# Patient Record
Sex: MALE | Race: WHITE | NOT HISPANIC OR LATINO | Employment: OTHER
[De-identification: names, ages, dates, MRNs, and addresses within clinical notes are randomized per-mention and may not be internally consistent; named-entity substitution may affect disease eponyms.]

---

## 2017-02-17 ENCOUNTER — RECORDS - HEALTHEAST (OUTPATIENT)
Dept: ADMINISTRATIVE | Facility: OTHER | Age: 82
End: 2017-02-17

## 2017-03-10 ENCOUNTER — OFFICE VISIT - HEALTHEAST (OUTPATIENT)
Dept: INTERNAL MEDICINE | Facility: CLINIC | Age: 82
End: 2017-03-10

## 2017-03-10 DIAGNOSIS — Z00.00 HEALTHCARE MAINTENANCE: ICD-10-CM

## 2017-03-10 DIAGNOSIS — Z13.220 LIPID SCREENING: ICD-10-CM

## 2017-03-10 DIAGNOSIS — N20.0 NEPHROLITHIASIS: ICD-10-CM

## 2017-03-10 LAB
CHOLEST SERPL-MCNC: 217 MG/DL
FASTING STATUS PATIENT QL REPORTED: YES
HDLC SERPL-MCNC: 66 MG/DL
LDLC SERPL CALC-MCNC: 139 MG/DL
TRIGL SERPL-MCNC: 61 MG/DL

## 2017-03-10 ASSESSMENT — MIFFLIN-ST. JEOR: SCORE: 1329.85

## 2017-03-11 ENCOUNTER — COMMUNICATION - HEALTHEAST (OUTPATIENT)
Dept: INTERNAL MEDICINE | Facility: CLINIC | Age: 82
End: 2017-03-11

## 2017-04-19 ENCOUNTER — OFFICE VISIT - HEALTHEAST (OUTPATIENT)
Dept: INTERNAL MEDICINE | Facility: CLINIC | Age: 82
End: 2017-04-19

## 2017-04-19 DIAGNOSIS — M54.9 BACK PAIN: ICD-10-CM

## 2017-04-19 ASSESSMENT — MIFFLIN-ST. JEOR: SCORE: 1327.13

## 2017-05-01 ENCOUNTER — HOSPITAL ENCOUNTER (OUTPATIENT)
Dept: MRI IMAGING | Facility: HOSPITAL | Age: 82
Discharge: HOME | End: 2017-05-01
Attending: INTERNAL MEDICINE

## 2017-05-01 DIAGNOSIS — M54.9 BACK PAIN: ICD-10-CM

## 2017-05-02 ENCOUNTER — COMMUNICATION - HEALTHEAST (OUTPATIENT)
Dept: INTERNAL MEDICINE | Facility: CLINIC | Age: 82
End: 2017-05-02

## 2017-06-01 ENCOUNTER — OFFICE VISIT - HEALTHEAST (OUTPATIENT)
Dept: FAMILY MEDICINE | Facility: CLINIC | Age: 82
End: 2017-06-01

## 2017-06-01 ENCOUNTER — HOSPITAL ENCOUNTER (OUTPATIENT)
Dept: ULTRASOUND IMAGING | Facility: HOSPITAL | Age: 82
Discharge: HOME | End: 2017-06-01
Attending: FAMILY MEDICINE

## 2017-06-01 DIAGNOSIS — M79.89 LEFT LEG SWELLING: ICD-10-CM

## 2017-06-01 DIAGNOSIS — S80.812A LEG ABRASION, LEFT, INITIAL ENCOUNTER: ICD-10-CM

## 2017-06-05 ENCOUNTER — OFFICE VISIT - HEALTHEAST (OUTPATIENT)
Dept: FAMILY MEDICINE | Facility: CLINIC | Age: 82
End: 2017-06-05

## 2017-06-05 DIAGNOSIS — L03.116 LEFT LEG CELLULITIS: ICD-10-CM

## 2017-06-05 ASSESSMENT — MIFFLIN-ST. JEOR: SCORE: 1341.18

## 2017-06-08 ENCOUNTER — RECORDS - HEALTHEAST (OUTPATIENT)
Dept: GENERAL RADIOLOGY | Facility: CLINIC | Age: 82
End: 2017-06-08

## 2017-06-08 ENCOUNTER — OFFICE VISIT - HEALTHEAST (OUTPATIENT)
Dept: INTERNAL MEDICINE | Facility: CLINIC | Age: 82
End: 2017-06-08

## 2017-06-08 ENCOUNTER — COMMUNICATION - HEALTHEAST (OUTPATIENT)
Dept: INTERNAL MEDICINE | Facility: CLINIC | Age: 82
End: 2017-06-08

## 2017-06-08 DIAGNOSIS — S93.409A ANKLE SPRAIN: ICD-10-CM

## 2017-06-08 DIAGNOSIS — S81.802A WOUND OF LEFT LEG: ICD-10-CM

## 2017-06-08 DIAGNOSIS — S93.409A SPRAIN OF UNSPECIFIED LIGAMENT OF UNSPECIFIED ANKLE, INITIAL ENCOUNTER: ICD-10-CM

## 2017-06-08 ASSESSMENT — MIFFLIN-ST. JEOR: SCORE: 1333.47

## 2017-06-12 ENCOUNTER — OFFICE VISIT - HEALTHEAST (OUTPATIENT)
Dept: VASCULAR SURGERY | Facility: CLINIC | Age: 82
End: 2017-06-12

## 2017-06-12 DIAGNOSIS — L97.809: ICD-10-CM

## 2017-06-12 DIAGNOSIS — L97.922 LEG ULCER, LEFT, WITH FAT LAYER EXPOSED (H): ICD-10-CM

## 2017-06-12 ASSESSMENT — MIFFLIN-ST. JEOR: SCORE: 1332.11

## 2017-06-14 ENCOUNTER — COMMUNICATION - HEALTHEAST (OUTPATIENT)
Dept: VASCULAR SURGERY | Facility: CLINIC | Age: 82
End: 2017-06-14

## 2017-06-22 ENCOUNTER — OFFICE VISIT - HEALTHEAST (OUTPATIENT)
Dept: VASCULAR SURGERY | Facility: CLINIC | Age: 82
End: 2017-06-22

## 2017-06-22 DIAGNOSIS — L97.821: ICD-10-CM

## 2017-06-22 DIAGNOSIS — L97.921 LEG ULCER, LEFT, LIMITED TO BREAKDOWN OF SKIN (H): ICD-10-CM

## 2017-07-12 ENCOUNTER — COMMUNICATION - HEALTHEAST (OUTPATIENT)
Dept: INTERNAL MEDICINE | Facility: CLINIC | Age: 82
End: 2017-07-12

## 2017-07-27 ENCOUNTER — OFFICE VISIT - HEALTHEAST (OUTPATIENT)
Dept: VASCULAR SURGERY | Facility: CLINIC | Age: 82
End: 2017-07-27

## 2017-07-27 DIAGNOSIS — L97.921 LEG ULCER, LEFT, LIMITED TO BREAKDOWN OF SKIN (H): ICD-10-CM

## 2017-11-09 ENCOUNTER — RECORDS - HEALTHEAST (OUTPATIENT)
Dept: ADMINISTRATIVE | Facility: OTHER | Age: 82
End: 2017-11-09

## 2018-02-13 ENCOUNTER — RECORDS - HEALTHEAST (OUTPATIENT)
Dept: ADMINISTRATIVE | Facility: OTHER | Age: 83
End: 2018-02-13

## 2018-03-22 ENCOUNTER — OFFICE VISIT - HEALTHEAST (OUTPATIENT)
Dept: INTERNAL MEDICINE | Facility: CLINIC | Age: 83
End: 2018-03-22

## 2018-03-22 DIAGNOSIS — R42 VERTIGO: ICD-10-CM

## 2018-03-22 DIAGNOSIS — E78.5 HYPERLIPEMIA: ICD-10-CM

## 2018-03-22 LAB
ALBUMIN SERPL-MCNC: 3.5 G/DL (ref 3.5–5)
ALP SERPL-CCNC: 175 U/L (ref 45–120)
ALT SERPL W P-5'-P-CCNC: 27 U/L (ref 0–45)
ANION GAP SERPL CALCULATED.3IONS-SCNC: 7 MMOL/L (ref 5–18)
AST SERPL W P-5'-P-CCNC: 26 U/L (ref 0–40)
BILIRUB SERPL-MCNC: 0.6 MG/DL (ref 0–1)
BUN SERPL-MCNC: 23 MG/DL (ref 8–28)
CALCIUM SERPL-MCNC: 9.3 MG/DL (ref 8.5–10.5)
CHLORIDE BLD-SCNC: 107 MMOL/L (ref 98–107)
CHOLEST SERPL-MCNC: 185 MG/DL
CO2 SERPL-SCNC: 28 MMOL/L (ref 22–31)
CREAT SERPL-MCNC: 0.86 MG/DL (ref 0.7–1.3)
ERYTHROCYTE [DISTWIDTH] IN BLOOD BY AUTOMATED COUNT: 12.8 % (ref 11–14.5)
FASTING STATUS PATIENT QL REPORTED: YES
GFR SERPL CREATININE-BSD FRML MDRD: >60 ML/MIN/1.73M2
GLUCOSE BLD-MCNC: 87 MG/DL (ref 70–125)
HCT VFR BLD AUTO: 41.7 % (ref 40–54)
HDLC SERPL-MCNC: 64 MG/DL
HGB BLD-MCNC: 14.2 G/DL (ref 14–18)
LDLC SERPL CALC-MCNC: 108 MG/DL
MCH RBC QN AUTO: 31.4 PG (ref 27–34)
MCHC RBC AUTO-ENTMCNC: 34.1 G/DL (ref 32–36)
MCV RBC AUTO: 92 FL (ref 80–100)
PLATELET # BLD AUTO: 171 THOU/UL (ref 140–440)
PMV BLD AUTO: 7.4 FL (ref 7–10)
POTASSIUM BLD-SCNC: 4.9 MMOL/L (ref 3.5–5)
PROT SERPL-MCNC: 7.3 G/DL (ref 6–8)
RBC # BLD AUTO: 4.53 MILL/UL (ref 4.4–6.2)
SODIUM SERPL-SCNC: 142 MMOL/L (ref 136–145)
TRIGL SERPL-MCNC: 66 MG/DL
TSH SERPL DL<=0.005 MIU/L-ACNC: 0.12 UIU/ML (ref 0.3–5)
WBC: 2.7 THOU/UL (ref 4–11)

## 2018-03-22 ASSESSMENT — MIFFLIN-ST. JEOR: SCORE: 1323.49

## 2018-03-23 ENCOUNTER — COMMUNICATION - HEALTHEAST (OUTPATIENT)
Dept: INTERNAL MEDICINE | Facility: CLINIC | Age: 83
End: 2018-03-23

## 2018-03-27 ENCOUNTER — OFFICE VISIT - HEALTHEAST (OUTPATIENT)
Dept: OCCUPATIONAL THERAPY | Facility: REHABILITATION | Age: 83
End: 2018-03-27

## 2018-03-27 DIAGNOSIS — R11.0 NAUSEA: ICD-10-CM

## 2018-03-27 DIAGNOSIS — R42 DIZZINESS: ICD-10-CM

## 2018-03-27 DIAGNOSIS — R26.81 UNSTEADINESS ON FEET: ICD-10-CM

## 2018-03-28 ENCOUNTER — COMMUNICATION - HEALTHEAST (OUTPATIENT)
Dept: INTERNAL MEDICINE | Facility: CLINIC | Age: 83
End: 2018-03-28

## 2018-04-02 ENCOUNTER — RECORDS - HEALTHEAST (OUTPATIENT)
Dept: ADMINISTRATIVE | Facility: OTHER | Age: 83
End: 2018-04-02

## 2018-04-12 ENCOUNTER — OFFICE VISIT - HEALTHEAST (OUTPATIENT)
Dept: OCCUPATIONAL THERAPY | Facility: REHABILITATION | Age: 83
End: 2018-04-12

## 2018-04-12 DIAGNOSIS — R42 DIZZINESS: ICD-10-CM

## 2018-04-12 DIAGNOSIS — R11.0 NAUSEA: ICD-10-CM

## 2018-04-12 DIAGNOSIS — R26.81 UNSTEADINESS ON FEET: ICD-10-CM

## 2018-05-02 ENCOUNTER — OFFICE VISIT - HEALTHEAST (OUTPATIENT)
Dept: PHYSICAL THERAPY | Facility: REHABILITATION | Age: 83
End: 2018-05-02

## 2018-05-02 DIAGNOSIS — R26.81 UNSTEADINESS ON FEET: ICD-10-CM

## 2018-05-02 DIAGNOSIS — Z91.81 HISTORY OF FALL WITHIN PAST 90 DAYS: ICD-10-CM

## 2018-05-16 ENCOUNTER — OFFICE VISIT - HEALTHEAST (OUTPATIENT)
Dept: PHYSICAL THERAPY | Facility: REHABILITATION | Age: 83
End: 2018-05-16

## 2018-05-16 DIAGNOSIS — R26.81 UNSTEADINESS ON FEET: ICD-10-CM

## 2018-05-16 DIAGNOSIS — Z91.81 HISTORY OF FALL WITHIN PAST 90 DAYS: ICD-10-CM

## 2018-05-23 ENCOUNTER — OFFICE VISIT - HEALTHEAST (OUTPATIENT)
Dept: PHYSICAL THERAPY | Facility: REHABILITATION | Age: 83
End: 2018-05-23

## 2018-05-23 DIAGNOSIS — R26.81 UNSTEADINESS ON FEET: ICD-10-CM

## 2018-05-23 DIAGNOSIS — Z91.81 HISTORY OF FALL WITHIN PAST 90 DAYS: ICD-10-CM

## 2018-06-06 ENCOUNTER — OFFICE VISIT - HEALTHEAST (OUTPATIENT)
Dept: PHYSICAL THERAPY | Facility: REHABILITATION | Age: 83
End: 2018-06-06

## 2018-06-06 DIAGNOSIS — R26.81 UNSTEADINESS ON FEET: ICD-10-CM

## 2018-06-06 DIAGNOSIS — Z91.81 HISTORY OF FALL WITHIN PAST 90 DAYS: ICD-10-CM

## 2018-06-20 ENCOUNTER — OFFICE VISIT - HEALTHEAST (OUTPATIENT)
Dept: PHYSICAL THERAPY | Facility: REHABILITATION | Age: 83
End: 2018-06-20

## 2018-06-20 DIAGNOSIS — R26.81 UNSTEADINESS ON FEET: ICD-10-CM

## 2018-06-20 DIAGNOSIS — Z91.81 HISTORY OF FALL WITHIN PAST 90 DAYS: ICD-10-CM

## 2018-07-11 ENCOUNTER — OFFICE VISIT - HEALTHEAST (OUTPATIENT)
Dept: AUDIOLOGY | Facility: CLINIC | Age: 83
End: 2018-07-11

## 2018-07-11 ENCOUNTER — OFFICE VISIT - HEALTHEAST (OUTPATIENT)
Dept: PHYSICAL THERAPY | Facility: REHABILITATION | Age: 83
End: 2018-07-11

## 2018-07-11 DIAGNOSIS — H90.3 SENSORINEURAL HEARING LOSS, BILATERAL: ICD-10-CM

## 2018-07-11 DIAGNOSIS — R26.81 UNSTEADINESS ON FEET: ICD-10-CM

## 2018-07-11 DIAGNOSIS — H93.8X2 PLUGGED FEELING IN EAR, LEFT: ICD-10-CM

## 2018-07-11 DIAGNOSIS — Z91.81 HISTORY OF FALL WITHIN PAST 90 DAYS: ICD-10-CM

## 2018-07-12 ENCOUNTER — COMMUNICATION - HEALTHEAST (OUTPATIENT)
Dept: ADMINISTRATIVE | Facility: CLINIC | Age: 83
End: 2018-07-12

## 2018-07-12 ENCOUNTER — COMMUNICATION - HEALTHEAST (OUTPATIENT)
Dept: INTERNAL MEDICINE | Facility: CLINIC | Age: 83
End: 2018-07-12

## 2018-07-12 DIAGNOSIS — E03.9 HYPOTHYROID: ICD-10-CM

## 2018-08-01 ENCOUNTER — OFFICE VISIT - HEALTHEAST (OUTPATIENT)
Dept: PHYSICAL THERAPY | Facility: REHABILITATION | Age: 83
End: 2018-08-01

## 2018-08-01 DIAGNOSIS — R26.81 UNSTEADINESS ON FEET: ICD-10-CM

## 2018-08-01 DIAGNOSIS — Z91.81 HISTORY OF FALL WITHIN PAST 90 DAYS: ICD-10-CM

## 2018-08-08 ENCOUNTER — OFFICE VISIT - HEALTHEAST (OUTPATIENT)
Dept: INTERNAL MEDICINE | Facility: CLINIC | Age: 83
End: 2018-08-08

## 2018-08-08 DIAGNOSIS — R53.82 CHRONIC FATIGUE: ICD-10-CM

## 2018-08-08 DIAGNOSIS — H61.23 BILATERAL IMPACTED CERUMEN: ICD-10-CM

## 2018-08-08 DIAGNOSIS — E03.9 ACQUIRED HYPOTHYROIDISM: ICD-10-CM

## 2018-10-25 ENCOUNTER — OFFICE VISIT - HEALTHEAST (OUTPATIENT)
Dept: INTERNAL MEDICINE | Facility: CLINIC | Age: 83
End: 2018-10-25

## 2018-10-25 DIAGNOSIS — Z98.49: ICD-10-CM

## 2018-10-25 DIAGNOSIS — R53.83 FATIGUE: ICD-10-CM

## 2018-10-25 DIAGNOSIS — Z01.818 PRE-OPERATIVE EXAMINATION: ICD-10-CM

## 2018-10-25 DIAGNOSIS — D72.819 LEUKOPENIA: ICD-10-CM

## 2018-10-25 LAB
ANION GAP SERPL CALCULATED.3IONS-SCNC: 7 MMOL/L (ref 5–18)
ATRIAL RATE - MUSE: 54 BPM
BASOPHILS # BLD AUTO: 0 THOU/UL (ref 0–0.2)
BASOPHILS NFR BLD AUTO: 0 % (ref 0–2)
BUN SERPL-MCNC: 21 MG/DL (ref 8–28)
CALCIUM SERPL-MCNC: 9.4 MG/DL (ref 8.5–10.5)
CHLORIDE BLD-SCNC: 104 MMOL/L (ref 98–107)
CO2 SERPL-SCNC: 28 MMOL/L (ref 22–31)
CREAT SERPL-MCNC: 0.94 MG/DL (ref 0.7–1.3)
DIASTOLIC BLOOD PRESSURE - MUSE: NORMAL MMHG
EOSINOPHIL # BLD AUTO: 0 THOU/UL (ref 0–0.4)
EOSINOPHIL NFR BLD AUTO: 1 % (ref 0–6)
ERYTHROCYTE [DISTWIDTH] IN BLOOD BY AUTOMATED COUNT: 12.1 % (ref 11–14.5)
GFR SERPL CREATININE-BSD FRML MDRD: >60 ML/MIN/1.73M2
GLUCOSE BLD-MCNC: 84 MG/DL (ref 70–125)
HCT VFR BLD AUTO: 39.7 % (ref 40–54)
HGB BLD-MCNC: 13.4 G/DL (ref 14–18)
INTERPRETATION ECG - MUSE: NORMAL
LYMPHOCYTES # BLD AUTO: 0.7 THOU/UL (ref 0.8–4.4)
LYMPHOCYTES NFR BLD AUTO: 23 % (ref 20–40)
MCH RBC QN AUTO: 31.4 PG (ref 27–34)
MCHC RBC AUTO-ENTMCNC: 33.7 G/DL (ref 32–36)
MCV RBC AUTO: 93 FL (ref 80–100)
MONOCYTES # BLD AUTO: 0.4 THOU/UL (ref 0–0.9)
MONOCYTES NFR BLD AUTO: 13 % (ref 2–10)
NEUTROPHILS # BLD AUTO: 1.9 THOU/UL (ref 2–7.7)
NEUTROPHILS NFR BLD AUTO: 63 % (ref 50–70)
P AXIS - MUSE: 88 DEGREES
PLATELET # BLD AUTO: 166 THOU/UL (ref 140–440)
PMV BLD AUTO: 7.3 FL (ref 7–10)
POTASSIUM BLD-SCNC: 4.4 MMOL/L (ref 3.5–5)
PR INTERVAL - MUSE: 176 MS
QRS DURATION - MUSE: 90 MS
QT - MUSE: 434 MS
QTC - MUSE: 411 MS
R AXIS - MUSE: 12 DEGREES
RBC # BLD AUTO: 4.27 MILL/UL (ref 4.4–6.2)
SODIUM SERPL-SCNC: 139 MMOL/L (ref 136–145)
SYSTOLIC BLOOD PRESSURE - MUSE: NORMAL MMHG
T AXIS - MUSE: 63 DEGREES
VENTRICULAR RATE- MUSE: 54 BPM
WBC: 3.1 THOU/UL (ref 4–11)

## 2018-10-25 ASSESSMENT — MIFFLIN-ST. JEOR: SCORE: 1318.5

## 2018-10-26 ENCOUNTER — COMMUNICATION - HEALTHEAST (OUTPATIENT)
Dept: INTERNAL MEDICINE | Facility: CLINIC | Age: 83
End: 2018-10-26

## 2018-12-17 ENCOUNTER — RECORDS - HEALTHEAST (OUTPATIENT)
Dept: ADMINISTRATIVE | Facility: OTHER | Age: 83
End: 2018-12-17

## 2019-01-19 ENCOUNTER — COMMUNICATION - HEALTHEAST (OUTPATIENT)
Dept: INTERNAL MEDICINE | Facility: CLINIC | Age: 84
End: 2019-01-19

## 2019-01-19 DIAGNOSIS — E03.9 HYPOTHYROID: ICD-10-CM

## 2019-02-04 ENCOUNTER — COMMUNICATION - HEALTHEAST (OUTPATIENT)
Dept: INTERNAL MEDICINE | Facility: CLINIC | Age: 84
End: 2019-02-04

## 2019-02-07 ENCOUNTER — OFFICE VISIT - HEALTHEAST (OUTPATIENT)
Dept: INTERNAL MEDICINE | Facility: CLINIC | Age: 84
End: 2019-02-07

## 2019-02-07 DIAGNOSIS — E03.9 ACQUIRED HYPOTHYROIDISM: ICD-10-CM

## 2019-02-07 DIAGNOSIS — F34.1 DYSTHYMIA: ICD-10-CM

## 2019-02-07 DIAGNOSIS — R53.83 FATIGUE, UNSPECIFIED TYPE: ICD-10-CM

## 2019-02-15 ENCOUNTER — RECORDS - HEALTHEAST (OUTPATIENT)
Dept: ADMINISTRATIVE | Facility: OTHER | Age: 84
End: 2019-02-15

## 2019-03-26 ENCOUNTER — COMMUNICATION - HEALTHEAST (OUTPATIENT)
Dept: FAMILY MEDICINE | Facility: CLINIC | Age: 84
End: 2019-03-26

## 2019-03-26 ENCOUNTER — OFFICE VISIT - HEALTHEAST (OUTPATIENT)
Dept: FAMILY MEDICINE | Facility: CLINIC | Age: 84
End: 2019-03-26

## 2019-03-26 DIAGNOSIS — F34.1 DYSTHYMIA: ICD-10-CM

## 2019-03-26 DIAGNOSIS — Z00.00 ROUTINE GENERAL MEDICAL EXAMINATION AT A HEALTH CARE FACILITY: ICD-10-CM

## 2019-03-26 DIAGNOSIS — H57.89 EYE DRAINAGE: ICD-10-CM

## 2019-03-26 DIAGNOSIS — E03.9 ACQUIRED HYPOTHYROIDISM: ICD-10-CM

## 2019-03-26 DIAGNOSIS — Z80.42 FAMILY HISTORY OF MALIGNANT NEOPLASM OF PROSTATE: ICD-10-CM

## 2019-03-26 LAB — TSH SERPL DL<=0.005 MIU/L-ACNC: 0.14 UIU/ML (ref 0.3–5)

## 2019-03-26 ASSESSMENT — MIFFLIN-ST. JEOR: SCORE: 1265.32

## 2019-05-09 ENCOUNTER — AMBULATORY - HEALTHEAST (OUTPATIENT)
Dept: FAMILY MEDICINE | Facility: CLINIC | Age: 84
End: 2019-05-09

## 2019-05-09 ENCOUNTER — AMBULATORY - HEALTHEAST (OUTPATIENT)
Dept: LAB | Facility: CLINIC | Age: 84
End: 2019-05-09

## 2019-05-09 DIAGNOSIS — E03.9 ACQUIRED HYPOTHYROIDISM: ICD-10-CM

## 2019-05-09 LAB — TSH SERPL DL<=0.005 MIU/L-ACNC: 1.21 UIU/ML (ref 0.3–5)

## 2019-05-20 ENCOUNTER — OFFICE VISIT - HEALTHEAST (OUTPATIENT)
Dept: FAMILY MEDICINE | Facility: CLINIC | Age: 84
End: 2019-05-20

## 2019-05-20 DIAGNOSIS — L60.8 TOENAIL DEFORMITY: ICD-10-CM

## 2019-05-20 DIAGNOSIS — R26.89 BALANCE PROBLEMS: ICD-10-CM

## 2019-05-20 DIAGNOSIS — R26.81 GENERAL UNSTEADINESS: ICD-10-CM

## 2019-05-20 DIAGNOSIS — H61.91 LESION OF EXTERNAL EAR CANAL, RIGHT: ICD-10-CM

## 2019-05-20 DIAGNOSIS — H61.21 IMPACTED CERUMEN OF RIGHT EAR: ICD-10-CM

## 2019-05-20 DIAGNOSIS — E03.9 ACQUIRED HYPOTHYROIDISM: ICD-10-CM

## 2019-05-20 ASSESSMENT — MIFFLIN-ST. JEOR: SCORE: 1281.99

## 2019-05-23 ENCOUNTER — OFFICE VISIT - HEALTHEAST (OUTPATIENT)
Dept: PODIATRY | Facility: CLINIC | Age: 84
End: 2019-05-23

## 2019-05-23 DIAGNOSIS — L60.2 ONYCHAUXIS: ICD-10-CM

## 2019-05-23 DIAGNOSIS — B35.1 NAIL FUNGUS: ICD-10-CM

## 2019-05-23 ASSESSMENT — MIFFLIN-ST. JEOR: SCORE: 1281.08

## 2019-05-30 ENCOUNTER — OFFICE VISIT - HEALTHEAST (OUTPATIENT)
Dept: PHYSICAL THERAPY | Facility: REHABILITATION | Age: 84
End: 2019-05-30

## 2019-05-30 DIAGNOSIS — M62.81 GENERALIZED MUSCLE WEAKNESS: ICD-10-CM

## 2019-05-30 DIAGNOSIS — R26.81 UNSTEADINESS ON FEET: ICD-10-CM

## 2019-05-31 ENCOUNTER — OFFICE VISIT - HEALTHEAST (OUTPATIENT)
Dept: OTOLARYNGOLOGY | Facility: CLINIC | Age: 84
End: 2019-05-31

## 2019-05-31 DIAGNOSIS — H90.3 SENSORINEURAL HEARING LOSS (SNHL) OF BOTH EARS: ICD-10-CM

## 2019-05-31 DIAGNOSIS — H61.21 IMPACTED CERUMEN OF RIGHT EAR: ICD-10-CM

## 2019-05-31 DIAGNOSIS — H61.891 IRRITATION OF EXTERNAL EAR CANAL, RIGHT: ICD-10-CM

## 2019-06-21 ENCOUNTER — OFFICE VISIT - HEALTHEAST (OUTPATIENT)
Dept: PHYSICAL THERAPY | Facility: REHABILITATION | Age: 84
End: 2019-06-21

## 2019-06-21 DIAGNOSIS — Z91.81 HISTORY OF FALL WITHIN PAST 90 DAYS: ICD-10-CM

## 2019-06-21 DIAGNOSIS — R26.81 UNSTEADINESS ON FEET: ICD-10-CM

## 2019-06-21 DIAGNOSIS — M62.81 GENERALIZED MUSCLE WEAKNESS: ICD-10-CM

## 2019-06-27 ENCOUNTER — OFFICE VISIT - HEALTHEAST (OUTPATIENT)
Dept: PHYSICAL THERAPY | Facility: REHABILITATION | Age: 84
End: 2019-06-27

## 2019-06-27 DIAGNOSIS — R26.81 UNSTEADINESS ON FEET: ICD-10-CM

## 2019-06-27 DIAGNOSIS — Z91.81 HISTORY OF FALL WITHIN PAST 90 DAYS: ICD-10-CM

## 2019-06-27 DIAGNOSIS — M62.81 GENERALIZED MUSCLE WEAKNESS: ICD-10-CM

## 2019-07-11 ENCOUNTER — OFFICE VISIT - HEALTHEAST (OUTPATIENT)
Dept: PHYSICAL THERAPY | Facility: REHABILITATION | Age: 84
End: 2019-07-11

## 2019-07-11 DIAGNOSIS — M62.81 GENERALIZED MUSCLE WEAKNESS: ICD-10-CM

## 2019-07-11 DIAGNOSIS — R26.81 UNSTEADINESS ON FEET: ICD-10-CM

## 2019-07-11 DIAGNOSIS — Z91.81 HISTORY OF FALL WITHIN PAST 90 DAYS: ICD-10-CM

## 2019-07-25 ENCOUNTER — RECORDS - HEALTHEAST (OUTPATIENT)
Dept: ADMINISTRATIVE | Facility: OTHER | Age: 84
End: 2019-07-25

## 2019-08-09 ENCOUNTER — OFFICE VISIT - HEALTHEAST (OUTPATIENT)
Dept: OTOLARYNGOLOGY | Facility: CLINIC | Age: 84
End: 2019-08-09

## 2019-08-09 DIAGNOSIS — H61.891 IRRITATION OF EXTERNAL EAR CANAL, RIGHT: ICD-10-CM

## 2019-09-20 ENCOUNTER — OFFICE VISIT - HEALTHEAST (OUTPATIENT)
Dept: FAMILY MEDICINE | Facility: CLINIC | Age: 84
End: 2019-09-20

## 2019-09-20 DIAGNOSIS — F34.1 DYSTHYMIA: ICD-10-CM

## 2019-09-20 DIAGNOSIS — R26.89 BALANCE PROBLEMS: ICD-10-CM

## 2019-09-20 DIAGNOSIS — R53.83 FATIGUE, UNSPECIFIED TYPE: ICD-10-CM

## 2019-09-20 DIAGNOSIS — R26.81 GENERAL UNSTEADINESS: ICD-10-CM

## 2019-09-20 ASSESSMENT — ANXIETY QUESTIONNAIRES
IF YOU CHECKED OFF ANY PROBLEMS ON THIS QUESTIONNAIRE, HOW DIFFICULT HAVE THESE PROBLEMS MADE IT FOR YOU TO DO YOUR WORK, TAKE CARE OF THINGS AT HOME, OR GET ALONG WITH OTHER PEOPLE: NOT DIFFICULT AT ALL
1. FEELING NERVOUS, ANXIOUS, OR ON EDGE: NOT AT ALL
7. FEELING AFRAID AS IF SOMETHING AWFUL MIGHT HAPPEN: NOT AT ALL
3. WORRYING TOO MUCH ABOUT DIFFERENT THINGS: NOT AT ALL
6. BECOMING EASILY ANNOYED OR IRRITABLE: NOT AT ALL
2. NOT BEING ABLE TO STOP OR CONTROL WORRYING: NOT AT ALL
GAD7 TOTAL SCORE: 0
4. TROUBLE RELAXING: NOT AT ALL
5. BEING SO RESTLESS THAT IT IS HARD TO SIT STILL: NOT AT ALL

## 2019-09-20 ASSESSMENT — MIFFLIN-ST. JEOR: SCORE: 1280.63

## 2019-09-20 ASSESSMENT — PATIENT HEALTH QUESTIONNAIRE - PHQ9: SUM OF ALL RESPONSES TO PHQ QUESTIONS 1-9: 7

## 2019-10-21 ENCOUNTER — OFFICE VISIT - HEALTHEAST (OUTPATIENT)
Dept: FAMILY MEDICINE | Facility: CLINIC | Age: 84
End: 2019-10-21

## 2019-10-21 DIAGNOSIS — F34.1 DYSTHYMIA: ICD-10-CM

## 2019-10-21 DIAGNOSIS — R26.89 BALANCE PROBLEMS: ICD-10-CM

## 2019-10-21 DIAGNOSIS — R53.83 FATIGUE, UNSPECIFIED TYPE: ICD-10-CM

## 2019-10-21 DIAGNOSIS — E55.9 VITAMIN D DEFICIENCY, UNSPECIFIED: ICD-10-CM

## 2019-10-21 LAB
BASOPHILS # BLD AUTO: 0 THOU/UL (ref 0–0.2)
BASOPHILS NFR BLD AUTO: 0 % (ref 0–2)
EOSINOPHIL # BLD AUTO: 0 THOU/UL (ref 0–0.4)
EOSINOPHIL NFR BLD AUTO: 1 % (ref 0–6)
ERYTHROCYTE [DISTWIDTH] IN BLOOD BY AUTOMATED COUNT: 14.1 % (ref 11–14.5)
HCT VFR BLD AUTO: 40.5 % (ref 40–54)
HGB BLD-MCNC: 12.8 G/DL (ref 14–18)
LYMPHOCYTES # BLD AUTO: 0.5 THOU/UL (ref 0.8–4.4)
LYMPHOCYTES NFR BLD AUTO: 18 % (ref 20–40)
MCH RBC QN AUTO: 31.3 PG (ref 27–34)
MCHC RBC AUTO-ENTMCNC: 31.6 G/DL (ref 32–36)
MCV RBC AUTO: 99 FL (ref 80–100)
MONOCYTES # BLD AUTO: 0.3 THOU/UL (ref 0–0.9)
MONOCYTES NFR BLD AUTO: 10 % (ref 2–10)
NEUTROPHILS # BLD AUTO: 2 THOU/UL (ref 2–7.7)
NEUTROPHILS NFR BLD AUTO: 71 % (ref 50–70)
PLATELET # BLD AUTO: 171 THOU/UL (ref 140–440)
PMV BLD AUTO: 10.3 FL (ref 8.5–12.5)
RBC # BLD AUTO: 4.09 MILL/UL (ref 4.4–6.2)
WBC: 2.8 THOU/UL (ref 4–11)

## 2019-10-21 ASSESSMENT — ANXIETY QUESTIONNAIRES
5. BEING SO RESTLESS THAT IT IS HARD TO SIT STILL: NOT AT ALL
IF YOU CHECKED OFF ANY PROBLEMS ON THIS QUESTIONNAIRE, HOW DIFFICULT HAVE THESE PROBLEMS MADE IT FOR YOU TO DO YOUR WORK, TAKE CARE OF THINGS AT HOME, OR GET ALONG WITH OTHER PEOPLE: NOT DIFFICULT AT ALL
2. NOT BEING ABLE TO STOP OR CONTROL WORRYING: NOT AT ALL
7. FEELING AFRAID AS IF SOMETHING AWFUL MIGHT HAPPEN: NOT AT ALL
1. FEELING NERVOUS, ANXIOUS, OR ON EDGE: NOT AT ALL
GAD7 TOTAL SCORE: 0
3. WORRYING TOO MUCH ABOUT DIFFERENT THINGS: NOT AT ALL
6. BECOMING EASILY ANNOYED OR IRRITABLE: NOT AT ALL
4. TROUBLE RELAXING: NOT AT ALL

## 2019-10-21 ASSESSMENT — MIFFLIN-ST. JEOR: SCORE: 1298.77

## 2019-10-21 ASSESSMENT — PATIENT HEALTH QUESTIONNAIRE - PHQ9: SUM OF ALL RESPONSES TO PHQ QUESTIONS 1-9: 3

## 2019-10-22 ENCOUNTER — AMBULATORY - HEALTHEAST (OUTPATIENT)
Dept: FAMILY MEDICINE | Facility: CLINIC | Age: 84
End: 2019-10-22

## 2019-10-22 ENCOUNTER — COMMUNICATION - HEALTHEAST (OUTPATIENT)
Dept: FAMILY MEDICINE | Facility: CLINIC | Age: 84
End: 2019-10-22

## 2019-10-22 DIAGNOSIS — D70.9 NEUTROPENIA, UNSPECIFIED TYPE (H): ICD-10-CM

## 2019-10-22 DIAGNOSIS — D64.9 ANEMIA, UNSPECIFIED TYPE: ICD-10-CM

## 2019-10-22 LAB — 25(OH)D3 SERPL-MCNC: 40.3 NG/ML (ref 30–80)

## 2019-10-28 ENCOUNTER — COMMUNICATION - HEALTHEAST (OUTPATIENT)
Dept: ONCOLOGY | Facility: HOSPITAL | Age: 84
End: 2019-10-28

## 2019-10-31 ENCOUNTER — COMMUNICATION - HEALTHEAST (OUTPATIENT)
Dept: ONCOLOGY | Facility: HOSPITAL | Age: 84
End: 2019-10-31

## 2019-10-31 ENCOUNTER — COMMUNICATION - HEALTHEAST (OUTPATIENT)
Dept: ADMINISTRATIVE | Facility: HOSPITAL | Age: 84
End: 2019-10-31

## 2019-11-01 ENCOUNTER — COMMUNICATION - HEALTHEAST (OUTPATIENT)
Dept: ONCOLOGY | Facility: HOSPITAL | Age: 84
End: 2019-11-01

## 2019-11-08 ENCOUNTER — AMBULATORY - HEALTHEAST (OUTPATIENT)
Dept: INFUSION THERAPY | Facility: HOSPITAL | Age: 84
End: 2019-11-08

## 2019-11-08 ENCOUNTER — OFFICE VISIT - HEALTHEAST (OUTPATIENT)
Dept: ONCOLOGY | Facility: HOSPITAL | Age: 84
End: 2019-11-08

## 2019-11-08 DIAGNOSIS — D64.89 ANEMIA DUE TO OTHER CAUSE, NOT CLASSIFIED: ICD-10-CM

## 2019-11-08 DIAGNOSIS — R74.9 ABNORMAL SERUM ENZYME LEVEL, UNSPECIFIED: ICD-10-CM

## 2019-11-08 DIAGNOSIS — D64.9 ANEMIA, UNSPECIFIED TYPE: ICD-10-CM

## 2019-11-08 DIAGNOSIS — D72.810 LYMPHOCYTOPENIA: ICD-10-CM

## 2019-11-08 DIAGNOSIS — D64.9 NORMOCYTIC ANEMIA: ICD-10-CM

## 2019-11-08 LAB
ALBUMIN SERPL-MCNC: 3.6 G/DL (ref 3.5–5)
ALP SERPL-CCNC: 183 U/L (ref 45–120)
ALT SERPL W P-5'-P-CCNC: 23 U/L (ref 0–45)
ANION GAP SERPL CALCULATED.3IONS-SCNC: 5 MMOL/L (ref 5–18)
AST SERPL W P-5'-P-CCNC: 23 U/L (ref 0–40)
BASOPHILS # BLD AUTO: 0 THOU/UL (ref 0–0.2)
BASOPHILS NFR BLD AUTO: 0 % (ref 0–2)
BILIRUB SERPL-MCNC: 0.4 MG/DL (ref 0–1)
BUN SERPL-MCNC: 24 MG/DL (ref 8–28)
CALCIUM SERPL-MCNC: 9.6 MG/DL (ref 8.5–10.5)
CHLORIDE BLD-SCNC: 105 MMOL/L (ref 98–107)
CO2 SERPL-SCNC: 29 MMOL/L (ref 22–31)
CREAT SERPL-MCNC: 1.01 MG/DL (ref 0.7–1.3)
EOSINOPHIL # BLD AUTO: 0 THOU/UL (ref 0–0.4)
EOSINOPHIL NFR BLD AUTO: 1 % (ref 0–6)
ERYTHROCYTE [DISTWIDTH] IN BLOOD BY AUTOMATED COUNT: 14.3 % (ref 11–14.5)
FOLATE SERPL-MCNC: 19.4 NG/ML
GFR SERPL CREATININE-BSD FRML MDRD: >60 ML/MIN/1.73M2
GLUCOSE BLD-MCNC: 65 MG/DL (ref 70–125)
HCT VFR BLD AUTO: 40.4 % (ref 40–54)
HGB BLD-MCNC: 13.3 G/DL (ref 14–18)
LYMPHOCYTES # BLD AUTO: 0.7 THOU/UL (ref 0.8–4.4)
LYMPHOCYTES NFR BLD AUTO: 20 % (ref 20–40)
MCH RBC QN AUTO: 32.5 PG (ref 27–34)
MCHC RBC AUTO-ENTMCNC: 32.9 G/DL (ref 32–36)
MCV RBC AUTO: 99 FL (ref 80–100)
MONOCYTES # BLD AUTO: 0.5 THOU/UL (ref 0–0.9)
MONOCYTES NFR BLD AUTO: 15 % (ref 2–10)
NEUTROPHILS # BLD AUTO: 2.2 THOU/UL (ref 2–7.7)
NEUTROPHILS NFR BLD AUTO: 64 % (ref 50–70)
PLATELET # BLD AUTO: 177 THOU/UL (ref 140–440)
PMV BLD AUTO: 10 FL (ref 8.5–12.5)
POTASSIUM BLD-SCNC: 4.8 MMOL/L (ref 3.5–5)
PROT SERPL-MCNC: 7.7 G/DL (ref 6–8)
RBC # BLD AUTO: 4.09 MILL/UL (ref 4.4–6.2)
RETICS # AUTO: 0.04 MILL/UL (ref 0.01–0.11)
RETICS/RBC NFR AUTO: 1.07 % (ref 0.8–2.7)
SODIUM SERPL-SCNC: 139 MMOL/L (ref 136–145)
T4 FREE SERPL-MCNC: 1.1 NG/DL (ref 0.7–1.8)
TSH SERPL DL<=0.005 MIU/L-ACNC: 0.66 UIU/ML (ref 0.3–5)
VIT B12 SERPL-MCNC: 554 PG/ML (ref 213–816)
WBC: 3.4 THOU/UL (ref 4–11)

## 2019-11-08 ASSESSMENT — MIFFLIN-ST. JEOR: SCORE: 1305.12

## 2019-11-11 LAB — COPPER SERPL-MCNC: 119.2 UG/DL (ref 70–140)

## 2019-11-13 ENCOUNTER — COMMUNICATION - HEALTHEAST (OUTPATIENT)
Dept: ONCOLOGY | Facility: HOSPITAL | Age: 84
End: 2019-11-13

## 2019-11-25 ENCOUNTER — OFFICE VISIT - HEALTHEAST (OUTPATIENT)
Dept: FAMILY MEDICINE | Facility: CLINIC | Age: 84
End: 2019-11-25

## 2019-11-25 DIAGNOSIS — F41.9 ANXIETY: ICD-10-CM

## 2019-11-25 DIAGNOSIS — R45.86 MOOD CHANGE: ICD-10-CM

## 2019-11-25 DIAGNOSIS — F32.A DEPRESSION, UNSPECIFIED DEPRESSION TYPE: ICD-10-CM

## 2019-11-25 DIAGNOSIS — R26.89 BALANCE PROBLEMS: ICD-10-CM

## 2019-11-25 ASSESSMENT — ANXIETY QUESTIONNAIRES
5. BEING SO RESTLESS THAT IT IS HARD TO SIT STILL: NOT AT ALL
2. NOT BEING ABLE TO STOP OR CONTROL WORRYING: NEARLY EVERY DAY
4. TROUBLE RELAXING: NOT AT ALL
GAD7 TOTAL SCORE: 3
7. FEELING AFRAID AS IF SOMETHING AWFUL MIGHT HAPPEN: NOT AT ALL
3. WORRYING TOO MUCH ABOUT DIFFERENT THINGS: NOT AT ALL
1. FEELING NERVOUS, ANXIOUS, OR ON EDGE: NOT AT ALL
6. BECOMING EASILY ANNOYED OR IRRITABLE: NOT AT ALL

## 2019-11-25 ASSESSMENT — PATIENT HEALTH QUESTIONNAIRE - PHQ9: SUM OF ALL RESPONSES TO PHQ QUESTIONS 1-9: 18

## 2019-11-25 ASSESSMENT — MIFFLIN-ST. JEOR: SCORE: 1288.34

## 2019-12-11 ENCOUNTER — OFFICE VISIT - HEALTHEAST (OUTPATIENT)
Dept: BEHAVIORAL HEALTH | Facility: CLINIC | Age: 84
End: 2019-12-11

## 2019-12-11 DIAGNOSIS — F43.23 ADJUSTMENT DISORDER WITH MIXED ANXIETY AND DEPRESSED MOOD: ICD-10-CM

## 2019-12-11 ASSESSMENT — PATIENT HEALTH QUESTIONNAIRE - PHQ9: SUM OF ALL RESPONSES TO PHQ QUESTIONS 1-9: 10

## 2019-12-11 ASSESSMENT — ANXIETY QUESTIONNAIRES
6. BECOMING EASILY ANNOYED OR IRRITABLE: SEVERAL DAYS
4. TROUBLE RELAXING: NOT AT ALL
GAD7 TOTAL SCORE: 7
1. FEELING NERVOUS, ANXIOUS, OR ON EDGE: SEVERAL DAYS
5. BEING SO RESTLESS THAT IT IS HARD TO SIT STILL: NOT AT ALL
2. NOT BEING ABLE TO STOP OR CONTROL WORRYING: MORE THAN HALF THE DAYS
7. FEELING AFRAID AS IF SOMETHING AWFUL MIGHT HAPPEN: SEVERAL DAYS
IF YOU CHECKED OFF ANY PROBLEMS ON THIS QUESTIONNAIRE, HOW DIFFICULT HAVE THESE PROBLEMS MADE IT FOR YOU TO DO YOUR WORK, TAKE CARE OF THINGS AT HOME, OR GET ALONG WITH OTHER PEOPLE: NOT DIFFICULT AT ALL
3. WORRYING TOO MUCH ABOUT DIFFERENT THINGS: MORE THAN HALF THE DAYS

## 2019-12-17 ENCOUNTER — COMMUNICATION - HEALTHEAST (OUTPATIENT)
Dept: FAMILY MEDICINE | Facility: CLINIC | Age: 84
End: 2019-12-17

## 2019-12-23 ENCOUNTER — OFFICE VISIT - HEALTHEAST (OUTPATIENT)
Dept: FAMILY MEDICINE | Facility: CLINIC | Age: 84
End: 2019-12-23

## 2019-12-23 DIAGNOSIS — F41.9 ANXIETY: ICD-10-CM

## 2019-12-23 DIAGNOSIS — F32.A DEPRESSION, UNSPECIFIED DEPRESSION TYPE: ICD-10-CM

## 2019-12-23 ASSESSMENT — ANXIETY QUESTIONNAIRES
5. BEING SO RESTLESS THAT IT IS HARD TO SIT STILL: NOT AT ALL
3. WORRYING TOO MUCH ABOUT DIFFERENT THINGS: SEVERAL DAYS
1. FEELING NERVOUS, ANXIOUS, OR ON EDGE: MORE THAN HALF THE DAYS
4. TROUBLE RELAXING: NOT AT ALL
6. BECOMING EASILY ANNOYED OR IRRITABLE: MORE THAN HALF THE DAYS
7. FEELING AFRAID AS IF SOMETHING AWFUL MIGHT HAPPEN: NOT AT ALL
2. NOT BEING ABLE TO STOP OR CONTROL WORRYING: SEVERAL DAYS
GAD7 TOTAL SCORE: 6

## 2019-12-23 ASSESSMENT — PATIENT HEALTH QUESTIONNAIRE - PHQ9: SUM OF ALL RESPONSES TO PHQ QUESTIONS 1-9: 10

## 2019-12-23 ASSESSMENT — MIFFLIN-ST. JEOR: SCORE: 1294.69

## 2020-01-10 ENCOUNTER — RECORDS - HEALTHEAST (OUTPATIENT)
Dept: ADMINISTRATIVE | Facility: OTHER | Age: 85
End: 2020-01-10

## 2020-01-17 ENCOUNTER — RECORDS - HEALTHEAST (OUTPATIENT)
Dept: ADMINISTRATIVE | Facility: OTHER | Age: 85
End: 2020-01-17

## 2020-01-28 ENCOUNTER — OFFICE VISIT - HEALTHEAST (OUTPATIENT)
Dept: FAMILY MEDICINE | Facility: CLINIC | Age: 85
End: 2020-01-28

## 2020-01-28 DIAGNOSIS — F32.A DEPRESSION, UNSPECIFIED DEPRESSION TYPE: ICD-10-CM

## 2020-01-28 DIAGNOSIS — G20.A1 PARKINSON DISEASE (H): ICD-10-CM

## 2020-01-28 ASSESSMENT — MIFFLIN-ST. JEOR: SCORE: 1297.41

## 2020-01-29 ASSESSMENT — ANXIETY QUESTIONNAIRES
GAD7 TOTAL SCORE: 3
7. FEELING AFRAID AS IF SOMETHING AWFUL MIGHT HAPPEN: NOT AT ALL
2. NOT BEING ABLE TO STOP OR CONTROL WORRYING: SEVERAL DAYS
6. BECOMING EASILY ANNOYED OR IRRITABLE: NOT AT ALL
3. WORRYING TOO MUCH ABOUT DIFFERENT THINGS: SEVERAL DAYS
4. TROUBLE RELAXING: NOT AT ALL
1. FEELING NERVOUS, ANXIOUS, OR ON EDGE: SEVERAL DAYS
5. BEING SO RESTLESS THAT IT IS HARD TO SIT STILL: NOT AT ALL

## 2020-01-29 ASSESSMENT — PATIENT HEALTH QUESTIONNAIRE - PHQ9: SUM OF ALL RESPONSES TO PHQ QUESTIONS 1-9: 4

## 2020-02-28 ENCOUNTER — OFFICE VISIT - HEALTHEAST (OUTPATIENT)
Dept: FAMILY MEDICINE | Facility: CLINIC | Age: 85
End: 2020-02-28

## 2020-02-28 DIAGNOSIS — R00.1 SINUS BRADYCARDIA: ICD-10-CM

## 2020-02-28 DIAGNOSIS — F41.9 ANXIETY: ICD-10-CM

## 2020-02-28 DIAGNOSIS — F32.A DEPRESSION, UNSPECIFIED DEPRESSION TYPE: ICD-10-CM

## 2020-02-28 DIAGNOSIS — G20.A1 PARKINSON DISEASE (H): ICD-10-CM

## 2020-02-28 ASSESSMENT — ANXIETY QUESTIONNAIRES
4. TROUBLE RELAXING: NOT AT ALL
1. FEELING NERVOUS, ANXIOUS, OR ON EDGE: SEVERAL DAYS
GAD7 TOTAL SCORE: 3
3. WORRYING TOO MUCH ABOUT DIFFERENT THINGS: SEVERAL DAYS
7. FEELING AFRAID AS IF SOMETHING AWFUL MIGHT HAPPEN: NOT AT ALL
5. BEING SO RESTLESS THAT IT IS HARD TO SIT STILL: NOT AT ALL
2. NOT BEING ABLE TO STOP OR CONTROL WORRYING: SEVERAL DAYS
6. BECOMING EASILY ANNOYED OR IRRITABLE: NOT AT ALL

## 2020-02-28 ASSESSMENT — MIFFLIN-ST. JEOR: SCORE: 1274.73

## 2020-02-28 ASSESSMENT — PATIENT HEALTH QUESTIONNAIRE - PHQ9: SUM OF ALL RESPONSES TO PHQ QUESTIONS 1-9: 6

## 2020-03-18 ENCOUNTER — COMMUNICATION - HEALTHEAST (OUTPATIENT)
Dept: FAMILY MEDICINE | Facility: CLINIC | Age: 85
End: 2020-03-18

## 2020-04-22 ENCOUNTER — COMMUNICATION - HEALTHEAST (OUTPATIENT)
Dept: FAMILY MEDICINE | Facility: CLINIC | Age: 85
End: 2020-04-22

## 2020-04-22 DIAGNOSIS — E03.9 ACQUIRED HYPOTHYROIDISM: ICD-10-CM

## 2020-05-19 ENCOUNTER — COMMUNICATION - HEALTHEAST (OUTPATIENT)
Dept: FAMILY MEDICINE | Facility: CLINIC | Age: 85
End: 2020-05-19

## 2020-05-19 DIAGNOSIS — F32.A DEPRESSION, UNSPECIFIED DEPRESSION TYPE: ICD-10-CM

## 2020-07-13 PROBLEM — D72.819 LEUKOPENIA: Status: ACTIVE | Noted: 2018-10-26

## 2020-07-13 PROBLEM — Z80.42 FAMILY HISTORY OF MALIGNANT NEOPLASM OF PROSTATE: Status: ACTIVE | Noted: 2020-07-13

## 2020-07-13 PROBLEM — F34.1 DYSTHYMIA: Status: ACTIVE | Noted: 2019-02-08

## 2020-07-13 PROBLEM — R53.83 FATIGUE: Status: ACTIVE | Noted: 2018-10-26

## 2020-07-15 ENCOUNTER — AMBULATORY - HEALTHEAST (OUTPATIENT)
Dept: ADMINISTRATIVE | Facility: REHABILITATION | Age: 85
End: 2020-07-15

## 2020-07-15 ENCOUNTER — OFFICE VISIT (OUTPATIENT)
Dept: NEUROLOGY | Facility: CLINIC | Age: 85
End: 2020-07-15
Payer: COMMERCIAL

## 2020-07-15 ENCOUNTER — RECORDS - HEALTHEAST (OUTPATIENT)
Dept: ADMINISTRATIVE | Facility: OTHER | Age: 85
End: 2020-07-15

## 2020-07-15 VITALS — WEIGHT: 140 LBS | HEIGHT: 70 IN | BODY MASS INDEX: 20.04 KG/M2

## 2020-07-15 DIAGNOSIS — G20.A1 PARKINSON DISEASE (H): ICD-10-CM

## 2020-07-15 DIAGNOSIS — G20.A1 PARKINSON DISEASE (H): Primary | ICD-10-CM

## 2020-07-15 PROCEDURE — 99213 OFFICE O/P EST LOW 20 MIN: CPT | Mod: 95 | Performed by: PSYCHIATRY & NEUROLOGY

## 2020-07-15 RX ORDER — PSYLLIUM HUSK 0.4 G
2000 CAPSULE ORAL
COMMUNITY

## 2020-07-15 RX ORDER — MULTIPLE VITAMINS W/ MINERALS TAB 9MG-400MCG
1 TAB ORAL
COMMUNITY

## 2020-07-15 RX ORDER — LEVOTHYROXINE SODIUM 112 UG/1
TABLET ORAL
COMMUNITY
Start: 2020-04-23 | End: 2022-08-12

## 2020-07-15 RX ORDER — CARBIDOPA AND LEVODOPA 25; 100 MG/1; MG/1
2 TABLET ORAL 3 TIMES DAILY
COMMUNITY
Start: 2020-01-10 | End: 2020-10-07

## 2020-07-15 RX ORDER — LUTEIN 6 MG
1 TABLET ORAL
COMMUNITY
End: 2021-01-07

## 2020-07-15 ASSESSMENT — MIFFLIN-ST. JEOR: SCORE: 1296.29

## 2020-07-15 NOTE — NURSING NOTE
Chief Complaint   Patient presents with     Parkinsons Disease     3 month follow up- still having balance problems     Phone visit     [phone]     Magali Martinez CMA on 7/15/2020 at 10:01 AM

## 2020-07-15 NOTE — PATIENT INSTRUCTIONS
Patient Education     Parkinson Disease: Managing Day to Day    As Parkinson disease progresses, you may need to make some changes in your daily routine. For best results, plan activities for times you ll feel your best. Leave plenty of time to complete tasks. And take breaks when you need them. If more help is needed, your healthcare provider may refer you to an occupational therapist. This is a professional who can help you practice tasks of daily living.  Dressing  To make dressing easier:    Sit down to dress. This helps prevent falls.    Choose clothes that are easy to put on and take off. Elastic waistbands and clothes that close in the front are good choices.    Add paper clips to zipper pulls. This makes them easier to grasp.    Choose shoes that are easy to wear. Wear shoes with hook and loop straps. Women should not wear high heels.  Bathing and grooming  Loss of muscle control can make bathing and grooming a challenge. Try these tips:    Install safety items in the bathroom, such as grab bars, nonslip bathmats, and raised toilet seats.    Sit down to brush your teeth, shave, or dry your hair. This helps reduce the risk of falls.    Use liquid soap with a pump. Bar soap can be hard to hold.    Wear an absorbent robe to dry off if using towels is hard.  Eating and drinking  Try these tips at mealtime:    Choose foods that are easy to prepare and eat. Fresh fruits and vegetables make great snacks.    Try large-handled forks, spoons, and knives if it s hard to  utensils. Spill-proof cups can help with drinking.    Tell your healthcare provider if you have any problems swallowing.    Take small bites and small sips to prevent breathing food into your lungs.   Constipation  Constipation is a very common problem. The tips below can help:    Drink plenty of water.    Eat foods that are high in fiber, such as fruits, vegetables, and whole grains. A fiber supplement can also help.    Get regular  exercise.    Talk with your healthcare provider about laxatives or stool softeners.  Communicating  Over time, your voice may grow softer and less distinct. Your handwriting is also likely to become small and cramped. These tips can help you cope:    Breathe deeply before starting your sentences. Focus on speaking slowly and loudly. If needed, your healthcare provider may refer you to a speech therapist.    Add a voice amplifier to your phone. This helps you be heard.    Try typing instead of writing. If this is a problem, consider using voice-activated software for computers.    Use foam  on pens and pencils. These can make them easier to hold.  Sleeping  Many people with Parkinson have trouble sleeping. They may also move in their sleep and strike their partners. Tell your healthcare provider if you re having sleep problems or recurrent nightmares. Medicine can often help you sleep better. Check with your healthcare provider before using over-the-counter medicines to help you sleep.   Getting out of bed  You may feel very stiff and slow in the morning. It often helps to take medicines before you get out of bed. Ask your healthcare provider if you can use dissolvable pills or chew pills with water. This can help medicine work faster. Above all, remember to be patient and take your time.  Having sex  Having Parkinson doesn t mean you can t have a good sex life. Plan sex for the times of day when you ll feel your best. Talk with your partner about your feelings. Your healthcare provider can also find ways to help if you re having problems with sexual function.  If you have any of the following, call your healthcare provider:    Your symptoms suddenly get worse.    You have severe constipation.    You have trouble sleeping.    You have problems chewing or swallowing.    You often  freeze  (are unable to move your feet) or begin having falls.  Date Last Reviewed: 3/1/2018    1124-5106 [employer]  [address] [place] [address]. All rights reserved. This information is not intended as a substitute for professional medical care. Always follow your healthcare professional's instructions.

## 2020-07-15 NOTE — LETTER
7/15/2020         RE: Gomez Villasenor  [address] [facility] [address]        Dear Colleague,    Thank you for referring your patient, Gomez Villasenor, to the [facility] NEUROLOGY [place]. Please see a copy of my visit note below.    [facility] Neurology  [place]    Gomez Villasenor MRN# [id number]   Age: 89 year old YOB: 1928               Assessment and Plan:   Assessment:   Parkinson disease        Plan:   We will keep the Sinemet dose the same for now.  I would like to have him see physical therapy for balance and range of motion training specifically in regards to his Parkinson disease.  In the past he has seen Fransico at the [facility] physical therapy, and hopefully we can have him go back there again.  Delbert and his wife were both very happy with Fransico.    Regarding constipation, this can go along with Parkinson's in general.  I recommended any over-the-counter stool softener as needed.  If all goes well I will see him back in 3 months.               Chief Complaint/HPI:     I spoke to Delbert and his wife for a telephone visit today with their permission for follow-up.  He has a diagnosis of Parkinson's.  At our last visit I increased his Sinemet dose to 2 tablets 3 times daily.  He did notice some benefit with that in terms of ambulation but is still having some balance difficulty and has fallen a few times.  Otherwise he is doing okay.  He does okay with fine motor activities such as buttoning buttons and using utensils at the table.  He is noticing some depression and has been started on Zoloft at 50 mg daily.  He will be seeing his therapist in the next week or so.            Past Medical History:    has no past medical history on file.          Past Surgical History:    has no past surgical history on file.          Social History:     Social History     Tobacco Use     Smoking status: Never Smoker     Smokeless tobacco: Never Used   Substance Use  Topics     Alcohol use: Not Currently             Family History:     Family History   Problem Relation Age of Onset     Heart Disease Mother      Cancer Brother                 Allergies:   No Known Allergies          Medications:     Current Outpatient Medications:      carbidopa-levodopa (SINEMET)  MG tablet, Take 2 tablets by mouth 3 times daily, Disp: , Rfl:      cholecalciferol (VITAMIN D-1000 MAX ST) 25 MCG (1000 UT) TABS, Take 2,000 Units by mouth, Disp: , Rfl:      levothyroxine (SYNTHROID/LEVOTHROID) 112 MCG tablet, TAKE ONE TABLET BY MOUTH ONCE DAILY, Disp: , Rfl:      Multiple Vitamins-Minerals (PRESERVISION AREDS 2+MULTI VIT PO), , Disp: , Rfl:      multivitamin w/minerals (MULTI-VITAMIN) tablet, Take 1 tablet by mouth, Disp: , Rfl:      sertraline (ZOLOFT) 50 MG tablet, Take 75 mg by mouth daily TAKE 1 &1/2 TABLETS (75 MG) BY MOUTH ONCE DAILY, Disp: , Rfl:      Lutein 20 MG TABS, Take 1 tablet by mouth, Disp: , Rfl:      Omega-3 Fatty Acids (FISH OIL PO), Take 1 capsule by mouth, Disp: , Rfl:            Review of Systems:   No difficulty breathing or swallowing, no double vision            Physical Exam:   Awake and alert with no aphasia no dysarthria  Speech is clear and coherent  Further exam is not feasible over the phone today.       22 minutes were spent on the phone today.    Carroll Chaudhari MD         Again, thank you for allowing me to participate in the care of your patient.        Sincerely,        Carroll Chaudhari MD

## 2020-07-15 NOTE — PROGRESS NOTES
[facility] Neurology  [place]    Gomez Villasenor MRN# [id number]   Age: 89 year old YOB: 1928               Assessment and Plan:   Assessment:   Parkinson disease        Plan:   We will keep the Sinemet dose the same for now.  I would like to have him see physical therapy for balance and range of motion training specifically in regards to his Parkinson disease.  In the past he has seen Fransico at the [facility] physical therapy, and hopefully we can have him go back there again.  Delbert and his wife were both very happy with Fransico.    Regarding constipation, this can go along with Parkinson's in general.  I recommended any over-the-counter stool softener as needed.  If all goes well I will see him back in 3 months.               Chief Complaint/HPI:     I spoke to Delbert and his wife for a telephone visit today with their permission for follow-up.  He has a diagnosis of Parkinson's.  At our last visit I increased his Sinemet dose to 2 tablets 3 times daily.  He did notice some benefit with that in terms of ambulation but is still having some balance difficulty and has fallen a few times.  Otherwise he is doing okay.  He does okay with fine motor activities such as buttoning buttons and using utensils at the table.  He is noticing some depression and has been started on Zoloft at 50 mg daily.  He will be seeing his therapist in the next week or so.            Past Medical History:    has no past medical history on file.          Past Surgical History:    has no past surgical history on file.          Social History:     Social History     Tobacco Use     Smoking status: Never Smoker     Smokeless tobacco: Never Used   Substance Use Topics     Alcohol use: Not Currently             Family History:     Family History   Problem Relation Age of Onset     Heart Disease Mother      Cancer Brother                 Allergies:   No Known Allergies          Medications:     Current Outpatient  Medications:      carbidopa-levodopa (SINEMET)  MG tablet, Take 2 tablets by mouth 3 times daily, Disp: , Rfl:      cholecalciferol (VITAMIN D-1000 MAX ST) 25 MCG (1000 UT) TABS, Take 2,000 Units by mouth, Disp: , Rfl:      levothyroxine (SYNTHROID/LEVOTHROID) 112 MCG tablet, TAKE ONE TABLET BY MOUTH ONCE DAILY, Disp: , Rfl:      Multiple Vitamins-Minerals (PRESERVISION AREDS 2+MULTI VIT PO), , Disp: , Rfl:      multivitamin w/minerals (MULTI-VITAMIN) tablet, Take 1 tablet by mouth, Disp: , Rfl:      sertraline (ZOLOFT) 50 MG tablet, Take 75 mg by mouth daily TAKE 1 &1/2 TABLETS (75 MG) BY MOUTH ONCE DAILY, Disp: , Rfl:      Lutein 20 MG TABS, Take 1 tablet by mouth, Disp: , Rfl:      Omega-3 Fatty Acids (FISH OIL PO), Take 1 capsule by mouth, Disp: , Rfl:            Review of Systems:   No difficulty breathing or swallowing, no double vision            Physical Exam:   Awake and alert with no aphasia no dysarthria  Speech is clear and coherent  Further exam is not feasible over the phone today.       22 minutes were spent on the phone today.    Carroll Chaudhari MD

## 2020-07-18 ENCOUNTER — COMMUNICATION - HEALTHEAST (OUTPATIENT)
Dept: FAMILY MEDICINE | Facility: CLINIC | Age: 85
End: 2020-07-18

## 2020-07-18 DIAGNOSIS — E03.9 ACQUIRED HYPOTHYROIDISM: ICD-10-CM

## 2020-07-23 ENCOUNTER — COMMUNICATION - HEALTHEAST (OUTPATIENT)
Dept: ADMINISTRATIVE | Facility: HOSPITAL | Age: 85
End: 2020-07-23

## 2020-08-04 ENCOUNTER — OFFICE VISIT - HEALTHEAST (OUTPATIENT)
Dept: FAMILY MEDICINE | Facility: CLINIC | Age: 85
End: 2020-08-04

## 2020-08-04 DIAGNOSIS — H26.9 CATARACT OF LEFT EYE, UNSPECIFIED CATARACT TYPE: ICD-10-CM

## 2020-08-04 DIAGNOSIS — F34.1 DYSTHYMIA: ICD-10-CM

## 2020-08-04 DIAGNOSIS — G20.A1 PARKINSON DISEASE (H): ICD-10-CM

## 2020-08-04 DIAGNOSIS — E03.9 ACQUIRED HYPOTHYROIDISM: ICD-10-CM

## 2020-08-04 DIAGNOSIS — D64.9 ANEMIA, UNSPECIFIED TYPE: ICD-10-CM

## 2020-08-04 DIAGNOSIS — D72.810 LYMPHOCYTOPENIA: ICD-10-CM

## 2020-08-04 DIAGNOSIS — G62.9 NEUROPATHY: ICD-10-CM

## 2020-08-04 DIAGNOSIS — Z01.818 PREOPERATIVE EXAMINATION: ICD-10-CM

## 2020-08-04 LAB
BASOPHILS # BLD AUTO: 0 THOU/UL (ref 0–0.2)
BASOPHILS NFR BLD AUTO: 0 % (ref 0–2)
EOSINOPHIL # BLD AUTO: 0 THOU/UL (ref 0–0.4)
EOSINOPHIL NFR BLD AUTO: 1 % (ref 0–6)
ERYTHROCYTE [DISTWIDTH] IN BLOOD BY AUTOMATED COUNT: 14.6 % (ref 11–14.5)
HCT VFR BLD AUTO: 38.7 % (ref 40–54)
HGB BLD-MCNC: 12.6 G/DL (ref 14–18)
LYMPHOCYTES # BLD AUTO: 0.4 THOU/UL (ref 0.8–4.4)
LYMPHOCYTES NFR BLD AUTO: 12 % (ref 20–40)
MCH RBC QN AUTO: 31.8 PG (ref 27–34)
MCHC RBC AUTO-ENTMCNC: 32.6 G/DL (ref 32–36)
MCV RBC AUTO: 98 FL (ref 80–100)
MONOCYTES # BLD AUTO: 0.5 THOU/UL (ref 0–0.9)
MONOCYTES NFR BLD AUTO: 15 % (ref 2–10)
NEUTROPHILS # BLD AUTO: 2.5 THOU/UL (ref 2–7.7)
NEUTROPHILS NFR BLD AUTO: 72 % (ref 50–70)
PLATELET # BLD AUTO: 176 THOU/UL (ref 140–440)
PMV BLD AUTO: 9.6 FL (ref 8.5–12.5)
RBC # BLD AUTO: 3.96 MILL/UL (ref 4.4–6.2)
WBC: 3.5 THOU/UL (ref 4–11)

## 2020-08-04 ASSESSMENT — ANXIETY QUESTIONNAIRES
4. TROUBLE RELAXING: SEVERAL DAYS
5. BEING SO RESTLESS THAT IT IS HARD TO SIT STILL: NOT AT ALL
3. WORRYING TOO MUCH ABOUT DIFFERENT THINGS: SEVERAL DAYS
6. BECOMING EASILY ANNOYED OR IRRITABLE: NOT AT ALL
GAD7 TOTAL SCORE: 3
2. NOT BEING ABLE TO STOP OR CONTROL WORRYING: NOT AT ALL
1. FEELING NERVOUS, ANXIOUS, OR ON EDGE: SEVERAL DAYS
7. FEELING AFRAID AS IF SOMETHING AWFUL MIGHT HAPPEN: NOT AT ALL

## 2020-08-04 ASSESSMENT — PATIENT HEALTH QUESTIONNAIRE - PHQ9: SUM OF ALL RESPONSES TO PHQ QUESTIONS 1-9: 2

## 2020-08-04 ASSESSMENT — MIFFLIN-ST. JEOR: SCORE: 1269.29

## 2020-08-06 ENCOUNTER — OFFICE VISIT - HEALTHEAST (OUTPATIENT)
Dept: PHYSICAL THERAPY | Facility: REHABILITATION | Age: 85
End: 2020-08-06

## 2020-08-06 DIAGNOSIS — G20.A1 PARKINSON'S DISEASE (H): ICD-10-CM

## 2020-08-06 DIAGNOSIS — R26.81 GAIT INSTABILITY: ICD-10-CM

## 2020-08-06 DIAGNOSIS — R25.8 BRADYKINESIA: ICD-10-CM

## 2020-08-06 DIAGNOSIS — R29.3 POSTURAL INSTABILITY: ICD-10-CM

## 2020-08-10 ENCOUNTER — COMMUNICATION - HEALTHEAST (OUTPATIENT)
Dept: ONCOLOGY | Facility: HOSPITAL | Age: 85
End: 2020-08-10

## 2020-08-13 ENCOUNTER — OFFICE VISIT - HEALTHEAST (OUTPATIENT)
Dept: PHYSICAL THERAPY | Facility: REHABILITATION | Age: 85
End: 2020-08-13

## 2020-08-13 DIAGNOSIS — R26.81 UNSTEADINESS ON FEET: ICD-10-CM

## 2020-08-13 DIAGNOSIS — Z91.81 HISTORY OF FALL WITHIN PAST 90 DAYS: ICD-10-CM

## 2020-08-13 DIAGNOSIS — R29.3 POSTURAL INSTABILITY: ICD-10-CM

## 2020-08-13 DIAGNOSIS — R26.81 GAIT INSTABILITY: ICD-10-CM

## 2020-08-13 DIAGNOSIS — M62.81 GENERALIZED MUSCLE WEAKNESS: ICD-10-CM

## 2020-08-13 DIAGNOSIS — G20.A1 PARKINSON'S DISEASE (H): ICD-10-CM

## 2020-08-13 DIAGNOSIS — R25.8 BRADYKINESIA: ICD-10-CM

## 2020-08-21 ENCOUNTER — COMMUNICATION - HEALTHEAST (OUTPATIENT)
Dept: FAMILY MEDICINE | Facility: CLINIC | Age: 85
End: 2020-08-21

## 2020-08-21 DIAGNOSIS — F32.A DEPRESSION, UNSPECIFIED DEPRESSION TYPE: ICD-10-CM

## 2020-08-24 ENCOUNTER — OFFICE VISIT - HEALTHEAST (OUTPATIENT)
Dept: PHYSICAL THERAPY | Facility: REHABILITATION | Age: 85
End: 2020-08-24

## 2020-08-24 DIAGNOSIS — R29.3 POSTURAL INSTABILITY: ICD-10-CM

## 2020-08-24 DIAGNOSIS — R26.81 GAIT INSTABILITY: ICD-10-CM

## 2020-08-24 DIAGNOSIS — G20.A1 PARKINSON'S DISEASE (H): ICD-10-CM

## 2020-08-24 DIAGNOSIS — R25.8 BRADYKINESIA: ICD-10-CM

## 2020-08-24 DIAGNOSIS — Z91.81 HISTORY OF FALL WITHIN PAST 90 DAYS: ICD-10-CM

## 2020-08-24 DIAGNOSIS — M62.81 GENERALIZED MUSCLE WEAKNESS: ICD-10-CM

## 2020-08-24 DIAGNOSIS — R26.81 UNSTEADINESS ON FEET: ICD-10-CM

## 2020-08-27 ENCOUNTER — OFFICE VISIT - HEALTHEAST (OUTPATIENT)
Dept: PHYSICAL THERAPY | Facility: REHABILITATION | Age: 85
End: 2020-08-27

## 2020-08-27 DIAGNOSIS — R29.3 POSTURAL INSTABILITY: ICD-10-CM

## 2020-08-27 DIAGNOSIS — R26.81 GAIT INSTABILITY: ICD-10-CM

## 2020-08-27 DIAGNOSIS — R25.8 BRADYKINESIA: ICD-10-CM

## 2020-08-27 DIAGNOSIS — G20.A1 PARKINSON'S DISEASE (H): ICD-10-CM

## 2020-08-31 ENCOUNTER — OFFICE VISIT - HEALTHEAST (OUTPATIENT)
Dept: PHYSICAL THERAPY | Facility: REHABILITATION | Age: 85
End: 2020-08-31

## 2020-08-31 DIAGNOSIS — G20.A1 PARKINSON'S DISEASE (H): ICD-10-CM

## 2020-08-31 DIAGNOSIS — M62.81 GENERALIZED MUSCLE WEAKNESS: ICD-10-CM

## 2020-08-31 DIAGNOSIS — R26.81 UNSTEADINESS ON FEET: ICD-10-CM

## 2020-08-31 DIAGNOSIS — R25.8 BRADYKINESIA: ICD-10-CM

## 2020-08-31 DIAGNOSIS — R29.3 POSTURAL INSTABILITY: ICD-10-CM

## 2020-08-31 DIAGNOSIS — Z91.81 HISTORY OF FALL WITHIN PAST 90 DAYS: ICD-10-CM

## 2020-08-31 DIAGNOSIS — R26.81 GAIT INSTABILITY: ICD-10-CM

## 2020-09-04 ENCOUNTER — OFFICE VISIT - HEALTHEAST (OUTPATIENT)
Dept: PHYSICAL THERAPY | Facility: REHABILITATION | Age: 85
End: 2020-09-04

## 2020-09-04 DIAGNOSIS — R25.8 BRADYKINESIA: ICD-10-CM

## 2020-09-04 DIAGNOSIS — R29.3 POSTURAL INSTABILITY: ICD-10-CM

## 2020-09-04 DIAGNOSIS — R26.81 GAIT INSTABILITY: ICD-10-CM

## 2020-09-04 DIAGNOSIS — G20.A1 PARKINSON'S DISEASE (H): ICD-10-CM

## 2020-09-08 ENCOUNTER — OFFICE VISIT - HEALTHEAST (OUTPATIENT)
Dept: PHYSICAL THERAPY | Facility: REHABILITATION | Age: 85
End: 2020-09-08

## 2020-09-08 DIAGNOSIS — R29.3 POSTURAL INSTABILITY: ICD-10-CM

## 2020-09-08 DIAGNOSIS — G20.A1 PARKINSON'S DISEASE (H): ICD-10-CM

## 2020-09-08 DIAGNOSIS — R26.81 GAIT INSTABILITY: ICD-10-CM

## 2020-09-08 DIAGNOSIS — M62.81 GENERALIZED MUSCLE WEAKNESS: ICD-10-CM

## 2020-09-08 DIAGNOSIS — R25.8 BRADYKINESIA: ICD-10-CM

## 2020-09-08 DIAGNOSIS — R26.81 UNSTEADINESS ON FEET: ICD-10-CM

## 2020-09-11 ENCOUNTER — OFFICE VISIT - HEALTHEAST (OUTPATIENT)
Dept: PHYSICAL THERAPY | Facility: REHABILITATION | Age: 85
End: 2020-09-11

## 2020-09-11 DIAGNOSIS — R29.3 POSTURAL INSTABILITY: ICD-10-CM

## 2020-09-11 DIAGNOSIS — R25.8 BRADYKINESIA: ICD-10-CM

## 2020-09-11 DIAGNOSIS — R26.81 GAIT INSTABILITY: ICD-10-CM

## 2020-09-11 DIAGNOSIS — G20.A1 PARKINSON'S DISEASE (H): ICD-10-CM

## 2020-09-14 ENCOUNTER — OFFICE VISIT - HEALTHEAST (OUTPATIENT)
Dept: PHYSICAL THERAPY | Facility: REHABILITATION | Age: 85
End: 2020-09-14

## 2020-09-14 DIAGNOSIS — R29.3 POSTURAL INSTABILITY: ICD-10-CM

## 2020-09-14 DIAGNOSIS — R26.81 UNSTEADINESS ON FEET: ICD-10-CM

## 2020-09-14 DIAGNOSIS — R26.81 GAIT INSTABILITY: ICD-10-CM

## 2020-09-14 DIAGNOSIS — G20.A1 PARKINSON'S DISEASE (H): ICD-10-CM

## 2020-09-14 DIAGNOSIS — R25.8 BRADYKINESIA: ICD-10-CM

## 2020-09-14 DIAGNOSIS — M62.81 GENERALIZED MUSCLE WEAKNESS: ICD-10-CM

## 2020-09-17 ENCOUNTER — OFFICE VISIT - HEALTHEAST (OUTPATIENT)
Dept: PHYSICAL THERAPY | Facility: REHABILITATION | Age: 85
End: 2020-09-17

## 2020-09-17 DIAGNOSIS — R25.8 BRADYKINESIA: ICD-10-CM

## 2020-09-17 DIAGNOSIS — R29.3 POSTURAL INSTABILITY: ICD-10-CM

## 2020-09-17 DIAGNOSIS — R26.81 GAIT INSTABILITY: ICD-10-CM

## 2020-09-17 DIAGNOSIS — G20.A1 PARKINSON'S DISEASE (H): ICD-10-CM

## 2020-09-23 ENCOUNTER — OFFICE VISIT - HEALTHEAST (OUTPATIENT)
Dept: PHYSICAL THERAPY | Facility: REHABILITATION | Age: 85
End: 2020-09-23

## 2020-09-23 DIAGNOSIS — R26.81 GAIT INSTABILITY: ICD-10-CM

## 2020-09-23 DIAGNOSIS — R29.3 POSTURAL INSTABILITY: ICD-10-CM

## 2020-09-23 DIAGNOSIS — Z91.81 HISTORY OF FALL WITHIN PAST 90 DAYS: ICD-10-CM

## 2020-09-23 DIAGNOSIS — G20.A1 PARKINSON'S DISEASE (H): ICD-10-CM

## 2020-09-23 DIAGNOSIS — R26.81 UNSTEADINESS ON FEET: ICD-10-CM

## 2020-09-23 DIAGNOSIS — M62.81 GENERALIZED MUSCLE WEAKNESS: ICD-10-CM

## 2020-09-23 DIAGNOSIS — R25.8 BRADYKINESIA: ICD-10-CM

## 2020-10-05 ENCOUNTER — OFFICE VISIT - HEALTHEAST (OUTPATIENT)
Dept: PHYSICAL THERAPY | Facility: REHABILITATION | Age: 85
End: 2020-10-05

## 2020-10-05 DIAGNOSIS — R25.8 BRADYKINESIA: ICD-10-CM

## 2020-10-05 DIAGNOSIS — R26.81 UNSTEADINESS ON FEET: ICD-10-CM

## 2020-10-05 DIAGNOSIS — Z91.81 HISTORY OF FALL WITHIN PAST 90 DAYS: ICD-10-CM

## 2020-10-05 DIAGNOSIS — R26.81 GAIT INSTABILITY: ICD-10-CM

## 2020-10-05 DIAGNOSIS — R29.3 POSTURAL INSTABILITY: ICD-10-CM

## 2020-10-05 DIAGNOSIS — M62.81 GENERALIZED MUSCLE WEAKNESS: ICD-10-CM

## 2020-10-05 DIAGNOSIS — G20.A1 PARKINSON'S DISEASE (H): ICD-10-CM

## 2020-10-07 ENCOUNTER — VIRTUAL VISIT (OUTPATIENT)
Dept: NEUROLOGY | Facility: CLINIC | Age: 85
End: 2020-10-07
Payer: COMMERCIAL

## 2020-10-07 ENCOUNTER — RECORDS - HEALTHEAST (OUTPATIENT)
Dept: ADMINISTRATIVE | Facility: OTHER | Age: 85
End: 2020-10-07

## 2020-10-07 VITALS — HEIGHT: 68 IN | BODY MASS INDEX: 21.22 KG/M2 | WEIGHT: 140 LBS

## 2020-10-07 DIAGNOSIS — R53.83 FATIGUE, UNSPECIFIED TYPE: ICD-10-CM

## 2020-10-07 DIAGNOSIS — G20.A1 PARKINSON DISEASE (H): Primary | ICD-10-CM

## 2020-10-07 PROCEDURE — 99213 OFFICE O/P EST LOW 20 MIN: CPT | Mod: 95 | Performed by: PSYCHIATRY & NEUROLOGY

## 2020-10-07 RX ORDER — CARBIDOPA AND LEVODOPA 25; 100 MG/1; MG/1
3 TABLET ORAL 3 TIMES DAILY
Qty: 810 TABLET | Refills: 1 | Status: SHIPPED | OUTPATIENT
Start: 2020-10-07 | End: 2021-01-07

## 2020-10-07 ASSESSMENT — MIFFLIN-ST. JEOR: SCORE: 1259.54

## 2020-10-07 NOTE — PROGRESS NOTES
[facility] Neurology  [place]    Gomez Villasenor MRN# [id number]   Age: 89 year old YOB: 1928               Assessment and Plan:   Assessment:   Parkinson disease  fatigue        Plan:   We will increase the Sinemet now to 3 tablets 3 times a day.  He will continue with the home exercise program from the physical therapist.  We will meet again in 3 months.  Mrs. Klein asked about another physical therapy referral in about 6 months which sounds reasonable.  We will see how he is doing.    Regarding the fatigue, he may be tired from doing the exercises and may legitimately need a nap.  Affective issues might also contribute to daytime sleepiness.  He is not having difficulty sleeping at night so we do not need to make any major adjustments right now.  I have ordered some routine labs to look for metabolic issues.             Chief Complaint/HPI:     I spoke to Delbert and his wife for a telephone visit today with their permission for follow-up.  He has a diagnosis of Parkinson's.  At our last visit we continued the same dose of Sinemet, 2 tablets 3 times a day.  Since then he has been doing physical therapy through the [facility].  He has had good benefit from that.  He still uses a walking stick or walker when out of the house.  He feels his balance is still iffy.  He has not had recent falls.  I asked about fine motor activities such as buttoning buttons or using utensils, he says that he does not think of those things as a problem.  He has been taking naps more frequently these days.  His wife says that he will lie on the floor, go right to sleep for 10 or 15 minutes, and then be ready to go again.  He is not having any difficulty sleeping at night, and his wife concurs with that.  There are no symptoms of REM behavior disorder.            Past Medical History:    has a past medical history of Parkinson's disease (H).          Past Surgical History:    has no past surgical  history on file.          Social History:     Social History     Tobacco Use     Smoking status: Never Smoker     Smokeless tobacco: Never Used   Substance Use Topics     Alcohol use: Not Currently             Family History:     Family History   Problem Relation Age of Onset     Heart Disease Mother      Cancer Brother      No Known Problems Father                 Allergies:   No Known Allergies          Medications:     Current Outpatient Medications:      carbidopa-levodopa (SINEMET)  MG tablet, Take 3 tablets by mouth 3 times daily, Disp: 810 tablet, Rfl: 1     cholecalciferol (VITAMIN D-1000 MAX ST) 25 MCG (1000 UT) TABS, Take 2,000 Units by mouth, Disp: , Rfl:      levothyroxine (SYNTHROID/LEVOTHROID) 112 MCG tablet, TAKE ONE TABLET BY MOUTH ONCE DAILY, Disp: , Rfl:      Lutein 20 MG TABS, Take 1 tablet by mouth, Disp: , Rfl:      Multiple Vitamins-Minerals (PRESERVISION AREDS 2+MULTI VIT PO), , Disp: , Rfl:      multivitamin w/minerals (MULTI-VITAMIN) tablet, Take 1 tablet by mouth, Disp: , Rfl:      Omega-3 Fatty Acids (FISH OIL PO), Take 1 capsule by mouth, Disp: , Rfl:      sertraline (ZOLOFT) 50 MG tablet, Take 75 mg by mouth daily TAKE 1 &1/2 TABLETS (75 MG) BY MOUTH ONCE DAILY, Disp: , Rfl:            Review of Systems:   No difficulty breathing or swallowing, no double vision            Physical Exam:   Awake and alert with no aphasia no dysarthria  Speech is clear and coherent  Further exam is not feasible over the phone today.       16 minutes were spent on the phone today.    Carroll Chaudhari MD

## 2020-10-07 NOTE — LETTER
10/7/2020         RE: Gomez Villasenor  [address] [facility] [facility] [address]        Dear Colleague,    Thank you for referring your patient, Gomez Villasenor, to the [facility] NEUROLOGY CLINIC [place]. Please see a copy of my visit note below.    [facility] Neurology  [place]    Gomez Villasenor MRN# [id number]   Age: 89 year old YOB: 1928               Assessment and Plan:   Assessment:   Parkinson disease  fatigue        Plan:   We will increase the Sinemet now to 3 tablets 3 times a day.  He will continue with the home exercise program from the physical therapist.  We will meet again in 3 months.  Mrs. Klein asked about another physical therapy referral in about 6 months which sounds reasonable.  We will see how he is doing.    Regarding the fatigue, he may be tired from doing the exercises and may legitimately need a nap.  Affective issues might also contribute to daytime sleepiness.  He is not having difficulty sleeping at night so we do not need to make any major adjustments right now.  I have ordered some routine labs to look for metabolic issues.             Chief Complaint/HPI:     I spoke to Delbert and his wife for a telephone visit today with their permission for follow-up.  He has a diagnosis of Parkinson's.  At our last visit we continued the same dose of Sinemet, 2 tablets 3 times a day.  Since then he has been doing physical therapy through the [facility] system.  He has had good benefit from that.  He still uses a walking stick or walker when out of the house.  He feels his balance is still iffy.  He has not had recent falls.  I asked about fine motor activities such as buttoning buttons or using utensils, he says that he does not think of those things as a problem.  He has been taking naps more frequently these days.  His wife says that he will lie on the floor, go right to sleep for 10 or 15 minutes, and then be ready to go again.  He is not having  any difficulty sleeping at night, and his wife concurs with that.  There are no symptoms of REM behavior disorder.            Past Medical History:    has a past medical history of Parkinson's disease (H).          Past Surgical History:    has no past surgical history on file.          Social History:     Social History     Tobacco Use     Smoking status: Never Smoker     Smokeless tobacco: Never Used   Substance Use Topics     Alcohol use: Not Currently             Family History:     Family History   Problem Relation Age of Onset     Heart Disease Mother      Cancer Brother      No Known Problems Father                 Allergies:   No Known Allergies          Medications:     Current Outpatient Medications:      carbidopa-levodopa (SINEMET)  MG tablet, Take 3 tablets by mouth 3 times daily, Disp: 810 tablet, Rfl: 1     cholecalciferol (VITAMIN D-1000 MAX ST) 25 MCG (1000 UT) TABS, Take 2,000 Units by mouth, Disp: , Rfl:      levothyroxine (SYNTHROID/LEVOTHROID) 112 MCG tablet, TAKE ONE TABLET BY MOUTH ONCE DAILY, Disp: , Rfl:      Lutein 20 MG TABS, Take 1 tablet by mouth, Disp: , Rfl:      Multiple Vitamins-Minerals (PRESERVISION AREDS 2+MULTI VIT PO), , Disp: , Rfl:      multivitamin w/minerals (MULTI-VITAMIN) tablet, Take 1 tablet by mouth, Disp: , Rfl:      Omega-3 Fatty Acids (FISH OIL PO), Take 1 capsule by mouth, Disp: , Rfl:      sertraline (ZOLOFT) 50 MG tablet, Take 75 mg by mouth daily TAKE 1 &1/2 TABLETS (75 MG) BY MOUTH ONCE DAILY, Disp: , Rfl:            Review of Systems:   No difficulty breathing or swallowing, no double vision            Physical Exam:   Awake and alert with no aphasia no dysarthria  Speech is clear and coherent  Further exam is not feasible over the phone today.       16 minutes were spent on the phone today.    Carroll Chaudhari MD             Again, thank you for allowing me to participate in the care of your patient.        Sincerely,        Carroll Chaudhari MD

## 2020-10-07 NOTE — NURSING NOTE
Chief Complaint   Patient presents with     Follow Up     Parkinsons-states that his fatigue has increased since last visit and has found himself taking more naps during the day. Balance has stayed about the same-Therapy has been well and he has found that to be helpful      Telephone Visit Unable to do video [phone]  Alec Evans CMA on 10/7/2020 at 10:07 AM

## 2020-10-12 ENCOUNTER — OFFICE VISIT - HEALTHEAST (OUTPATIENT)
Dept: PHYSICAL THERAPY | Facility: REHABILITATION | Age: 85
End: 2020-10-12

## 2020-10-12 DIAGNOSIS — R29.3 POSTURAL INSTABILITY: ICD-10-CM

## 2020-10-12 DIAGNOSIS — G20.A1 PARKINSON'S DISEASE (H): ICD-10-CM

## 2020-10-12 DIAGNOSIS — R26.81 UNSTEADINESS ON FEET: ICD-10-CM

## 2020-10-12 DIAGNOSIS — M62.81 GENERALIZED MUSCLE WEAKNESS: ICD-10-CM

## 2020-10-12 DIAGNOSIS — R25.8 BRADYKINESIA: ICD-10-CM

## 2020-10-12 DIAGNOSIS — R26.81 GAIT INSTABILITY: ICD-10-CM

## 2020-10-12 DIAGNOSIS — Z91.81 HISTORY OF FALL WITHIN PAST 90 DAYS: ICD-10-CM

## 2020-10-19 ENCOUNTER — OFFICE VISIT - HEALTHEAST (OUTPATIENT)
Dept: PHYSICAL THERAPY | Facility: REHABILITATION | Age: 85
End: 2020-10-19

## 2020-10-19 ENCOUNTER — RECORDS - HEALTHEAST (OUTPATIENT)
Dept: LAB | Facility: HOSPITAL | Age: 85
End: 2020-10-19

## 2020-10-19 DIAGNOSIS — R26.81 UNSTEADINESS ON FEET: ICD-10-CM

## 2020-10-19 DIAGNOSIS — R25.8 BRADYKINESIA: ICD-10-CM

## 2020-10-19 DIAGNOSIS — Z91.81 HISTORY OF FALL WITHIN PAST 90 DAYS: ICD-10-CM

## 2020-10-19 DIAGNOSIS — G20.A1 PARKINSON'S DISEASE (H): ICD-10-CM

## 2020-10-19 DIAGNOSIS — R29.3 POSTURAL INSTABILITY: ICD-10-CM

## 2020-10-19 DIAGNOSIS — R26.81 GAIT INSTABILITY: ICD-10-CM

## 2020-10-19 DIAGNOSIS — M62.81 GENERALIZED MUSCLE WEAKNESS: ICD-10-CM

## 2020-10-19 LAB
ALBUMIN SERPL-MCNC: 3.7 G/DL (ref 3.5–5)
ALP SERPL-CCNC: 173 U/L (ref 45–120)
ALT SERPL W P-5'-P-CCNC: <9 U/L (ref 0–45)
ANION GAP SERPL CALCULATED.3IONS-SCNC: 7 MMOL/L (ref 5–18)
AST SERPL W P-5'-P-CCNC: 20 U/L (ref 0–40)
BILIRUB SERPL-MCNC: 0.4 MG/DL (ref 0–1)
BUN SERPL-MCNC: 22 MG/DL (ref 8–28)
CALCIUM SERPL-MCNC: 9.1 MG/DL (ref 8.5–10.5)
CHLORIDE BLD-SCNC: 105 MMOL/L (ref 98–107)
CO2 SERPL-SCNC: 29 MMOL/L (ref 22–31)
CREAT SERPL-MCNC: 1.01 MG/DL (ref 0.7–1.3)
GFR SERPL CREATININE-BSD FRML MDRD: >60 ML/MIN/1.73M2
GLUCOSE BLD-MCNC: 76 MG/DL (ref 70–125)
POTASSIUM BLD-SCNC: 4.2 MMOL/L (ref 3.5–5)
PROT SERPL-MCNC: 8 G/DL (ref 6–8)
SODIUM SERPL-SCNC: 141 MMOL/L (ref 136–145)
TSH SERPL DL<=0.005 MIU/L-ACNC: 0.81 UIU/ML (ref 0.3–5)
VIT B12 SERPL-MCNC: 492 PG/ML (ref 213–816)

## 2020-11-30 ENCOUNTER — OFFICE VISIT - HEALTHEAST (OUTPATIENT)
Dept: FAMILY MEDICINE | Facility: CLINIC | Age: 85
End: 2020-11-30

## 2020-11-30 DIAGNOSIS — G20.A1 PARKINSON DISEASE (H): ICD-10-CM

## 2020-11-30 DIAGNOSIS — E03.9 ACQUIRED HYPOTHYROIDISM: ICD-10-CM

## 2020-11-30 DIAGNOSIS — M54.50 ACUTE RIGHT-SIDED LOW BACK PAIN WITHOUT SCIATICA: ICD-10-CM

## 2020-11-30 DIAGNOSIS — F32.A DEPRESSION, UNSPECIFIED DEPRESSION TYPE: ICD-10-CM

## 2020-11-30 DIAGNOSIS — Z00.00 MEDICARE ANNUAL WELLNESS VISIT, SUBSEQUENT: ICD-10-CM

## 2020-11-30 DIAGNOSIS — Z13.1 SCREENING FOR DIABETES MELLITUS: ICD-10-CM

## 2020-11-30 LAB
FASTING STATUS PATIENT QL REPORTED: NO
GLUCOSE BLD-MCNC: 77 MG/DL (ref 74–125)

## 2020-11-30 ASSESSMENT — ANXIETY QUESTIONNAIRES
4. TROUBLE RELAXING: NOT AT ALL
5. BEING SO RESTLESS THAT IT IS HARD TO SIT STILL: NOT AT ALL
1. FEELING NERVOUS, ANXIOUS, OR ON EDGE: SEVERAL DAYS
6. BECOMING EASILY ANNOYED OR IRRITABLE: NOT AT ALL
IF YOU CHECKED OFF ANY PROBLEMS ON THIS QUESTIONNAIRE, HOW DIFFICULT HAVE THESE PROBLEMS MADE IT FOR YOU TO DO YOUR WORK, TAKE CARE OF THINGS AT HOME, OR GET ALONG WITH OTHER PEOPLE: SOMEWHAT DIFFICULT
GAD7 TOTAL SCORE: 3
2. NOT BEING ABLE TO STOP OR CONTROL WORRYING: SEVERAL DAYS
3. WORRYING TOO MUCH ABOUT DIFFERENT THINGS: SEVERAL DAYS
7. FEELING AFRAID AS IF SOMETHING AWFUL MIGHT HAPPEN: NOT AT ALL

## 2020-11-30 ASSESSMENT — MIFFLIN-ST. JEOR: SCORE: 1280.63

## 2020-11-30 ASSESSMENT — PATIENT HEALTH QUESTIONNAIRE - PHQ9: SUM OF ALL RESPONSES TO PHQ QUESTIONS 1-9: 11

## 2020-12-09 ENCOUNTER — OFFICE VISIT - HEALTHEAST (OUTPATIENT)
Dept: PHYSICAL THERAPY | Facility: REHABILITATION | Age: 85
End: 2020-12-09

## 2020-12-09 DIAGNOSIS — M25.69 BACK STIFFNESS: ICD-10-CM

## 2020-12-09 DIAGNOSIS — M62.81 GENERALIZED MUSCLE WEAKNESS: ICD-10-CM

## 2020-12-09 DIAGNOSIS — G20.A1 PARKINSON DISEASE (H): ICD-10-CM

## 2020-12-09 DIAGNOSIS — M54.50 ACUTE RIGHT-SIDED LOW BACK PAIN WITHOUT SCIATICA: ICD-10-CM

## 2020-12-16 ENCOUNTER — OFFICE VISIT - HEALTHEAST (OUTPATIENT)
Dept: PHYSICAL THERAPY | Facility: REHABILITATION | Age: 85
End: 2020-12-16

## 2020-12-16 DIAGNOSIS — M25.69 BACK STIFFNESS: ICD-10-CM

## 2020-12-16 DIAGNOSIS — M62.81 GENERALIZED MUSCLE WEAKNESS: ICD-10-CM

## 2020-12-16 DIAGNOSIS — G20.A1 PARKINSON DISEASE (H): ICD-10-CM

## 2020-12-16 DIAGNOSIS — M54.50 ACUTE RIGHT-SIDED LOW BACK PAIN WITHOUT SCIATICA: ICD-10-CM

## 2020-12-30 ENCOUNTER — OFFICE VISIT - HEALTHEAST (OUTPATIENT)
Dept: PHYSICAL THERAPY | Facility: REHABILITATION | Age: 85
End: 2020-12-30

## 2020-12-30 DIAGNOSIS — M62.81 GENERALIZED MUSCLE WEAKNESS: ICD-10-CM

## 2020-12-30 DIAGNOSIS — M54.50 ACUTE RIGHT-SIDED LOW BACK PAIN WITHOUT SCIATICA: ICD-10-CM

## 2020-12-30 DIAGNOSIS — M25.69 BACK STIFFNESS: ICD-10-CM

## 2020-12-30 DIAGNOSIS — G20.A1 PARKINSON DISEASE (H): ICD-10-CM

## 2020-12-30 DIAGNOSIS — F34.1 DYSTHYMIA: ICD-10-CM

## 2021-01-07 ENCOUNTER — VIRTUAL VISIT (OUTPATIENT)
Dept: NEUROLOGY | Facility: CLINIC | Age: 86
End: 2021-01-07
Payer: COMMERCIAL

## 2021-01-07 ENCOUNTER — RECORDS - HEALTHEAST (OUTPATIENT)
Dept: ADMINISTRATIVE | Facility: OTHER | Age: 86
End: 2021-01-07

## 2021-01-07 VITALS — BODY MASS INDEX: 19.33 KG/M2 | WEIGHT: 135 LBS | HEIGHT: 70 IN

## 2021-01-07 DIAGNOSIS — R53.83 FATIGUE, UNSPECIFIED TYPE: ICD-10-CM

## 2021-01-07 DIAGNOSIS — G20.A1 PARKINSON DISEASE (H): Primary | ICD-10-CM

## 2021-01-07 PROCEDURE — 99213 OFFICE O/P EST LOW 20 MIN: CPT | Mod: 95 | Performed by: PSYCHIATRY & NEUROLOGY

## 2021-01-07 RX ORDER — CARBIDOPA AND LEVODOPA 25; 100 MG/1; MG/1
3 TABLET ORAL 3 TIMES DAILY
Qty: 810 TABLET | Refills: 1 | Status: SHIPPED | OUTPATIENT
Start: 2021-01-07 | End: 2021-04-08

## 2021-01-07 ASSESSMENT — MIFFLIN-ST. JEOR: SCORE: 1268.61

## 2021-01-07 NOTE — PROGRESS NOTES
[facility] Neurology  [place]    Gomez Villasenor MRN# [id number]   Age: 89 year old YOB: 1928               Assessment and Plan:   Assessment:   Parkinson's  Fatigue        Plan:     We will continue the current dose of Sinemet.  I told him it is fine to take a nap if he wants, he is not having any difficulty sleeping at night.  I did talk to him about the rationale for using a cane or walker especially out of the house (I wonder if he has some neuropathy contributing to his balance difficulties).  We will meet again in 3 months, and discuss whether to refer him again for another round of the Parkinson's physical therapy.             Chief Complaint/HPI:     I spoke to Delbert and his wife for a telephone visit today with their permission.  At our last visit 3 months ago I increased the Sinemet to 3 tablets 3 times a day.  Delbert is not sure if that yielded significant benefit, it certainly did not hurt.  He still uses a cane or walker when out of the house.  TSH, B12 and CMP were all fine after last visit.  He does still notice some balance difficulties.  He does admit to some numbness in the toes which could represent some neuropathy.  He continues working with a  doing physical exercise, he also continues doing the home exercise program from the physical therapist.  Again, his wife mentions that he will frequently take naps during the day.  Interestingly, if they are busy having people over or doing things he finds that he does not need a nap and can keep going just fine.  He snores a little bit, his wife has not noticed any apneic spells.            Past Medical History:    has a past medical history of Parkinson's disease (H).          Past Surgical History:    has no past surgical history on file.          Social History:     Social History     Tobacco Use     Smoking status: Never Smoker     Smokeless tobacco: Never Used   Substance Use Topics     Alcohol use: Not  Currently             Family History:     Family History   Problem Relation Age of Onset     Heart Disease Mother      Cancer Brother      No Known Problems Father                 Allergies:   No Known Allergies          Medications:     Current Outpatient Medications:      carbidopa-levodopa (SINEMET)  MG tablet, Take 3 tablets by mouth 3 times daily, Disp: 810 tablet, Rfl: 1     cholecalciferol (VITAMIN D-1000 MAX ST) 25 MCG (1000 UT) TABS, Take 2,000 Units by mouth, Disp: , Rfl:      levothyroxine (SYNTHROID/LEVOTHROID) 112 MCG tablet, TAKE ONE TABLET BY MOUTH ONCE DAILY, Disp: , Rfl:      Multiple Vitamins-Minerals (PRESERVISION AREDS 2+MULTI VIT PO), , Disp: , Rfl:      multivitamin w/minerals (MULTI-VITAMIN) tablet, Take 1 tablet by mouth, Disp: , Rfl:      Omega-3 Fatty Acids (FISH OIL PO), Take 1 capsule by mouth, Disp: , Rfl:      sertraline (ZOLOFT) 50 MG tablet, Take 75 mg by mouth daily TAKE 1 &1/2 TABLETS (75 MG) BY MOUTH ONCE DAILY, Disp: , Rfl:                 Physical Exam:   Awake and alert with no aphasia no dysarthria  Speech is clear and coherent  Further exam is not feasible over the phone       15 minutes were spent on the phone today.      Carroll Chaudhari MD

## 2021-01-07 NOTE — LETTER
1/7/2021         RE: Gomez Villasenor  [address] [address]        Dear Colleague,    Thank you for referring your patient, Gomez Villsaenor, to the [facility] NEUROLOGY [facility] [place]. Please see a copy of my visit note below.    [facility] Neurology  [place]    Gomez Villasenor MRN# [id number]   Age: 89 year old YOB: 1928               Assessment and Plan:   Assessment:   Parkinson's  Fatigue        Plan:     We will continue the current dose of Sinemet.  I told him it is fine to take a nap if he wants, he is not having any difficulty sleeping at night.  I did talk to him about the rationale for using a cane or walker especially out of the house (I wonder if he has some neuropathy contributing to his balance difficulties).  We will meet again in 3 months, and discuss whether to refer him again for another round of the Parkinson's physical therapy.             Chief Complaint/HPI:     I spoke to Delbert and his wife for a telephone visit today with their permission.  At our last visit 3 months ago I increased the Sinemet to 3 tablets 3 times a day.  Delbert is not sure if that yielded significant benefit, it certainly did not hurt.  He still uses a cane or walker when out of the house.  TSH, B12 and CMP were all fine after last visit.  He does still notice some balance difficulties.  He does admit to some numbness in the toes which could represent some neuropathy.  He continues working with a  doing physical exercise, he also continues doing the home exercise program from the physical therapist.  Again, his wife mentions that he will frequently take naps during the day.  Interestingly, if they are busy having people over or doing things he finds that he does not need a nap and can keep going just fine.  He snores a little bit, his wife has not noticed any apneic spells.            Past Medical History:    has a past medical history of  Parkinson's disease (H).          Past Surgical History:    has no past surgical history on file.          Social History:     Social History     Tobacco Use     Smoking status: Never Smoker     Smokeless tobacco: Never Used   Substance Use Topics     Alcohol use: Not Currently             Family History:     Family History   Problem Relation Age of Onset     Heart Disease Mother      Cancer Brother      No Known Problems Father                 Allergies:   No Known Allergies          Medications:     Current Outpatient Medications:      carbidopa-levodopa (SINEMET)  MG tablet, Take 3 tablets by mouth 3 times daily, Disp: 810 tablet, Rfl: 1     cholecalciferol (VITAMIN D-1000 MAX ST) 25 MCG (1000 UT) TABS, Take 2,000 Units by mouth, Disp: , Rfl:      levothyroxine (SYNTHROID/LEVOTHROID) 112 MCG tablet, TAKE ONE TABLET BY MOUTH ONCE DAILY, Disp: , Rfl:      Multiple Vitamins-Minerals (PRESERVISION AREDS 2+MULTI VIT PO), , Disp: , Rfl:      multivitamin w/minerals (MULTI-VITAMIN) tablet, Take 1 tablet by mouth, Disp: , Rfl:      Omega-3 Fatty Acids (FISH OIL PO), Take 1 capsule by mouth, Disp: , Rfl:      sertraline (ZOLOFT) 50 MG tablet, Take 75 mg by mouth daily TAKE 1 &1/2 TABLETS (75 MG) BY MOUTH ONCE DAILY, Disp: , Rfl:                 Physical Exam:   Awake and alert with no aphasia no dysarthria  Speech is clear and coherent  Further exam is not feasible over the phone       15 minutes were spent on the phone today.      Carroll Chaudhari MD             Again, thank you for allowing me to participate in the care of your patient.        Sincerely,        Carroll Chaudhari MD

## 2021-01-07 NOTE — NURSING NOTE
Chief Complaint   Patient presents with     Follow Up     3 month follow up-states that his balance has worsened (denies any falls)      Phone visit [phone]  Alec Evans CMA on 1/7/2021 at 10:09 AM

## 2021-01-13 ENCOUNTER — OFFICE VISIT - HEALTHEAST (OUTPATIENT)
Dept: PHYSICAL THERAPY | Facility: REHABILITATION | Age: 86
End: 2021-01-13

## 2021-01-13 DIAGNOSIS — M25.69 BACK STIFFNESS: ICD-10-CM

## 2021-01-13 DIAGNOSIS — M54.50 ACUTE RIGHT-SIDED LOW BACK PAIN WITHOUT SCIATICA: ICD-10-CM

## 2021-01-13 DIAGNOSIS — M62.81 GENERALIZED MUSCLE WEAKNESS: ICD-10-CM

## 2021-01-18 ENCOUNTER — RECORDS - HEALTHEAST (OUTPATIENT)
Dept: ADMINISTRATIVE | Facility: OTHER | Age: 86
End: 2021-01-18

## 2021-01-21 ENCOUNTER — COMMUNICATION - HEALTHEAST (OUTPATIENT)
Dept: FAMILY MEDICINE | Facility: CLINIC | Age: 86
End: 2021-01-21

## 2021-04-08 ENCOUNTER — VIRTUAL VISIT (OUTPATIENT)
Dept: NEUROLOGY | Facility: CLINIC | Age: 86
End: 2021-04-08
Payer: COMMERCIAL

## 2021-04-08 ENCOUNTER — AMBULATORY - HEALTHEAST (OUTPATIENT)
Dept: ADMINISTRATIVE | Facility: REHABILITATION | Age: 86
End: 2021-04-08

## 2021-04-08 ENCOUNTER — RECORDS - HEALTHEAST (OUTPATIENT)
Dept: ADMINISTRATIVE | Facility: OTHER | Age: 86
End: 2021-04-08

## 2021-04-08 VITALS — WEIGHT: 135 LBS | BODY MASS INDEX: 19.33 KG/M2 | HEIGHT: 70 IN

## 2021-04-08 DIAGNOSIS — G20.A1 PARKINSON DISEASE (H): Primary | ICD-10-CM

## 2021-04-08 DIAGNOSIS — G20.A1 PARKINSON DISEASE (H): ICD-10-CM

## 2021-04-08 PROCEDURE — 99443 PR PHYSICIAN TELEPHONE EVALUATION 21-30 MIN: CPT | Mod: 95 | Performed by: PSYCHIATRY & NEUROLOGY

## 2021-04-08 RX ORDER — CARBIDOPA AND LEVODOPA 25; 100 MG/1; MG/1
3 TABLET ORAL 4 TIMES DAILY
Qty: 1050 TABLET | Refills: 1 | Status: SHIPPED | OUTPATIENT
Start: 2021-04-08 | End: 2021-07-14

## 2021-04-08 ASSESSMENT — MIFFLIN-ST. JEOR: SCORE: 1268.61

## 2021-04-08 NOTE — LETTER
"    4/8/2021         RE: Gomez Villasenor  [address] [address]        Dear Colleague,    Thank you for referring your patient, Gomez Villasenor, to the [facility] NEUROLOGY CL[facility] [place]. Please see a copy of my visit note below.    [facility] Neurology  [place]    Gomez Villasenor MRN# [id number]   Age: 89 year old YOB: 1928               Assessment and Plan:   Assessment:   Parkinson's, gradually progressing        Plan:   Orders Placed This Encounter   Procedures     PHYSICAL THERAPY REFERRAL     I increased his carbidopa levodopa prescription to 3 tablets 4 times a day.  He can experiment with timing in terms of taking the medication apart from meals if convenient.  He will try to discern if this makes a difference in terms of effectiveness.    We will have Delbert check in with the physical therapists for a refresher on Parkinson's related therapy.  I would like to see him (and his wife) here in the clinic in about 3 months for follow-up.             Chief Complaint/HPI:     I spoke to Delbert and his wife for a telephone visit today with their permission.  Delbert has Parkinson's, I last spoke with him in October 2020.  Since then, his walking is \"not getting any better.\"  He now uses a walker or a stick more often.  He has not had any recent falls.  He is noticing some wearing off of the medication.  Presently he takes carbidopa levodopa 3 tablets 3 times a day.  He takes these at 6 AM, noon and 6 PM.  He is still doing some home exercise, but it sounds like he focuses more on strengthening and range of motion.  He has been to physical therapy at [facility] in the past and had a good experience there.  No significant hallucinations, no cognitive issues.            Past Medical History:    has a past medical history of Parkinson's disease (H).          Past Surgical History:    has no past surgical history on file.          Social History:     Social " History     Tobacco Use     Smoking status: Never Smoker     Smokeless tobacco: Never Used   Substance Use Topics     Alcohol use: Not Currently             Family History:     Family History   Problem Relation Age of Onset     Heart Disease Mother      Cancer Brother      No Known Problems Father                 Allergies:   No Known Allergies          Medications:     Current Outpatient Medications:      carbidopa-levodopa (SINEMET)  MG tablet, Take 3 tablets by mouth 4 times daily, Disp: 1050 tablet, Rfl: 1     cholecalciferol (VITAMIN D-1000 MAX ST) 25 MCG (1000 UT) TABS, Take 2,000 Units by mouth, Disp: , Rfl:      levothyroxine (SYNTHROID/LEVOTHROID) 112 MCG tablet, TAKE ONE TABLET BY MOUTH ONCE DAILY, Disp: , Rfl:      Multiple Vitamins-Minerals (PRESERVISION AREDS 2+MULTI VIT PO), , Disp: , Rfl:      multivitamin w/minerals (MULTI-VITAMIN) tablet, Take 1 tablet by mouth, Disp: , Rfl:      sertraline (ZOLOFT) 50 MG tablet, Take 75 mg by mouth daily TAKE 1 &1/2 TABLETS (75 MG) BY MOUTH ONCE DAILY, Disp: , Rfl:      Omega-3 Fatty Acids (FISH OIL PO), Take 1 capsule by mouth, Disp: , Rfl:               Physical Exam:   Awake and alert with no aphasia no dysarthria  Speech is clear and coherent  Further exam is not feasible over the phone.       23 minutes were spent on the phone today.      Carroll Chaudhari MD             Again, thank you for allowing me to participate in the care of your patient.        Sincerely,        Carroll Chaudhari MD

## 2021-04-09 ENCOUNTER — COMMUNICATION - HEALTHEAST (OUTPATIENT)
Dept: FAMILY MEDICINE | Facility: CLINIC | Age: 86
End: 2021-04-09

## 2021-04-09 DIAGNOSIS — E03.9 ACQUIRED HYPOTHYROIDISM: ICD-10-CM

## 2021-04-09 NOTE — PROGRESS NOTES
"[facility] Neurology  [place]    Gomez Villasenor MRN# [id number]   Age: 89 year old YOB: 1928               Assessment and Plan:   Assessment:   Parkinson's, gradually progressing        Plan:   Orders Placed This Encounter   Procedures     PHYSICAL THERAPY REFERRAL     I increased his carbidopa levodopa prescription to 3 tablets 4 times a day.  He can experiment with timing in terms of taking the medication apart from meals if convenient.  He will try to discern if this makes a difference in terms of effectiveness.    We will have Delbert check in with the physical therapists for a refresher on Parkinson's related therapy.  I would like to see him (and his wife) here in the clinic in about 3 months for follow-up.             Chief Complaint/HPI:     I spoke to Delbert and his wife for a telephone visit today with their permission.  Delbert has Parkinson's, I last spoke with him in October 2020.  Since then, his walking is \"not getting any better.\"  He now uses a walker or a stick more often.  He has not had any recent falls.  He is noticing some wearing off of the medication.  Presently he takes carbidopa levodopa 3 tablets 3 times a day.  He takes these at 6 AM, noon and 6 PM.  He is still doing some home exercise, but it sounds like he focuses more on strengthening and range of motion.  He has been to physical therapy at [facility] in the past and had a good experience there.  No significant hallucinations, no cognitive issues.            Past Medical History:    has a past medical history of Parkinson's disease (H).          Past Surgical History:    has no past surgical history on file.          Social History:     Social History     Tobacco Use     Smoking status: Never Smoker     Smokeless tobacco: Never Used   Substance Use Topics     Alcohol use: Not Currently             Family History:     Family History   Problem Relation Age of Onset     Heart Disease Mother      Cancer Brother "      No Known Problems Father                 Allergies:   No Known Allergies          Medications:     Current Outpatient Medications:      carbidopa-levodopa (SINEMET)  MG tablet, Take 3 tablets by mouth 4 times daily, Disp: 1050 tablet, Rfl: 1     cholecalciferol (VITAMIN D-1000 MAX ST) 25 MCG (1000 UT) TABS, Take 2,000 Units by mouth, Disp: , Rfl:      levothyroxine (SYNTHROID/LEVOTHROID) 112 MCG tablet, TAKE ONE TABLET BY MOUTH ONCE DAILY, Disp: , Rfl:      Multiple Vitamins-Minerals (PRESERVISION AREDS 2+MULTI VIT PO), , Disp: , Rfl:      multivitamin w/minerals (MULTI-VITAMIN) tablet, Take 1 tablet by mouth, Disp: , Rfl:      sertraline (ZOLOFT) 50 MG tablet, Take 75 mg by mouth daily TAKE 1 &1/2 TABLETS (75 MG) BY MOUTH ONCE DAILY, Disp: , Rfl:      Omega-3 Fatty Acids (FISH OIL PO), Take 1 capsule by mouth, Disp: , Rfl:               Physical Exam:   Awake and alert with no aphasia no dysarthria  Speech is clear and coherent  Further exam is not feasible over the phone.       23 minutes were spent on the phone today.      Carroll Chaudhari MD

## 2021-04-27 ENCOUNTER — RECORDS - HEALTHEAST (OUTPATIENT)
Dept: ADMINISTRATIVE | Facility: OTHER | Age: 86
End: 2021-04-27

## 2021-04-27 ENCOUNTER — OFFICE VISIT - HEALTHEAST (OUTPATIENT)
Dept: PHYSICAL THERAPY | Facility: REHABILITATION | Age: 86
End: 2021-04-27

## 2021-04-27 DIAGNOSIS — M62.81 GENERALIZED MUSCLE WEAKNESS: ICD-10-CM

## 2021-04-27 DIAGNOSIS — R26.81 GAIT INSTABILITY: ICD-10-CM

## 2021-04-27 DIAGNOSIS — R25.8 BRADYKINESIA: ICD-10-CM

## 2021-04-27 DIAGNOSIS — R29.3 POSTURAL INSTABILITY: ICD-10-CM

## 2021-04-27 DIAGNOSIS — G20.A1 PARKINSON DISEASE (H): ICD-10-CM

## 2021-05-10 ENCOUNTER — OFFICE VISIT - HEALTHEAST (OUTPATIENT)
Dept: FAMILY MEDICINE | Facility: CLINIC | Age: 86
End: 2021-05-10

## 2021-05-10 DIAGNOSIS — G20.A1 PARKINSON DISEASE (H): ICD-10-CM

## 2021-05-10 DIAGNOSIS — R53.83 FATIGUE, UNSPECIFIED TYPE: ICD-10-CM

## 2021-05-10 DIAGNOSIS — R00.1 SINUS BRADYCARDIA: ICD-10-CM

## 2021-05-10 DIAGNOSIS — F32.0 MILD MAJOR DEPRESSION (H): ICD-10-CM

## 2021-05-10 LAB
ALBUMIN SERPL-MCNC: 3.4 G/DL (ref 3.5–5)
ALP SERPL-CCNC: 209 U/L (ref 45–120)
ALT SERPL W P-5'-P-CCNC: 15 U/L (ref 0–45)
ANION GAP SERPL CALCULATED.3IONS-SCNC: 9 MMOL/L (ref 5–18)
AST SERPL W P-5'-P-CCNC: 38 U/L (ref 0–40)
ATRIAL RATE - MUSE: 48 BPM
BASOPHILS # BLD AUTO: 0 THOU/UL (ref 0–0.2)
BASOPHILS NFR BLD AUTO: 0 % (ref 0–2)
BILIRUB SERPL-MCNC: 0.4 MG/DL (ref 0–1)
BUN SERPL-MCNC: 23 MG/DL (ref 8–28)
CALCIUM SERPL-MCNC: 8.9 MG/DL (ref 8.5–10.5)
CHLORIDE BLD-SCNC: 105 MMOL/L (ref 98–107)
CO2 SERPL-SCNC: 26 MMOL/L (ref 22–31)
CREAT SERPL-MCNC: 0.82 MG/DL (ref 0.7–1.3)
DIASTOLIC BLOOD PRESSURE - MUSE: NORMAL
EOSINOPHIL # BLD AUTO: 0 THOU/UL (ref 0–0.4)
EOSINOPHIL NFR BLD AUTO: 1 % (ref 0–6)
ERYTHROCYTE [DISTWIDTH] IN BLOOD BY AUTOMATED COUNT: 14.6 % (ref 11–14.5)
GFR SERPL CREATININE-BSD FRML MDRD: >60 ML/MIN/1.73M2
GLUCOSE BLD-MCNC: 81 MG/DL (ref 70–125)
HCT VFR BLD AUTO: 38.3 % (ref 40–54)
HGB BLD-MCNC: 12.3 G/DL (ref 14–18)
IMM GRANULOCYTES # BLD: 0 THOU/UL
IMM GRANULOCYTES NFR BLD: 0 %
INTERPRETATION ECG - MUSE: NORMAL
LYMPHOCYTES # BLD AUTO: 0.4 THOU/UL (ref 0.8–4.4)
LYMPHOCYTES NFR BLD AUTO: 13 % (ref 20–40)
MCH RBC QN AUTO: 32.1 PG (ref 27–34)
MCHC RBC AUTO-ENTMCNC: 32.1 G/DL (ref 32–36)
MCV RBC AUTO: 100 FL (ref 80–100)
MONOCYTES # BLD AUTO: 0.4 THOU/UL (ref 0–0.9)
MONOCYTES NFR BLD AUTO: 13 % (ref 2–10)
NEUTROPHILS # BLD AUTO: 2.3 THOU/UL (ref 2–7.7)
NEUTROPHILS NFR BLD AUTO: 73 % (ref 50–70)
P AXIS - MUSE: 89 DEGREES
PLATELET # BLD AUTO: 153 THOU/UL (ref 140–440)
PMV BLD AUTO: 9.6 FL (ref 7–10)
POTASSIUM BLD-SCNC: 4.6 MMOL/L (ref 3.5–5)
PR INTERVAL - MUSE: 200 MS
PROT SERPL-MCNC: 7.1 G/DL (ref 6–8)
QRS DURATION - MUSE: 92 MS
QT - MUSE: 466 MS
QTC - MUSE: 416 MS
R AXIS - MUSE: 31 DEGREES
RBC # BLD AUTO: 3.83 MILL/UL (ref 4.4–6.2)
SODIUM SERPL-SCNC: 140 MMOL/L (ref 136–145)
SYSTOLIC BLOOD PRESSURE - MUSE: NORMAL
T AXIS - MUSE: 68 DEGREES
TSH SERPL DL<=0.005 MIU/L-ACNC: 1.26 UIU/ML (ref 0.3–5)
VENTRICULAR RATE- MUSE: 48 BPM
WBC: 3.1 THOU/UL (ref 4–11)

## 2021-05-10 ASSESSMENT — PATIENT HEALTH QUESTIONNAIRE - PHQ9: SUM OF ALL RESPONSES TO PHQ QUESTIONS 1-9: 7

## 2021-05-10 ASSESSMENT — MIFFLIN-ST. JEOR: SCORE: 1239.35

## 2021-05-17 ENCOUNTER — OFFICE VISIT - HEALTHEAST (OUTPATIENT)
Dept: PHYSICAL THERAPY | Facility: REHABILITATION | Age: 86
End: 2021-05-17

## 2021-05-17 DIAGNOSIS — R26.81 GAIT INSTABILITY: ICD-10-CM

## 2021-05-17 DIAGNOSIS — R25.8 BRADYKINESIA: ICD-10-CM

## 2021-05-17 DIAGNOSIS — R29.3 POSTURAL INSTABILITY: ICD-10-CM

## 2021-05-17 DIAGNOSIS — G20.A1 PARKINSON DISEASE (H): ICD-10-CM

## 2021-05-17 DIAGNOSIS — M62.81 GENERALIZED MUSCLE WEAKNESS: ICD-10-CM

## 2021-05-21 ENCOUNTER — OFFICE VISIT - HEALTHEAST (OUTPATIENT)
Dept: CARDIOLOGY | Facility: CLINIC | Age: 86
End: 2021-05-21

## 2021-05-21 DIAGNOSIS — R53.83 FATIGUE: ICD-10-CM

## 2021-05-21 DIAGNOSIS — R42 DIZZINESS: ICD-10-CM

## 2021-05-21 DIAGNOSIS — G20.A1 PARKINSON DISEASE (H): ICD-10-CM

## 2021-05-21 DIAGNOSIS — R94.31 ABNORMAL ELECTROCARDIOGRAM: ICD-10-CM

## 2021-05-21 DIAGNOSIS — R00.1 SINUS BRADYCARDIA: ICD-10-CM

## 2021-05-21 ASSESSMENT — MIFFLIN-ST. JEOR: SCORE: 1235.72

## 2021-05-26 ENCOUNTER — OFFICE VISIT - HEALTHEAST (OUTPATIENT)
Dept: PHYSICAL THERAPY | Facility: REHABILITATION | Age: 86
End: 2021-05-26

## 2021-05-26 DIAGNOSIS — R26.81 GAIT INSTABILITY: ICD-10-CM

## 2021-05-26 DIAGNOSIS — M62.81 GENERALIZED MUSCLE WEAKNESS: ICD-10-CM

## 2021-05-26 DIAGNOSIS — R25.8 BRADYKINESIA: ICD-10-CM

## 2021-05-26 DIAGNOSIS — G20.A1 PARKINSON DISEASE (H): ICD-10-CM

## 2021-05-26 DIAGNOSIS — R29.3 POSTURAL INSTABILITY: ICD-10-CM

## 2021-05-26 ASSESSMENT — PATIENT HEALTH QUESTIONNAIRE - PHQ9
SUM OF ALL RESPONSES TO PHQ QUESTIONS 1-9: 2
SUM OF ALL RESPONSES TO PHQ QUESTIONS 1-9: 7
SUM OF ALL RESPONSES TO PHQ QUESTIONS 1-9: 3
SUM OF ALL RESPONSES TO PHQ QUESTIONS 1-9: 10
SUM OF ALL RESPONSES TO PHQ QUESTIONS 1-9: 10
SUM OF ALL RESPONSES TO PHQ QUESTIONS 1-9: 11
SUM OF ALL RESPONSES TO PHQ QUESTIONS 1-9: 18

## 2021-05-27 VITALS
HEIGHT: 69 IN | HEART RATE: 48 BPM | BODY MASS INDEX: 19.82 KG/M2 | SYSTOLIC BLOOD PRESSURE: 150 MMHG | DIASTOLIC BLOOD PRESSURE: 73 MMHG | WEIGHT: 133.8 LBS

## 2021-05-27 ASSESSMENT — PATIENT HEALTH QUESTIONNAIRE - PHQ9
SUM OF ALL RESPONSES TO PHQ QUESTIONS 1-9: 6
SUM OF ALL RESPONSES TO PHQ QUESTIONS 1-9: 4
SUM OF ALL RESPONSES TO PHQ QUESTIONS 1-9: 7

## 2021-05-27 NOTE — PROGRESS NOTES
"  Assessment and Plan:     1. Routine general medical examination at a health care facility    2. Acquired hypothyroidism  Will check TSH today, depending on results, will then refill levothyroxine  - Thyroid Stimulating Hormone (TSH)    3. Eye drainage  Mild, discussed with pt no sign of conjunctivitis or inflammation at this time.  If ongoing over the next 1-2 weeks, consider referral to ophthalmology.    4. Dysthymia  Has improved since starting venlafaxine.  Recommend he continue medication at this time.    5. Prostate Cancer  Is followed by urology, does have follow-up appointment months.    The patient's current medical problems were reviewed.    I have had an Advance Directives discussion with the patient.  The following health maintenance schedule was reviewed with the patient and provided in printed form in the after visit summary:   Health Maintenance   Topic Date Due     ZOSTER VACCINES (2 of 3) 01/27/2009     TD 18+ HE  11/10/2019     DEPRESSION FOLLOW UP  08/07/2019     FALL RISK ASSESSMENT  02/07/2020     ADVANCE DIRECTIVES DISCUSSED WITH PATIENT  03/22/2023     PNEUMOCOCCAL POLYSACCHARIDE VACCINE AGE 65 AND OVER  Completed     INFLUENZA VACCINE RULE BASED  Completed     PNEUMOCOCCAL CONJUGATE VACCINE FOR ADULTS (PCV13 OR PREVNAR)  Completed        Subjective:   Chief Complaint: Gomez EDU Villasenor \"Delbert\" is an 89 y.o. male, new to family medicine here for an Annual Wellness visit and to establish care.    HPI:  Additional concerns:     Patient has a history of hypothyroidism, has been taking levothyroxine for several years, most recent TSH checked approximately 1 year ago was slightly low.  He denies any symptoms of hypo-or hyperthyroidism.    When he last saw his prior PCP a few months ago, he did note some decreased energy and dysthymia that has been going on for about 1 year, was started on Effexor, currently taking 75 mg daily.  With medication patient has noted some increased energy and wife is " also seen more smiles from the patient.    Over the last 4 to 5 days patient has also noted some right eye drainage.  He denies any other cold symptoms.  He has tried using over-the-counter Systane eyedrops, which has helped.  He denies any photophobia, vision changes, eye pain, fevers, chills, sweats.    Review of Systems:  Please see above.  The rest of the review of systems are negative for all systems.    Patient Care Team:  Christiano Irvin MD as PCP - General  Neal Del Rio MD (Ophthalmology)  Christian Badillo MD (Dermatology)  Levon Cruz MD as Physician (Ophthalmology)  Stevie Braxton MD as Physician (Urology)  Hadley Chi MD as Physician (Urology)     Patient Active Problem List   Diagnosis     Prostate Cancer     Nephrolithiasis     Macular Degeneration     Hypothyroid     Leukopenia     Fatigue     Dysthymia     Past Medical History:   Diagnosis Date     Cancer (H)     prostate     Disease of thyroid gland     hypothyroid     Hearing loss      Kidney stones 3/17/2015    this is third episode of stones     Macular degeneration      Nephrolithiasis 2015    right sided/ureteroscopic removal     Prostate cancer (H)      Status post cystoscopy March 2015    with ureteroscopy and stone removal     Urinary incontinence       Past Surgical History:   Procedure Laterality Date     CYSTOURETHROSCOPY      Right stent exchange and stone extraction     EYE SURGERY Right     cataract removed     INSERT / REPLACE / REMOVE PROSTHESIS URETHRAL SPHINCTER N/A 9/21/2016    Procedure:  REPLACEMENT OF ARTIFICIAL URINARY SPHINCTER;  Surgeon: Stevie Braxton MD;  Location: [facility];  Service:      OTHER SURGICAL HISTORY      artificial urethral sphincter     AL CYSTOURETHROSCOPY      Description: Cystoscopy (Diagnostic);  Recorded: 11/07/2008;     AL REMV PROSTATE,RETROPUB,RADICAL      Description: Prostatectomy Retropubic Radical With Nerve Sparing;  Recorded: 11/10/2009;  Comments: 2001  artificial sphinter placed      Family History   Problem Relation Age of Onset     Heart disease Mother      No Medical Problems Father      Leukemia Brother      Cancer Brother       Social History     Socioeconomic History     Marital status:      Spouse name: Tanya     Number of children: 3     Years of education: Not on file     Highest education level: Not on file   Occupational History     Occupation: retired dentist   Social Needs     Financial resource strain: Not on file     Food insecurity:     Worry: Not on file     Inability: Not on file     Transportation needs:     Medical: Not on file     Non-medical: Not on file   Tobacco Use     Smoking status: Never Smoker     Smokeless tobacco: Never Used   Substance and Sexual Activity     Alcohol use: Yes     Alcohol/week: 0.6 oz     Types: 1 Cans of beer per week     Comment: one beer most days     Drug use: No     Sexual activity: Not on file   Lifestyle     Physical activity:     Days per week: Not on file     Minutes per session: Not on file     Stress: Not on file   Relationships     Social connections:     Talks on phone: Not on file     Gets together: Not on file     Attends Taoism service: Not on file     Active member of club or organization: Not on file     Attends meetings of clubs or organizations: Not on file     Relationship status: Not on file     Intimate partner violence:     Fear of current or ex partner: Not on file     Emotionally abused: Not on file     Physically abused: Not on file     Forced sexual activity: Not on file   Other Topics Concern     Not on file   Social History Narrative    . Tanya. 3 grown children and several grandkids. Retired dentist/  World traveller. Exercise enthusiast. Identical twin Brother Alonso Faust  age 83 CLL/Cancer/ICH... Non smoker, occ beer/       Current Outpatient Medications   Medication Sig Dispense Refill     cholecalciferol, vitamin D3, 1,000 unit tablet Take 2,000 Units by  "mouth daily.        GLUCOSAMINE/CHONDROITIN SULF A (GLUCOSAMINE-CHONDROITIN ORAL) Take 2 tablets by mouth daily.        levothyroxine (SYNTHROID, LEVOTHROID) 125 MCG tablet Take 1 tablet (125 mcg) by mouth once daily 90 tablet 3     lutein 20 mg Tab Take 1 tablet by mouth daily.       multivitamin therapeutic (THERAGRAN) tablet Take 1 tablet by mouth daily.       venlafaxine (EFFEXOR XR) 75 MG 24 hr capsule Take 1 capsule (75 mg total) by mouth daily. (take in AM) 90 capsule 3     vit A/vit C/vit E/zinc/copper (PRESERVISION AREDS ORAL) Take by mouth.       No current facility-administered medications for this visit.       Objective:   Vital Signs:   Visit Vitals  /90 (Patient Site: Left Arm, Patient Position: Sitting, Cuff Size: Adult Regular)   Pulse (!) 56   Temp 97.6  F (36.4  C) (Oral)   Resp 16   Ht 5' 8.25\" (1.734 m)   Wt 140 lb 6.4 oz (63.7 kg)   BMI 21.19 kg/m         VisionScreening:  No exam data present     PHYSICAL EXAM  General Appearance: Alert, cooperative, no distress, appears stated age  Head: Normocephalic, without obvious abnormality, atraumatic  Eyes: PERRL, conjunctiva/corneas clear, EOM's intact.  No drainage noted.  Ears: Normal TM's and external ear canals, both ears  Throat: Lips, mucosa, and tongue normal; teeth and gums normal  Neck: Supple, symmetrical, trachea midline, no adenopathy;  thyroid: not enlarged, symmetric, no tenderness/mass/nodules; no carotid bruit or JVD  Lungs: Clear to auscultation bilaterally, respirations unlabored  Heart: Regular rate and rhythm, S1 and S2 normal, no murmur.  Abdomen: Soft, non-tender, bowel sounds active all four quadrants,  no masses, no organomegaly  Musculoskeletal: Normal range of motion. No joint swelling or deformity.   Extremities: Extremities normal, atraumatic, no cyanosis or edema  Skin: Skin color, texture, turgor normal, no rashes or lesions  Lymph nodes: Cervical, supraclavicular, and axillary nodes normal  Neurologic: Alert.  " Normal speech.  No focal deficits.  Normal deep tendon reflexes.   Psychiatric: Normal mood and affect.  Does not appear anxious or depressed.  Normal speech pattern.  Maintains eye contact.  PHQ-9 = 0.  JOANIE-7 = 1.  See flow sheet for details.        Assessment Results 3/26/2019   Activities of Daily Living No help needed   Instrumental Activities of Daily Living No help needed   Get Up and Go Score -   Mini Cog Total Score 4   Some recent data might be hidden     A Mini-Cog score of 0-2 suggests the possibility of dementia, score of 3-5 suggests no dementia    Identified Health Risks:     The patient was provided with written information regarding signs of hearing loss.  He is at risk for falling and has been provided with information to reduce the risk of falling at home.  Patient's advanced directive was discussed and I am comfortable with the patient's wishes.

## 2021-05-27 NOTE — TELEPHONE ENCOUNTER
Please call pt and let him know TSH is still low, which means his levothyroxine dose is too high.  This may be causing some of his weight loss.  I've sent new lower dose of levothyroxine to his pharmacy.  Please also have him return to lab in ~6 weeks to recheck TSH level.

## 2021-05-28 ASSESSMENT — ANXIETY QUESTIONNAIRES
GAD7 TOTAL SCORE: 3
GAD7 TOTAL SCORE: 0
GAD7 TOTAL SCORE: 3
GAD7 TOTAL SCORE: 6
GAD7 TOTAL SCORE: 7
GAD7 TOTAL SCORE: 0

## 2021-05-29 ENCOUNTER — RECORDS - HEALTHEAST (OUTPATIENT)
Dept: ADMINISTRATIVE | Facility: CLINIC | Age: 86
End: 2021-05-29

## 2021-05-29 NOTE — PROGRESS NOTES
FOOT AND ANKLE SURGERY/PODIATRY CONSULT NOTE         ASSESSMENT:   Onychomycosis, onychauxis second toe right foot      TREATMENT:  We did the second toenail right foot        HPI: I was asked to see Gomez Villasenor today to evaluate and treat painful thick toenail second toe right foot.  The patient stated that this can be somewhat uncomfortable when wearing normal shoes.  He stated that the nail is also quite loose.  He denies any trauma to the foot.  He has not had any associated redness or swelling.  The pain is extremely mild.  He denies any other previous treatment.  He has not noticed any redness, swelling, drainage of bleeding surrounding the toenail.  The patient was seen in consultation at the request of Modesta Corrales MD for valuation treatment painful thick toenail second toe right foot.     Past Medical History:   Diagnosis Date     Cancer (H)     prostate     Disease of thyroid gland     hypothyroid     Hearing loss      Kidney stones 3/17/2015    this is third episode of stones     Macular degeneration      Nephrolithiasis 2015    right sided/ureteroscopic removal     Prostate cancer (H)      Status post cystoscopy March 2015    with ureteroscopy and stone removal     Urinary incontinence        Past Surgical History:   Procedure Laterality Date     CYSTOURETHROSCOPY      Right stent exchange and stone extraction     EYE SURGERY Right     cataract removed     INSERT / REPLACE / REMOVE PROSTHESIS URETHRAL SPHINCTER N/A 9/21/2016    Procedure:  REPLACEMENT OF ARTIFICIAL URINARY SPHINCTER;  Surgeon: Stevie Braxton MD;  Location: [facility];  Service:      OTHER SURGICAL HISTORY      artificial urethral sphincter     LA CYSTOURETHROSCOPY      Description: Cystoscopy (Diagnostic);  Recorded: 11/07/2008;     LA REMV PROSTATE,RETROPUB,RADICAL      Description: Prostatectomy Retropubic Radical With Nerve Sparing;  Recorded: 11/10/2009;  Comments: 2001 artificial sphinter placed       No Known  Allergies      Current Outpatient Medications:      cholecalciferol, vitamin D3, 1,000 unit tablet, Take 2,000 Units by mouth daily. , Disp: , Rfl:      GLUCOSAMINE/CHONDROITIN SULF A (GLUCOSAMINE-CHONDROITIN ORAL), Take 2 tablets by mouth daily. , Disp: , Rfl:      levothyroxine (SYNTHROID) 112 MCG tablet, Take 1 tablet (112 mcg total) by mouth daily., Disp: 90 tablet, Rfl: 3     lutein 20 mg Tab, Take 1 tablet by mouth daily., Disp: , Rfl:      multivitamin (ONE A DAY) per tablet, Take 1 tablet by mouth., Disp: , Rfl:      multivitamin therapeutic (THERAGRAN) tablet, Take 1 tablet by mouth daily., Disp: , Rfl:      venlafaxine (EFFEXOR XR) 75 MG 24 hr capsule, Take 1 capsule (75 mg total) by mouth daily. (take in AM), Disp: 90 capsule, Rfl: 3     vit A/vit C/vit E/zinc/copper (PRESERVISION AREDS ORAL), Take by mouth., Disp: , Rfl:      zolpidem (AMBIEN) 5 MG tablet, Take by mouth., Disp: , Rfl:     Family History   Problem Relation Age of Onset     Heart disease Mother      No Medical Problems Father      Leukemia Brother      Cancer Brother        Social History     Socioeconomic History     Marital status:      Spouse name: Tanya     Number of children: 3     Years of education: Not on file     Highest education level: Not on file   Occupational History     Occupation: retired dentist   Social Needs     Financial resource strain: Not on file     Food insecurity:     Worry: Not on file     Inability: Not on file     Transportation needs:     Medical: Not on file     Non-medical: Not on file   Tobacco Use     Smoking status: Never Smoker     Smokeless tobacco: Never Used   Substance and Sexual Activity     Alcohol use: Yes     Alcohol/week: 0.6 oz     Types: 1 Cans of beer per week     Comment: one beer most days     Drug use: No     Sexual activity: Not on file   Lifestyle     Physical activity:     Days per week: Not on file     Minutes per session: Not on file     Stress: Not on file   Relationships      Social connections:     Talks on phone: Not on file     Gets together: Not on file     Attends [employer] service: Not on file     Active member of club or organization: Not on file     Attends meetings of clubs or organizations: Not on file     Relationship status: Not on file     Intimate partner violence:     Fear of current or ex partner: Not on file     Emotionally abused: Not on file     Physically abused: Not on file     Forced sexual activity: Not on file   Other Topics Concern     Not on file   Social History Narrative    . Tanya. 3 grown children and several grandkids. Retired dentist/  World traveller. Exercise enthusiast. Identical twin Brother Alonso Faust  age 83 CLL/Cancer/ICH... Non smoker, occ beer/        Review of Systems - Patient denies fever, chills, rash, wound, stiffness, limping, numbness, weakness, heart burn, blood in stool, chest pain with activity, calf pain when walking, shortness of breath with activity, chronic cough, easy bleeding/bruising, swelling of ankles, excessive thirst, fatigue, depression, anxiety.  Patient admits to thick toenail second toe right foot.      OBJECTIVE:  Appearance: alert, well appearing, and in no distress.    Vitals:    19 1504   BP: 120/68   Pulse: (!) 52   SpO2: 97%       BMI= Body mass index is 21.43 kg/m .    General appearance: Patient is alert and fully cooperative with history & exam.  No sign of distress is noted during the visit.  Psychiatric: Affect is pleasant & appropriate.  Patient appears motivated to improve health.  Respiratory: Breathing is regular & unlabored while sitting.  HEENT: Hearing is intact to spoken word.  Speech is clear.  No gross evidence of visual impairment that would impact ambulation.    Vascular: Dorsalis pedis and posterior tibial pulses are palpable. There is no pedal hair growth bilaterally.  CFT < 3 sec from anterior tibial surface to distal digits bilaterally. There is no appreciable edema  noted.  Dermatologic: Nails are normal length this date.  The second toenail right foot is elongated and 2-3 times normal thickness.  Turgor and texture are within normal limits. No coloration or temperature changes. No primary or secondary lesions noted.  Neurologic: All epicritic and proprioceptive sensations are grossly intact bilaterally.  Musculoskeletal: All active and passive ankle, subtalar, midtarsal, and 1st MPJ range of motion are grossly intact without pain or crepitus, with the exception of none. Manual muscle strength is within normal limits bilaterally. All dorsiflexors, plantarflexors, invertors, evertors are intact bilaterally. Tenderness present to second toenail right foot on palpation.  No tenderness to second toe right foot with range of motion. Calf is soft/non-tender without warmth/induration    Imaging:     No results found.       TREATMENT:  Debrided second toenail right foot.  I recommended the patient return to the clinic as needed.    Ahmet Dougherty; SANDY  [facility] Foot & Ankle Surgery/Podiatry

## 2021-05-29 NOTE — PROGRESS NOTES
HPI: Gomez Villasenor presents to clinic today for cerumen removal at the request of Dr. Corrales. He was at a regular doctors visit and noted to have cerumen in his right ear canal.  Dr. Corrales also noted a small area of irritation in the right ear canal as well so wanted ENT to take a look.  He has known hearing loss for which he wears hearing aids that he got at [place].  Denies otalgia, otorrhea, vertigo, change in hearing from baseline or tinnitus. Denies other symptoms.    Past medical history, surgical history, family history, social history, medications, and allergies have been reviewed and are documented above.     Review of Systems: a 10-system review was performed. Symptoms are noted in the HPI and on a scanned document in the computer chart.    Physical Examination:   GEN: no acute distress, alert and oriented  EYES: extraocular movements intact, pupils equal and round. Sclera clear.   EARS: cerumen impaction on the right cleared under binocular microscopy using curette and suction.  Tiny area of redness in the superior posterior region of the right ear canal. The tympanic membranes are noted to be intact and clear with no signs of infections, effusions, perforations, or retractions.  PULM: breathing comfortably on room air with no stertor or stridor. Chest expansion symmetric.  HEART: no clubbing or cyanosis, no peripheral edema    MEDICAL DECISION-MAKING: Cerumen impaction on the right cleared under binocular microscopy without difficulty. Would like patient to follow-up in 6-8 weeks for recheck of his ears.

## 2021-05-29 NOTE — PROGRESS NOTES
[facility] Daily Progress     Patient Name: Gomez Villasenor  Date: 6/21/2019  Visit #: 2/8 through 8/28  PTA visit #:    PRECAUTIONS: none  Referral Diagnosis: General unsteadiness  Referring provider: Modesta Corrales MD  Visit Diagnosis:     ICD-10-CM    1. Unsteadiness on feet R26.81    2. Generalized muscle weakness M62.81    3. History of fall within past 90 days Z91.81          Assessment:     Although patient demonstrates relatively low risk for falls, he would benefit from skilled PT to address LE strength and proprioceptive deficits to reduce his risk of future falls.  HEP/POC compliance is  good .  Patient is appropriate to continue with skilled physical therapy intervention, as indicated by initial plan of care.    Goal Status:  Pt. will be independent with home exercise program in : Comment  Comment:: in 8 weeks    Pt will: demonstrate improved funtional mobility: TUG <= 10 sec for decreased risk of future falls in 8 weeks.  Pt will: demonstrate improved LE strength: APTA >= 12x for decreased risk of future falls in 8 weeks.  Pt will: demonstrate improved balance: FT/EC on foam x30 sec without LOB for decreased risk of future falls in 8 weeks.      Plan / Patient Education:     Continue current HEP (reviewing for proper performance).    Subjective:     Denies falls since last visit. Notes some difficulty with getting up off hands and knees after working in the garden.    Objective:     Demonstrated and patient performed proper technique and sequencing for floor<>stand transfers. Encouraged placement of sturdy outdoor chair nearby to help aid with transfer and decrease risk of falling while doing so.  Frequent cueing required for SLOW controlled mvmts; however, improved from previous visit.    Treatment Today     TREATMENT MINUTES COMMENTS   Evaluation     Self-care/ Home management     Manual therapy     Neuromuscular Re-education 29 Balance ex per flow sheet   Therapeutic Activity      Therapeutic Exercises     Gait training     Modality__________________                Total 29    Blank areas are intentional and mean the treatment did not include these items.       Roby Yepez  6/21/2019

## 2021-05-29 NOTE — PROGRESS NOTES
[facility] Certification Request    May 30, 2019      Patient: Gomez Villasenor  MR Number: [id number]  YOB: 1928  Date of Visit: 5/30/2019      Dear Dr. Modesta Corrales:    Thank you for this referral.   We are seeing Gomez Villasenor in Physical Therapy for decreased balance.    Medicare and/or Medicaid requires physician review and approval of the treatment plan. Please review the plan of care and verify that you agree with the therapy plan of care by co-signing this note.      Plan of Care  Authorization / Certification Start Date: 05/30/19  Authorization / Certification End Date: 08/28/19  Authorization / Certification Number of Visits: 8  Communication with: Referral Source  Patient Related Instruction: Nature of Condition;Treatment plan and rationale;Self Care instruction;Basis of treatment;Posture;Precautions;Next steps;Expected outcome  Times per Week: 1  Number of Weeks: 8  Number of Visits: 8  Therapeutic Exercise: Stretching;Strengthening  Neuromuscular Reeducation: balance/proprioception      Goals:  Pt. will be independent with home exercise program in : Comment  Comment:: in 8 weeks    Pt will: demonstrate improved funtional mobility: TUG <= 10 sec for decreased risk of future falls in 8 weeks.  Pt will: demonstrate improved LE strength: APTA >= 12x for decreased risk of future falls in 8 weeks.  Pt will: demonstrate improved balance: FT/EC on foam x30 sec without LOB for decreased risk of future falls in 8 weeks.        If you have any questions or concerns, please don't hesitate to call.    Sincerely,      Roby Yepez, PT        Physician recommendation:     ___ Follow therapist's recommendation        ___ Modify therapy      *Physician co-signature indicates they certify the need for these services furnished within this plan and while under their care.    [facility]   Initial Evaluation    Patient Name: Gomez Villasenor  Date of evaluation:  5/30/2019  PRECAUTIONS: none  Referral Diagnosis: General unsteadiness  Referring provider: Modesta Corrales MD  Visit Diagnosis:     ICD-10-CM    1. Unsteadiness on feet R26.81    2. Generalized muscle weakness M62.81        Assessment:      Pt. is appropriate for skilled PT intervention as outlined in the Plan of Care (POC).  Pt. is a good candidate for skilled PT services to improve pain levels and function.  Goals and POC established in collaboration with the patient.  Although patient demonstrates relatively low risk for falls, he would benefit from skilled PT to address LE strength and proprioceptive deficits to reduce his risk of future falls.    Goals:  Pt. will be independent with home exercise program in : Comment  Comment:: in 8 weeks    Pt will: demonstrate improved funtional mobility: TUG <= 10 sec for decreased risk of future falls in 8 weeks.  Pt will: demonstrate improved LE strength: APTA >= 12x for decreased risk of future falls in 8 weeks.  Pt will: demonstrate improved balance: FT/EC on foam x30 sec without LOB for decreased risk of future falls in 8 weeks.      Patient's expectations/goals are realistic.    Barriers to Learning or Achieving Goals:  Age.        Plan / Patient Instructions:        Plan of Care:   Authorization / Certification Start Date: 05/30/19  Authorization / Certification End Date: 08/28/19  Authorization / Certification Number of Visits: 8  Communication with: Referral Source  Patient Related Instruction: Nature of Condition;Treatment plan and rationale;Self Care instruction;Basis of treatment;Posture;Precautions;Next steps;Expected outcome  Times per Week: 1  Number of Weeks: 8  Number of Visits: 8  Therapeutic Exercise: Stretching;Strengthening  Neuromuscular Reeducation: balance/proprioception      Plan for next visit: Review HEP to ensure correct performance, adding in walking with head turns L/R as able.     Subjective:       History of Present Illness:    Gomez collier  "89 y.o. male who presents to therapy today with complaints of decreased balance.   Wife notes more shuffling, more LE weakness, more forward flexed posture/always looking down.  Does feel to be walking better at the start of the day, and tends to degrade as the day goes on.  Enjoys working in the yard. Goes to the gym 1-3 days/week. Notes to feel a bit more off balance the days after he goes to the gym.    Pt seeks PT to \"balance.\"     Objective:      Patient Outcome Measures :    Lower Extremity Functional Scale (_/80): 52     Scores range from 0-80, where a score of 80 represents maximum function. The minimal clinically important difference is a positive change of 9 points.    Examination  1. Unsteadiness on feet     2. Generalized muscle weakness       Involved side: Bilateral  Posture Observation:      General sitting posture is  fair.  General standing posture is fair.    LE sensation: Intact t/o bilat LE  LE strength: Grossly 4>4+/5 t/o bilat LE.    TU sec without AD.  APTA: 8x without UE A  4m walk test: 3.75 sec on average without AD    Balance Assessment:  Rhomberg - Eyes Open:  30 seconds  Rhomberg - Eyes Closed:  30 seconds  Rhomberg - Perturbed Surface with Eyes Open:  30 seconds  Rhomberg - Perturbed Surface with Eyes Closed:  5 seconds  Sharpened Rhomberg - Eyes Open:  R in front 30 seconds; L in front 25 seconds  Single Leg Stance:  3 seconds    MCGREGOR/56      Treatment Today     TREATMENT MINUTES COMMENTS   Evaluation 30 Balance   Self-care/ Home management     Manual therapy     Neuromuscular Re-education 38 -Discussed therapy diagnosis, prognosis, POC, relevant neuroanatomy and basis for tx.  -Reviewed and performed HEP, updating from previous POC.   Therapeutic Activity     Therapeutic Exercises     Gait training     Modality__________________                Total 68    Blank areas are intentional and mean the treatment did not include these items.            PT Evaluation Code: (Please " list factors)  Patient History/Comorbidities: age  Examination: balance  Clinical Presentation: Stable  Clinical Decision Making: Low    Patient History/  Comorbidities Examination  (body structures and functions, activity limitations, and/or participation restrictions) Clinical Presentation Clinical Decision Making (Complexity)   No documented Comorbidities or personal factors 1-2 Elements Stable and/or uncomplicated Low   1-2 documented comorbidities or personal factor 3 Elements Evolving clinical presentation with changing characteristics Moderate   3-4 documented comorbidities or personal factors 4 or more Unstable and unpredictable High           Roby Yepez, PT, DPT  5/30/2019  9:00 AM

## 2021-05-29 NOTE — PROGRESS NOTES
Assessment / Impression     1. General unsteadiness  Ambulatory referral to PT/OT    Ambulatory referral to Adult OT- Internal   2. Balance problems     3. Toenail deformity  Ambulatory referral to Podiatry   4. Impacted cerumen of right ear  Ambulatory referral to ENT   5. Lesion of external ear canal, right  Ambulatory referral to ENT   6. Acquired hypothyroidism  levothyroxine (SYNTHROID) 112 MCG tablet         Plan:     Unsteadiness and balance problems: This is been chronic, discussed with patient that given age this may continue over time.  Would recommend referral to PT and OT.    Toenail deformity: Appears to be possible fungal infection, and we discussed various options including topical versus oral antifungal, however given the toenail is loose, would recommend referral to podiatry to determine whether he should have complete toenail removal.    Impacted cerumen of right ear with lesion of ear canal: We are unable to fully remove all cerumen in canal today, therefore I was unable to fully visualize ear canal, though I could see a portion of a red lesion, possible granuloma?  on the right ear canal.  For this reason, I would recommend referral to ENT for full cerumen removal, as he has had difficulty hearing from his right ear, and this may also help visualize the fall ear canal to evaluate lesion.    Acquired hypothyroidism: Reviewed recent lab results, inform patient that recent TSH is now normal.  Would recommend he stay on his current dose of levothyroxine 112 mcg.  He may feel more tired given now he is at normal thyroid hormone levels rather than high thyroid hormone levels, though also discussed with patient that given his age it is common for people to sleep more than previously.    Follow-up in 6 to 12 months, sooner if needed.    I spent total time 40 minutes, counseling time 23 minutes reviewing plan and counseling patient, as well as discussion of findings as noted above.      Subjective:       HPI: Gomez Villasenor is a 89 y.o. male, here today with wife who presents for balance concerns.  This is not necessarily a new issue, as he had previously discussed this with his prior PCP last year, had participated in PT OT for a few months at that time until about August, 2018, which had been helpful.  However, over the last 3 to 6 months, he needs to be more focused on his walking in order to be balance correctly.  He will occasionally use a hiking pole to help keep balance.  He denies any headaches or ear pain, will have some occasional lightheadedness, though denies any shortness of breath or chest pain.    For the last several months to year, there have been some changes to his right second toenail.  His wife will not cut it due to some toenail changes.  It is slightly painful when it is pressed.    Patient has felt more tired with recent adjustments of his levothyroxine dose which had been lowered due to low TSH.  His TSH has now normalized.      Medical History:     Patient Active Problem List   Diagnosis     Prostate Cancer     Nephrolithiasis     Macular Degeneration     Hypothyroid     Leukopenia     Fatigue     Dysthymia       Past Medical History:   Diagnosis Date     Cancer (H)     prostate     Disease of thyroid gland     hypothyroid     Hearing loss      Kidney stones 3/17/2015    this is third episode of stones     Macular degeneration      Nephrolithiasis 2015    right sided/ureteroscopic removal     Prostate cancer (H)      Status post cystoscopy March 2015    with ureteroscopy and stone removal     Urinary incontinence        Past Surgical History:   Procedure Laterality Date     CYSTOURETHROSCOPY      Right stent exchange and stone extraction     EYE SURGERY Right     cataract removed     INSERT / REPLACE / REMOVE PROSTHESIS URETHRAL SPHINCTER N/A 9/21/2016    Procedure:  REPLACEMENT OF ARTIFICIAL URINARY SPHINCTER;  Surgeon: Stevie Braxton MD;  Location: [facility];   "Service:      OTHER SURGICAL HISTORY      artificial urethral sphincter     [place] CYSTOURETHROSCOPY      Description: Cystoscopy (Diagnostic);  Recorded: 2008;     [place] REMV PROSTATE,RETROPUB,RADICAL      Description: Prostatectomy Retropubic Radical With Nerve Sparing;  Recorded: 11/10/2009;  Comments:  artificial sphinter placed       Current Medications:     Current Outpatient Medications   Medication Sig     cholecalciferol, vitamin D3, 1,000 unit tablet Take 2,000 Units by mouth daily.      GLUCOSAMINE/CHONDROITIN SULF A (GLUCOSAMINE-CHONDROITIN ORAL) Take 2 tablets by mouth daily.      levothyroxine (SYNTHROID) 112 MCG tablet Take 1 tablet (112 mcg total) by mouth daily.     lutein 20 mg Tab Take 1 tablet by mouth daily.     multivitamin therapeutic (THERAGRAN) tablet Take 1 tablet by mouth daily.     venlafaxine (EFFEXOR XR) 75 MG 24 hr capsule Take 1 capsule (75 mg total) by mouth daily. (take in AM)     vit A/vit C/vit E/zinc/copper (PRESERVISION AREDS ORAL) Take by mouth.       Family History:     Family History   Problem Relation Age of Onset     Heart disease Mother      No Medical Problems Father      Leukemia Brother      Cancer Brother        Review of Systems  All other systems reviewed and are negative.         Social History:     Social History     Tobacco Use   Smoking Status Never Smoker   Smokeless Tobacco Never Used     Social History     Social History Narrative    . Tanya. 3 grown children and several grandkids. Retired dentist/  World traveller. Exercise enthusiast. Identical twin Brother Alonso Faust  age 83 CLL/Cancer/ICH... Non smoker, occ beer/          Objective:     /80 (Patient Site: Left Arm, Patient Position: Sitting, Cuff Size: Adult Regular)   Pulse 65   Temp 98  F (36.7  C) (Oral)   Resp 14   Ht 5' 8.5\" (1.74 m)   Wt 143 lb 3.2 oz (65 kg)   SpO2 99%   BMI 21.46 kg/m    Physical Examination: General appearance - alert, well appearing, and in no " distress  Eyes: pupils equal and reactive, extraocular eye movements intact  Ears: normal external ears, left clear canal,  Left TM appears normal, with no redness or bulging noted. Right canal initially with moderate amount of cerumen, and after my nurse tried to do ear lavage, only a portion of cerumen was removed.  I was not fully able to visualize the entire ear canal, though I could see and erythematous lesion along the lower right side of the canal.  From what I could visualize, right TM appears normal, with no redness or bulging noted.  Mouth: mucous membranes moist, pharynx normal without lesions  Neck: supple, no significant adenopathy or thyromegaly  Lungs: clear to auscultation, no wheezes, rales or rhonchi, symmetric air entry  Heart: normal rate, regular rhythm, normal S1, S2, no murmurs.  Abdomen: soft, nontender, nondistended, no masses or organomegaly  Neurological: alert, oriented, normal speech, no focal findings or movement disorder noted.  Normal rapid hand movements bilaterally.  Normal heel-to-shin test bilaterally.  Normal finger-to-nose test bilaterally.  Widened gait.  Normal romberg test.    Extremities: No edema, no clubbing or cyanosis.  There is thickened right 2nd digit toenail that is partially loose.    Psychiatric: Normal affect. Does not appear anxious or depressed.    No results found for this or any previous visit (from the past 168 hour(s)).      Modesta Corrales MD  5/20/2019  5:11 PM

## 2021-05-30 ENCOUNTER — RECORDS - HEALTHEAST (OUTPATIENT)
Dept: ADMINISTRATIVE | Facility: CLINIC | Age: 86
End: 2021-05-30

## 2021-05-30 VITALS — HEIGHT: 69 IN | WEIGHT: 151.4 LBS | BODY MASS INDEX: 22.42 KG/M2

## 2021-05-30 VITALS — BODY MASS INDEX: 22.51 KG/M2 | HEIGHT: 69 IN | WEIGHT: 152 LBS

## 2021-05-30 NOTE — PROGRESS NOTES
[facility] Daily Progress     Patient Name: Gomez Villasenor  Date: 2019  Visit #:  through   PTA visit #:    PRECAUTIONS: none  Referral Diagnosis: General unsteadiness  Referring provider: Modesta Corrales MD  Visit Diagnosis:     ICD-10-CM    1. Unsteadiness on feet R26.81    2. Generalized muscle weakness M62.81    3. History of fall within past 90 days Z91.81          Assessment:     With help from wife, patient independent and compliant with [facility]. At this time, he does test for relatively low risk for falls, although discussed with patient that often his falls occur with fatigue in demanding environments. Discussed importance of activity modification/pacing as needed to help prevent future falls. Otherwise, it is likely a plateau has been reached, and he is appropriate to continue with independent self-management.    Goal Status:  Pt. will be independent with home exercise program in : Comment  Comment:: in 8 weeks. MET  Pt will: demonstrate improved funtional mobility: TUG <= 10 sec for decreased risk of future falls in 8 weeks. PROGRESSING  Pt will: demonstrate improved LE strength: APTA >= 12x for decreased risk of future falls in 8 weeks. MET  Pt will: demonstrate improved balance: FT/EC on foam x30 sec without LOB for decreased risk of future falls in 8 weeks. NOT MET      Plan / Patient Education:     Discharge to [facility] at this time. Encouraged to follow-up with PCP regarding growing lack of energy over the past 6 months, despite staying active.    Subjective:     Did have one fall the other day when walking down the driving to get the mail. No injury, but did need some help from wife to get up.  Notes to be feeling more run-down/fatigued over the pat 6 months; encouraged to follow-up with PCP.    Objective:     TU.5 sec on average without AD.  APTA: 12x without UE A  Balance Assessment:  Rhomberg - Eyes Open:  30 seconds  Rhomberg - Eyes Closed:  30 seconds  Rhomberg -  Perturbed Surface with Eyes Open:  30 seconds  Rhomberg - Perturbed Surface with Eyes Closed:  3 seconds     MCGREGOR/56    LEFS: 40/80    Treatment Today     TREATMENT MINUTES COMMENTS   Evaluation     Self-care/ Home management 12 Discussed home modification/set-up for safety, fall prevention, fall recovery techniques   Manual therapy     Neuromuscular Re-education     Therapeutic Activity     Therapeutic Exercises     Gait training     Modality__________________     Physical Performance Test 29 Per above         Total 41    Blank areas are intentional and mean the treatment did not include these items.       Roby Yepez, PT, DPT  2019     [facility] Discharge Summary  Patient Name: Gomez Villasenor  Date: 2019  Referral Diagnosis: General unsteadiness  Referring provider: Modesta Corrales MD  Visit Diagnosis:   1. Unsteadiness on feet     2. Generalized muscle weakness     3. History of fall within past 90 days         Goals:  Pt. will be independent with home exercise program in : Comment  Comment:: in 8 weeks. MET  Pt will: demonstrate improved funtional mobility: TUG <= 10 sec for decreased risk of future falls in 8 weeks. PROGRESSING  Pt will: demonstrate improved LE strength: APTA >= 12x for decreased risk of future falls in 8 weeks. MET  Pt will: demonstrate improved balance: FT/EC on foam x30 sec without LOB for decreased risk of future falls in 8 weeks. NOT MET    Patient was seen for 4 visits from 19 to 19 with 0 missed appointments.  With help from wife, patient independent and compliant with HEP. At this time, he does test for relatively low risk for falls, although discussed with patient that often his falls occur with fatigue in demanding environments. Discussed importance of activity modification/pacing as needed to help prevent future falls. Otherwise, it is likely a plateau has been reached, and he is appropriate to continue with independent  self-management.    Therapy will be discontinued at this time.  The patient will need a new referral to resume.    Thank you for your referral.  Roby Yepez  7/11/2019  4:05 PM

## 2021-05-30 NOTE — PROGRESS NOTES
[facility] Daily Progress     Patient Name: Gomez Villasenor  Date: 6/27/2019  Visit #: 3/8 through 8/28  PTA visit #:    PRECAUTIONS: none  Referral Diagnosis: General unsteadiness  Referring provider: Modesta Corrales MD  Visit Diagnosis:     ICD-10-CM    1. Unsteadiness on feet R26.81    2. Generalized muscle weakness M62.81    3. History of fall within past 90 days Z91.81          Assessment:     Although patient demonstrates relatively low risk for falls, he would benefit from skilled PT to address LE strength and proprioceptive deficits to reduce his risk of future falls.  HEP/POC compliance is  good .  Patient is appropriate to continue with skilled physical therapy intervention, as indicated by initial plan of care.    Goal Status:  Pt. will be independent with home exercise program in : Comment  Comment:: in 8 weeks    Pt will: demonstrate improved funtional mobility: TUG <= 10 sec for decreased risk of future falls in 8 weeks.  Pt will: demonstrate improved LE strength: APTA >= 12x for decreased risk of future falls in 8 weeks.  Pt will: demonstrate improved balance: FT/EC on foam x30 sec without LOB for decreased risk of future falls in 8 weeks.      Plan / Patient Education:     Reassess goals next visit.    Subjective:     Denies falls since last visit. Tripped on the runner in the front [place], so they have removed this.  Noticing greater ease with getting off chairs.  Reports floor<>stand transfers in the garden getting a little easier.    Objective:     Reviewed HEP, with continued emphasis on slow, controlled movements and trying to perform LE strength/balance ex with as little help through UEs as needed, while still maintaining safety.    Treatment Today     TREATMENT MINUTES COMMENTS   Evaluation     Self-care/ Home management     Manual therapy     Neuromuscular Re-education 27 Balance ex per flow sheet   Therapeutic Activity     Therapeutic Exercises     Gait training      Modality__________________                Total 27    Blank areas are intentional and mean the treatment did not include these items.       Roby Yepez, PT, DPT  6/27/2019

## 2021-05-31 ENCOUNTER — RECORDS - HEALTHEAST (OUTPATIENT)
Dept: ADMINISTRATIVE | Facility: CLINIC | Age: 86
End: 2021-05-31

## 2021-05-31 VITALS — BODY MASS INDEX: 21.33 KG/M2 | WEIGHT: 149 LBS | HEIGHT: 70 IN

## 2021-05-31 VITALS — BODY MASS INDEX: 21.37 KG/M2 | HEIGHT: 70 IN | WEIGHT: 149.3 LBS

## 2021-05-31 VITALS — WEIGHT: 151 LBS | BODY MASS INDEX: 21.62 KG/M2 | HEIGHT: 70 IN

## 2021-05-31 VITALS — BODY MASS INDEX: 22.4 KG/M2 | WEIGHT: 151.7 LBS

## 2021-05-31 NOTE — PROGRESS NOTES
HPI: Gomez Villasenor presents to clinic today for recheck of his ears.  He was seen 5/31/19 for ear cleaning and noted to have area of redness in right EAC.  Denies otalgia, otorrhea, vertigo, change in hearing from baseline or tinnitus. Denies other symptoms.    Past medical history, surgical history, family history, social history, medications, and allergies have been reviewed and are documented above.     Review of Systems: a 10-system review was performed. Symptoms are noted in the HPI and on a scanned document in the computer chart.    Physical Examination:   GEN: no acute distress, alert and oriented  EYES: extraocular movements intact, pupils equal and round. Sclera clear.   EARS: Bilateral EACs normal without erythema, edema, or cerumen.  The tympanic membranes are noted to be intact and clear with no signs of infections, effusions, perforations, or retractions.  PULM: breathing comfortably on room air with no stertor or stridor. Chest expansion symmetric.  HEART: no clubbing or cyanosis, no peripheral edema    MEDICAL DECISION-MAKING: Patient is a 89 y/o male with normal ear canals.  Reassured patient that ear exam was normal today.  He may RTC prn.

## 2021-06-01 VITALS — HEIGHT: 70 IN | BODY MASS INDEX: 21.06 KG/M2 | WEIGHT: 147.1 LBS

## 2021-06-01 VITALS — BODY MASS INDEX: 20.76 KG/M2 | WEIGHT: 144.7 LBS

## 2021-06-01 NOTE — PROGRESS NOTES
Assessment / Impression     1. Dysthymia     2. Fatigue, unspecified type     3. Balance problems     4. General unsteadiness           Plan:     I spent a significant portion of the time counseling and talking to patient and his wife regarding various options, including switching the timing of his Effexor.  However, given he feels that the Effexor may have caused some of his side effects including tremor of his lips and worsening his balance issues, and he does not know if it has helped his mood that much, I am okay with patient discontinuing Effexor for 1 month and we have close follow-up with  him at that time.  We did also briefly discussed physical therapy again, the patient would like to see how he does without medication.  He will continue his exercises at home.  Discussed expected amount of energy given patient's age as well.    I spent total time 25 minutes, counseling time 17 minutes reviewing options and plan with patient and wife.      Return in about 1 month (around 10/20/2019) for Follow Up.    Subjective:      HPI: Gomez Villasenor is a 89 y.o. male, here with his wife, who presents for ongoing concerns regarding his mood and balance.  I did personally review patient's chart, of note, his prior PCP had placed patient on Effexor due to history of dysthymia.  Patient notes that he has had some in balance, though felt like it got worse around the time he started the Effexor.  He has also noted occasional tremor of his lips since starting the Effexor.  When he saw me at his last visit he had talked about his balance concerns, we did give patient a referral to PT, and he is unsure whether it was helpful, though he notes that symptoms did not get worse.  He is also wondering if it is somewhat normal that he is taking a nap daily.  Otherwise he does not feel tired or short of breath during other moments of the day.    He and his wife are hoping to move to senior housing soon, is a currently still live in  their home, though yard maintenance has been hard now that patient and his wife are older.      Medical History:     Patient Active Problem List   Diagnosis     Prostate Cancer     Nephrolithiasis     Macular Degeneration     Hypothyroid     Leukopenia     Fatigue     Dysthymia       Past Medical History:   Diagnosis Date     Cancer (H)     prostate     Disease of thyroid gland     hypothyroid     Hearing loss      Kidney stones 3/17/2015    this is third episode of stones     Macular degeneration      Nephrolithiasis 2015    right sided/ureteroscopic removal     Prostate cancer (H)      Status post cystoscopy March 2015    with ureteroscopy and stone removal     Urinary incontinence        Past Surgical History:   Procedure Laterality Date     CYSTOURETHROSCOPY      Right stent exchange and stone extraction     EYE SURGERY Right     cataract removed     INSERT / REPLACE / REMOVE PROSTHESIS URETHRAL SPHINCTER N/A 9/21/2016    Procedure:  REPLACEMENT OF ARTIFICIAL URINARY SPHINCTER;  Surgeon: Stevie Braxton MD;  Location: [facility];  Service:      OTHER SURGICAL HISTORY      artificial urethral sphincter     [place] CYSTOURETHROSCOPY      Description: Cystoscopy (Diagnostic);  Recorded: 11/07/2008;     [place] REMV PROSTATE,RETROPUB,RADICAL      Description: Prostatectomy Retropubic Radical With Nerve Sparing;  Recorded: 11/10/2009;  Comments: 2001 artificial sphinter placed       Current Medications:     Current Outpatient Medications   Medication Sig     cholecalciferol, vitamin D3, 1,000 unit tablet Take 2,000 Units by mouth daily.      GLUCOSAMINE/CHONDROITIN SULF A (GLUCOSAMINE-CHONDROITIN ORAL) Take 2 tablets by mouth daily.      levothyroxine (SYNTHROID) 112 MCG tablet Take 1 tablet (112 mcg total) by mouth daily.     multivitamin (ONE A DAY) per tablet Take 1 tablet by mouth.     multivitamin therapeutic (THERAGRAN) tablet Take 1 tablet by mouth daily.     vit A/vit C/vit E/zinc/copper (PRESERVISION  "AREDS ORAL) Take by mouth.     lutein 20 mg Tab Take 1 tablet by mouth daily.       Family History:     Family History   Problem Relation Age of Onset     Heart disease Mother      No Medical Problems Father      Leukemia Brother      Cancer Brother        Review of Systems  All other systems reviewed and are negative.         Social History:     Social History     Tobacco Use   Smoking Status Never Smoker   Smokeless Tobacco Never Used     Social History     Social History Narrative    . Tanya. 3 grown children and several grandkids. Retired dentist/  World traveller. Exercise enthusiast. Identical twin Brother Alonso Faust  age 83 CLL/Cancer/ICH... Non smoker, occ beer/          Objective:     /76 (Patient Site: Right Arm, Patient Position: Sitting, Cuff Size: Adult Regular)   Pulse 64   Temp 97.3  F (36.3  C) (Oral)   Resp 22   Ht 5' 8.5\" (1.74 m)   Wt 142 lb 14.4 oz (64.8 kg)   BMI 21.41 kg/m    Physical Examination: General appearance - alert, well appearing, and in no distress  Eyes: pupils equal and reactive, extraocular eye movements intact  Mouth: mucous membranes moist, pharynx normal without lesions  Neck: supple, no significant adenopathy or thyromegaly  Lungs: clear to auscultation, no wheezes, rales or rhonchi, symmetric air entry  Heart: normal rate, regular rhythm, normal S1, S2, no murmurs.  Abdomen: soft, nontender, nondistended, no masses or organomegaly  Neurological: alert, oriented, normal speech, no focal findings or movement disorder noted.    Extremities: No edema, no clubbing or cyanosis  Psychiatric: Normal affect. Does not appear anxious or depressed.  Normal speech pattern.  Maintains eye contact.  PHQ-9 = 7.  JOANIE-7 = 0.  See flow sheet for details.    No results found for this or any previous visit (from the past 168 hour(s)).      Modesta Corrales MD  2019  5:08 PM        "

## 2021-06-02 ENCOUNTER — OFFICE VISIT - HEALTHEAST (OUTPATIENT)
Dept: PHYSICAL THERAPY | Facility: REHABILITATION | Age: 86
End: 2021-06-02

## 2021-06-02 VITALS — WEIGHT: 142.1 LBS | BODY MASS INDEX: 20.39 KG/M2

## 2021-06-02 VITALS — BODY MASS INDEX: 21.28 KG/M2 | HEIGHT: 68 IN | WEIGHT: 140.4 LBS

## 2021-06-02 VITALS — BODY MASS INDEX: 20.9 KG/M2 | HEIGHT: 70 IN | WEIGHT: 146 LBS

## 2021-06-02 DIAGNOSIS — M62.81 GENERALIZED MUSCLE WEAKNESS: ICD-10-CM

## 2021-06-02 DIAGNOSIS — R26.81 GAIT INSTABILITY: ICD-10-CM

## 2021-06-02 DIAGNOSIS — G20.A1 PARKINSON DISEASE (H): ICD-10-CM

## 2021-06-02 DIAGNOSIS — R29.3 POSTURAL INSTABILITY: ICD-10-CM

## 2021-06-02 DIAGNOSIS — R25.8 BRADYKINESIA: ICD-10-CM

## 2021-06-02 NOTE — PROGRESS NOTES
Assessment / Impression     1. Dysthymia     2. Fatigue, unspecified type  HM1(CBC and Differential)    Vitamin D, Total (25-Hydroxy)    HM1 (CBC with Diff)   3. Vitamin D deficiency, unspecified   Vitamin D, Total (25-Hydroxy)   4. Balance problems           Plan:     From a mood standpoint, patient actually feels quite well despite stopping Effexor.  We will not restart any new medications.  He does note some fatigue, and we discussed general expectations given patient's age, however have not recently checked CBC her vitamin D level so we will recheck these levels.  He did have recent TSH checked which was in normal range.  Regarding his general history of balance problems, recommend he continue his exercises that he has learned from PT, can use a walker as needed.    Return in about 4 months (around 2/21/2020) for Follow Up.    Subjective:      HPI: Gomez Villasenor is a 89 y.o. male, here today with wife, who presents for general follow-up.  Please see my previous notes for more details.  At last visit, we decided to stop Effexor to see if this would improve some of his symptoms.  He actually has noted improvement of his mood since stopping his Effexor, wanting to get out of bed in the morning, and wife is also noted that patient is smiling more.  From a balance perspective, though he has not noted much change, though he is using a walker which seems to help overall.  He did find that the exercise he was doing with physical therapy has been helpful, so he needs to commit to doing them more regularly.  He still will note tiredness and fatigue, though denies any shortness of breath or difficulty breathing.  No suicidal or homicidal ideation.      Medical History:     Patient Active Problem List   Diagnosis     Prostate Cancer     Nephrolithiasis     Macular Degeneration     Hypothyroid     Leukopenia     Fatigue     Dysthymia       Past Medical History:   Diagnosis Date     Cancer (H)     prostate     Disease  of thyroid gland     hypothyroid     Hearing loss      Kidney stones 3/17/2015    this is third episode of stones     Macular degeneration      Nephrolithiasis 2015    right sided/ureteroscopic removal     Prostate cancer (H)      Status post cystoscopy March 2015    with ureteroscopy and stone removal     Urinary incontinence        Past Surgical History:   Procedure Laterality Date     CYSTOURETHROSCOPY      Right stent exchange and stone extraction     EYE SURGERY Right     cataract removed     INSERT / REPLACE / REMOVE PROSTHESIS URETHRAL SPHINCTER N/A 9/21/2016    Procedure:  REPLACEMENT OF ARTIFICIAL URINARY SPHINCTER;  Surgeon: Stevie Braxton MD;  Location: [facility];  Service:      OTHER SURGICAL HISTORY      artificial urethral sphincter     MN CYSTOURETHROSCOPY      Description: Cystoscopy (Diagnostic);  Recorded: 11/07/2008;     MN REMV PROSTATE,RETROPUB,RADICAL      Description: Prostatectomy Retropubic Radical With Nerve Sparing;  Recorded: 11/10/2009;  Comments: 2001 artificial sphinter placed       Current Medications:     Current Outpatient Medications   Medication Sig     cholecalciferol, vitamin D3, 1,000 unit tablet Take 2,000 Units by mouth daily.      GLUCOSAMINE/CHONDROITIN SULF A (GLUCOSAMINE-CHONDROITIN ORAL) Take 2 tablets by mouth daily.      levothyroxine (SYNTHROID) 112 MCG tablet Take 1 tablet (112 mcg total) by mouth daily.     multivitamin (ONE A DAY) per tablet Take 1 tablet by mouth.     vit A/vit C/vit E/zinc/copper (PRESERVISION AREDS ORAL) Take by mouth.     lutein 20 mg Tab Take 1 tablet by mouth daily.     multivitamin therapeutic (THERAGRAN) tablet Take 1 tablet by mouth daily.       Family History:     Family History   Problem Relation Age of Onset     Heart disease Mother      No Medical Problems Father      Leukemia Brother      Cancer Brother        Review of Systems  All other systems reviewed and are negative.         Social History:     Social History  "    Tobacco Use   Smoking Status Never Smoker   Smokeless Tobacco Never Used     Social History     Patient does not qualify to have social determinant information on file (likely too young).   Social History Narrative    . Tanya. 3 grown children and several grandkids. Retired dentist/  World traveller. Exercise enthusiast. Identical twin Brother Alonso Faust  age 83 CLL/Cancer/ICH... Non smoker, occ beer/          Objective:     /78 (Patient Site: Right Arm, Patient Position: Sitting, Cuff Size: Adult Regular)   Pulse 60   Temp 97.4  F (36.3  C) (Oral)   Resp 16   Ht 5' 8.5\" (1.74 m)   Wt 146 lb 14.4 oz (66.6 kg)   BMI 22.01 kg/m    Physical Examination: General appearance - alert, well appearing, and in no distress  Eyes: pupils equal and reactive, extraocular eye movements intact  Mouth: mucous membranes moist, pharynx normal without lesions  Neck: supple, no significant adenopathy or thyromegaly  Lungs: clear to auscultation, no wheezes, rales or rhonchi, symmetric air entry  Heart: normal rate, regular rhythm, normal S1, S2, no murmurs.  Abdomen: soft, nontender, nondistended, no masses or organomegaly  Neurological: alert, oriented, normal speech, no focal findings or movement disorder noted.    Extremities: No edema, no clubbing or cyanosis  Psychiatric: Normal affect. Does not appear anxious or depressed.  Normal speech pattern.  Maintains eye contact.  PHQ-9=3.  JOANIE-7=0.  See flow sheet for details.    No results found for this or any previous visit (from the past 168 hour(s)).      Modesta Corrales MD  10/21/2019  5:32 PM        "

## 2021-06-02 NOTE — TELEPHONE ENCOUNTER
Pt's wife called in today asking if they could take the earlier appointment that was offered to them yesterday when they called.  Writer checked the schedule and found the appointment on Friday, November 8th, 2019 at 11:00 Am with Dr. Gutierrez, 10:45 AM Nurse at [facility].  Appointment was rescheduled to this date and time. New letter and information will be sent out via [employer].     In Basket message received for [facility], referring physician is Modesta Corrales. Call placed to [facility] to set up appointment. Same scheduled for Thursday, 11/14/2019 at 1:00 PM with . With a nurse apt at 12:45 PM. Envelope sent to [facility] with informational letter, MD card, [facility] intake form, medication and allergy list, and appointment letter. Work up for Neutropenia/Anemia has been done at [facility].  Medical records will collect any outside records.      I explained that Delbert would becoming to a cancer center and that the doctors are both cancer and blood doctors. Explained the appointment process of arriving early and bringing Health History and medication allergy list along to appointment.     Given [facility] Number to call if  Delbert has further questions or concerns. [phone].

## 2021-06-03 VITALS
BODY MASS INDEX: 21.76 KG/M2 | WEIGHT: 146.9 LBS | HEART RATE: 60 BPM | TEMPERATURE: 97.4 F | HEIGHT: 69 IN | SYSTOLIC BLOOD PRESSURE: 138 MMHG | DIASTOLIC BLOOD PRESSURE: 78 MMHG | RESPIRATION RATE: 16 BRPM

## 2021-06-03 VITALS
RESPIRATION RATE: 22 BRPM | HEART RATE: 64 BPM | SYSTOLIC BLOOD PRESSURE: 130 MMHG | TEMPERATURE: 97.3 F | DIASTOLIC BLOOD PRESSURE: 76 MMHG | WEIGHT: 142.9 LBS | HEIGHT: 69 IN | BODY MASS INDEX: 21.17 KG/M2

## 2021-06-03 VITALS
BODY MASS INDEX: 21.97 KG/M2 | SYSTOLIC BLOOD PRESSURE: 173 MMHG | OXYGEN SATURATION: 95 % | HEIGHT: 69 IN | HEART RATE: 53 BPM | DIASTOLIC BLOOD PRESSURE: 83 MMHG | WEIGHT: 148.3 LBS

## 2021-06-03 VITALS — BODY MASS INDEX: 21.21 KG/M2 | HEIGHT: 69 IN | WEIGHT: 143.2 LBS

## 2021-06-03 VITALS — BODY MASS INDEX: 21.18 KG/M2 | HEIGHT: 69 IN | WEIGHT: 143 LBS

## 2021-06-03 NOTE — PROGRESS NOTES
Assessment / Impression     1. Balance problems  Ambulatory referral to Neurology   2. Depression, unspecified depression type  Ambulatory referral to Neurology    Ambulatory referral to Psychiatry    Ambulatory referral to Psychology   3. Anxiety  Ambulatory referral to Psychiatry    Ambulatory referral to Psychology   4. Mood change  Ambulatory referral to Neurology    Ambulatory referral to Psychiatry    Ambulatory referral to Psychology         Plan:     In regard to patient and wife's concern regarding possible diagnosis of Parkinson's disease, I discussed with him that I did not note any resting tremor or cogwheel rigidity today, though he may have some bradykinesia.  They were hoping to have referral to neurology for further evaluation and perhaps some clarity in regards to some of his symptoms, therefore this was ordered today.  I do think that there is an component of unaddressed depression and possible anxiety as well, and this was discussed with patient.  However, given possible neurological diagnoses, will wait for that evaluation before starting medication.  Given his age and that he did not note any significant improvement when he was previously on Effexor, we discussed referral to psychiatry.  I do also recommend referral to psychology, particularly in regard to patient's anxiety, and patient understood.  We will have her specialty  assist patient in scheduling appointments, and he may follow-up with me once he has seen other providers, sooner if needed.    Return in about 3 months (around 2/25/2020) for Follow Up.    Subjective:      HPI: Gomez SORENSEN Berniebetty is a 89 y.o. male, here today with wife who presents for general follow-up of ongoing mood and general energy level.  Patient now tells me that he did not answer his PHQ-9 truthfully over the last few months, and he still has moments of feeling down and hopeless.  However, when he was placed on Effexor by his prior PCP earlier this year  he did not note much improvement of his mood, though he does feel that mood has may be worsened since coming off of medication.  He continues to note balance concerns in general and unsteadiness.  Patient and his wife are wondering if patient may have parkinsonian, because of patient's multiple symptoms including anxiety, stooped posture and instability.  He has not noted any significant tremors, aside from occasional tremor of his mouth.  He denies any significant cognitive decline, though still feels fatigued and tired many days, though wondering if this may simply be his new baseline. No suicidal or homicidal ideation.        Medical History:     Patient Active Problem List   Diagnosis     Prostate Cancer     Nephrolithiasis     Macular Degeneration     Hypothyroid     Leukopenia     Fatigue     Dysthymia       Past Medical History:   Diagnosis Date     Cancer (H)     prostate     Disease of thyroid gland     hypothyroid     Hearing loss      Kidney stones 3/17/2015    this is third episode of stones     Macular degeneration      Nephrolithiasis 2015    right sided/ureteroscopic removal     Prostate cancer (H)      Status post cystoscopy March 2015    with ureteroscopy and stone removal     Urinary incontinence        Past Surgical History:   Procedure Laterality Date     CYSTOURETHROSCOPY      Right stent exchange and stone extraction     EYE SURGERY Right     cataract removed     INSERT / REPLACE / REMOVE PROSTHESIS URETHRAL SPHINCTER N/A 9/21/2016    Procedure:  REPLACEMENT OF ARTIFICIAL URINARY SPHINCTER;  Surgeon: Stevie Braxton MD;  Location: [facility];  Service:      OTHER SURGICAL HISTORY      artificial urethral sphincter     IN CYSTOURETHROSCOPY      Description: Cystoscopy (Diagnostic);  Recorded: 11/07/2008;     IN REMV PROSTATE,RETROPUB,RADICAL      Description: Prostatectomy Retropubic Radical With Nerve Sparing;  Recorded: 11/10/2009;  Comments: 2001 artificial sphinter placed  "      Current Medications:     Current Outpatient Medications   Medication Sig     cholecalciferol, vitamin D3, 1,000 unit tablet Take 2,000 Units by mouth daily.      GLUCOSAMINE/CHONDROITIN SULF A (GLUCOSAMINE-CHONDROITIN ORAL) Take 2 tablets by mouth daily.      levothyroxine (SYNTHROID) 112 MCG tablet Take 1 tablet (112 mcg total) by mouth daily.     lutein 20 mg Tab Take 1 tablet by mouth daily.     multivitamin (ONE A DAY) per tablet Take 1 tablet by mouth.     vit A/vit C/vit E/zinc/copper (PRESERVISION AREDS ORAL) Take by mouth.       Family History:     Family History   Problem Relation Age of Onset     Heart disease Mother      No Medical Problems Father      Leukemia Brother      Cancer Brother        Review of Systems  All other systems reviewed and are negative.         Social History:     Social History     Tobacco Use   Smoking Status Never Smoker   Smokeless Tobacco Never Used     Social History     Social History Narrative    . Tanya. 3 grown children and several grandkids. Retired dentist/  World traveller. Exercise enthusiast. Identical twin Brother Alonso Faust  age 83 CLL/Cancer/ICH... Non smoker, occ beer/          Objective:     /80 (Patient Site: Left Arm, Patient Position: Sitting, Cuff Size: Adult Regular)   Pulse 60   Resp 18   Ht 5' 8.5\" (1.74 m)   Wt 144 lb 9.6 oz (65.6 kg)   BMI 21.67 kg/m    Physical Examination: General appearance - alert, well appearing, and in no distress  Eyes: pupils equal and reactive, extraocular eye movements intact  Mouth: mucous membranes moist, pharynx normal without lesions  Neck: supple, no significant adenopathy or thyromegaly  Lungs: clear to auscultation, no wheezes, rales or rhonchi, symmetric air entry  Heart: normal rate, regular rhythm, normal S1, S2, no murmurs.  Abdomen: soft, nontender, nondistended, no masses or organomegaly  Neurological: alert, oriented, normal speech, no focal findings or movement disorder noted.  "   Musculoskeletal: No resting or active tremor noted.  No cogwheel rigidity of upper extremities noted.   gait is slower than normal, though no shuffling appreciated.  Extremities: No edema, no clubbing or cyanosis  Psychiatric: Normal affect. Does not appear anxious or depressed.  Normal speech pattern.  Maintains eye contact.  Well-groomed.  PHQ-9 = 18.  JOANIE-7 = 3.  See flow sheet for details.    No results found for this or any previous visit (from the past 168 hour(s)).      Modesta Corrales MD  11/25/2019  4:35 PM

## 2021-06-03 NOTE — TELEPHONE ENCOUNTER
I called and informed patient through voicemail that labs were normal and to follow up in 6 months per Dr. Gutierrez. If worsening cytopenias to follow up with primary.

## 2021-06-03 NOTE — CONSULTS
[facility] Hematology and Oncology Consult Note    Patient: Gomez Villasenor  MRN: [id number]  Date of Service: 11/08/2019      Reason for Visit:    1.  Normocytic anemia  2.  Lymphocytopenia    Assessment/Plan:    1.  Cytopenias: Reviewed his labs going back many years.  He said slight decrease in his white count and hemoglobin.  Today there is still only mildly decreased.  Platelet count is normal.  No neutropenia.  Generally asymptomatic other than decreased stamina which she attributes to depression.  No B symptoms.  His exam is normal.  At this point we will check some laboratory studies to evaluate the cytopenias.  If these are normal then I would favor observation with blood count checks every 6 months.  I do not think he needs a bone marrow biopsy at this time.  He may have a very indolent myelodysplasia with his counts but not be well enough to initiate treatment at this point.  Plan was explained to the patient and his significant other.  Questions were answered.  We will call him when all of his lab tests are back and make a final plan at that time.    ECOG Performance   ECOG Performance Status: 2    Distress Assessment  Distress Assessment Score: 2    Problem List:    1. Lymphocytopenia  Vitamin B12    Folate, Serum    Copper, Serum    Reticulocytes    HM1(CBC and Differential)    Comprehensive Metabolic Panel    TSH    T4, Free   2. Abnormal serum enzyme level, unspecified   Copper, Serum   3. Anemia due to other cause, not classified   TSH   4. Anemia, unspecified type   T4, Free     Staging History:    Cancer Staging  No matching staging information was found for the patient.    History:    Delbert is a 89-year-old gentleman who is referred for cytopenias.  Overall he is been feeling okay.  He does have some depression which she believes is causing some decreased stamina and fatigue.  Overall he is generally healthy though.  Reports no new symptoms.  Denies fevers, chills, night sweats.  No nausea,  vomiting, or abdominal pain.  No recent unintentional weight loss.    Past History:    Past Medical History:   Diagnosis Date     Cancer (H)     prostate     Disease of thyroid gland     hypothyroid     Hearing loss      Kidney stones 3/17/2015    this is third episode of stones     Macular degeneration      Nephrolithiasis 2015    right sided/ureteroscopic removal     Prostate cancer (H)      Status post cystoscopy March 2015    with ureteroscopy and stone removal     Urinary incontinence     Family History   Problem Relation Age of Onset     Heart disease Mother      No Medical Problems Father      Leukemia Brother      Cancer Brother       [unfilled] Social History     Socioeconomic History     Marital status:      Spouse name: Tanya     Number of children: 3     Years of education: Not on file     Highest education level: Not on file   Occupational History     Occupation: retired dentist   Social Needs     Financial resource strain: Not on file     Food insecurity:     Worry: Not on file     Inability: Not on file     Transportation needs:     Medical: Not on file     Non-medical: Not on file   Tobacco Use     Smoking status: Never Smoker     Smokeless tobacco: Never Used   Substance and Sexual Activity     Alcohol use: Yes     Alcohol/week: 1.0 standard drinks     Types: 1 Cans of beer per week     Comment: one beer most days     Drug use: No     Sexual activity: Never   Lifestyle     Physical activity:     Days per week: Not on file     Minutes per session: Not on file     Stress: Not on file   Relationships     Social connections:     Talks on phone: Not on file     Gets together: Not on file     Attends Rastafarian service: Not on file     Active member of club or organization: Not on file     Attends meetings of clubs or organizations: Not on file     Relationship status: Not on file     Intimate partner violence:     Fear of current or ex partner: Not on file     Emotionally abused: Not on file      Physically abused: Not on file     Forced sexual activity: Not on file   Other Topics Concern     Not on file   Social History Narrative    . Tanya. 3 grown children and several grandkids. Retired dentist/  World traveller. Exercise enthusiast. Identical twin Brother Alonso Faust  age 83 CLL/Cancer/ICH... Non smoker, occ beer/         Allergies:    No Known Allergies    Review of Systems:    General  General (WDL): Exceptions to WDL  Fatigue: Yes - Chronic (Greater than 3 months)  Fever: None  Generalized Muscle Weakness: Yes - Recent (Less than 3 months)  Weight Loss: Yes - Recent (Greater than 3 months)  ENT  ENT (WDL): Exceptions to WDL  Vertigo (Dizziness): None  Changes in vision: Yes - Chronic (Greater than 3 months)  Glasses or Contacts: Yes - Chronic (Greater than 3 months)  Hearing loss: Yes - Chronic (Greater than 3 months)  Hearing Aids: Yes - Chronic (Greater than 3 months)  Tinnitus: None  Pain/Pressure in ears: None  Sinus Congestion/Drainage: None  Hoarseness: None  Sore Throat: None  Dental Problems: None  Dentures: None  Respiratory  Respiratory (WDL): All respiratory elements are within defined limits  Cardiovascular  Cardiovascular (WDL): All cardiovascular elements are within defined limits  Endocrine  Endocrine (WDL): All endocrine elements are within defined limits  Gastrointestinal  Gastrointestinal (WDL): All gastrointestinal elements are within defined limits  Musculoskeletal  Musculoskeletal (WDL): Exceptions to WDL  Range of Motion Limitation: None  Joint pain: None  Back Pain: None  Activity Assistance: None  Difficulty to lie flat for more than 30 minutes: None  Pain interfering with walking: None  Muscle pain or stiffness: None  Recent fall: Yes - Chronic (Greater than 3 months)  Assistive device: Yes - Chronic (Greater than 3 months)  Neurological  Neurological (WDL): Exceptions to WDL  History of LOC?: None  Headaches: None  Difficulty walking: Yes - Chronic (Greater  "than 3 months)  Difficulty with speech: None  Difficulty with memory: None  Vertigo (Dizziness): None  Dominant Hand: Right  Seizures: None  Difficulty with Balance: Yes - Chronic (Greater than 3 months)  Numbness and/or tingling: None  Psychological/Emotional  Psychological/Emotional (WDL): Exceptions to WDL  Depression: Yes - Chronic (Greater than 3 months)  Insomnia: None  Panic attacks: None  Anxiety: None  Daytime sleepiness: Yes - Chronic (Greater than 3 months)  Hallucinations: None  Psychosocial needs or not coping well: None  Hematological/Lymphatic  Hematological/Lymphatic (WDL): All hematological/lymphatic elements are within defined limits  Dermatological  Dermatologic (WDL): All dermatological elements are within defined limits  Genitourinary/Reproductive  Genitourinary/Reproductive (WDL): Exceptions to WDL  Urinary Frequency: Yes - Chronic (Greater than 3 months)  Urinary Incontinence: Yes - Chronic (Greater than 3 months)  Painful urination: None  Urination more than 2 times a night: Yes - Chronic (Greater than 3 months)  Urinary Urgency: None  Difficulty Initiating Urine Stream: None  Blood in urine: None  Sensation of incomplete emptying of bladder: Yes - Chronic (Greater than 3 months)  Sexual concerns: None  Reproductive (Females only)     Pain  Currently in Pain: No/denies    Physical Exam:    Recent Vitals 11/8/2019   Height 5' 8.5\"   Weight 148 lbs 5 oz   BSA (m2) 1.8 m2   /83   Pulse 53   Temp -   Temp src 1   SpO2 95   Some recent data might be hidden     General: patient appears stated age of 89 y.o.. Nontoxic and in no distress.   HEENT: Head: atraumatic, normocephalic. Sclerae anicteric.  Chest:  Normal respiratory effort  Cardiac:  No edema.   Abdomen: abdomen is non-distended  Extremities: normal tone and muscle bulk.  Skin: no lesions or rash. Warm and dry.   CNS: alert and oriented. Grossly non-focal.   Psychiatric: normal mood and affect.     Lab Results:    Recent Results (from " the past 168 hour(s))   Reticulocytes   Result Value Ref Range    Retic Absolute Count 0.044 0.010 - 0.110 mill/uL    Retic Ct Pct 1.07 0.8 - 2.7 %   HM1 (CBC with Diff)   Result Value Ref Range    WBC 3.4 (L) 4.0 - 11.0 thou/uL    RBC 4.09 (L) 4.40 - 6.20 mill/uL    Hemoglobin 13.3 (L) 14.0 - 18.0 g/dL    Hematocrit 40.4 40.0 - 54.0 %    MCV 99 80 - 100 fL    MCH 32.5 27.0 - 34.0 pg    MCHC 32.9 32.0 - 36.0 g/dL    RDW 14.3 11.0 - 14.5 %    Platelets 177 140 - 440 thou/uL    MPV 10.0 8.5 - 12.5 fL    Neutrophils % 64 50 - 70 %    Lymphocytes % 20 20 - 40 %    Monocytes % 15 (H) 2 - 10 %    Eosinophils % 1 0 - 6 %    Basophils % 0 0 - 2 %    Neutrophils Absolute 2.2 2.0 - 7.7 thou/uL    Lymphocytes Absolute 0.7 (L) 0.8 - 4.4 thou/uL    Monocytes Absolute 0.5 0.0 - 0.9 thou/uL    Eosinophils Absolute 0.0 0.0 - 0.4 thou/uL    Basophils Absolute 0.0 0.0 - 0.2 thou/uL     Imaging Results:    No results found.      Signed by: Amrit Gutierrez MD

## 2021-06-04 VITALS
WEIGHT: 141.6 LBS | SYSTOLIC BLOOD PRESSURE: 126 MMHG | RESPIRATION RATE: 18 BRPM | DIASTOLIC BLOOD PRESSURE: 76 MMHG | HEIGHT: 69 IN | BODY MASS INDEX: 20.97 KG/M2 | HEART RATE: 52 BPM

## 2021-06-04 VITALS
HEART RATE: 54 BPM | DIASTOLIC BLOOD PRESSURE: 68 MMHG | RESPIRATION RATE: 16 BRPM | WEIGHT: 146.6 LBS | BODY MASS INDEX: 21.71 KG/M2 | HEIGHT: 69 IN | SYSTOLIC BLOOD PRESSURE: 122 MMHG

## 2021-06-04 VITALS
TEMPERATURE: 97.6 F | RESPIRATION RATE: 20 BRPM | WEIGHT: 140.4 LBS | HEIGHT: 69 IN | SYSTOLIC BLOOD PRESSURE: 122 MMHG | BODY MASS INDEX: 20.79 KG/M2 | HEART RATE: 57 BPM | OXYGEN SATURATION: 98 % | DIASTOLIC BLOOD PRESSURE: 78 MMHG

## 2021-06-04 VITALS
HEIGHT: 69 IN | SYSTOLIC BLOOD PRESSURE: 108 MMHG | HEART RATE: 60 BPM | WEIGHT: 144.6 LBS | DIASTOLIC BLOOD PRESSURE: 80 MMHG | BODY MASS INDEX: 21.42 KG/M2 | RESPIRATION RATE: 18 BRPM

## 2021-06-04 VITALS
BODY MASS INDEX: 21.62 KG/M2 | WEIGHT: 146 LBS | HEART RATE: 82 BPM | RESPIRATION RATE: 18 BRPM | HEIGHT: 69 IN | DIASTOLIC BLOOD PRESSURE: 72 MMHG | SYSTOLIC BLOOD PRESSURE: 140 MMHG

## 2021-06-04 NOTE — TELEPHONE ENCOUNTER
Looks like the only SSRI pt has ever tried (that we have record of) is effexor and that was 02/2019 from Dr Irvin. 37.5 and 75mg doses    See note below

## 2021-06-04 NOTE — PROGRESS NOTES
Assessment / Impression     1. Depression, unspecified depression type  sertraline (ZOLOFT) 25 MG tablet   2. Anxiety  sertraline (ZOLOFT) 25 MG tablet         Plan:     Discussed with patient various treatment options, including medications as well as therapy.  Patient would like to start medication, and he is unsure if he'll continue therapy at this point.  We will begin sertraline 12.5 mg daily for 1 week, then increase to 25mg daily..  Discussed possible side effects of medication such as GI side effects, decreased libido, possible weight gain.   If patient notes any severe side effects such as severe agitation or suicidal ideation, he may call the clinic or 911 immediately for further instructions. Discussed with patient that it may take 4-6 weeks for full effect.  We may also need to increase dose.  Patient to make follow-up appointment with me in 4-6 weeks.    I spent total time 25 minutes, counseling time 17 minutes reviewing treatment options and plan with patient and wife.      Return in about 1 month (around 1/23/2020) for Med Check.    Subjective:      HPI: Gomez Villasenor is a 89 y.o. male, here today with wife who presents for requesting starting medication for depression and anxiety.  He had recently seen a therapist, and though I briefly reviewed notes, they are in transit to being scanned to his chart, so I do not currently have therapist notes.  At his visit with this therapist, they had talked about restarting SSRI.  Patient is here today because he would like to restart medication.  He had previously been on Effexor starting around February of this year, which was started by his previous PCP, though he did not feel medication did much to change his mood, so medication was stopped a few months ago.  He continues to feel decreased energy and overall interest in doing things.  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  In the end, he decided to go and was glad that he went, and he knows that the daughter of the  was appreciative of his visit.  Patient and his wife state there have been similar circumstances where patient becomes anxious about going out to do things or to see people.  This did not change when he was on Effexor.  Denies any suicidal or homicidal ideation.      Medical History:     Patient Active Problem List   Diagnosis     Prostate Cancer     Nephrolithiasis     Macular Degeneration     Hypothyroid     Leukopenia     Fatigue     Dysthymia       Past Medical History:   Diagnosis Date     Cancer (H)     prostate     Disease of thyroid gland     hypothyroid     Hearing loss      Kidney stones 3/17/2015    this is third episode of stones     Macular degeneration      Nephrolithiasis     right sided/ureteroscopic removal     Prostate cancer (H)      Status post cystoscopy 2015    with ureteroscopy and stone removal     Urinary incontinence        Past Surgical History:   Procedure Laterality Date     CYSTOURETHROSCOPY      Right stent exchange and stone extraction     EYE SURGERY Right     cataract removed     INSERT / REPLACE / REMOVE PROSTHESIS URETHRAL SPHINCTER N/A 2016    Procedure:  REPLACEMENT OF ARTIFICIAL URINARY SPHINCTER;  Surgeon: Stevie Braxton MD;  Location: [facility];  Service:      OTHER SURGICAL HISTORY      artificial urethral sphincter     MD CYSTOURETHROSCOPY      Description: Cystoscopy (Diagnostic);  Recorded: 2008;     MD REMV PROSTATE,RETROPUB,RADICAL      Description: Prostatectomy Retropubic Radical With Nerve Sparing;  Recorded: 11/10/2009;  Comments:  artificial sphinter placed       Current Medications:     Current Outpatient Medications   Medication Sig      "cholecalciferol, vitamin D3, 1,000 unit tablet Take 2,000 Units by mouth daily.      GLUCOSAMINE/CHONDROITIN SULF A (GLUCOSAMINE-CHONDROITIN ORAL) Take 2 tablets by mouth daily.      levothyroxine (SYNTHROID) 112 MCG tablet Take 1 tablet (112 mcg total) by mouth daily.     multivitamin (ONE A DAY) per tablet Take 1 tablet by mouth.     vit A/vit C/vit E/zinc/copper (PRESERVISION AREDS ORAL) Take by mouth.     lutein 20 mg Tab Take 1 tablet by mouth daily.     sertraline (ZOLOFT) 25 MG tablet Take 1/2 tab daily for 1 week, then 1 tab daily.       Family History:     Family History   Problem Relation Age of Onset     Heart disease Mother      No Medical Problems Father      Leukemia Brother      Cancer Brother        Review of Systems  All other systems reviewed and are negative.         Social History:     Social History     Tobacco Use   Smoking Status Never Smoker   Smokeless Tobacco Never Used     Social History     Social History Narrative    . Tanya. 3 grown children and several grandkids. Retired dentist/  World traveller. Exercise enthusiast. Identical twin Brother Alonso Faust  age 83 CLL/Cancer/ICH... Non smoker, occ beer/          Objective:     /72 (Patient Site: Right Arm, Patient Position: Sitting, Cuff Size: Adult Regular)   Pulse 82   Resp 18   Ht 5' 8.5\" (1.74 m)   Wt 146 lb (66.2 kg)   BMI 21.88 kg/m    Physical Examination: General appearance - alert, well appearing, and in no distress  Eyes: extraocular eye movements intact  Neurological: alert, oriented, normal speech, no focal findings or movement disorder noted.    Extremities: No edema, no clubbing or cyanosis  Psychiatric: Normal affect. Does not appear anxious or depressed.  Normal speech pattern.  Maintains eye contact.  PHQ-9 = 10.  JOANIE-7 = 8.  See flow sheet for details.  Well groomed.    No results found for this or any previous visit (from the past 168 hour(s)).      Modesta Corrales MD  2019  2:36 " PM

## 2021-06-04 NOTE — TELEPHONE ENCOUNTER
Medication Request    Medication name: Anti depressant    Pharmacy Name and Location:   [id number] - [place] [id number] [address]    Reason for request:  Psych evaul 12/11/19  Requesting a antidepressant, would like something other then the Effexor found not to be effected.    When did you use medication last?:  03/26/19    Patient offered appointment:    pending PCP request, due to recent assessment with psych.    Okay to leave a detailed message: yes    Please send Rx for antidepressant or advise spouse with a plan of care.

## 2021-06-05 VITALS
RESPIRATION RATE: 20 BRPM | HEART RATE: 56 BPM | DIASTOLIC BLOOD PRESSURE: 76 MMHG | TEMPERATURE: 96.7 F | SYSTOLIC BLOOD PRESSURE: 126 MMHG | BODY MASS INDEX: 21.17 KG/M2 | HEIGHT: 69 IN | WEIGHT: 142.9 LBS

## 2021-06-05 NOTE — PROGRESS NOTES
"Assessment / Impression     1. Depression, unspecified depression type  sertraline (ZOLOFT) 50 MG tablet   2. Parkinson disease (H)           Plan:     His mood is improved, though I do think there is still some room for improvement.  For this reason, would recommend we increase his sertraline to 50 mg daily.  He will continue treatment of his Parkinson's with neurology.  HARVEY completed so that I can review those records.  Follow-up in 1 month for medication check.  Patient understands, agrees with plan.    Return in about 1 month (around 2/28/2020) for Follow Up.    Subjective:      HPI: Gomez Villasenor is a 89 y.o. male, here today with wife \"Maday\" who presents for medication follow-up.  Please see my previous notes for more details.  At that visit, patient had noted depressed mood, and so we started sertraline, and now he is taking 25 mg daily.  Since he last saw me he [facility], though I am unable to see the clinic note.  Patient states that neurology thinks patient may have Parkinson's disease, therefore he was started on Sinemet on January 10 and he has overall felt better mood wise, though unsure whether this is due to the sertraline or Sinemet.  He does feel that his movement such as walking has also improved.  From a mood standpoint though, he does note that when he first wakes up in the morning he does not feel as well, still having moments of feeling down in the dumps, though as the day goes on he seems to do better.  He has been taking his sertraline around dinnertime.  No adverse effects of medication.      Medical History:     Patient Active Problem List   Diagnosis     Prostate Cancer     Nephrolithiasis     Macular Degeneration     Hypothyroid     Leukopenia     Fatigue     Dysthymia     Parkinson disease (H)       Past Medical History:   Diagnosis Date     Cancer (H)     prostate     Disease of thyroid gland     hypothyroid     Hearing loss      Kidney stones " 3/17/2015    this is third episode of stones     Macular degeneration      Nephrolithiasis 2015    right sided/ureteroscopic removal     Prostate cancer (H)      Status post cystoscopy March 2015    with ureteroscopy and stone removal     Urinary incontinence        Past Surgical History:   Procedure Laterality Date     CYSTOURETHROSCOPY      Right stent exchange and stone extraction     EYE SURGERY Right     cataract removed     INSERT / REPLACE / REMOVE PROSTHESIS URETHRAL SPHINCTER N/A 9/21/2016    Procedure:  REPLACEMENT OF ARTIFICIAL URINARY SPHINCTER;  Surgeon: Stevie Braxton MD;  Location: [facility];  Service:      OTHER SURGICAL HISTORY      artificial urethral sphincter     OK CYSTOURETHROSCOPY      Description: Cystoscopy (Diagnostic);  Recorded: 11/07/2008;     OK REMV PROSTATE,RETROPUB,RADICAL      Description: Prostatectomy Retropubic Radical With Nerve Sparing;  Recorded: 11/10/2009;  Comments: 2001 artificial sphinter placed       Current Medications:     Current Outpatient Medications   Medication Sig     carbidopa-levodopa (SINEMET)  mg per tablet Take 1 tablet by mouth 3 (three) times a day.     cholecalciferol, vitamin D3, 1,000 unit tablet Take 2,000 Units by mouth daily.      GLUCOSAMINE/CHONDROITIN SULF A (GLUCOSAMINE-CHONDROITIN ORAL) Take 2 tablets by mouth daily.      levothyroxine (SYNTHROID) 112 MCG tablet Take 1 tablet (112 mcg total) by mouth daily.     multivitamin (ONE A DAY) per tablet Take 1 tablet by mouth.     vit A/vit C/vit E/zinc/copper (PRESERVISION AREDS ORAL) Take by mouth.     lutein 20 mg Tab Take 1 tablet by mouth daily.     sertraline (ZOLOFT) 50 MG tablet Take 1 tablet (50 mg total) by mouth daily.       Family History:     Family History   Problem Relation Age of Onset     Heart disease Mother      No Medical Problems Father      Leukemia Brother      Cancer Brother        Review of Systems  All other systems reviewed and are negative.  "        Social History:     Social History     Tobacco Use   Smoking Status Never Smoker   Smokeless Tobacco Never Used     Social History     Social History Narrative    . Tanya. 3 grown children and several grandkids. Retired dentist/  World traveller. Exercise enthusiast. Identical twin Brother Alonso Faust  age 83 CLL/Cancer/ICH... Non smoker, occ beer/          Objective:     /68 (Patient Site: Left Arm, Patient Position: Sitting, Cuff Size: Adult Regular)   Pulse (!) 54   Resp 16   Ht 5' 8.5\" (1.74 m)   Wt 146 lb 9.6 oz (66.5 kg)   BMI 21.97 kg/m    Physical Examination: General appearance - alert, well appearing, and in no distress  Eyes: extraocular eye movements intact  Mouth: mucous membranes moist, pharynx normal without lesions  Lungs: clear to auscultation, no wheezes, rales or rhonchi, symmetric air entry  Heart: normal rate, regular rhythm, normal S1, S2, no murmurs.  Abdomen: soft, nontender, nondistended, no masses or organomegaly  Extremities: No edema, no clubbing or cyanosis  Psychiatric: Normal affect. Does not appear anxious or depressed.  Normal speech pattern.  Maintains eye contact.  PHQ-9 = 4.  JOANIE to 7 = 3.  See flow sheet for details.  No active suicidal or homicidal ideation.    No results found for this or any previous visit (from the past 168 hour(s)).      Modesta Corrales MD  2020  5:37 PM        "

## 2021-06-06 NOTE — PROGRESS NOTES
"Assessment / Impression     1. Anxiety     2. Depression, unspecified depression type  sertraline (ZOLOFT) 50 MG tablet   3. Parkinson disease (H)     4. Sinus bradycardia           Plan:     Discussed with patient that we could try to further increase his sertraline to 75 mg daily given he continues to have gradual mood improvement.  Patient is in agreement to plan.  Again, discussed that this may take up to 1 month to note full effects.  He will follow-up in 1 month at that time.  He will continue management of Sinemet with neurology.  Of note, patient is bradycardic today, though in review of his chart, he tends to have a heart rate that runs around 60, though has previously has documented heart rate in 50s.  He is asymptomatic, no lightheadedness.    Return in about 1 month (around 3/28/2020) for Med Check.    Subjective:      HPI: Gomez Villasenor is a 89 y.o. male who presents for medication follow-up of sertraline.  Please see my previous notes for more details.  Briefly, patient has had some dysthymia or depression for a few years.  He was previously on Effexor, though did not feel that medication was helpful so we had stopped medication, however they did feel that his mood got worse.  We did recently start sertraline, at last visit had increased sertraline to 50 mg daily.  Since that visit, wife has noted some mild improvement such as \"more smiles\".  However, patient still notes not much interest in doing things that he used to enjoy.  He also notes anxiety surrounding possibly moving out of their home which they have lived in for several years soon.  Denies any active suicidal or homicidal ideation.  He continues to take Sinemet for Parkinson's, which is managed by neurology.  Denies any adverse effects of medication.      Medical History:     Patient Active Problem List   Diagnosis     Prostate Cancer     Nephrolithiasis     Macular Degeneration     Hypothyroid     Leukopenia     Fatigue     Dysthymia "     Parkinson disease (H)       Past Medical History:   Diagnosis Date     Cancer (H)     prostate     Disease of thyroid gland     hypothyroid     Hearing loss      Kidney stones 3/17/2015    this is third episode of stones     Macular degeneration      Nephrolithiasis 2015    right sided/ureteroscopic removal     Prostate cancer (H)      Status post cystoscopy March 2015    with ureteroscopy and stone removal     Urinary incontinence        Past Surgical History:   Procedure Laterality Date     CYSTOURETHROSCOPY      Right stent exchange and stone extraction     EYE SURGERY Right     cataract removed     INSERT / REPLACE / REMOVE PROSTHESIS URETHRAL SPHINCTER N/A 9/21/2016    Procedure:  REPLACEMENT OF ARTIFICIAL URINARY SPHINCTER;  Surgeon: Stevie Braxton MD;  Location: [facility];  Service:      OTHER SURGICAL HISTORY      artificial urethral sphincter     ME CYSTOURETHROSCOPY      Description: Cystoscopy (Diagnostic);  Recorded: 11/07/2008;     ME REMV PROSTATE,RETROPUB,RADICAL      Description: Prostatectomy Retropubic Radical With Nerve Sparing;  Recorded: 11/10/2009;  Comments: 2001 artificial sphinter placed       Current Medications:     Current Outpatient Medications   Medication Sig     carbidopa-levodopa (SINEMET)  mg per tablet Take 1 tablet by mouth 3 (three) times a day.     cholecalciferol, vitamin D3, 1,000 unit tablet Take 2,000 Units by mouth daily.      GLUCOSAMINE/CHONDROITIN SULF A (GLUCOSAMINE-CHONDROITIN ORAL) Take 2 tablets by mouth daily.      levothyroxine (SYNTHROID) 112 MCG tablet Take 1 tablet (112 mcg total) by mouth daily.     lutein 20 mg Tab Take 1 tablet by mouth daily.     multivitamin (ONE A DAY) per tablet Take 1 tablet by mouth.     sertraline (ZOLOFT) 50 MG tablet Take 1.5 tablets (75 mg total) by mouth daily.     vit A/vit C/vit E/zinc/copper (PRESERVISION AREDS ORAL) Take by mouth.       Family History:     Family History   Problem Relation Age of Onset  "    Heart disease Mother      No Medical Problems Father      Leukemia Brother      Cancer Brother        Review of Systems  All other systems reviewed and are negative.         Social History:     Social History     Tobacco Use   Smoking Status Never Smoker   Smokeless Tobacco Never Used     Social History     Social History Narrative    . Tanya. 3 grown children and several grandkids. Retired dentist/  World traveller. Exercise enthusiast. Identical twin Brother Alonso Faust  age 83 CLL/Cancer/ICH... Non smoker, occ beer/          Objective:     /76 (Patient Site: Left Arm, Patient Position: Sitting, Cuff Size: Adult Regular)   Pulse (!) 52   Resp 18   Ht 5' 8.5\" (1.74 m)   Wt 141 lb 9.6 oz (64.2 kg)   BMI 21.22 kg/m    Physical Examination: General appearance - alert, well appearing, and in no distress  Eyes: extraocular eye movements intact  Mouth: mucous membranes moist, pharynx normal without lesions  Neck: supple, no significant adenopathy or thyromegaly  Lungs: clear to auscultation, no wheezes, rales or rhonchi, symmetric air entry  Heart: normal rate, regular rhythm, normal S1, S2, no murmurs.  Neurological: alert, oriented, normal speech  Extremities: No edema, no clubbing or cyanosis  Psychiatric: Normal affect. Does not appear anxious or depressed.  Normal speech pattern.  Maintains eye contact.  PHQ-9 = 6.  JOANIE to 7 = 3.  See flow sheet for details.    No results found for this or any previous visit (from the past 168 hour(s)).      Modesta Corrales MD  2020  4:38 PM        "

## 2021-06-06 NOTE — TELEPHONE ENCOUNTER
Called and spoke with wife Tanya (who is on the consent to communicate and always comes in with the patient)    Due to COVID19 patient is electing to cancel the appointment scheduled for next week.  He is improving on the new dose of medication and will call and/or send a [employer] message as needed. Will call pharmacy for refills as needed.    Hermelinda Parra, CMA

## 2021-06-07 NOTE — TELEPHONE ENCOUNTER
Refill Approved    Rx renewed per Medication Renewal Policy. Medication was last renewed on 11.2019.    Tika Deras, [facility] Triage/Med Refill 4/23/2020     Requested Prescriptions   Pending Prescriptions Disp Refills     levothyroxine (SYNTHROID, LEVOTHROID) 112 MCG tablet [Pharmacy Med Name: Levothyroxine Sodium Oral Tablet 112 MCG] 90 tablet 0     Sig: TAKE ONE TABLET BY MOUTH ONCE DAILY       Thyroid Hormones Protocol Passed - 4/22/2020  7:22 AM        Passed - Provider visit in past 12 months or next 3 months     Last office visit with prescriber/PCP: 2/28/2020 Modesta Corrales MD OR same dept: 2/28/2020 Modesta Corrales MD OR same specialty: 2/28/2020 Modesta Corrales MD  Last physical: 3/26/2019 Last MTM visit: Visit date not found   Next visit within 3 mo: Visit date not found  Next physical within 3 mo: Visit date not found  Prescriber OR PCP: Modesta Corrales MD  Last diagnosis associated with med order: 1. Acquired hypothyroidism  - levothyroxine (SYNTHROID, LEVOTHROID) 112 MCG tablet [Pharmacy Med Name: Levothyroxine Sodium Oral Tablet 112 MCG]; TAKE ONE TABLET BY MOUTH ONCE DAILY   Dispense: 90 tablet; Refill: 0    If protocol passes may refill for 12 months if within 3 months of last provider visit (or a total of 15 months).             Passed - TSH on file in past 12 months for patient age 12 & older     TSH   Date Value Ref Range Status   11/08/2019 0.66 0.30 - 5.00 uIU/mL Final

## 2021-06-09 ENCOUNTER — OFFICE VISIT - HEALTHEAST (OUTPATIENT)
Dept: PHYSICAL THERAPY | Facility: REHABILITATION | Age: 86
End: 2021-06-09

## 2021-06-09 DIAGNOSIS — G20.A1 PARKINSON DISEASE (H): ICD-10-CM

## 2021-06-09 DIAGNOSIS — R25.8 BRADYKINESIA: ICD-10-CM

## 2021-06-09 DIAGNOSIS — M62.81 GENERALIZED MUSCLE WEAKNESS: ICD-10-CM

## 2021-06-09 DIAGNOSIS — R26.81 GAIT INSTABILITY: ICD-10-CM

## 2021-06-09 DIAGNOSIS — R29.3 POSTURAL INSTABILITY: ICD-10-CM

## 2021-06-09 NOTE — PROGRESS NOTES
Assessment and Plan:   NO MAJOR dx or problems/ Wellness visit    1. Healthcare maintenance  No new dx; RTC one yr or prn  2. Nephrolithiasis    - Comprehensive Metabolic Panel  - HM2(CBC w/o Differential)  - Thyroid Stimulating Hormone (TSH)  - Urinalysis-UC if Indicated    3. Lipid screening    - Lipid Cascade      The patient's current medical problems were reviewed.    I have had an Advance Directives discussion with the patient.  The following health maintenance schedule was reviewed with the patient and provided in printed form in the after visit summary:   Health Maintenance   Topic Date Due     FALL RISK ASSESSMENT  03/10/2018     TD 18+ HE  11/10/2019     ADVANCE DIRECTIVES DISCUSSED WITH PATIENT  03/10/2022     PNEUMOCOCCAL POLYSACCHARIDE VACCINE AGE 65 AND OVER  Completed     INFLUENZA VACCINE RULE BASED  Completed     PNEUMOCOCCAL CONJUGATE VACCINE FOR ADULTS (PCV13 OR PREVNAR)  Completed     ZOSTER VACCINE  Completed        Subjective:   Chief Complaint: Gomez Villasenor is an 88 y.o. male here for an Annual Wellness visit.   HPI:  Here for annual exam. Doing very well. Some fatigue and lack of energy and lack of enthusiasm at age 88 but now significant depressive issues    Review of Systems:      Please see above.  The rest of the review of systems are negative for all systems.    Patient Care Team:  Chritsiano Irvin MD as PCP - General  Neal Del Rio MD (Ophthalmology)  Christian Badillo MD (Dermatology)  Levon Cruz MD as Physician (Ophthalmology)  Stevie Braxton MD as Physician (Urology)  Hadley Chi MD as Physician (Urology)     Patient Active Problem List   Diagnosis     Prostate Cancer     Nephrolithiasis     Macular Degeneration     Vertigo     Ureteral stone     Hypothyroid     Past Medical History:   Diagnosis Date     Cancer     prostate     Disease of thyroid gland     hypothyroid     Hearing loss      Kidney stones 3/17/2015    this is third episode of stones      Macular degeneration      Nephrolithiasis 2015    right sided/ureteroscopic removal     Prostate cancer      Status post cystoscopy 2015    with ureteroscopy and stone removal     Urinary incontinence       Past Surgical History:   Procedure Laterality Date     CYSTOURETHROSCOPY      Right stent exchange and stone extraction     EYE SURGERY Right     cataract removed     INSERT / REPLACE / REMOVE PROSTHESIS URETHRAL SPHINCTER N/A 2016    Procedure:  REPLACEMENT OF ARTIFICIAL URINARY SPHINCTER;  Surgeon: Stevie Braxton MD;  Location: [facility];  Service:      OTHER SURGICAL HISTORY      artificial urethral sphincter     NH CYSTOURETHROSCOPY      Description: Cystoscopy (Diagnostic);  Recorded: 2008;     NH REMV PROSTATE,RETROPUB,RADICAL      Description: Prostatectomy Retropubic Radical With Nerve Sparing;  Recorded: 11/10/2009;  Comments:  artificial sphinter placed      Family History   Problem Relation Age of Onset     Heart disease Mother      No Medical Problems Father      Leukemia Brother      Cancer Brother       Social History     Social History     Marital status:      Spouse name: Tanya     Number of children: 3     Years of education: N/A     Occupational History     retired dentist      Social History Main Topics     Smoking status: Never Smoker     Smokeless tobacco: Never Used     Alcohol use 0.6 oz/week     1 Cans of beer per week      Comment: one beer most days     Drug use: No     Sexual activity: Not on file     Other Topics Concern     Not on file     Social History Narrative    . Tanya. 3 grown children and several grandkids. Retired dentist/  World traveller. Exercise enthusiast. Identical twin Brother Alonso Faust  age 83 CLL/Cancer/ICH... Non smoker, occ beer/       Current Outpatient Prescriptions   Medication Sig Dispense Refill     ANTIOX #8/OM3/DHA/EPA/LUT/ZEAX (PRESERVISION AREDS 2, OMEGA-3, ORAL) Take 2 tablets by mouth daily.   "      cholecalciferol, vitamin D3, 1,000 unit tablet Take 2,000 Units by mouth daily.        GLUCOSAMINE/CHONDROITIN SULF A (GLUCOSAMINE-CHONDROITIN ORAL) Take 2 tablets by mouth daily.        levothyroxine (SYNTHROID, LEVOTHROID) 125 MCG tablet Take 125 mcg by mouth Daily at 6:00 am.       lutein 20 mg Tab Take 1 tablet by mouth daily.       multivitamin therapeutic (THERAGRAN) tablet Take 1 tablet by mouth daily.       omega-3 fatty acids-vitamin E (FISH OIL) 1,000 mg cap Take 1 capsule by mouth daily.       No current facility-administered medications for this visit.       Objective:   Vital Signs:   Visit Vitals     /88 (Patient Site: Left Arm, Patient Position: Sitting, Cuff Size: Adult Regular); 152/86 left      Pulse 60     Ht 5' 9\" (1.753 m)     Wt 152 lb (68.9 kg)     BMI 22.45 kg/m2        VisionScreening:  No exam data present     PHYSICAL EXAM  Extraordinarily healthy appearing at age 88. Chronic age related skin changes. HEENT negative. Lungs clear cor reg abd- benign. Ext neg remarkable good muscle tone. Neuro negative    Assessment Results 3/10/2017   Activities of Daily Living No help needed   Instrumental Activities of Daily Living No help needed   Get Up and Go Score Less than 12 seconds   Mini Cog Total Score 5     A Mini-Cog score of 0-2 suggests the possibility of dementia, score of 3-5 suggests no dementia    Identified Health Risks:     The patient was provided with written information regarding signs of hearing loss.  Patient's advanced directive was discussed and I am comfortable with the patient's wishes.    Recent Results (from the past 240 hour(s))   HM2(CBC w/o Differential)   Result Value Ref Range    WBC 3.2 (L) 4.0 - 11.0 thou/uL    RBC 4.52 4.40 - 6.20 mill/uL    Hemoglobin 14.3 14.0 - 18.0 g/dL    Hematocrit 42.4 40.0 - 54.0 %    MCV 94 80 - 100 fL    MCH 31.6 27.0 - 34.0 pg    MCHC 33.6 32.0 - 36.0 g/dL    RDW 12.6 11.0 - 14.5 %    Platelets 200 140 - 440 thou/uL    MPV 7.2 " 7.0 - 10.0 fL   Urinalysis-UC if Indicated   Result Value Ref Range    Color, UA Yellow Colorless, Yellow, Straw, Light Yellow    Clarity, UA Clear Clear    Glucose, UA Negative Negative    Bilirubin, UA Negative Negative    Ketones, UA Negative Negative    Specific Gravity, UA 1.015 1.005 - 1.030    Blood, UA Negative Negative    pH, UA 7.0 5.0 - 8.0    Protein, UA Negative Negative mg/dL    Urobilinogen, UA 0.2 E.U./dL 0.2 E.U./dL, 1.0 E.U./dL    Nitrite, UA Negative Negative    Leukocytes, UA Negative Negative

## 2021-06-09 NOTE — TELEPHONE ENCOUNTER
Refill Approved    Rx renewed per Medication Renewal Policy. Medication was last renewed on 04/23/2020.  Last office visit was on 02/28/2020 with PCP.    Dominique Cruz, [facility] Triage/Med Refill 7/18/2020     Requested Prescriptions   Pending Prescriptions Disp Refills     levothyroxine (SYNTHROID, LEVOTHROID) 112 MCG tablet [Pharmacy Med Name: Levothyroxine Sodium Oral Tablet 112 MCG] 90 tablet 0     Sig: TAKE ONE TABLET BY MOUTH ONCE DAILY       Thyroid Hormones Protocol Passed - 7/18/2020  7:03 AM        Passed - Provider visit in past 12 months or next 3 months     Last office visit with prescriber/PCP: 2/28/2020 Modesta Corrales MD OR same dept: 2/28/2020 Modesta Corrales MD OR same specialty: 2/28/2020 Modesta Corrales MD  Last physical: 3/26/2019 Last MTM visit: Visit date not found   Next visit within 3 mo: Visit date not found  Next physical within 3 mo: Visit date not found  Prescriber OR PCP: Modetsa Corrales MD  Last diagnosis associated with med order: 1. Acquired hypothyroidism  - levothyroxine (SYNTHROID, LEVOTHROID) 112 MCG tablet [Pharmacy Med Name: Levothyroxine Sodium Oral Tablet 112 MCG]; TAKE ONE TABLET BY MOUTH ONCE DAILY   Dispense: 90 tablet; Refill: 0    If protocol passes may refill for 12 months if within 3 months of last provider visit (or a total of 15 months).             Passed - TSH on file in past 12 months for patient age 12 & older     TSH   Date Value Ref Range Status   11/08/2019 0.66 0.30 - 5.00 uIU/mL Final

## 2021-06-10 NOTE — PROGRESS NOTES
Preoperative Exam  -  Scheduled Procedure: left cataract removal  Surgery Date:  08/18/20  Surgery Location: [facility]  [phone]  Surgeon:  Dr. Del Rio    Assessment/Plan:     1. Preoperative examination  2. Cataract of left eye, unspecified cataract type      3. Parkinson disease (H)  Followed by neurology, continue carbidopa-levodopa.    4. Acquired hypothyroidism  Most recent TSH checked in November, 2019 and normal.  Continue current dose of levothyroxine.    5. Dysthymia  Mood adequately managed with sertraline 50 mg daily.    6. Anemia, unspecified type  7. Lymphocytopenia  - HM1(CBC and Differential)  - HM1 (CBC with Diff)  Followed by hematology, labs were stable when checked in November, 2019.  Rechecked CBC today, which has been relatively stable.  Encouraged patient to follow-up with hematology as planned.    8. Neuropathy:  Intermittent and peripheral.  Had recent normal labs including folate and vitamin B12.  Not extremely bothersome at this time, and patient not wanting to take additional medications.  Will continue to monitor.    Surgical Procedure Risk: Low (reported cardiac risk generally < 1%)  Have you had prior anesthesia?: Yes  Have you or any family members had a previous anesthesia reaction:  No  Do you or any family members have a history of a clotting or bleeding disorder?: No  Cardiac Risk Assessment: no increased risk for major cardiac complications    APPROVAL GIVEN to proceed with proposed procedure, without further diagnostic evaluation      Functional Status: Independent  Patient plans to recover at home with family.     Subjective:      Gomez Villasenor is a 89 y.o. male, here today with wife, who presents for a preoperative consultation.  Has had worsening vision in left eye, planning to have left cataract removal.      Was seen by hematology regarding normocytic anemia and lymphocytopenia in November, 2019.  They were to follow-up again in approximately 6 months though  have not done so.  He denies any new fatigue, fevers, chills, sweats.    Has had intermittent tingling/pain of toes.  Barely noticeable today.      All other systems reviewed and are negative, other than those listed in the HPI.    Pertinent History  Do you have difficulty breathing or chest pain after walking up a flight of stairs: No  History of obstructive sleep apnea: No  Steroid use in the last 6 months: No  Frequent Aspirin/NSAID use: No  Prior Blood Transfusion: No  Prior Blood Transfusion Reaction: No  If for some reason prior to, during or after the procedure, if it is medically indicated, would you be willing to have a blood transfusion?:  There is no transfusion refusal.    Current Outpatient Medications   Medication Sig Dispense Refill     carbidopa-levodopa (SINEMET)  mg per tablet Take 2 tablets by mouth 3 (three) times a day.       cholecalciferol, vitamin D3, 1,000 unit tablet Take 2,000 Units by mouth daily.        GLUCOSAMINE/CHONDROITIN SULF A (GLUCOSAMINE-CHONDROITIN ORAL) Take 2 tablets by mouth daily.        levothyroxine (SYNTHROID, LEVOTHROID) 112 MCG tablet TAKE ONE TABLET BY MOUTH ONCE DAILY  90 tablet 2     lutein 20 mg Tab Take 1 tablet by mouth daily.       multivitamin (ONE A DAY) per tablet Take 1 tablet by mouth.       sertraline (ZOLOFT) 50 MG tablet TAKE 1 &1/2 TABLETS (75 MG) BY MOUTH ONCE DAILY 135 tablet 2     vit A/vit C/vit E/zinc/copper (PRESERVISION AREDS ORAL) Take by mouth.       No current facility-administered medications for this visit.         No Known Allergies    Patient Active Problem List   Diagnosis     Prostate Cancer     Nephrolithiasis     Macular Degeneration     Hypothyroid     Leukopenia     Fatigue     Dysthymia     Parkinson disease (H)       Past Medical History:   Diagnosis Date     Cancer (H)     prostate     Disease of thyroid gland     hypothyroid     Hearing loss      Kidney stones 3/17/2015    this is third episode of stones     Macular  degeneration      Nephrolithiasis 2015    right sided/ureteroscopic removal     Prostate cancer (H)      Status post cystoscopy March 2015    with ureteroscopy and stone removal     Urinary incontinence        Past Surgical History:   Procedure Laterality Date     CYSTOURETHROSCOPY      Right stent exchange and stone extraction     EYE SURGERY Right     cataract removed     INSERT / REPLACE / REMOVE PROSTHESIS URETHRAL SPHINCTER N/A 9/21/2016    Procedure:  REPLACEMENT OF ARTIFICIAL URINARY SPHINCTER;  Surgeon: Stevie Braxton MD;  Location: [facility];  Service:      OTHER SURGICAL HISTORY      artificial urethral sphincter     NC CYSTOURETHROSCOPY      Description: Cystoscopy (Diagnostic);  Recorded: 11/07/2008;     NC REMV PROSTATE,RETROPUB,RADICAL      Description: Prostatectomy Retropubic Radical With Nerve Sparing;  Recorded: 11/10/2009;  Comments: 2001 artificial sphinter placed       Social History     Socioeconomic History     Marital status:      Spouse name: Tanya     Number of children: 3     Years of education: Not on file     Highest education level: Not on file   Occupational History     Occupation: retired dentist   Social Needs     Financial resource strain: Not on file     Food insecurity     Worry: Not on file     Inability: Not on file     Transportation needs     Medical: Not on file     Non-medical: Not on file   Tobacco Use     Smoking status: Never Smoker     Smokeless tobacco: Never Used   Substance and Sexual Activity     Alcohol use: Yes     Alcohol/week: 1.0 standard drinks     Types: 1 Cans of beer per week     Comment: one beer most days     Drug use: No     Sexual activity: Never   Lifestyle     Physical activity     Days per week: Not on file     Minutes per session: Not on file     Stress: Not on file   Relationships     Social connections     Talks on phone: Not on file     Gets together: Not on file     Attends [employer] service: Not on file     Active member  "of club or organization: Not on file     Attends meetings of clubs or organizations: Not on file     Relationship status: Not on file     Intimate partner violence     Fear of current or ex partner: Not on file     Emotionally abused: Not on file     Physically abused: Not on file     Forced sexual activity: Not on file   Other Topics Concern     Not on file   Social History Narrative    . Tanya. 3 grown children and several grandkids. Retired dentist/  World traveller. Exercise enthusiast. Identical twin Brother Alonso Faust  age 83 CLL/Cancer/ICH... Non smoker, occ beer/        Patient Care Team:  Modesta Corrales MD as PCP - General (Family Medicine)  Neal Del Rio MD (Ophthalmology)  Christian Badillo MD (Dermatology)  Levon Cruz MD as Physician (Ophthalmology)  Stevie Braxton MD as Physician (Urology)  Hadley Chi MD as Physician (Urology)  Modesta Corrales MD as Assigned PCP          Objective:     Vitals:    20 0918   BP: 122/78   Pulse: (!) 57   Resp: 20   Temp: 97.6  F (36.4  C)   TempSrc: Tympanic   SpO2: 98%   Weight: 140 lb 6.4 oz (63.7 kg)   Height: 5' 8.5\" (1.74 m)         Physical Exam:  General Appearance: Alert, cooperative, no distress, appears stated age  Head: Normocephalic, without obvious abnormality, atraumatic  Eyes: PERRL, conjunctiva/corneas clear, EOM's intact  Ears: Normal TM's and external ear canals, both ears  Throat: Lips, mucosa, and tongue normal; teeth and gums normal  Neck: Supple, symmetrical, trachea midline, no adenopathy;  thyroid: not enlarged, symmetric, no tenderness/mass/nodules; no carotid bruit or JVD  Lungs: Clear to auscultation bilaterally, respirations unlabored  Heart: Regular rate and rhythm, S1 and S2 normal, no murmur.  Abdomen: Soft, non-tender, bowel sounds active all four quadrants,  no masses, no organomegaly  Musculoskeletal: Normal range of motion. No joint swelling or deformity.   Extremities: Extremities normal, " atraumatic, no cyanosis or edema  Skin: Skin color, texture, turgor normal, no rashes or lesions  Lymph nodes: Cervical, supraclavicular, and axillary nodes normal  Neurologic: Alert.  Normal speech.  No focal deficits.  Normal deep tendon reflexes.   Psychiatric: Normal mood and affect.  Does not appear anxious or depressed.        Patient Instructions   No aspirin or NSAIDs within 7 days of surgery.        Labs:  Recent Results (from the past 24 hour(s))   HM1 (CBC with Diff)    Collection Time: 08/04/20  9:56 AM   Result Value Ref Range    WBC 3.5 (L) 4.0 - 11.0 thou/uL    RBC 3.96 (L) 4.40 - 6.20 mill/uL    Hemoglobin 12.6 (L) 14.0 - 18.0 g/dL    Hematocrit 38.7 (L) 40.0 - 54.0 %    MCV 98 80 - 100 fL    MCH 31.8 27.0 - 34.0 pg    MCHC 32.6 32.0 - 36.0 g/dL    RDW 14.6 (H) 11.0 - 14.5 %    Platelets 176 140 - 440 thou/uL    MPV 9.6 8.5 - 12.5 fL    Neutrophils % 72 (H) 50 - 70 %    Lymphocytes % 12 (L) 20 - 40 %    Monocytes % 15 (H) 2 - 10 %    Eosinophils % 1 0 - 6 %    Basophils % 0 0 - 2 %    Neutrophils Absolute 2.5 2.0 - 7.7 thou/uL    Lymphocytes Absolute 0.4 (L) 0.8 - 4.4 thou/uL    Monocytes Absolute 0.5 0.0 - 0.9 thou/uL    Eosinophils Absolute 0.0 0.0 - 0.4 thou/uL    Basophils Absolute 0.0 0.0 - 0.2 thou/uL       Immunization History   Administered Date(s) Administered     DT (pediatric) 07/31/2000     Hep A, historic 03/01/1996, 12/09/1996, 08/09/2007     IG-IM 12/27/1990     IPV 08/23/1985, 12/03/1991     Influenza C5y2-46, 01/08/2010     Influenza high dose,seasonal,PF, 65+ yrs 09/23/2015, 10/17/2017, 10/09/2018, 10/01/2019     Influenza, inj, historic,unspecified 10/08/2007, 11/07/2008, 09/30/2009, 10/01/2010, 10/04/2011, 10/24/2012, 09/11/2014     Influenza, seasonal,quad inj 6-35 mos 09/20/2013     Influenza,seasonal, Inj IIV3 11/15/2005, 12/05/2006     Lyme Disease 04/15/1999, 05/13/1999, 04/26/2000     Meningococcal MPSV4, SQ 03/11/1991, 02/06/1996     Pneumo Conj 13-V (2010&after) 02/13/2015      Pneumo Polysac 23-V 01/01/1994, 08/09/2007     Rabies, IM Human Diploid 02/24/1992, 03/03/1992, 03/25/1992     Td, Adult, Absorbed 08/23/1985, 12/21/1990, 02/06/1996, 12/31/2000, 09/10/2007     Td,adult,historic,unspecified 11/10/2009     Typhoid, Inj, Inactive 09/06/1985, 12/04/1990, 02/06/1996, 09/10/2007     Varicella 05/04/2019     Yellow Fever 08/23/1985, 12/09/1996, 12/02/2008     ZOSTER, LIVE 12/02/2008     ZOSTER, RECOMBINANT, IM 05/04/2019, 07/11/2019           Electronically signed by Modesta Corrales MD 08/04/20 9:21 AM

## 2021-06-10 NOTE — PROGRESS NOTES
[facility] Rehabilitation Daily Progress     Patient Name: Gomez Villasenor  Date: 8/13/2020  Visit #: 2  PTA visit #:    Referral Diagnosis:    Referring provider: Carroll Chaudhari MD  Visit Diagnosis:     ICD-10-CM    1. Bradykinesia  R25.8    2. Postural instability  R29.3    3. Gait instability  R26.81    4. Parkinson's disease (H)  G20    5. Unsteadiness on feet  R26.81    6. Generalized muscle weakness  M62.81    7. History of fall within past 90 days  Z91.81        Initial Eval Assessment: Gomez Villasenor is a 89 y.o. male who presents to therapy today with chief complaints of feeling unbalanced and falling. Onset date of sx was 3-4 years ago.  Pt reported h/o therapy for balance two times over the last 2 years with some improvements. However, this winter he met with a neurologist and was diagnosed with Parkinson's Disease. He has been started on medication and it has been increased since. He has had several falls the most recent was last night. Patient presents with an increase in falls risk based on TUG, TUG cognitive, mini BESTest score and gait speed. Patient will benefit from skilled therapy to increase strengthe, improved balance and decrease risk for falls.      Precautions / Restrictions : falls risk indicated      Assessment:   Patient presents to follow up appointment for parkinson's disease. Treatment was initiated today with large amplitude movements, mobility and balance exercise. Patient tolerated well and is motivated to participate in therapy.  HEP/POC compliance is  good .  Patient is benefitting from skilled physical therapy and is making steady progress toward functional goals.  Patient is appropriate to continue with skilled physical therapy intervention, as indicated by initial plan of care.    Goal Status:  No data recorded  Pt will: Pateint will demonstrate improved balance with decrease in TUG time to less than 12 seconds ad TUG cognitive less than 15 seconds in 8  weeks  Pt will: demonstrated increase in functional strength and endurance with increase in 2 minute walk test to >125 meters and a RPE less than/equal to 3/10 in 8 weeks  Pt will: increase Mini BESTest score to > 18/28 in order to decrease his risk for falls in 8 weeks  Pt will: increase comfortable gait speed to >1.0 m/sec for improved saefty with ambulation and demonstrate improved gait mechanics and pattern in 8 weeks      Plan / Patient Education:     Continue with initial plan of care.  Progress with home program as tolerated.  Plan for next visit: begin PD specific exercises, large amplitude, high effort exercises for improved balance and strength.   Subjective:     Pain Rating: no pain today. He fell about 2 weeks ago and hit back on left side which was sore for a while but it is now better.     Patient reports nothing new since seen. No falls since he was last here.     Patient exercises with a  once per week, walking about a mile at a time. Walking stick to get around out of the house nothing in the house.     Objective:     Therapeutic exercise performed:    Seated sustained exercise:  Large amplitude Floor to ceiling (reaching down out up and back)   Comments: instructed patient on performance of exercise, incluced finger flicks with 10 second hold. Cuing for big movements throughout.    Sets/reps: 1x10    Effort: 8-9 right hand more difficulty doing full motion and finger flicks throughout  Large amplitude Side to side (reaching out to the side and twisting across body   Comments: instructed patient on performance of exercise, incluced finger flicks with 10 second hold. Cuing for big movements throughout.      Sets/reps: 1x8 on each side, non alternating   Effort 10 right hand more difficulty doing full motion and finger flicks throughout    Other:     Sit to stand with large arm movements out and powerful motion, and controlled lowering 2x10 repetitions    Neuromuscular re-education  performed:    Multi-directional repetitive movements:     Step and Reach  Chairs nearby: one rail  UE support: one hand for forward and backwards no hand for sideways    2A Forward step and reach:    Comments: instructed patient on performance of exercise. Use of yard stick to step over. Cuing for big movements throughout.      Sets/reps: 2x10 each side, with one UE support   Effort 9/10  2B Side step and reach   Comments: instructed patient on performance of exercise. Use of yard stick to step over. Cuing for big movements throughout.    Sets/reps: 2x10 each side    Effort 9/10  2C Backwards step and reach   Comments: instructed patient on performance of exercise. Use of yard stick to step over. Cuing for big movements throughout.    Sets/reps: 2x10 each side    Effort 9/10    Rock and Reach (for next time)   Chairs nearby:   UE support:     2D Left LE forward/backward   Comments:    Sets/reps:    Effort   2D Right LE forward/backward   Comments:    Sets/reps:    Effort   2E Side to side    Comments:    Sets/reps:    Effort       Other:      Therapeutic activity performed:       BIG walking (not today)    Other:     Nu Step WL5 for 8 minutes, patient tolerated well    HEP Below  Exercise #1: out, down, up and back seated with finger flicks 2x10  Comment #1: sit to stand with big arms x10  Exercise #2: large stepping forward and back with arms out x10        Treatment Today     TREATMENT MINUTES COMMENTS   Evaluation     Self-care/ Home management     Manual therapy     Neuromuscular Re-education 24 See above   Therapeutic Activity     Therapeutic Exercises 31 See above   Gait training     Modality__________________                Total 55    Blank areas are intentional and mean the treatment did not include these items.       Wilma Galvez, PT, DPT  8/13/2020

## 2021-06-10 NOTE — PROGRESS NOTES
"Gomez Burrellvicky  9/22/1928      Assessment and Plan:  1. Back pain- remote hx of Prostate Ca; check MRI may need spine referral or phys Rx depending on results      Chief Complaint: back     Visit diagnoses:    1. Back pain  MR Lumbar Spine Without Contrast       Meds:  Current Outpatient Prescriptions   Medication Sig Dispense Refill     ANTIOX #8/OM3/DHA/EPA/LUT/ZEAX (PRESERVISION AREDS 2, OMEGA-3, ORAL) Take 2 tablets by mouth daily.        cholecalciferol, vitamin D3, 1,000 unit tablet Take 2,000 Units by mouth daily.        GLUCOSAMINE/CHONDROITIN SULF A (GLUCOSAMINE-CHONDROITIN ORAL) Take 2 tablets by mouth daily.        levothyroxine (SYNTHROID, LEVOTHROID) 125 MCG tablet Take 125 mcg by mouth Daily at 6:00 am.       lutein 20 mg Tab Take 1 tablet by mouth daily.       multivitamin therapeutic (THERAGRAN) tablet Take 1 tablet by mouth daily.       omega-3 fatty acids-vitamin E (FISH OIL) 1,000 mg cap Take 1 capsule by mouth daily.       No current facility-administered medications for this visit.        No Known Allergies    ROS: complete review of symptoms otherwise negative except as noted below    S:  New onset of back pain for patient with which is unusual for him mostly in the mid spine area remote history of prostate cancer noted no radiation to the buttock is able to ambulate without difficulty he's never really had back pain and actually has been very active and physically fit working with a        O:   Vitals:    04/19/17 0923   BP: 138/88   Patient Site: Left Arm   Patient Position: Sitting   Cuff Size: Adult Regular   Pulse: 60   Weight: 151 lb 6.4 oz (68.7 kg)   Height: 5' 9\" (1.753 m)       Physical Exam:  General-  VS- see above  HEENT- neg   Neck- no adenopathy/thyromegaly/bruits  Chest- clear   Cor- reg no murmurs/gallops/ectopics  Abdomen- soft non tender, no masses; no organomegaly  Extremities: no edema, good distal pulses  Neuro- Cr. NN-  intact, alert,   Musculoskeletal  some " paralumbar spasm and tenderness on the right side no CVA tenderness some tenderness over the L3 vertebrae of questionable significance is able to ambulate without difficulty        Christiano Irvin MD

## 2021-06-10 NOTE — TELEPHONE ENCOUNTER
FYI - Status Update  Who is Calling: Spouse  Update: Patient only has two days worth of medication.  Please refill today and add appropriate amount of refills.  Okay to leave a detailed message?:  No return call needed

## 2021-06-10 NOTE — PROGRESS NOTES
[facility] Rehabilitation Daily Progress     Patient Name: Gomez Villasenor  Date: 8/24/2020  Visit #: 3  PTA visit #:    Referral Diagnosis:    Referring provider: Carroll Chaudhari MD  Visit Diagnosis:     ICD-10-CM    1. Bradykinesia  R25.8    2. Postural instability  R29.3    3. Gait instability  R26.81    4. Parkinson's disease (H)  G20    5. Unsteadiness on feet  R26.81    6. Generalized muscle weakness  M62.81    7. History of fall within past 90 days  Z91.81        Initial Eval Assessment: Gomez Villasenor is a 89 y.o. male who presents to therapy today with chief complaints of feeling unbalanced and falling. Onset date of sx was 3-4 years ago.  Pt reported h/o therapy for balance two times over the last 2 years with some improvements. However, this winter he met with a neurologist and was diagnosed with Parkinson's Disease. He has been started on medication and it has been increased since. He has had several falls the most recent was last night. Patient presents with an increase in falls risk based on TUG, TUG cognitive, mini BESTest score and gait speed. Patient will benefit from skilled therapy to increase strengthe, improved balance and decrease risk for falls.      Precautions / Restrictions : falls risk indicated      Assessment:   Patient presents to follow up appointment for parkinson's disease. Treatment was initiated today with large amplitude movements, mobility and balance exercise. Patient tolerated well and is motivated to participate in therapy. Good participation and effort throughout.  HEP/POC compliance is  good .  Patient is benefitting from skilled physical therapy and is making steady progress toward functional goals.  Patient is appropriate to continue with skilled physical therapy intervention, as indicated by initial plan of care.    Goal Status:  No data recorded  Pt will: Pateint will demonstrate improved balance with decrease in TUG time to less than 12 seconds ad  "TUG cognitive less than 15 seconds in 8 weeks  Pt will: demonstrated increase in functional strength and endurance with increase in 2 minute walk test to >125 meters and a RPE less than/equal to 3/10 in 8 weeks  Pt will: increase Mini BESTest score to > 18/28 in order to decrease his risk for falls in 8 weeks  Pt will: increase comfortable gait speed to >1.0 m/sec for improved saefty with ambulation and demonstrate improved gait mechanics and pattern in 8 weeks      Plan / Patient Education:     Continue with initial plan of care.  Progress with home program as tolerated.  Plan for next visit: begin PD specific exercises, large amplitude, high effort exercises for improved balance and strength.   Subjective:     Pain Rating: no pain today. He fell about 2 weeks ago and hit back on left side which was sore for a while but it is now better.     Patient reports that he is a little foggy this morning. He has been doing exercises. They are going pretty well, thinks he is improving. No falls since here.   Patient reports nothing new since seen. No falls since he was last here.     Patient exercises with a  once per week, walking about a mile at a time. Walking stick to get around out of the house nothing in the house.     Objective:     Therapeutic exercise performed:    Seated sustained exercise:  Large amplitude Floor to ceiling (reaching down out up and back)   Comments: instructed patient on performance of exercise, incluced finger flicks with 10 second hold. Cuing for big movements throughout.    Sets/reps: 2x8    Effort: 8-9 right hand more difficulty doing full motion and finger flicks throughout (no fatigue to note)  Large amplitude Side to side (reaching out to the side and twisting across body   Comments: instructed patient on performance of exercise, incluced finger flicks with 10 second hold. Cuing for big movements throughout.      Sets/reps: 1x8 on each side, non alternating   Effort 8-9 \"pretty good, " "felt good\" right hand more difficulty doing full motion and finger flicks throughout    Other:     Sit to stand with large arm movements out and powerful motion, and controlled lowering 2x10 repetitions    Neuromuscular re-education performed:    Multi-directional repetitive movements:     Step and Reach  Chairs nearby: in // bars  UE support: one hand for forward and backwards no hand for sideways    2A Forward step and reach:    Comments: instructed patient on performance of exercise. Use of walking stick to step over. Cuing for big movements throughout.    Sets/reps: 2x10 each side no UE support at this time in // bars, intermittently needing for balance   Effort 8/10  2B Side step and reach   Comments: instructed patient on performance of exercise. Use of walking stick to perform. Cuing for big movements throughout.    Sets/reps: 2x10 each side    Effort 6-7/10, not sure why he did not do more. Just foggy and having to think of to many things  2C Backwards step and reach   Comments: instructed patient on performance of exercise. Use of yard stick to step over. Cuing for big movements throughout.    Sets/reps: 2x10 each side , first repetition without holding on, second with holding on one arm was able to perform to a full effort when holding on with one arm.    Effort 9/10    Rock and Reach (for next time)   Chairs nearby:   UE support:     2D Left LE forward/backward   Comments:    Sets/reps:    Effort   2D Right LE forward/backward   Comments:    Sets/reps:    Effort   2E Side to side    Comments:    Sets/reps:    Effort       Other:      Therapeutic activity performed:       BIG walking: cuing for heel strike and big arm swing throughout 300 feet x3-5 repetitions    Other:     Nu Step WL5 for 8 minutes, patient tolerated well    HEP Below  Exercise #1: out, down, up and back seated with finger flicks 2x10  Comment #1: sit to stand with big arms x10  Exercise #2: large stepping forward and back with arms out " x10  Comment #2: Rotation large amplitude in sitting x10        Treatment Today     TREATMENT MINUTES COMMENTS   Evaluation     Self-care/ Home management     Manual therapy     Neuromuscular Re-education 31 See above   Therapeutic Activity     Therapeutic Exercises 24 See above   Gait training     Modality__________________                Total 55    Blank areas are intentional and mean the treatment did not include these items.       Wilma Galvez, PT, DPT  8/24/2020

## 2021-06-10 NOTE — TELEPHONE ENCOUNTER
Refill Approved    Rx renewed per Medication Renewal Policy. Medication was last renewed on 5/21/20.    Kary Cervantes, [facility] Triage/Med Refill 8/21/2020     Requested Prescriptions   Pending Prescriptions Disp Refills     sertraline (ZOLOFT) 50 MG tablet 135 tablet 2       [facility] Refill Protocol  Passed - 8/21/2020  9:05 AM        Passed - PCP or prescribing provider visit in last year     Last office visit with prescriber/PCP: 2/28/2020 Modesta Corrales MD OR same dept: 2/28/2020 Modesta Corrales MD OR same specialty: 2/28/2020 Modesta Corrales MD  Last physical: 8/4/2020 Last MTM visit: Visit date not found   Next visit within 3 mo: Visit date not found  Next physical within 3 mo: Visit date not found  Prescriber OR PCP: Modesta Corrales MD  Last diagnosis associated with med order: 1. Depression, unspecified depression type  - sertraline (ZOLOFT) 50 MG tablet  Dispense: 135 tablet; Refill: 2    If protocol passes may refill for 12 months if within 3 months of last provider visit (or a total of 15 months).

## 2021-06-10 NOTE — PROGRESS NOTES
[facility] Rehabilitation Daily Progress     Patient Name: Gomez Villasenor  Date: 8/27/2020  Visit #: 3  PTA visit #:    Referral Diagnosis:    Referring provider: Carroll Chaudhari MD  Visit Diagnosis:     ICD-10-CM    1. Postural instability  R29.3    2. Bradykinesia  R25.8    3. Gait instability  R26.81    4. Parkinson's disease (H)  G20        Initial Eval Assessment: Gomez Villasenor is a 89 y.o. male who presents to therapy today with chief complaints of feeling unbalanced and falling. Onset date of sx was 3-4 years ago.  Pt reported h/o therapy for balance two times over the last 2 years with some improvements. However, this winter he met with a neurologist and was diagnosed with Parkinson's Disease. He has been started on medication and it has been increased since. He has had several falls the most recent was last night. Patient presents with an increase in falls risk based on TUG, TUG cognitive, mini BESTest score and gait speed. Patient will benefit from skilled therapy to increase strengthe, improved balance and decrease risk for falls.      Precautions / Restrictions : falls risk indicated      Assessment:   Patient presents to follow up appointment for parkinson's disease. Treatment was initiated today with large amplitude movements, mobility and balance exercise. Patient tolerated well and is motivated to participate in therapy. Good participation and effort throughout.  HEP/POC compliance is  good .  Patient is benefitting from skilled physical therapy and is making steady progress toward functional goals.  Patient is appropriate to continue with skilled physical therapy intervention, as indicated by initial plan of care.    Goal Status:  No data recorded  Pt will: Pateint will demonstrate improved balance with decrease in TUG time to less than 12 seconds ad TUG cognitive less than 15 seconds in 8 weeks  Pt will: demonstrated increase in functional strength and endurance with increase  "in 2 minute walk test to >125 meters and a RPE less than/equal to 3/10 in 8 weeks  Pt will: increase Mini BESTest score to > 18/28 in order to decrease his risk for falls in 8 weeks  Pt will: increase comfortable gait speed to >1.0 m/sec for improved saefty with ambulation and demonstrate improved gait mechanics and pattern in 8 weeks      Plan / Patient Education:     Continue with initial plan of care.  Progress with home program as tolerated.  Plan for next visit: continue PD specific exercises, large amplitude, high effort exercises for improved balance and strength.   Subjective:     Pain Rating: no pain today.  No falls, trying to work the exercises daily. Wife has been helping with this at home.   Has been going to the  one time per week.     Objective:     Therapeutic exercise performed:    Seated sustained exercise:  Large amplitude Floor to ceiling (reaching down out up and back)   Comments: instructed patient on performance of exercise, incluced finger flicks with 10 second hold. Cuing for big movements throughout.    Sets/reps: 1x8    Effort: 9-10   Large amplitude Side to side (reaching out to the side and twisting across body   Comments: instructed patient on performance of exercise, incluced finger flicks with 10 second hold. Cuing for big movements throughout, shaping for each of the first 2-3 reps each direction     Sets/reps: 1x 12  alternating   Effort 8-9 \"pretty good, felt good\" right hand more difficulty doing full motion and finger flicks throughout    Other:     NuStep WL 5 x 8 min average spm 71     Sit to stand with large arm movements out and powerful motion, and controlled lowering 2x10 repetitions, added yellow foam under his feet on second set     Neuromuscular re-education performed:    Multi-directional repetitive movements:     Step and Reach  Chairs nearby: in // bars  UE support: no hands for support consistently     2A Forward step and reach:    Comments: instructed patient " on performance of exercise. Use of walking stick to step over. Cuing for big movements throughout. Encouraged quality of movement over speed of movement    Sets/reps: 2x10 each side no UE support at this time in // bars, intermittently needing for balance   Effort 8/10  2B Side step and reach   Comments: instructed patient on performance of exercise. Use of walking stick to perform. Cuing for big movements throughout. Cues needed throughout for head turn and big steps, decreased stability with faitgue, using bar behing himself to catch balance frequently on second set to the right    Sets/reps: 2x10 each side    Effort 8/10,   2C Backwards step and reach   Comments: instructed patient on performance of exercise. . Cuing for big movements throughout. Guiding arm swing, and cues for weight shift and toe ups   Sets/reps: 2x10 each side ,  Holding on with one arm throughout for increase quality of movement    Effort 9/10    Rock and Reach (for next time)   Chairs nearby:   UE support:     2D LE forward/backward   Comments: initiated today, stated with heel toe rocking, then added arm swing unilaterally with holding on to // bar with opposite side, cues to continued heel toe rocking needed    Sets/reps: 2 x 10 reps each side    Effort  Not rated   2E Side to side    Comments:    Sets/reps:    Effort       Other:      Therapeutic activity performed:       BIG walking: cuing for heel strike and big arm swing throughout 300 feet x3-5 repetitions    Other:     HEP Below  Exercise #1: out, down, up and back seated with finger flicks 2x10  Comment #1: sit to stand with big arms x10  Exercise #2: large stepping forward and back with arms out x10  Comment #2: Rotation large amplitude in sitting x10        Treatment Today     TREATMENT MINUTES COMMENTS   Evaluation     Self-care/ Home management     Manual therapy     Neuromuscular Re-education 30 See above   Therapeutic Activity     Therapeutic Exercises 23 See above   Gait  training     Modality__________________                Total 53     Blank areas are intentional and mean the treatment did not include these items.       Wendy aWlsh, PT, DPT, CLT  8/27/2020

## 2021-06-11 NOTE — PROGRESS NOTES
[facility] Rehabilitation Daily Progress     Patient Name: Gomez Villasenor  Date: 8/31/2020  Visit #: 4  PTA visit #:    Referral Diagnosis:    Referring provider: Carroll Chaudhari MD  Visit Diagnosis:     ICD-10-CM    1. Postural instability  R29.3    2. Bradykinesia  R25.8    3. Gait instability  R26.81    4. Parkinson's disease (H)  G20    5. Unsteadiness on feet  R26.81    6. Generalized muscle weakness  M62.81    7. History of fall within past 90 days  Z91.81        Initial Eval Assessment: Gomez Villasenor is a 89 y.o. male who presents to therapy today with chief complaints of feeling unbalanced and falling. Onset date of sx was 3-4 years ago.  Pt reported h/o therapy for balance two times over the last 2 years with some improvements. However, this winter he met with a neurologist and was diagnosed with Parkinson's Disease. He has been started on medication and it has been increased since. He has had several falls the most recent was last night. Patient presents with an increase in falls risk based on TUG, TUG cognitive, mini BESTest score and gait speed. Patient will benefit from skilled therapy to increase strengthe, improved balance and decrease risk for falls.      Precautions / Restrictions : falls risk indicated      Assessment:   Patient presents to follow up appointment for parkinson's disease. Treatment was initiated today with large amplitude movements, mobility and balance exercise. Patient tolerated well and is motivated to participate in therapy. Good participation and effort throughout. Due to patient request, worked on some exercises to help with picking up items from floor on this date.  HEP/POC compliance is  good .  Patient is benefitting from skilled physical therapy and is making steady progress toward functional goals.  Patient is appropriate to continue with skilled physical therapy intervention, as indicated by initial plan of care.    Goal Status:  No data recorded  Pt  "will: Pateint will demonstrate improved balance with decrease in TUG time to less than 12 seconds ad TUG cognitive less than 15 seconds in 8 weeks  Pt will: demonstrated increase in functional strength and endurance with increase in 2 minute walk test to >125 meters and a RPE less than/equal to 3/10 in 8 weeks  Pt will: increase Mini BESTest score to > 18/28 in order to decrease his risk for falls in 8 weeks  Pt will: increase comfortable gait speed to >1.0 m/sec for improved saefty with ambulation and demonstrate improved gait mechanics and pattern in 8 weeks      Plan / Patient Education:     Continue with initial plan of care.  Progress with home program as tolerated.  Plan for next visit: give stepping to side next time, continue PD specific exercises, large amplitude, high effort exercises for improved balance and strength.   Subjective:     Pain Rating: no pain today.    Patient reports he has been doing exercises at least once a day. No falls.. Wife has been helping with this at home with exercise.   Has been going to the  one time per week. Tuesday's.     At home it all resolves around balance, things hard to do.     Objective:     Therapeutic exercise performed:    Seated sustained exercise:  Large amplitude Floor to ceiling (reaching down out up and back)   Comments: instructed patient on performance of exercise, incluced finger flicks with 10 second hold. Cuing for big movements throughout.    Sets/reps: 1x9    Effort: 9/10  Large amplitude Side to side (reaching out to the side and twisting across body   Comments: instructed patient on performance of exercise, incluced finger flicks with 10 second hold. Cuing for big movements throughout, shaping for each of the first 2-3 reps each direction     Sets/reps: 1x 12  alternating   Effort 9 \"pretty good, felt good\" right hand more difficulty doing full motion and finger flicks throughout    Other:     NuStep WL 6 x 8 min average spm 71     Sit to stand " with large arm movements out and powerful motion, and controlled lowering 2x10 repetitions, yellow foam under his feet both sets.    Neuromuscular re-education performed:    Multi-directional repetitive movements:     Step and Reach  Chairs nearby: in // bars  UE support: no hands for support consistently     2A Forward step and reach:    Comments: instructed patient on performance of exercise. Use of walking stick to step over. Cuing for big movements throughout. Encouraged quality of movement over speed of movement    Sets/reps: 2x10 each side no UE support at this time in // bars, intermittently needing for balance   Effort 8/10 when having to not use bars, when using bars 9/10  2B Side step and reach   Comments: instructed patient on performance of exercise. Use of walking stick to perform. Cuing for big movements throughout. Cues needed throughout for head turn and big steps, decreased stability with faitgue, using bar behing himself to catch balance frequently on second set to the right    Sets/reps: 2x10 each side    Effort 8/10 first repetition then 9/10  2C Backwards step and reach   Comments: instructed patient on performance of exercise. . Cuing for big movements throughout. Guiding arm swing, and cues for weight shift and toe ups.  (at times catching foot on foam)   Sets/reps: 2x10 each side ,  Holding on with one arm throughout for increase quality of movement    Effort 9/10    Rock and Reach (for next time)   Chairs nearby:   UE support:     2D LE forward/backward   Comments: stated with heel toe rocking, then added arm swing unilaterally with holding on to // bar with opposite side, cues to continued heel toe rocking needed, attempted without holding on increased difficulty   Sets/reps: 2 x 10 reps each side    Effort  Not rated   2E Side to side    Comments:    Sets/reps:    Effort       Other:    Standing on foam rotation grabbing scarves on ground and throwing across body x12 both  sides.    Therapeutic activity performed:       BIG walking: cuing for heel strike and big arm swing throughout 300 feet x3-5 repetitions    Other:     HEP   Exercise #1: out, down, up and back seated with finger flicks 2x10  Comment #1: sit to stand with big arms x10  Exercise #2: large stepping forward and back with arms out x10  Comment #2: Rotation large amplitude in sitting x10        Treatment Today     TREATMENT MINUTES COMMENTS   Evaluation     Self-care/ Home management     Manual therapy     Neuromuscular Re-education 30 See above   Therapeutic Activity     Therapeutic Exercises 25 See above   Gait training     Modality__________________                Total 53     Blank areas are intentional and mean the treatment did not include these items.       Wilma Galvez, PT, DPT  8/31/2020

## 2021-06-11 NOTE — PROGRESS NOTES
[facility] Rehabilitation Daily Progress     Patient Name: Gomez Villasenor  Date: 9/11/2020  Visit #: 8  PTA visit #:    Referral Diagnosis:    Referring provider: Carroll Chaudhari MD  Visit Diagnosis:     ICD-10-CM    1. Postural instability  R29.3    2. Bradykinesia  R25.8    3. Gait instability  R26.81    4. Parkinson's disease (H)  G20        Initial Eval Assessment: Gomez Villasenor is a 89 y.o. male who presents to therapy today with chief complaints of feeling unbalanced and falling. Onset date of sx was 3-4 years ago.  Pt reported h/o therapy for balance two times over the last 2 years with some improvements. However, this winter he met with a neurologist and was diagnosed with Parkinson's Disease. He has been started on medication and it has been increased since. He has had several falls the most recent was last night. Patient presents with an increase in falls risk based on TUG, TUG cognitive, mini BESTest score and gait speed. Patient will benefit from skilled therapy to increase strengthe, improved balance and decrease risk for falls.      Precautions / Restrictions : falls risk indicated      Assessment:   Patient presents to follow up appointment for parkinson's disease. Treatment was continued today with large amplitude movements, mobility and balance exercise. Patient tolerated well and is motivated to participate in therapy. Good participation and effort throughout. Worked with [employer] walking sticks today, patient had improved gait and tolerance to ambulation with this.  HEP/POC compliance is  good .  Patient is benefitting from skilled physical therapy and is making steady progress toward functional goals.  Patient is appropriate to continue with skilled physical therapy intervention, as indicated by initial plan of care.    Goal Status:  No data recorded  Pt will: Pateint will demonstrate improved balance with decrease in TUG time to less than 12 seconds ad TUG cognitive less than 15  seconds in 8 weeks  Pt will: demonstrated increase in functional strength and endurance with increase in 2 minute walk test to >125 meters and a RPE less than/equal to 3/10 in 8 weeks  Pt will: increase Mini BESTest score to > 18/28 in order to decrease his risk for falls in 8 weeks  Pt will: increase comfortable gait speed to >1.0 m/sec for improved saefty with ambulation and demonstrate improved gait mechanics and pattern in 8 weeks      Plan / Patient Education:     Continue with initial plan of care.  Progress with home program as tolerated.  Plan for next visit: give rocking exercises next time, contniue practicing with walking sticks. continue PD specific exercises, large amplitude, high effort exercises for improved balance and strength.   Subjective:     Pain Rating: no pain today.    Doing ok, no falls.     Objective:       Balance Testing: Last session   Functional test Score / AD if used 2020 Fall risk cutoff score Norms Observations   30 second sit<>stand 11     14 <8 high fall risk  9-12 mod risk 10     Timed up and go (TUG)  (seconds) Trial 1: 13.78  Trial 2: 11.59  Trial 3: 12.88  Av.75 sec   Trial 1: 12.76  Trial 2:  12.01  Trial 3: 11. 81  Av.2 >13 sec       Cognitive TUG (seconds) 18.35 sec   Task: Count down from 100 by 3's  - 100- 81 with 3-4 mistakes            15.56 seconds   100-91 (1 mistake) >15 sec 9-10 seconds     Comfortable gait speed 10M    6.54 seconds 11 steps   0.92 m/sec   <1.0 m/s 1.21 m/sec for 85-89 years old      Fast gait speed 10M    5.54 seconds 9 steps  1.08 m/sec      1.65 m/sec for 85-89 years old      4-square step test    Trial 1: 20.53 steps pn wood dowel on backwards step and nearly fell, mod+ assist from therapist   Trial 2: 13.85 sec did not strike any wood dowels     >15 sec       2 minute walk test    114.2 meters     118meters (with no assistive device, was focusing on arm swingbil)   144.1 meters for 80-85 years old men      6 minute walk test                                                        Therapeutic exercise performed:    Seated sustained exercise:  Large amplitude Floor to ceiling (reaching down out up and back)   Comments: instructed patient on performance of exercise, incluced finger flicks with 10 second hold. Cuing for big movements throughout and rotation of the shoulders on the last 2 reps    Sets/reps: 1x5    Effort: 10/10  Large amplitude Side to side (reaching out to the side and twisting across body   Comments: instructed patient on performance of exercise, incluced finger flicks with 10 second hold. Cuing for big movements throughout, shaping for each of the first 2-3 reps each direction     Sets/reps: 1x8 repetitions bilaterally alternating.   Effort 10/10    Other:     NuStep WL 6 x 8 min average spm 71     Sit to stand with large arm movements out and powerful motion, and controlled lowering 1 x15 repetitions, yellow foam under his feet both sets. Cues for weight shifting forward and slow return to sit as needed throughout second set       Neuromuscular re-education performed:    Multi-directional repetitive movements:     Step and Reach  Chairs nearby: in // bars  UE support: no hands for support consistently     2A Forward step and reach:    Comments: instructed patient on performance of exercise. Use of 1/2 foam to step over. Cuing for big movements throughout. Encouraged quality of movement over speed of movement cues for B arms but patient was more off balance and stopped using the arms to focus on the legs    Sets/reps: 1x20 alternating sides, intermittent use of UE support at thi the bar near by    Effort  9/10  2B Side step and reach   Comments: instructed patient on performance of exercise. Use of walking stick to perform. Cuing for big movements throughout. Cues needed throughout for head turn and big steps,cues for R arm on first 2-3 reps, decreased stability with faitgue, and struck the foam roll with L foot consistently     Sets/reps: 1 x15  Each side    Effort  10/10  2C Backwards step and reach   Comments: instructed patient on performance of exercise. . Cuing for big movements throughout. Guiding arm swing, and cues for weight shift and toe ups. Changed to holding on with one arm on rail, unilateral arm swing for increase in quality of movement and increase in amplitude    Sets/reps: 2x10 each side ,     Effort 9/10    Rock and Reach    Chairs nearby:   UE support:     2D LE forward/backward   Comments: stated with heel toe rocking, then added arm swing unilaterally with holding on to // bar with opposite side, cues to continued heel toe rocking needed, attempted without holding on increased difficulty   Sets/reps: 2 x 10 reps each side    Effort  Not rated   2E Side to side    Comments:    Sets/reps:    Effort       Other:      Therapeutic activity performed:       BIG walking: cuing for heel strike and big arm swing throughout 300 feet x3-5 repetitions, using B walking sticks today. Improvement to note  Other:     HEP   Exercise #1: out, down, up and back seated with finger flicks 2x10  Comment #1: sit to stand with big arms x10  Exercise #2: large stepping forward and back with arms out x10  Comment #2: Rotation large amplitude in sitting x10  Exercise #3: large stepping to the side arms out x10 magaly  Comment #3: large stepping back with arm swing bow x10 magaly        Treatment Today     TREATMENT MINUTES COMMENTS   Evaluation     Self-care/ Home management     Manual therapy     Neuromuscular Re-education 40 See above   Therapeutic Activity     Therapeutic Exercises 18 See above   Gait training     Modality__________________                Total 58    Blank areas are intentional and mean the treatment did not include these items.       Wendy Walsh, PT, DPT  9/11/2020

## 2021-06-11 NOTE — PROGRESS NOTES
"Gomez SORENSEN Ostergren  9/22/1928      Assessment and Plan:  1. Fall 5/27; left linear shin laceration. Looks clean ~ 7 cm right over tibia dependent rubor and edema- no pus or active infection  Wound will take time to heal and will ask wound clinic to evaluate  2. Lat malleolus tenderness- xray ok /blanca likely. Walking boot.       Chief Complaint: fall; leg wound and pain    Visit diagnoses:    1. Ankle sprain  XR Ankle Left 3 or More VWS    CANCELED: XR Ankle Left 3 or More VWS Portable   2. Wound of left leg  Ambulatory referral to Wound Clinic       Meds:  Current Outpatient Prescriptions   Medication Sig Dispense Refill     ANTIOX #8/OM3/DHA/EPA/LUT/ZEAX (PRESERVISION AREDS 2, OMEGA-3, ORAL) Take 2 tablets by mouth daily.        cephalexin (KEFLEX) 500 MG capsule Take 1 capsule (500 mg total) by mouth 3 (three) times a day for 7 days. 21 capsule 0     cholecalciferol, vitamin D3, 1,000 unit tablet Take 2,000 Units by mouth daily.        GLUCOSAMINE/CHONDROITIN SULF A (GLUCOSAMINE-CHONDROITIN ORAL) Take 2 tablets by mouth daily.        levothyroxine (SYNTHROID, LEVOTHROID) 125 MCG tablet Take 125 mcg by mouth Daily at 6:00 am.       lutein 20 mg Tab Take 1 tablet by mouth daily.       multivitamin therapeutic (THERAGRAN) tablet Take 1 tablet by mouth daily.       omega-3 fatty acids-vitamin E (FISH OIL) 1,000 mg cap Take 1 capsule by mouth daily.       No current facility-administered medications for this visit.        No Known Allergies    ROS: complete review of symptoms otherwise negative except as noted below    S:  [place]ell [place] weekend [place]. Arshad wound. Seen UC x2. On Keflex. Hard to bear weight on left ankle foot        O:   Vitals:    06/08/17 0914   BP: 140/76   Patient Site: Left Arm   Patient Position: Sitting   Cuff Size: Adult Regular   Pulse: 69   SpO2: 94%   Weight: 149 lb 4.8 oz (67.7 kg)   Height: 5' 10\" (1.778 m)       Physical Exam:  General-  VS- see above    Musculoskeletal- " impressive but clean vertical wound over shin ~7 cm; no pus. Some granulation tissue/ distal edema rubor tender lat malleolus. Grimace with weight bear          Christiano Irvin MD      XR ANKLE LEFT 3 OR MORE VWS  6/8/2017 9:38 AM     INDICATION: Left ankle sprain.  COMPARISON: None.     FINDINGS: Lower calf subcutaneous stranding which could represent edema, cellulitis, or contusion. No fracture, dislocation, or ankle joint effusion seen.     This report was electronically interpreted by: Dr. Wilman Verma MD ON 06/08/2017 at 10:05

## 2021-06-11 NOTE — PROGRESS NOTES
[facility] Rehabilitation Daily Progress     Patient Name: Gomez Villasenor  Date: 9/14/2020  Visit #: 7  PTA visit #:    Referral Diagnosis:    Referring provider: Carroll Chaudhari MD  Visit Diagnosis:     ICD-10-CM    1. Postural instability  R29.3    2. Bradykinesia  R25.8    3. Gait instability  R26.81    4. Parkinson's disease (H)  G20    5. Unsteadiness on feet  R26.81    6. Generalized muscle weakness  M62.81      Initial Eval Assessment: Gomez Villasenor is a 89 y.o. male who presents to therapy today with chief complaints of feeling unbalanced and falling. Onset date of sx was 3-4 years ago.  Pt reported h/o therapy for balance two times over the last 2 years with some improvements. However, this winter he met with a neurologist and was diagnosed with Parkinson's Disease. He has been started on medication and it has been increased since. He has had several falls the most recent was last night. Patient presents with an increase in falls risk based on TUG, TUG cognitive, mini BESTest score and gait speed. Patient will benefit from skilled therapy to increase strengthe, improved balance and decrease risk for falls.      Precautions / Restrictions : falls risk indicated      Assessment:   Patient presents to follow up appointment for parkinson's disease. Treatment was continued today with large amplitude movements, mobility and balance exercise. Patient tolerated well and is motivated to participate in therapy. Good participation and effort throughout, increased difficulty with balance today, unsure of why. Patient had increased difficulty with remembering cuing for exercises today, likely due to no compliance to HEP over the weekend. Worked with [employer] walking sticks today, patient had improved gait and tolerance to ambulation with this.  HEP/POC compliance is  good .  Patient is benefitting from skilled physical therapy and is making steady progress toward functional goals.  Patient is  appropriate to continue with skilled physical therapy intervention, as indicated by initial plan of care.    Goal Status:  No data recorded  Pt will: Pateint will demonstrate improved balance with decrease in TUG time to less than 12 seconds ad TUG cognitive less than 15 seconds in 8 weeks  Pt will: demonstrated increase in functional strength and endurance with increase in 2 minute walk test to >125 meters and a RPE less than/equal to 3/10 in 8 weeks  Pt will: increase Mini BESTest score to > 18/28 in order to decrease his risk for falls in 8 weeks  Pt will: increase comfortable gait speed to >1.0 m/sec for improved saefty with ambulation and demonstrate improved gait mechanics and pattern in 8 weeks      Plan / Patient Education:     Continue with initial plan of care.  Progress with home program as tolerated.     Plan for next visit: give rocking exercises next time, make sure he scheduled what he should for more appointments, contniue practicing with walking sticks. continue PD specific exercises, large amplitude, high effort exercises for improved balance and strength.   Subjective:     Pain Rating: no pain today.    Patient has been good, nothing new. Exercises are going pretty well. Since last time did not have time to get exercises in but was able to walk.  No falls.     Objective:     Therapeutic exercise performed:    Seated sustained exercise:  Large amplitude Floor to ceiling (reaching down out up and back)   Comments: instructed patient on performance of exercise, incluced finger flicks with 10 second hold. Cuing for big movements throughout and rotation of the shoulders. Patient needing reminders today on performance   Sets/reps: 1x10    Effort: 10/10  Large amplitude Side to side (reaching out to the side and twisting across body   Comments: instructed patient on performance of exercise, incluced finger flicks with 10 second hold. Cuing for big movements throughout, shaping for each of the first 2-3  reps each direction  (cuing for hip extension and following hand with eyes throughout.)      Sets/reps: 1x8 repetitions bilaterally non alternating.   Effort 10/10    Other:   NuStep WL 6 x 10 min average spm 71       Sit to stand with large arm movements out and powerful motion, and controlled lowering 2x10 repetitions, yellow foam under his feet both sets. Cues for weight shifting forward and slow return to sit as needed throughout second set       Neuromuscular re-education performed:    Multi-directional repetitive movements:     Step and Reach  Chairs nearby: in // bars  UE support: no hands for support consistently     2A Forward step and reach:    Comments: instructed patient on performance of exercise. Use of 1/2 foam to step over. Cuing for big movements throughout. Encouraged quality of movement over speed of movement cues for B arms occasionally mostly right arm needed cues for movement   Sets/reps: 2x10 repetitions magaly, first set one at a time, second set alternating intermittent use of UE support at thi the bar near by. A little more wobbly today   Effort  9/10 (balance hinders)   2B Side step and reach   Comments: instructed patient on performance of exercise. Cuing for big movements throughout. Cues needed throughout for head turn and big steps on first set, decreased stability with faitgue, and struck the foam roll with L foot consistently with fatigue on second set    Sets/reps: 1x10 first rep without UE support (increased difficulty and decreased form) second set with holding on to walking stick in one hand, both sides   Effort  10/10  2C Backwards step and reach   Comments: instructed patient on performance of exercise. . Cuing for big movements throughout. Guiding arm swing, and cues for weight shift and toe ups.  (at times catching foot on foam)   Sets/reps: 2x10 each side ,  First set with no UE support (difficult for patient needing intermittent UE on bar for catching balance), second set used  UE support.    Effort 9/10    Rock and Reach    Chairs nearby:   UE support:     2D LE forward/backward   Comments: stated with heel toe rocking, then added arm swing unilaterally with holding on to // bar with opposite side, cues to continued heel toe rocking needed, attempted without holding on increased difficulty   Sets/reps: 2 x 10 reps each side    Effort  Not rated   2E Side to side    Comments:    Sets/reps:    Effort       Other:      Therapeutic activity performed:     BIG walking: cuing for heel strike and big arm swing throughout 300 feet x3-5 repetitions, using B walking sticks today. Improvement to note  Other:     HEP   Exercise #1: out, down, up and back seated with finger flicks 2x10  Comment #1: sit to stand with big arms x10  Exercise #2: large stepping forward and back with arms out x10  Comment #2: Rotation large amplitude in sitting x10  Exercise #3: large stepping to the side arms out x10 magaly  Comment #3: large stepping back with arm swing bow x10 magaly    Treatment Today     TREATMENT MINUTES COMMENTS   Evaluation     Self-care/ Home management     Manual therapy     Neuromuscular Re-education 30 See above   Therapeutic Activity     Therapeutic Exercises 26 See above   Gait training     Modality__________________                Total 56    Blank areas are intentional and mean the treatment did not include these items.       Wilma Galvez, PT, DPT  9/14/2020

## 2021-06-11 NOTE — PROGRESS NOTES
Subjective:      Patient ID: Gomez Villasenor is a 88 y.o. male.    Chief Complaint:    HPI  Patient comes in for evaluation of injury to his left leg that occurred 6 days ago.  He is up at his cabin and slipped on a concrete step and fell forward on his left knee.  He suffered abrasions and has been dressing these at nighttime and keep them open during the day.  He has not had any fevers or chills.  He denies any cough or shortness of breath.  He has noticed that his left foot and ankle is swollen for the past 3 days.    Past Medical History:   Diagnosis Date     Cancer     prostate     Disease of thyroid gland     hypothyroid     Hearing loss      Kidney stones 3/17/2015    this is third episode of stones     Macular degeneration      Nephrolithiasis 2015    right sided/ureteroscopic removal     Prostate cancer      Status post cystoscopy March 2015    with ureteroscopy and stone removal     Urinary incontinence        Past Surgical History:   Procedure Laterality Date     CYSTOURETHROSCOPY      Right stent exchange and stone extraction     EYE SURGERY Right     cataract removed     INSERT / REPLACE / REMOVE PROSTHESIS URETHRAL SPHINCTER N/A 9/21/2016    Procedure:  REPLACEMENT OF ARTIFICIAL URINARY SPHINCTER;  Surgeon: Stevie Braxton MD;  Location: [facility];  Service:      OTHER SURGICAL HISTORY      artificial urethral sphincter     CA CYSTOURETHROSCOPY      Description: Cystoscopy (Diagnostic);  Recorded: 11/07/2008;     CA REMV PROSTATE,RETROPUB,RADICAL      Description: Prostatectomy Retropubic Radical With Nerve Sparing;  Recorded: 11/10/2009;  Comments: 2001 artificial sphinter placed       Family History   Problem Relation Age of Onset     Heart disease Mother      No Medical Problems Father      Leukemia Brother      Cancer Brother        Social History   Substance Use Topics     Smoking status: Never Smoker     Smokeless tobacco: Never Used     Alcohol use 0.6 oz/week     1 Cans of  beer per week      Comment: one beer most days       Review of Systems    Objective:     /82  Pulse (!) 56  Temp 97.5  F (36.4  C) (Oral)   Resp 16  Wt 151 lb 11.2 oz (68.8 kg)  SpO2 97%  BMI 22.4 kg/m2    Physical Exam   Constitutional: No distress.   He is able to stand up easily and walking does not cause much difficulty.   HENT:   Head: Atraumatic.   Cardiovascular: Normal rate.    Pulmonary/Chest: Effort normal. No respiratory distress.   Musculoskeletal:   Left lower extremity: Knee has 2 lacerations that appear to be healing well.  The anterior shin has about a 15 cm deep abrasion to the entire layers of the skin with some small amount of serous fluid weeping from one site.  This appears to be healing well and no signs of infection.  Ankle and foot has 2+ pitting edema and some ecchymosis noted that is likely dependent from his previous injury.  Pitting edema up the lower leg to the knee where it is 1+.  He has a strong dorsal pedal pulse.  Skin of the foot is warm dry.  Left knee: Mild tenderness  With palpation just over patella.  No crepitus noted.  No pain with lateral or medial torquing of the knee.  Anterior drawer sign is negative.  He is able to bear weight on the knee without much difficulty.    Right lower extremity: No signs of swelling seen.     Skin: Skin is warm and dry.     US of the left leg:     Us Venous Leg Left    Result Date: 6/1/2017  US VENOUS LEG LEFT 6/1/2017 1:50 PM INDICATION: Left leg swelling, pain. TECHNIQUE: Routine exam without and with compression, augmentation, and duplex utilizing 2D gray-scale imaging, Doppler interrogation with color-flow and spectral waveform analysis. COMPARISON: None. FINDINGS: The common femoral, femoral, popliteal, and segmentally visualized calf veins were evaluated. The opposite CFV was also included in the evaluation. Left leg veins are negative for deep venous thrombosis. No popliteal cysts.     CONCLUSION: 1.  Left leg veins are  negative for DVT.          Assessment:     Procedures    The primary encounter diagnosis was Leg abrasion, left, initial encounter. A diagnosis of Left leg swelling was also pertinent to this visit.    Abrasion to the left lower extremity including the knee and anterior tibia.  No signs of infection noted.  He had swelling and ecchymosis that I think was due to dependent edema from the injury.  I did order an ultrasound which confirmed that there was no DVT.    Plan:     I discussed keeping the area covered for the next couple weeks and change his dressing daily.  I showed him how to dress the wound by applying a small amount of antibiotic ointment to the area and then covering with a nonstick Telfa dressing and taping the edges to the skin.      Patient was informed that if he begins noticing increased redness, discharge or pain, then he needs to be reevaluated for possible infection.

## 2021-06-11 NOTE — PROGRESS NOTES
[facility] Rehabilitation Daily Progress     Patient Name: Gomez Villasenor  Date: 9/23/2020  Visit #: 9  PTA visit #:    Referral Diagnosis:    Referring provider: Carroll Chaudhari MD  Visit Diagnosis:     ICD-10-CM    1. Postural instability  R29.3    2. Bradykinesia  R25.8    3. Gait instability  R26.81    4. Parkinson's disease (H)  G20    5. Unsteadiness on feet  R26.81    6. Generalized muscle weakness  M62.81    7. History of fall within past 90 days  Z91.81      Initial Eval Assessment: Gomez Villasenor is a 89 y.o. male who presents to therapy today with chief complaints of feeling unbalanced and falling. Onset date of sx was 3-4 years ago.  Pt reported h/o therapy for balance two times over the last 2 years with some improvements. However, this winter he met with a neurologist and was diagnosed with Parkinson's Disease. He has been started on medication and it has been increased since. He has had several falls the most recent was last night. Patient presents with an increase in falls risk based on TUG, TUG cognitive, mini BESTest score and gait speed. Patient will benefit from skilled therapy to increase strengthe, improved balance and decrease risk for falls.      Precautions / Restrictions : falls risk indicated      Assessment:   Patient presents to follow up appointment for parkinson's disease. Treatment was continued today with large amplitude movements, mobility and balance exercise. Patient tolerated well and is motivated to participate in therapy. Good participation and effort throughout, improved balance with more focus today. Impacted by focus cognitive difficulty increases unsteadiness. Patient will now be seen one time a week with more focus on practice of HEP independently at home. Worked with magaly walking sticks today, patient had improved gait and tolerance to ambulation with this.    HEP/POC compliance is  good .  Patient is benefitting from skilled physical therapy and is  making steady progress toward functional goals.  Patient is appropriate to continue with skilled physical therapy intervention, as indicated by initial plan of care.    Goal Status:  No data recorded  Pt will: Pateint will demonstrate improved balance with decrease in TUG time to less than 12 seconds ad TUG cognitive less than 15 seconds in 8 weeks  Pt will: demonstrated increase in functional strength and endurance with increase in 2 minute walk test to >125 meters and a RPE less than/equal to 3/10 in 8 weeks  Pt will: increase Mini BESTest score to > 18/28 in order to decrease his risk for falls in 8 weeks  Pt will: increase comfortable gait speed to >1.0 m/sec for improved saefty with ambulation and demonstrate improved gait mechanics and pattern in 8 weeks      Plan / Patient Education:     Continue with initial plan of care.  Progress with home program as tolerated.       Plan for next visit: give rocking exercises next time, work on 1/4 and 1/2 turns contniue practicing with walking sticks. continue PD specific exercises, large amplitude, high effort exercises for improved balance and strength.   Subjective:     Pain Rating: no pain today.    Nothing new to report doing good.     Exercises are doing better, doing them more faithfully. Walking everyday but not quiet as far, has not been going as well the last week. (may be because he is walking with walking sticks instead of walker)    About a week ago he fell when trying to walk to a chair in his shop and feet stopped but the upper body went forward and he hit his head on the wall.         Objective:     Therapeutic exercise performed:    Seated sustained exercise:  Large amplitude Floor to ceiling (reaching down out up and back)   Comments: instructed patient on performance of exercise, incluced finger flicks with 10 second hold. Cuing for big movements throughout and rotation of the shoulders. Patient needing reminders today on performance   Sets/reps:  1x10    Effort: 10/10  Large amplitude Side to side (reaching out to the side and twisting across body   Comments: instructed patient on performance of exercise, incluced finger flicks with 10 second hold. Cuing for big movements throughout, shaping for each of the first 2-3 reps each direction  (cuing for hip extension and following hand with eyes throughout.)      Sets/reps: 1x12 repetitions alternating   Effort 10/10    Other:   NuStep WL 6 x 10 min average spm 72       Sit to stand with large arm movements out and powerful motion, and controlled lowering 2x10 repetitions, airex blue foam under his feet both sets. Cues for weight shifting forward and slow return to sit as needed throughout second set .       Neuromuscular re-education performed:    Multi-directional repetitive movements:     Step and Reach  Chairs nearby: in // bars  UE support: no hands for support consistently     2A Forward step and reach:    Comments: instructed patient on performance of exercise. Use of 1/2 foam to step over. Cuing for big movements throughout. Encouraged quality of movement over speed of movement cues for B arms occasionally mostly right arm needed cues for movement   Sets/reps: 2x10 repetitions each side. 1 x 20 reps alternating steps. A little more wobbly with fatigue come the end.   Effort  10/10   2B Side step and reach   Comments: instructed patient on performance of exercise. Cuing for big movements throughout. Cues needed throughout for head turn and big steps on first set, decreased stability with faitgue, and struck the foam roll with L foot consistently with fatigue on second set    Sets/reps: 1x10 first rep without UE support (increased difficulty and decreased form) second set with holding on to walking stick in one hand, both sides   Effort  10/10  2C Backwards step and reach   Comments: instructed patient on performance of exercise. . Cuing for big movements throughout. Decided to use one arm on bars for both  sets for increase in effort and quality of movement, will continue to try and advance away from this in clinic at future appointment   Sets/reps: 1x10 first rep without UE support. Improved performance with concentration. Increased difficulty when attempted to do every other more challenging to remember what to do which makes him unsteady   Effort 9/10    Rock and Reach    Chairs nearby:   UE support:     2D LE forward/backward   Comments: stated with heel toe rocking, then added arm swing unilaterally with holding on to // bar with opposite side, cues to continued heel toe rocking needed, attempted without holding on increased difficulty   Sets/reps: 2 x 10 reps each side    Effort  Not rated   2E Side to side    Comments:    Sets/reps:    Effort       Other:      Therapeutic activity performed:     Walking between chairs with big steps to do 1/2 turn and sit x 10 reps     BIG walking: cuing for heel strike and big arm swing throughout 300 feet x3-5 repetitions, using B walking sticks today. Improvement to note  Other:     HEP   Exercise #1: out, down, up and back seated with finger flicks 2x10  Comment #1: sit to stand with big arms x10  Exercise #2: large stepping forward and back with arms out x10  Comment #2: Rotation large amplitude in sitting x10  Exercise #3: large stepping to the side arms out x10 magaly  Comment #3: large stepping back with arm swing bow x10 magaly    Treatment Today     TREATMENT MINUTES COMMENTS   Evaluation     Self-care/ Home management     Manual therapy     Neuromuscular Re-education 33 See above   Therapeutic Activity     Therapeutic Exercises 23 See above   Gait training     Modality__________________                Total 56    Blank areas are intentional and mean the treatment did not include these items.       Wilma Galvez, PT, DPT  9/23/2020

## 2021-06-11 NOTE — PROGRESS NOTES
[facility] Rehabilitation Daily Progress     Patient Name: Gomez Villasenor  Date: 9/4/2020  Visit #: 6  PTA visit #:    Referral Diagnosis:    Referring provider: Carroll Chaudhari MD  Visit Diagnosis:     ICD-10-CM    1. Postural instability  R29.3    2. Bradykinesia  R25.8    3. Gait instability  R26.81    4. Parkinson's disease (H)  G20        Initial Eval Assessment: Gomez Villasenor is a 89 y.o. male who presents to therapy today with chief complaints of feeling unbalanced and falling. Onset date of sx was 3-4 years ago.  Pt reported h/o therapy for balance two times over the last 2 years with some improvements. However, this winter he met with a neurologist and was diagnosed with Parkinson's Disease. He has been started on medication and it has been increased since. He has had several falls the most recent was last night. Patient presents with an increase in falls risk based on TUG, TUG cognitive, mini BESTest score and gait speed. Patient will benefit from skilled therapy to increase strengthe, improved balance and decrease risk for falls.      Precautions / Restrictions : falls risk indicated      Assessment:   Patient presents to follow up appointment for parkinson's disease. Treatment was initiated today with large amplitude movements, mobility and balance exercise. Patient tolerated well and is motivated to participate in therapy. Good participation and effort throughout. Due to patient request, worked on some exercises to help with picking up items from floor on this date.  HEP/POC compliance is  good .  Patient is benefitting from skilled physical therapy and is making steady progress toward functional goals.  Patient is appropriate to continue with skilled physical therapy intervention, as indicated by initial plan of care.    Goal Status:  No data recorded  Pt will: Pateint will demonstrate improved balance with decrease in TUG time to less than 12 seconds ad TUG cognitive less than 15  seconds in 8 weeks  Pt will: demonstrated increase in functional strength and endurance with increase in 2 minute walk test to >125 meters and a RPE less than/equal to 3/10 in 8 weeks  Pt will: increase Mini BESTest score to > 18/28 in order to decrease his risk for falls in 8 weeks  Pt will: increase comfortable gait speed to >1.0 m/sec for improved saefty with ambulation and demonstrate improved gait mechanics and pattern in 8 weeks      Plan / Patient Education:     Continue with initial plan of care.  Progress with home program as tolerated.  Plan for next visit: give stepping to side next time, continue PD specific exercises, large amplitude, high effort exercises for improved balance and strength.   Subjective:     Pain Rating: no pain today.  No falls, has been doing the exercises 1 time per day.   Has been going to the  one time per week.   Feels like the balance stuff comes and goes.     Objective:     Therapeutic exercise performed:    Seated sustained exercise:  Large amplitude Floor to ceiling (reaching down out up and back)   Comments: instructed patient on performance of exercise, incluced finger flicks with 10 second hold. Cuing for big movements throughout and rotation of the shoulders on the last 2 reps    Sets/reps: 1x9    Effort: 8/10  Large amplitude Side to side (reaching out to the side and twisting across body   Comments: instructed patient on performance of exercise, incluced finger flicks with 10 second hold. Cuing for big movements throughout, shaping for each of the first 2-3 reps each direction     Sets/reps: 1x 12  alternating   Effort 9     Other:     NuStep WL 6 x 8 min average spm 71     Sit to stand with large arm movements out and powerful motion, and controlled lowering 2x10 repetitions, yellow foam under his feet both sets. Cues for weight shifting forward and slow return to sit as needed throughout second set       Neuromuscular re-education performed:    Multi-directional  repetitive movements:     Step and Reach  Chairs nearby: in // bars  UE support: no hands for support consistently     2A Forward step and reach:    Comments: instructed patient on performance of exercise. Use of 1/2 foam to step over. Cuing for big movements throughout. Encouraged quality of movement over speed of movement cues for B arms occasionally mostly right arm needed cues for movement   Sets/reps: 2x20 alternating sides, intermittent use of UE support at thi the bar near by    Effort  9/10  2B Side step and reach   Comments: instructed patient on performance of exercise. Use of walking stick to perform. Cuing for big movements throughout. Cues needed throughout for head turn and big steps on first set,cues for R arm on first set, decreased stability with faitgue, and struck the foam roll with L foot consistently with fatigue on second set   Sets/reps: 2x20  Alternating sides    Effort  9/10  2C Backwards step and reach   Comments: instructed patient on performance of exercise. . Cuing for big movements throughout. Guiding arm swing, and cues for weight shift and toe ups.  (at times catching foot on foam)   Sets/reps: 2x10 each side ,     Effort 9/10    Rock and Reach    Chairs nearby:   UE support:     2D LE forward/backward   Comments: stated with heel toe rocking, then added arm swing unilaterally with holding on to // bar with opposite side, cues to continued heel toe rocking needed, attempted without holding on increased difficulty   Sets/reps: 2 x 10 reps each side    Effort  Not rated   2E Side to side    Comments:    Sets/reps:    Effort       Other:    Standing on foam rotation grabbing scarves on ground and throwing across body x12 both sides.    Therapeutic activity performed:       BIG walking: cuing for heel strike and big arm swing throughout 300 feet x3-5 repetitions, using B walking sticks and scarves for cues for arm swing     Other:     HEP   Exercise #1: out, down, up and back seated with  finger flicks 2x10  Comment #1: sit to stand with big arms x10  Exercise #2: large stepping forward and back with arms out x10  Comment #2: Rotation large amplitude in sitting x10        Treatment Today     TREATMENT MINUTES COMMENTS   Evaluation     Self-care/ Home management     Manual therapy     Neuromuscular Re-education 30 See above   Therapeutic Activity     Therapeutic Exercises 23 See above   Gait training     Modality__________________                Total 53    Blank areas are intentional and mean the treatment did not include these items.       Wendy Walsh, PT, DPT  9/4/2020

## 2021-06-11 NOTE — PROGRESS NOTES
[facility] Rehabilitation Daily Progress     Patient Name: Gomez Villasenor  Date: 9/8/2020  Visit #: 6  PTA visit #:    Referral Diagnosis:    Referring provider: Carroll Chaudhari MD  Visit Diagnosis:     ICD-10-CM    1. Postural instability  R29.3    2. Bradykinesia  R25.8    3. Gait instability  R26.81    4. Parkinson's disease (H)  G20    5. Unsteadiness on feet  R26.81    6. Generalized muscle weakness  M62.81        Initial Eval Assessment: Gomez Villasenor is a 89 y.o. male who presents to therapy today with chief complaints of feeling unbalanced and falling. Onset date of sx was 3-4 years ago.  Pt reported h/o therapy for balance two times over the last 2 years with some improvements. However, this winter he met with a neurologist and was diagnosed with Parkinson's Disease. He has been started on medication and it has been increased since. He has had several falls the most recent was last night. Patient presents with an increase in falls risk based on TUG, TUG cognitive, mini BESTest score and gait speed. Patient will benefit from skilled therapy to increase strengthe, improved balance and decrease risk for falls.      Precautions / Restrictions : falls risk indicated      Assessment:   Patient presents to follow up appointment for parkinson's disease. Treatment was continued today with large amplitude movements, mobility and balance exercise. Patient tolerated well and is motivated to participate in therapy. Good participation and effort throughout. Worked with magaly walking sticks today, patient had improved gait and tolerance to ambulation with this.  HEP/POC compliance is  good .  Patient is benefitting from skilled physical therapy and is making steady progress toward functional goals.  Patient is appropriate to continue with skilled physical therapy intervention, as indicated by initial plan of care.    Goal Status:  No data recorded  Pt will: Pateint will demonstrate improved balance  with decrease in TUG time to less than 12 seconds ad TUG cognitive less than 15 seconds in 8 weeks  Pt will: demonstrated increase in functional strength and endurance with increase in 2 minute walk test to >125 meters and a RPE less than/equal to 3/10 in 8 weeks  Pt will: increase Mini BESTest score to > 18/28 in order to decrease his risk for falls in 8 weeks  Pt will: increase comfortable gait speed to >1.0 m/sec for improved saefty with ambulation and demonstrate improved gait mechanics and pattern in 8 weeks      Plan / Patient Education:     Continue with initial plan of care.  Progress with home program as tolerated.  Plan for next visit: give rocking exercises next time, make sure he scheduled what he should for more appointments, contniue practicing with walking sticks. continue PD specific exercises, large amplitude, high effort exercises for improved balance and strength.   Subjective:     Pain Rating: no pain today.    Patient has been doing good. Exercises are going okay, 1 time a day. No falls      Objective:       Balance Testing:  Functional test Score / AD if used 2020 Fall risk cutoff score Norms Observations   30 second sit<>stand 11     14 <8 high fall risk  9-12 mod risk 10     Timed up and go (TUG)  (seconds) Trial 1: 13.78  Trial 2: 11.59  Trial 3: 12.88  Av.75 sec   Trial 1: 12.76  Trial 2:  12.01  Trial 3: 11. 81 >13 sec       Cognitive TUG (seconds) 18.35 sec   Task: Count down from 100 by 3's  - 100- 81 with 3-4 mistakes            15.56 seconds   100-91 (1 mistake) >15 sec 9-10 seconds     Comfortable gait speed 10M    6.54 seconds 11 steps   0.92 m/sec   <1.0 m/s 1.21 m/sec for 85-89 years old      Fast gait speed 10M    5.54 seconds 9 steps  1.08 m/sec      1.65 m/sec for 85-89 years old      4-square step test    Trial 1: 20.53 steps pn wood dowel on backwards step and nearly fell, mod+ assist from therapist   Trial 2: 13.85 sec did not strike any wood dowels     >15 sec        2 minute walk test    114.2 meters     118meters (with no assistive device, was focusing on arm swingbil)   144.1 meters for 80-85 years old men      6 minute walk test                                                       Therapeutic exercise performed:    Seated sustained exercise:  Large amplitude Floor to ceiling (reaching down out up and back)   Comments: instructed patient on performance of exercise, incluced finger flicks with 10 second hold. Cuing for big movements throughout and rotation of the shoulders on the last 2 reps    Sets/reps: 1x10    Effort: 10/10  Large amplitude Side to side (reaching out to the side and twisting across body   Comments: instructed patient on performance of exercise, incluced finger flicks with 10 second hold. Cuing for big movements throughout, shaping for each of the first 2-3 reps each direction     Sets/reps: 1x8 repetitions bilaterally non alternating.   Effort 10/10    Other:     NuStep WL 6 x 10 min average spm 71     Sit to stand with large arm movements out and powerful motion, and controlled lowering 2x10 repetitions, yellow foam under his feet both sets. Cues for weight shifting forward and slow return to sit as needed throughout second set       Neuromuscular re-education performed:    Multi-directional repetitive movements:     Step and Reach  Chairs nearby: in // bars  UE support: no hands for support consistently     2A Forward step and reach:    Comments: instructed patient on performance of exercise. Use of 1/2 foam to step over. Cuing for big movements throughout. Encouraged quality of movement over speed of movement cues for B arms occasionally mostly right arm needed cues for movement   Sets/reps: 1x20 alternating sides, intermittent use of UE support at thi the bar near by    Effort  9/10  2B Side step and reach   Comments: instructed patient on performance of exercise. Use of walking stick to perform. Cuing for big movements throughout. Cues needed  throughout for head turn and big steps on first set,cues for R arm on first set, decreased stability with faitgue, and struck the foam roll with L foot consistently with fatigue on second set   Sets/reps: 1x10 with holding on to walking stick in one hand, both sides   Effort  10/10  2C Backwards step and reach   Comments: instructed patient on performance of exercise. . Cuing for big movements throughout. Guiding arm swing, and cues for weight shift and toe ups.  (at times catching foot on foam)   Sets/reps: 2x10 each side ,     Effort 9/10    Rock and Reach    Chairs nearby:   UE support:     2D LE forward/backward   Comments: stated with heel toe rocking, then added arm swing unilaterally with holding on to // bar with opposite side, cues to continued heel toe rocking needed, attempted without holding on increased difficulty   Sets/reps: 2 x 10 reps each side    Effort  Not rated   2E Side to side    Comments:    Sets/reps:    Effort       Other:      Therapeutic activity performed:       BIG walking: cuing for heel strike and big arm swing throughout 300 feet x3-5 repetitions, using B walking sticks today. Improvement to note  Other:     HEP   Exercise #1: out, down, up and back seated with finger flicks 2x10  Comment #1: sit to stand with big arms x10  Exercise #2: large stepping forward and back with arms out x10  Comment #2: Rotation large amplitude in sitting x10  Exercise #3: large stepping to the side arms out x10 magaly  Comment #3: large stepping back with arm swing bow x10 magaly        Treatment Today     TREATMENT MINUTES COMMENTS   Evaluation     Self-care/ Home management     Manual therapy     Neuromuscular Re-education 30 See above   Therapeutic Activity     Therapeutic Exercises 26 See above   Gait training     Modality__________________                Total 56    Blank areas are intentional and mean the treatment did not include these items.       Wilma Galvez, PT, DPT  9/8/2020

## 2021-06-11 NOTE — PROGRESS NOTES
Date of Service:6/22/2017    Chief Complaint:   Chief Complaint   Patient presents with     Wound Check       History:    Gomez presents to clinic for reevaluation of his left leg ulcer.  He is not having any pain in the ulcer area.  He is applying PolyMem on his ulcers every few days.  He continues to wear the Tubigrip for compression.    Current Outpatient Prescriptions   Medication Sig Dispense Refill     cholecalciferol, vitamin D3, 1,000 unit tablet Take 2,000 Units by mouth daily.        GLUCOSAMINE/CHONDROITIN SULF A (GLUCOSAMINE-CHONDROITIN ORAL) Take 2 tablets by mouth daily.        levothyroxine (SYNTHROID, LEVOTHROID) 125 MCG tablet Take 125 mcg by mouth Daily at 6:00 am.       lutein 20 mg Tab Take 1 tablet by mouth daily.       multivitamin therapeutic (THERAGRAN) tablet Take 1 tablet by mouth daily.       omega-3 fatty acids-vitamin E (FISH OIL) 1,000 mg cap Take 1 capsule by mouth daily.       Current Facility-Administered Medications   Medication Dose Route Frequency Provider Last Rate Last Dose     lidocaine 2 % jelly (XYLOCAINE)   Topical PRN Susan Petit CNP   1 application at 06/12/17 1030     lidocaine 2 % jelly (XYLOCAINE)   Topical PRN Susan Petit CNP           No Known Allergies    Physical Exam:    Vitals:    06/22/17 0912   BP: 146/90   Pulse: 60   Resp: 16   Temp: 97.6  F (36.4  C)    There is no height or weight on file to calculate BMI.    General:  88 y.o. male in no apparent distress.    Psychiatric:  Alert and oriented x 3.  Cooperative.   Integumentary:  Skin is uniformly warm, dry and pink.  Lower extremity edema: none  Ulcerations:  There is no florencio wound erythema, induration, maceration, denudement, fluctuance or warmth surrounding the ulcer(s).      Wound 06/12/17 left shin inferior (Active)   Pre Size Length 2 6/22/2017  9:00 AM   Pre Size Width 0.2 6/22/2017  9:00 AM   Pre Total Sq cm 0.4 6/22/2017  9:00 AM   Product Used Bacitracin 6/12/2017 10:00 AM        Wound 06/12/17 left shin superior (Active)   Pre Size Length 1.8 6/22/2017  9:00 AM   Pre Size Width 0.2 6/22/2017  9:00 AM   Pre Total Sq cm 0.36 6/22/2017  9:00 AM   Post Size Length 1.5 6/22/2017  9:00 AM   Post Size Width 0.3 6/22/2017  9:00 AM   Product Used Bacitracin 6/12/2017 10:00 AM       Wound 06/12/17 left knee (Active)   Pre Size Length 0.4 6/22/2017  9:00 AM   Pre Size Width 0.3 6/22/2017  9:00 AM   Pre Total Sq cm 0.12 6/22/2017  9:00 AM   Description scab 6/12/2017 10:00 AM       Incision 03/18/15 Perineum (Active)       Incision 09/21/16 Perineum (Active)       Assessment:  1. Leg ulcer, left, limited to breakdown of skin     2. Ulcer of knee, left, limited to breakdown of skin         A new wound was identified today: no.    Plan:  1.   Debridement of the left leg ulcer was recommended.  After consent was obtained and topical 2% Xylocaine was applied under clean conditions, and using a #15 blade,the devitalized dermal tissue was debrided for a total square centimeters of 0.48. Following debridement, there was a decrease in the nonviable tissue. The patient tolerated the procedure without any difficulty.     2.    Left leg ulcer, improving.     3.    Knee ulcer, resolved    4.   Treatment:   Will continue with the PolyMem, which can be changed 1-2x/week.  He will continue with wearing Tubigrip.    5.   Patient will follow up with me in two weeks  for reevaluation.      Susan Petit, APRN, CNP,  On license of [facility]  [phone]

## 2021-06-11 NOTE — PROGRESS NOTES
[facility] Daily Progress     Patient Name: Gomez Villasenor  Date: 9/17/2020  Visit #: 8  PTA visit #:    Referral Diagnosis:    Referring provider: Carroll Chaudhari MD  Visit Diagnosis:     ICD-10-CM    1. Postural instability  R29.3    2. Bradykinesia  R25.8    3. Gait instability  R26.81    4. Parkinson's disease (H)  G20      Initial Eval Assessment: Gomez Villasenor is a 89 y.o. male who presents to therapy today with chief complaints of feeling unbalanced and falling. Onset date of sx was 3-4 years ago.  Pt reported h/o therapy for balance two times over the last 2 years with some improvements. However, this winter he met with a neurologist and was diagnosed with Parkinson's Disease. He has been started on medication and it has been increased since. He has had several falls the most recent was last night. Patient presents with an increase in falls risk based on TUG, TUG cognitive, mini BESTest score and gait speed. Patient will benefit from skilled therapy to increase strengthe, improved balance and decrease risk for falls.      Precautions / Restrictions : falls risk indicated      Assessment:   Patient presents to follow up appointment for parkinson's disease. Treatment was continued today with large amplitude movements, mobility and balance exercise. Patient tolerated well and is motivated to participate in therapy. Good participation and effort throughout, increased difficulty with balance today, unsure of why. Patient had increased difficulty with remembering cuing for exercises today, likely due to no compliance to HEP over the weekend. Worked with magaly walking sticks today, patient had improved gait and tolerance to ambulation with this.  HEP/POC compliance is  good .  Patient is benefitting from skilled physical therapy and is making steady progress toward functional goals.  Patient is appropriate to continue with skilled physical therapy intervention, as indicated by  initial plan of care.    Goal Status:  No data recorded  Pt will: Pateint will demonstrate improved balance with decrease in TUG time to less than 12 seconds ad TUG cognitive less than 15 seconds in 8 weeks  Pt will: demonstrated increase in functional strength and endurance with increase in 2 minute walk test to >125 meters and a RPE less than/equal to 3/10 in 8 weeks  Pt will: increase Mini BESTest score to > 18/28 in order to decrease his risk for falls in 8 weeks  Pt will: increase comfortable gait speed to >1.0 m/sec for improved saefty with ambulation and demonstrate improved gait mechanics and pattern in 8 weeks      Plan / Patient Education:     Continue with initial plan of care.  Progress with home program as tolerated.     Plan for next visit: give rocking exercises next time, work on 1/4 and 1/2 turns contniue practicing with walking sticks. continue PD specific exercises, large amplitude, high effort exercises for improved balance and strength.   Subjective:     Pain Rating: no pain today.    Patient has been good, nothing new. Exercises are going pretty well. Exercises went well yesterday but walking was not good yesterday, dragging his feet. 2 days ago he fell when trying to walk to a chair in his shop and feet stopped but the upper body went forward and he hit his head on the wall. (has a bandaid in place on head today)     Objective:     Therapeutic exercise performed:    Seated sustained exercise:  Large amplitude Floor to ceiling (reaching down out up and back)   Comments: instructed patient on performance of exercise, incluced finger flicks with 10 second hold. Cuing for big movements throughout and rotation of the shoulders. Patient needing reminders today on performance   Sets/reps: 1x10    Effort: 10/10  Large amplitude Side to side (reaching out to the side and twisting across body   Comments: instructed patient on performance of exercise, incluced finger flicks with 10 second hold. Cuing for  big movements throughout, shaping for each of the first 2-3 reps each direction  (cuing for hip extension and following hand with eyes throughout.)      Sets/reps: 1x8 repetitions bilaterally non alternating.   Effort 10/10    Other:   NuStep WL 6 x 10 min average spm 72       Sit to stand with large arm movements out and powerful motion, and controlled lowering 2x10 repetitions, airex blue foam under his feet both sets. Cues for weight shifting forward and slow return to sit as needed throughout second set       Neuromuscular re-education performed:    Multi-directional repetitive movements:     Step and Reach  Chairs nearby: in // bars  UE support: no hands for support consistently     2A Forward step and reach:    Comments: instructed patient on performance of exercise. Use of 1/2 foam to step over. Cuing for big movements throughout. Encouraged quality of movement over speed of movement cues for B arms occasionally mostly right arm needed cues for movement   Sets/reps: 2x10 repetitions each side. 1 x 20 reps alternating steps, first 2 sets one at a time, third set alternating intermittent use of UE support at thi the bar near by. A little more wobbly with fatigue   Effort  9/10   2B Side step and reach   Comments: instructed patient on performance of exercise. Cuing for big movements throughout. Cues needed throughout for head turn and big steps on first set, decreased stability with faitgue, and struck the foam roll with L foot consistently with fatigue on second set    Sets/reps: 1x10 first rep without UE support (increased difficulty and decreased form) second set with holding on to walking stick in one hand, both sides   Effort  10/10  2C Backwards step and reach   Comments: instructed patient on performance of exercise. . Cuing for big movements throughout. Decided to use one arm on bars for both sets for increase in effort and quality of movement, will continue to try and advance away from this in clinic  at future appointment    Effort 9/10    Rock and Reach    Chairs nearby:   UE support:     2D LE forward/backward   Comments: stated with heel toe rocking, then added arm swing unilaterally with holding on to // bar with opposite side, cues to continued heel toe rocking needed, attempted without holding on increased difficulty   Sets/reps: 2 x 10 reps each side    Effort  Not rated   2E Side to side    Comments:    Sets/reps:    Effort       Other:      Therapeutic activity performed:     Walking between chairs with big steps to do 1/2 turn and sit x 10 reps     BIG walking: cuing for heel strike and big arm swing throughout 300 feet x3-5 repetitions, using B walking sticks today. Improvement to note  Other:     HEP   Exercise #1: out, down, up and back seated with finger flicks 2x10  Comment #1: sit to stand with big arms x10  Exercise #2: large stepping forward and back with arms out x10  Comment #2: Rotation large amplitude in sitting x10  Exercise #3: large stepping to the side arms out x10 magaly  Comment #3: large stepping back with arm swing bow x10 magaly    Treatment Today     TREATMENT MINUTES COMMENTS   Evaluation     Self-care/ Home management     Manual therapy     Neuromuscular Re-education 35 See above   Therapeutic Activity     Therapeutic Exercises 23 See above   Gait training     Modality__________________                Total 58    Blank areas are intentional and mean the treatment did not include these items.       Wendy Walsh, PT, DPT  9/17/2020

## 2021-06-11 NOTE — PROGRESS NOTES
"Assessment:     1. Left leg cellulitis  cephalexin (KEFLEX) 500 MG capsule     Given increased pain, slight increase in redness and ongoing pitting edema. Will tx with cephalexin. DVT neg 5 days ago and I do not feel it is necessary to repeat today,      Plan:   Left leg infection  Cephalexin prescribed. Recommend probiotics such as acidophillis and bifidus to replace the normal bacteria that lives in the colon and gets depleted by antibiotics. You can buy it as an over the counter supplement (Culturelle, Florajen) or eat yogurt with live or active cultures.  Continue to change dressings and monitor for redness, swelling, drainage or warmth.   Keep the leg elevated when you are sitting  Return to clinic if symptoms are not improving as expected or if worsening in any way.           Subjective:       Gomez Villasenor is a 88 y.o. male who presents for evaluation of left lower leg abrasion. He had fallen and hit his leg on a cement step about 10 days ago with a long abrasion. He was seen 5 days ago in clinic and taught dressing changes and had a neg US for DVT. Since then he feels the wound is looking slightly better but he started to have increased pain in it during the night and wife thought it looked slightly more red yesterday. No change in drainage. He has no pain currently. He has not taken anything for pain. Has been keeping his leg elevated occasionally. Denies fever, chills, decreased appetite, chest pain, SOB, dyspnea.     Review of Systems  Pertinent items are noted in HPI.      Objective:      /90  Pulse 60  Temp 97.9  F (36.6  C) (Oral)   Ht 5' 10\" (1.778 m)  Wt 151 lb (68.5 kg)  SpO2 97%  BMI 21.67 kg/m2  General appearance: alert, appears stated age and cooperative  Skin: left lower leg has a 15 cm shallow abrasion down the front of his tibia about 0.5cm across. There is scant amt of serous drainage. mild erythema extending out about 1 cm laterally but 2.5 and vaguely beyond on medial " aspect. There is still pitting edema from foot to knee, moderate - severe in foot/ankle and lessening up the leg. pitting +1.   Neurologic: Grossly normal  Musc: TTP of the lower leg, no calf pain. Full ROM of knee and ankle.

## 2021-06-11 NOTE — PROGRESS NOTES
[facility] Consult Note    Date of Service:  6/19/2017    Requesting Provider: Christiano Irvin MD      History of Present Illness:     Gomez Villasenor presents to clinic his wife regarding his lower leg ulcer.   Gomez fell a couple of weeks ago on a couple of cement steps.  He did not sustain any injuries from the fall except a large scrape on his left shin and left knee.  He will finish his antibiotic, keflex, today.  He is having minimal pain in his ulcer and minimal drainage.  He is applying bacitracin to it daily and covering it with a non stick pad.  His swelling has decreased, but continues to persist.  He is not applying any compression on his leg, but does elevate it when he is sitting. His primary gave him a walking boot last week, but he does not wear it because it hurts his ankle and shin.    Review of Symptoms:    Constitutional:  Denies fever, chills or night sweats    Integumentary:   See above. Skin is uniformly warm, dry and pink.    Psych:  No depression/anxiety      ENTM:  fair  sight.  Hard of Hearing: is not significant     GI:  Nutritional intake:  Appetite is good.  Taking Nutritional supplements: No  Weight:  no change .    Bowels:regular     :  denies difficulty with urination.     MSK:   Patient is ambulatory; does not use an assistive device.       Neurological: No new weakness, numbness, or tingling    Cardiac:  Denies new chest pain or tightness.    Respiratory:  Denies any unusual SOB    Endocrine:  negative diabetes.       Past Medical History:    Past Medical History:   Diagnosis Date     Cancer     prostate     Disease of thyroid gland     hypothyroid     Hearing loss      Kidney stones 3/17/2015    this is third episode of stones     Macular degeneration      Nephrolithiasis 2015    right sided/ureteroscopic removal     Prostate cancer      Status post cystoscopy March 2015    with ureteroscopy and stone removal     Urinary incontinence         Surgical  History:   Past Surgical History:   Procedure Laterality Date     CYSTOURETHROSCOPY      Right stent exchange and stone extraction     EYE SURGERY Right     cataract removed     INSERT / REPLACE / REMOVE PROSTHESIS URETHRAL SPHINCTER N/A 9/21/2016    Procedure:  REPLACEMENT OF ARTIFICIAL URINARY SPHINCTER;  Surgeon: Stevie Braxton MD;  Location: [facility];  Service:      OTHER SURGICAL HISTORY      artificial urethral sphincter     AR CYSTOURETHROSCOPY      Description: Cystoscopy (Diagnostic);  Recorded: 11/07/2008;     AR REMV PROSTATE,RETROPUB,RADICAL      Description: Prostatectomy Retropubic Radical With Nerve Sparing;  Recorded: 11/10/2009;  Comments: 2001 artificial sphinter placed       Allergies: No Known Allergies       Medications:    Current Outpatient Prescriptions:      ANTIOX #8/OM3/DHA/EPA/LUT/ZEAX (PRESERVISION AREDS 2, OMEGA-3, ORAL), Take 2 tablets by mouth daily. , Disp: , Rfl:      cholecalciferol, vitamin D3, 1,000 unit tablet, Take 2,000 Units by mouth daily. , Disp: , Rfl:      GLUCOSAMINE/CHONDROITIN SULF A (GLUCOSAMINE-CHONDROITIN ORAL), Take 2 tablets by mouth daily. , Disp: , Rfl:      levothyroxine (SYNTHROID, LEVOTHROID) 125 MCG tablet, Take 125 mcg by mouth Daily at 6:00 am., Disp: , Rfl:      lutein 20 mg Tab, Take 1 tablet by mouth daily., Disp: , Rfl:      multivitamin therapeutic (THERAGRAN) tablet, Take 1 tablet by mouth daily., Disp: , Rfl:      omega-3 fatty acids-vitamin E (FISH OIL) 1,000 mg cap, Take 1 capsule by mouth daily., Disp: , Rfl:     Current Facility-Administered Medications:      lidocaine 2 % jelly (XYLOCAINE), , Topical, PRN, Susan Petit CNP, 1 application at 06/12/17 1030     lidocaine 2 % jelly (XYLOCAINE), , Topical, PRN, Susan Petit CNP    Family history:   Family History   Problem Relation Age of Onset     Heart disease Mother      No Medical Problems Father      Leukemia Brother      Cancer Brother          Social History:  "  Social History     Social History     Marital status:      Spouse name: Tanya     Number of children: 3     Years of education: N/A     Occupational History     retired dentist      Social History Main Topics     Smoking status: Never Smoker     Smokeless tobacco: Never Used     Alcohol use 0.6 oz/week     1 Cans of beer per week      Comment: one beer most days     Drug use: No     Sexual activity: Not on file     Other Topics Concern     Not on file     Social History Narrative    . Tanya. 3 grown children and several grandkids. Retired dentist/  World traveller. Exercise enthusiast. Identical twin Brother Alonso Faust  age 83 CLL/Cancer/ICH... Non smoker, occ beer/         Physical Exam    General: This is a 88 y.o. male who appears their reported age, not in acute distress    Constitution:  Vital Signs: /90 (Patient Site: Left Arm, Patient Position: Sitting, Cuff Size: Adult Regular)  Pulse (!) 56  Temp 97.4  F (36.3  C) (Oral)   Resp 16  Ht 5' 10\" (1.778 m)  Wt 149 lb (67.6 kg) Comment: per patient report  BMI 21.38 kg/m2 Body mass index is 21.38 kg/(m^2).    Psychiatric: Alert and oriented X 3, in no apparent distress. Calm and cooperative throughout exam    Head/Neck:  Normocephalic, atraumatic    Cardiovascular:  Rate and Rhythm is regular    Respiratory:  Lungs are clear to auscultation in all fields bilaterally.     Abdomen: Normal bowel sounds. Soft, symmetric, no guarding or rigidity, non tender with palpation.  No organomegaly or masses palpated.     Integumentary/Hair/Nails:  Turgor and texture are diminished.    Groin Lymphatics: No inguinal lymphadenopathy.    Neurological:  Grossly intact.     Vascular:    Arterial:    Femoral:  strong and equal bilaterally.     bilateral dorsalis pedis and posterior tibial pulses,  are absent. Using a handheld doppler, the bilateral pulse is strong and unrestrictive and biphasic in nature    Hair growth on feet is absent " bilaterally     Venous:  There is not venous distention on theBilateral legs.  There are No physical signs of venous insufficiency    There is pitting edema noted in left leg and ankle.     Circumferential volume measures:    Vasc Edema 6/12/2017   Right just above MTP 20.4   Right Ankle 18   Right Widest Calf 29.8   Right Thigh Up 10cm 37.0   Left - just above MTP 22.5   Left Ankle 21.1   Left Widest Calf 29.5   Left Thigh Up 10cm 37.8       Ulceration(s)/Wound(s): Periwound skin is without denudement, erythema, maceration, warmth, induration and or fluctuance      Wound 06/12/17 left shin inferior (Active)   Pre Size Length 7 6/12/2017 10:00 AM   Pre Size Width 0.7 6/12/2017 10:00 AM   Pre Total Sq cm 4.9 6/12/2017 10:00 AM   Product Used Bacitracin 6/12/2017 10:00 AM       Wound 06/12/17 left shin superior (Active)   Pre Size Length 1.5 6/12/2017 10:00 AM   Pre Size Width 0.3 6/12/2017 10:00 AM   Pre Total Sq cm 0.45 6/12/2017 10:00 AM   Product Used Bacitracin 6/12/2017 10:00 AM       Wound 06/12/17 left knee (Active)   Pre Size Length 1.9 6/12/2017 10:00 AM   Pre Size Width 1.7 6/12/2017 10:00 AM   Pre Total Sq cm 3.23 6/12/2017 10:00 AM   Description scab 6/12/2017 10:00 AM       Incision 03/18/15 Perineum (Active)       Incision 09/21/16 Perineum (Active)       Laboratory studies:     Lab Results   Component Value Date    WBC 3.2 (L) 03/10/2017    HGB 14.3 03/10/2017    HCT 42.4 03/10/2017    MCV 94 03/10/2017     03/10/2017     Lab Results   Component Value Date    CREATININE 0.89 03/10/2017     Lab Results   Component Value Date    BUN 21 03/10/2017     Lab Results   Component Value Date    CRP 0.1 11/09/2015     Lab Results   Component Value Date    SEDRATE 34 (H) 11/09/2015     Lab Results   Component Value Date    ALBUMIN 3.5 03/10/2017     No results found for: HGBA1C    Lab Results   Component Value Date    TSH 0.42 03/10/2017         No results found for: BNP  Results for orders placed or  performed in visit on 09/07/16   Basic Metabolic Panel   Result Value Ref Range    Sodium 142 136 - 145 mmol/L    Potassium 4.6 3.5 - 5.1 mmol/L    Chloride 104 98 - 107 mmol/L    CO2 32 21 - 32 mmol/L    Anion Gap, Calculation 6 5 - 18 mmol/L    Glucose 67 (L) 74 - 125 mg/dL    Calcium 9.1 8.5 - 10.1 mg/dL    BUN 27 8 - 28 mg/dL    Creatinine 1.08 0.70 - 1.30 mg/dL    GFR MDRD Af Amer >60 >60 mL/min/1.73m2    GFR MDRD Non Af Amer >60 >60 mL/min/1.73m2      Results for orders placed or performed in visit on 03/10/17   Comprehensive Metabolic Panel   Result Value Ref Range    Sodium 141 136 - 145 mmol/L    Potassium 4.2 3.5 - 5.0 mmol/L    Chloride 106 98 - 107 mmol/L    CO2 29 22 - 31 mmol/L    Anion Gap, Calculation 6 5 - 18 mmol/L    Glucose 81 70 - 125 mg/dL    BUN 21 8 - 28 mg/dL    Creatinine 0.89 0.70 - 1.30 mg/dL    GFR MDRD Af Amer >60 >60 mL/min/1.73m2    GFR MDRD Non Af Amer >60 >60 mL/min/1.73m2    Bilirubin, Total 0.6 0.0 - 1.0 mg/dL    Calcium 9.2 8.5 - 10.5 mg/dL    Protein, Total 7.3 6.0 - 8.0 g/dL    Albumin 3.5 3.5 - 5.0 g/dL    Alkaline Phosphatase 166 (H) 45 - 120 U/L    AST 23 0 - 40 U/L    ALT 22 0 - 45 U/L     Vitamin D, Total (25-Hydroxy)   Date Value Ref Range Status   02/04/2010 44.0 30.0 - 80.0 ng/mL Final     Comment:                    Deficiency              <10.0 ng/mL               Insufficiency       10.0-29.9 ng/mL               Sufficiency         30.0-80.0 ng/mL               Toxicity (possible)    >100.0 ng/mL      Vitamin D, Total (25-Hydroxy)   Date Value Ref Range Status   02/04/2010 44.0 30.0 - 80.0 ng/mL Final     Comment:                    Deficiency              <10.0 ng/mL               Insufficiency       10.0-29.9 ng/mL               Sufficiency         30.0-80.0 ng/mL               Toxicity (possible)    >100.0 ng/mL        Assessment:  1. Leg ulcer, left, with fat layer exposed     2. Ulcer of knee         Assessment/Plan:  1.  Excisional Debridement of the left leg  ulcer was recommended and after consent was obtained and topical 2% Xylocaine was applied, under clean conditions, using a #15 scalpel, the devitalized subcutaneous  tissue in the ulcer base and at the ulcer margins were sharply excised for a total sq. cm of 8.58    Following excision, there was minimal bleeding tissue, which was easily controlled and a decrease in the nonviable tissue.  The patient tolerated the procedure without difficulty.      2. Edema. Secondary to trauma    3. Nutrition: Encouraged high protein foods and/or nutritional supplements    4.  Left leg and knee ulcer, no signs of infection    5. Treatment:  Will start PolyMem on his ulcers and start Tubigrip for compression to reduce his edema.  He can change it 2-3x/week.    6. Patient to return to clinic in one week(s) for re-evaluation. They were instructed to call the clinic sooner with any further questions or concerns. Answered all questions.      Susan Petit, APRN, CNP, [facility]  [phone]

## 2021-06-12 NOTE — PROGRESS NOTES
[facility] Rehabilitation Daily Progress and Discharge    Patient Name: Gomez Villasenor  Date: 10/19/2020  Visit #: 12  PTA visit #:    Referral Diagnosis:    Referring provider: Modesta Corrales MD  Visit Diagnosis:     ICD-10-CM    1. Postural instability  R29.3    2. Bradykinesia  R25.8    3. Gait instability  R26.81    4. Parkinson's disease (H)  G20    5. Generalized muscle weakness  M62.81    6. Unsteadiness on feet  R26.81    7. History of fall within past 90 days  Z91.81      Initial Eval Assessment: Gomez Villasenor is a 89 y.o. male who presents to therapy today with chief complaints of feeling unbalanced and falling. Onset date of sx was 3-4 years ago.  Pt reported h/o therapy for balance two times over the last 2 years with some improvements. However, this winter he met with a neurologist and was diagnosed with Parkinson's Disease. He has been started on medication and it has been increased since. He has had several falls the most recent was last night. Patient presents with an increase in falls risk based on TUG, TUG cognitive, mini BESTest score and gait speed. Patient will benefit from skilled therapy to increase strengthe, improved balance and decrease risk for falls.   Precautions / Restrictions : falls risk indicated      Assessment:   Patient presents to follow up appointment for parkinson's disease. Assessed objectives and goals on this date. Patient met almost all goals, and showed improvement in all physical performance testing. Appropriate to discharge and continue independent management with exercise.       Goal Status:  No data recorded  Pt will: Pateint will demonstrate improved balance with decrease in TUG time to less than 12 seconds ad TUG cognitive less than 15 seconds in 8 weeks  Pt will: demonstrated increase in functional strength and endurance with increase in 2 minute walk test to >125 meters and a RPE less than/equal to 3/10 in 8 weeks  Pt will: increase Mini BESTest  score to > 18/28 in order to decrease his risk for falls in 8 weeks  Pt will: increase comfortable gait speed to >1.0 m/sec for improved saefty with ambulation and demonstrate improved gait mechanics and pattern in 8 weeks      Plan / Patient Education:     Discharge to independent management  Make another check in [facility] in 6 months  Subjective:     Pain Rating: no pain today.    Patient reports he is doing pretty well. Seem to be a little low on energy recently, past couple months or so.  Patient reports that there is nothing new, did a couple of walks this weekend. Was able to walk a mile with the walking sticks which is great.     Exercises are doing better, doing them more faithfully. Daily.     No falls since last seen.     Objective:     Balance Testing:   Functional test Score / AD if used 2020 Fall risk cutoff score Norms Score 10/19/2020     30 second sit<>stand 11     14 <8 high fall risk  9-12 mod risk 10  14   Timed up and go (TUG)  (seconds) Trial 1: 13.78  Trial 2: 11.59  Trial 3: 12.88  Av.75 sec   Trial 1: 12.76  Trial 2:  12.01  Trial 3: 11. 81  Av.2 >13 sec      Trial 1: 11.16  Trial 2:  11.12  Trial 3: 11.14  Av.14   Cognitive TUG (seconds) 18.35 sec   Task: Count down from 100 by 3's  - 100- 81 with 3-4 mistakes            15.56 seconds   100-91 (1 mistake) >15 sec 9-10 seconds  14.93   Comfortable gait speed 10M    6.54 seconds 11 steps   0.92 m/sec   <1.0 m/s 1.21 m/sec for 85-89 years old   5.98 seconds   Fast gait speed 10M    5.54 seconds 9 steps  1.08 m/sec      1.65 m/sec for 85-89 years old   4.80 seconds   4-square step test    Trial 1: 20.53 steps pn wood dowel on backwards step and nearly fell, mod+ assist from therapist   Trial 2: 13.85 sec did not strike any wood dowels     >15 sec    16.4  13.5     Avg 15   2 minute walk test    114.2 meters     118meters (with no assistive device, was focusing on arm swingbil)   144.1 meters for 80-85 years old men   126 meters       6 minute walk test                                                     MiniBest <23/28= falls risk  Balance Assessment: Mini-BEST 14/28 (<23/28)  -- 19/28  1) Sit <> Stand: (2) Normal    2) Rise to Toes: (1) able to rise higher with hand assist    3) Stand on One Leg: (0) severe L: 1-2 seconds  R: 2-3 seconds    4) Compensatory Stepping Correction-Forward: (1) Moderate - multiple little steps to correct    5) Compensatory Stepping Correction-Backward: (0) 4+ steps  6) Compensatory Stepping Correction-Lateral: (1) Severe L: (1) 3 step,  R: (1) 4+ steps, max assist to correct balance    7) Eyes Open, Firm Surface: (2) Normal    8) Eyes Closed, Foam Surface: (2) Normal, some unsteadiness  9) Incline, Eyes Closed: (2) Normal  10) Change in Gait Speed: (2) Normal    11) Walk with Head Turns-Horizontal: (1) Moderate - slows speed and mild deviation for path   12) Walk with Pivot Turns: (1) Moderate - slow speed and 4+ steps to turn   13) Step Over Obstacles: (2) Moderate - slow gait and steps cautiously    14) TUG with Dual Tasks: (1) Moderate  TUG:  See above   Cognitive TUG:  See above        Therapeutic exercise performed: (HEP not in session)    Seated sustained exercise:  Large amplitude Floor to ceiling (reaching down out up and back)   Sets/reps: 1x8    Effort: 10/10  Large amplitude Side to side (reaching out to the side and twisting across body   Sets/reps: 1x12 repetitions alternating   Effort 10/10    Other: DONE In Session TOdAy  NuStep WL 6 x 10 min average spm 72     Sit to stand with large arm movements out and powerful motion, and controlled lowering 2x10 repetitions, blue foam Rampart under his feet both sets. Cues for weight shifting forward and slow return to sit as needed throughout second set .       Neuromuscular re-education performed: HEP not in session today    Multi-directional repetitive movements:     Step and Reach  Chairs nearby: in // bars  UE support: no hands for support consistently      2A Forward step and reach:    Sets/reps: 2x10 repetitions each side. 1 x 20 reps alternating steps. A little more wobbly with fatigue come the end.   Effort  10/10   2B Side step and reach   Sets/reps: 1x10 first rep without UE support (increased difficulty and decreased form) second set with holding on to walking stick in one hand, both sides   Effort  10/10  2C Backwards step and reach  Sets/reps: 1x10 first rep without UE support. Improved performance with concentration. Increased difficulty when attempted to do every other more challenging to remember what to do which makes him unsteady   Effort 9/10    Rock and Reach    Chairs nearby:   UE support:     2D LE forward/backward   Comments   Sets/reps: 2 x 10 reps each side    Effort  Not rated   2E Side to side    Comments:    Sets/reps:    Effort       Other:     HEP   Exercise #1: out, down, up and back seated with finger flicks 2x10  Comment #1: sit to stand with big arms x10  Exercise #2: large stepping forward and back with arms out x10  Comment #2: Rotation large amplitude in sitting x10  Exercise #3: large stepping to the side arms out x10 magaly  Comment #3: large stepping back with arm swing bow x10 magaly  Exercise #4: swinging in staggered stance x30 sec bilaterally      Treatment Today     TREATMENT MINUTES COMMENTS   Evaluation     Self-care/ Home management     Manual therapy     Neuromuscular Re-education     Therapeutic Activity     Therapeutic Exercises 15 See above   Gait training     Modality__________________     Physical Performance Test 40 See above         Total 55    Blank areas are intentional and mean the treatment did not include these items.       Wilma Galvez, PT, DPT  10/19/2020

## 2021-06-12 NOTE — PROGRESS NOTES
Date of Service:7/27/2017    Chief Complaint:   Chief Complaint   Patient presents with     Wound Check       History:    Gomez presents to clinic for reevaluation of his left leg ulcer.  He is not having any pain in the ulcer area.  He is applying PolyMem on his ulcers every few days.  He continues to wear the Tubigrip for compression. He is offering no complaints.    Current Outpatient Prescriptions   Medication Sig Dispense Refill     cholecalciferol, vitamin D3, 1,000 unit tablet Take 2,000 Units by mouth daily.        GLUCOSAMINE/CHONDROITIN SULF A (GLUCOSAMINE-CHONDROITIN ORAL) Take 2 tablets by mouth daily.        levothyroxine (SYNTHROID, LEVOTHROID) 125 MCG tablet TAKE 1 TABLET (125 MCG) BY MOUTH ONCE DAILY 90 tablet 3     lutein 20 mg Tab Take 1 tablet by mouth daily.       multivitamin therapeutic (THERAGRAN) tablet Take 1 tablet by mouth daily.       omega-3 fatty acids-vitamin E (FISH OIL) 1,000 mg cap Take 1 capsule by mouth daily.       Current Facility-Administered Medications   Medication Dose Route Frequency Provider Last Rate Last Dose     lidocaine 2 % jelly (XYLOCAINE)   Topical PRN Susan Petit, CNP   1 application at 06/12/17 1030     lidocaine 2 % jelly (XYLOCAINE)   Topical PRN Susan Petit, CNP   1 application at 07/27/17 0925       No Known Allergies    Physical Exam:    Vitals:    07/27/17 0900   BP: 138/74   Pulse: 64   Resp: 16   Temp: 98.4  F (36.9  C)    There is no height or weight on file to calculate BMI.    General:  88 y.o. male in no apparent distress.    Psychiatric:  Alert and oriented x 3.  Cooperative.   Integumentary:  Skin is uniformly warm, dry and pink.  Lower extremity edema: none  Ulcerations:  There is no florencio wound erythema, induration, maceration, denudement, fluctuance or warmth surrounding the ulcer(s).      See wound flow sheet for wound measurements.    Assessment:  1. Leg ulcer, left, limited to breakdown of skin         A new wound was  identified today: no.    Plan:  1.   Debridement of the left leg ulcer was recommended.  After consent was obtained and topical 2% Xylocaine was applied under clean conditions, and using a #15 blade,the devitalized dermal tissue was debrided for a total square centimeters of 0.2. Following debridement, there was a decrease in the nonviable tissue. The patient tolerated the procedure without any difficulty.     2.    Left leg ulcer, resolved    3.    Knee ulcer, resolved    4.   Treatment:  For the next two weeks, while the newly re-epithelized tissue remodels and becomes stronger, the area will be covered with band aide.       5.   Patient will follow up with me as needed.    Susan Petit, APRN, CNP,  [facility] [place]  [phone]

## 2021-06-12 NOTE — PROGRESS NOTES
[facility] Rehabilitation Daily Progress     Patient Name: Gomez Villasenor  Date: 10/12/2020  Visit #: 11  PTA visit #:    Referral Diagnosis:    Referring provider: Carroll Chaudhari MD  Visit Diagnosis:     ICD-10-CM    1. Postural instability  R29.3    2. Bradykinesia  R25.8    3. Gait instability  R26.81    4. Parkinson's disease (H)  G20    5. Generalized muscle weakness  M62.81    6. Unsteadiness on feet  R26.81    7. History of fall within past 90 days  Z91.81      Initial Eval Assessment: Gomez Villasenor is a 89 y.o. male who presents to therapy today with chief complaints of feeling unbalanced and falling. Onset date of sx was 3-4 years ago.  Pt reported h/o therapy for balance two times over the last 2 years with some improvements. However, this winter he met with a neurologist and was diagnosed with Parkinson's Disease. He has been started on medication and it has been increased since. He has had several falls the most recent was last night. Patient presents with an increase in falls risk based on TUG, TUG cognitive, mini BESTest score and gait speed. Patient will benefit from skilled therapy to increase strengthe, improved balance and decrease risk for falls.   Precautions / Restrictions : falls risk indicated      Assessment:   Patient presents to follow up appointment for parkinson's disease. Treatment was continued today with large amplitude movements, mobility and balance exercise. Focused on patient being independent with exercise today, needing less cuing. Patient tolerated well and is motivated to participate in therapy. Good participation and effort throughout. Time spent today on education for activity pacing and fatigue with Parkinson's. Will do testing next session, and ensure understanding for independent management.    HEP/POC compliance is  good .  Patient is benefitting from skilled physical therapy and is making steady progress toward functional goals.  Patient is  appropriate to continue with skilled physical therapy intervention, as indicated by initial plan of care.    Goal Status:  No data recorded  Pt will: Pateint will demonstrate improved balance with decrease in TUG time to less than 12 seconds ad TUG cognitive less than 15 seconds in 8 weeks  Pt will: demonstrated increase in functional strength and endurance with increase in 2 minute walk test to >125 meters and a RPE less than/equal to 3/10 in 8 weeks  Pt will: increase Mini BESTest score to > 18/28 in order to decrease his risk for falls in 8 weeks  Pt will: increase comfortable gait speed to >1.0 m/sec for improved saefty with ambulation and demonstrate improved gait mechanics and pattern in 8 weeks      Plan / Patient Education:     Continue with initial plan of care.  Progress with home program as tolerated.     Plan for next visit: testing next session, wrap things up,  work on 1/4 and 1/2 turns contniue practicing with walking sticks. continue PD specific exercises, large amplitude, high effort exercises for improved balance and strength.   Subjective:     Pain Rating: no pain today.    Patient reports that there is nothing new, did a couple of walks this weekend. Was able to walk a mile with the walking sticks which is great.     Exercises are doing better, doing them more faithfully. Daily.     No falls since last seen.     Objective:     Therapeutic exercise performed:    Seated sustained exercise:  Large amplitude Floor to ceiling (reaching down out up and back)   Comments: patient lead exercise on this date, good performance, incluced finger flicks with 10 second hold. Cuing for big movements throughout and rotation of the shoulders.   Sets/reps: 1x8    Effort: 10/10  Large amplitude Side to side (reaching out to the side and twisting across body   Comments: instructed patient on performance of exercise, incluced finger flicks with 10 second hold. Cuing for big movements throughout, improvement in  performance.   Sets/reps: 1x12 repetitions alternating   Effort 10/10    Other:   NuStep WL 6 x 10 min average spm 72     Sit to stand with large arm movements out and powerful motion, and controlled lowering 2x10 repetitions, blue foam Jena under his feet both sets. Cues for weight shifting forward and slow return to sit as needed throughout second set .       Neuromuscular re-education performed:    Multi-directional repetitive movements:     Step and Reach  Chairs nearby: in // bars  UE support: no hands for support consistently     2A Forward step and reach:    Comments: patient able to perform LE without cuing needing cuing for arms throughout. Use of 1/2 foam to step over. Cuing for big movements throughout. Encouraged quality of movement over speed of movement cues for B arms occasionally mostly right arm needed cues for movement   Sets/reps: 2x10 repetitions each side. 1 x 20 reps alternating steps. A little more wobbly with fatigue come the end.   Effort  10/10   2B Side step and reach   Comments: Patient able to remember performance of exercise with cuing for use of arms. Cuing for big movements throughout. Cues needed throughout for head turn and big steps on first set, decreased stability with faitgue, and struck the foam roll with L foot consistently with fatigue on second set    Sets/reps: 1x10 first rep without UE support (increased difficulty and decreased form) second set with holding on to walking stick in one hand, both sides   Effort  10/10  2C Backwards step and reach   Comments: needing reminders for UE use, able to remember stepping with LEs. Cuing for big movements throughout. Decided to use one arm on bars for both sets for increase in effort and quality of movement, will continue to try and advance away from this in clinic at future appointment   Sets/reps: 1x10 first rep without UE support. Improved performance with concentration. Increased difficulty when attempted to do every other  more challenging to remember what to do which makes him unsteady   Effort 9/10    Rock and Reach    Chairs nearby:   UE support:     2D LE forward/backward   Comments: stated with heel toe rocking, then added arm swing unilaterally with holding on to // bar with opposite side, cues to continued heel toe rocking needed, attempted without holding on increased difficulty   Sets/reps: 2 x 10 reps each side    Effort  Not rated   2E Side to side    Comments:    Sets/reps:    Effort       Other:    Therapeutic activity performed:     Walking between chairs with big steps to do 1/2 turn and sit plus three sit to stands in between x 6 reps    (walking 10 feet, 3 sts, turn and sit)    BIG walking: cuing for heel strike and big arm swing throughout 300 feet x3-5 repetitions, using B walking sticks today. Improvement to note  Other:     HEP   Exercise #1: out, down, up and back seated with finger flicks 2x10  Comment #1: sit to stand with big arms x10  Exercise #2: large stepping forward and back with arms out x10  Comment #2: Rotation large amplitude in sitting x10  Exercise #3: large stepping to the side arms out x10 magaly  Comment #3: large stepping back with arm swing bow x10 magaly      Treatment Today     TREATMENT MINUTES COMMENTS   Evaluation     Self-care/ Home management     Manual therapy     Neuromuscular Re-education 30 See above   Therapeutic Activity     Therapeutic Exercises 25 See above   Gait training     Modality__________________                Total 55    Blank areas are intentional and mean the treatment did not include these items.       Wilma Galvez, PT, DPT  10/12/2020

## 2021-06-12 NOTE — PROGRESS NOTES
[facility] Rehabilitation Daily Progress     Patient Name: Gomez Villasenor  Date: 10/5/2020  Visit #: 10  PTA visit #:    Referral Diagnosis:    Referring provider: Carroll Chaudhari MD  Visit Diagnosis:     ICD-10-CM    1. Postural instability  R29.3    2. Bradykinesia  R25.8    3. Gait instability  R26.81    4. Parkinson's disease (H)  G20    5. Unsteadiness on feet  R26.81    6. Generalized muscle weakness  M62.81    7. History of fall within past 90 days  Z91.81      Initial Eval Assessment: Gomez Villasenor is a 89 y.o. male who presents to therapy today with chief complaints of feeling unbalanced and falling. Onset date of sx was 3-4 years ago.  Pt reported h/o therapy for balance two times over the last 2 years with some improvements. However, this winter he met with a neurologist and was diagnosed with Parkinson's Disease. He has been started on medication and it has been increased since. He has had several falls the most recent was last night. Patient presents with an increase in falls risk based on TUG, TUG cognitive, mini BESTest score and gait speed. Patient will benefit from skilled therapy to increase strengthe, improved balance and decrease risk for falls.      Precautions / Restrictions : falls risk indicated      Assessment:   Patient presents to follow up appointment for parkinson's disease. Treatment was continued today with large amplitude movements, mobility and balance exercise. Patient tolerated well and is motivated to participate in therapy. Good participation and effort throughout. Time spent today on education for activity pacing and fatigue with Parkinson's. Impacted by focus cognitive difficulty increases unsteadiness. Patient will now be seen one time a week with more focus on practice of HEP independently at home. Worked with magaly walking sticks today, patient had improved gait and tolerance to ambulation with this.    HEP/POC compliance is  good .  Patient is  benefitting from skilled physical therapy and is making steady progress toward functional goals.  Patient is appropriate to continue with skilled physical therapy intervention, as indicated by initial plan of care.    Goal Status:  No data recorded  Pt will: Pateint will demonstrate improved balance with decrease in TUG time to less than 12 seconds ad TUG cognitive less than 15 seconds in 8 weeks  Pt will: demonstrated increase in functional strength and endurance with increase in 2 minute walk test to >125 meters and a RPE less than/equal to 3/10 in 8 weeks  Pt will: increase Mini BESTest score to > 18/28 in order to decrease his risk for falls in 8 weeks  Pt will: increase comfortable gait speed to >1.0 m/sec for improved saefty with ambulation and demonstrate improved gait mechanics and pattern in 8 weeks      Plan / Patient Education:     Continue with initial plan of care.  Progress with home program as tolerated.       Plan for next visit: give rocking exercises next time, work on 1/4 and 1/2 turns contniue practicing with walking sticks. continue PD specific exercises, large amplitude, high effort exercises for improved balance and strength.   Subjective:     Pain Rating: no pain today.    Patient reports that he was able to walk about a mile with walking sticks, has not been able to do this for a while. Can do this about every other day, gets more tired and does less the next day.    Exercises are doing better, doing them more faithfully. Daily.     No falls since last soon.         Objective:     Therapeutic exercise performed:    Seated sustained exercise:  Large amplitude Floor to ceiling (reaching down out up and back)   Comments: instructed patient on performance of exercise, incluced finger flicks with 10 second hold. Cuing for big movements throughout and rotation of the shoulders. Patient needing reminders today on performance   Sets/reps: 1x8    Effort: 10/10  Large amplitude Side to side (reaching  out to the side and twisting across body   Comments: instructed patient on performance of exercise, incluced finger flicks with 10 second hold. Cuing for big movements throughout, improvement in performance.   Sets/reps: 1x12 repetitions alternating   Effort 10/10    Other:   NuStep WL 6 x 10 min average spm 72       Sit to stand with large arm movements out and powerful motion, and controlled lowering 2x10 repetitions, blue foam [place] under his feet both sets. Cues for weight shifting forward and slow return to sit as needed throughout second set .       Neuromuscular re-education performed:    Multi-directional repetitive movements:     Step and Reach  Chairs nearby: in // bars  UE support: no hands for support consistently     2A Forward step and reach:    Comments: instructed patient on performance of exercise. Use of 1/2 foam to step over. Cuing for big movements throughout. Encouraged quality of movement over speed of movement cues for B arms occasionally mostly right arm needed cues for movement   Sets/reps: 2x10 repetitions each side. 1 x 20 reps alternating steps. A little more wobbly with fatigue come the end.   Effort  10/10   2B Side step and reach   Comments: instructed patient on performance of exercise. Cuing for big movements throughout. Cues needed throughout for head turn and big steps on first set, decreased stability with faitgue, and struck the foam roll with L foot consistently with fatigue on second set    Sets/reps: 1x10 first rep without UE support (increased difficulty and decreased form) second set with holding on to walking stick in one hand, both sides   Effort  10/10  2C Backwards step and reach   Comments: instructed patient on performance of exercise. . Cuing for big movements throughout. Decided to use one arm on bars for both sets for increase in effort and quality of movement, will continue to try and advance away from this in clinic at future appointment   Sets/reps: 1x10 first  rep without UE support. Improved performance with concentration. Increased difficulty when attempted to do every other more challenging to remember what to do which makes him unsteady   Effort 9/10    Rock and Reach    Chairs nearby:   UE support:     2D LE forward/backward   Comments: stated with heel toe rocking, then added arm swing unilaterally with holding on to // bar with opposite side, cues to continued heel toe rocking needed, attempted without holding on increased difficulty   Sets/reps: 2 x 10 reps each side    Effort  Not rated   2E Side to side    Comments:    Sets/reps:    Effort       Other:    Therapeutic activity performed:     Walking between chairs with big steps to do 1/2 turn and sit x 10 reps     BIG walking: cuing for heel strike and big arm swing throughout 300 feet x3-5 repetitions, using B walking sticks today. Improvement to note  Other:     HEP   Exercise #1: out, down, up and back seated with finger flicks 2x10  Comment #1: sit to stand with big arms x10  Exercise #2: large stepping forward and back with arms out x10  Comment #2: Rotation large amplitude in sitting x10  Exercise #3: large stepping to the side arms out x10 magaly  Comment #3: large stepping back with arm swing bow x10 magaly      Treatment Today     TREATMENT MINUTES COMMENTS   Evaluation     Self-care/ Home management     Manual therapy     Neuromuscular Re-education 31 See above   Therapeutic Activity     Therapeutic Exercises 25 See above   Gait training     Modality__________________                Total 56    Blank areas are intentional and mean the treatment did not include these items.       Wilma Galvez, PT, DPT  10/5/2020

## 2021-06-13 NOTE — PROGRESS NOTES
[facility] Rehabilitation Daily Progress Note    Patient Name: Gomez Villasenor  Date: 2020  Visit #: 2  PTA visit #:  NA  Referral Diagnosis: Low back pain  Referring provider: Modesta Corrales MD  Visit Diagnosis:     ICD-10-CM    1. Acute right-sided low back pain without sciatica  M54.5    2. Back stiffness  M25.69    3. Generalized muscle weakness  M62.81      Assessment from Initial Evaluation:  Gomez Villasenor is a 89 y.o. male who presents to therapy today with chief complaints of acute right sided low back pain. Onset date of sx was 10/2020 after a fall on the L side.  Pt reported h/o prostate cancer, hypothyroid, leukopenia, dysthymia, Parkinson Disease.  Pain symptoms are sharp at time, but is typically dull and achy.  Functional impairments include getting up from a chair and initially walking, walking for long periods, and standing for long periods.  Pt demo's signs and sx consistent with likely facet arthropathy as a result from his fall.     Precautions / Restrictions : falls risk d/t PD      Assessment:     HEP/POC compliance is  good .  The patient presents to PT for his first follow up visit.  His pain is improving and he has a good understanding of his HEP.  He is appropriate to follow up in 2 weeks with skilled PT services.    Goal Status:  Pt. will be independent with home exercise program in : 6 weeks    Pt will: be able to get up from a chair after sitting for long periods without pain in the lower back; in 8 weeks  Pt will: be able to walk 1/4-1/2 mile without pain in the lower back; in 8 weeks  Pt will: be able to stand for 20 minutes for housework without pain in the lower back; in 8 weeks    Plan / Patient Education:     Continue with initial plan of care.  Progress with home program as tolerated.  Continue with S/L mobilizations if helpful.  Core strengthening.       Subjective:     Pain Ratin  The patient reports that he feels his back when he first gets out of  bed in the morning.  He feels the pain improves after getting up and walking.  He was sore after last session and needed to take a Tylenol.  He continues to have symptoms into the R hip.    Objective:     Mild pain with R lumbar side bending.    Exercise #1: KTC stretch  Comment #1: 30 seconds bilaterally; also double KTC x 30 seconds  Exercise #2: LTR  Comment #2: x 3 bilaterally  Exercise #3: PPT  Comment #3: x 10  Exercise #4: Ab march  Comment #4: x 15; cues to keep pelvis level    Appt time: 9:45AM - 10:17AM    Treatment Today     TREATMENT MINUTES COMMENTS   Evaluation     Self-care/ Home management     Manual therapy 15 -L S/L R lumbar rotation mobilizations grades I-II   Neuromuscular Re-education     Therapeutic Activity     Therapeutic Exercises 17 -Subjective measures taken  -See flow sheet; added abdominal march to HEP   Gait training     Modality__________________                Total 32    Blank areas are intentional and mean the treatment did not include these items.       Christine Austin, PT, DPT  12/16/2020

## 2021-06-13 NOTE — PROGRESS NOTES
Assessment and Plan:     Patient has been advised of split billing requirements and indicates understanding: Yes  1. Medicare annual wellness visit, subsequent      2. Acute right-sided low back pain without sciatica  Recommend x-ray, which was completed today.  I do suspect this is more likely related to musculoskeletal origin.  Would recommend physical therapy.  - Ambulatory referral to PT/OT  - XR Pelvis W 2 Vw Hip Right; Future  - XR Pelvis W 2 Vw Hip Right    3. Parkinson disease (H)  Followed by neurology.  This is likely also playing a role in his overall balance and mood.    4. Acquired hypothyroidism  Recent TSH checked was normal.  Continue current dose of levothyroxine.    5. Depression, unspecified depression type  Currently on sertraline 75 mg daily.  I would recommend that he see his therapist more frequently, such as monthly rather than every 3 months.    6. Screening for diabetes mellitus  Fasting today, will check labs.  - Glucose     The patient's current medical problems were reviewed.    I have had an Advance Directives discussion with the patient.  The following health maintenance schedule was reviewed with the patient and provided in printed form in the after visit summary:   Health Maintenance   Topic Date Due     DEPRESSION ACTION PLAN  [birth year]28     TD 18+ HE  11/10/2019     MEDICARE ANNUAL WELLNESS VISIT  03/26/2020     FALL RISK ASSESSMENT  11/30/2021     ADVANCE CARE PLANNING  03/26/2024     Pneumococcal Vaccine: 65+ Years  Completed     INFLUENZA VACCINE RULE BASED  Completed     ZOSTER VACCINES  Completed     Pneumococcal Vaccine: Pediatrics (0 to 5 Years) and At-Risk Patients (6 to 64 Years)  Aged Out     HEPATITIS B VACCINES  Aged Out        Subjective:   Chief Complaint: Gomez Villasenor is an 89 y.o. male here with wife for an Annual Wellness visit.     HPI:  Additional concerns:    Has noted some dizziness in the morning, and also notes ongoing balance concerns, though this  is likely related to his Parkinson's.  He had been seeing physical therapy to help with Parkinson's, though has completed the sessions.    Has had soreness of right low back.  Started less than 1 month ago.  He did fall while going to the bathroom a month ago, though landed on the other side.  Did have a bruise.      He also continues to note feeling tired, and from a mood perspective has ongoing feeling of being down in the dumps.  He has passive thoughts of death, though denies any active suicidal homicidal ideation.  Contracts for safety.    Review of Systems:    Please see above.  The rest of the review of systems are negative for all systems.    Patient Care Team:  Modesta Corrales MD as PCP - General (Family Medicine)  Neal Del Rio MD (Ophthalmology)  Christian Badillo MD (Dermatology)  Levon Cruz MD as Physician (Ophthalmology)  Stevie Braxton MD as Physician (Urology)  Hadley Chi MD as Physician (Urology)  Modesta Corrales MD as Assigned PCP  Vasyl Leung PA-C as Assigned Surgical Provider  Ahmet Dougherty DPM as Assigned Musculoskeletal Provider  Amrit Gutierrez MD as Assigned Cancer Care Provider     Patient Active Problem List   Diagnosis     Prostate Cancer     Nephrolithiasis     Macular Degeneration     Hypothyroid     Leukopenia     Fatigue     Dysthymia     Parkinson disease (H)     Past Medical History:   Diagnosis Date     Cancer (H)     prostate     Disease of thyroid gland     hypothyroid     Hearing loss      Kidney stones 3/17/2015    this is third episode of stones     Macular degeneration      Nephrolithiasis 2015    right sided/ureteroscopic removal     Prostate cancer (H)      Status post cystoscopy March 2015    with ureteroscopy and stone removal     Urinary incontinence       Past Surgical History:   Procedure Laterality Date     CYSTOURETHROSCOPY      Right stent exchange and stone extraction     EYE SURGERY Right     cataract removed     INSERT / REPLACE  / REMOVE PROSTHESIS URETHRAL SPHINCTER N/A 9/21/2016    Procedure:  REPLACEMENT OF ARTIFICIAL URINARY SPHINCTER;  Surgeon: Stevie Braxton MD;  Location: [facility];  Service:      OTHER SURGICAL HISTORY      artificial urethral sphincter     CO CYSTOURETHROSCOPY      Description: Cystoscopy (Diagnostic);  Recorded: 11/07/2008;     CO REMV PROSTATE,RETROPUB,RADICAL      Description: Prostatectomy Retropubic Radical With Nerve Sparing;  Recorded: 11/10/2009;  Comments: 2001 artificial sphinter placed      Family History   Problem Relation Age of Onset     Heart disease Mother      No Medical Problems Father      Leukemia Brother      Cancer Brother       Social History     Socioeconomic History     Marital status:      Spouse name: Tanya     Number of children: 3     Years of education: Not on file     Highest education level: Not on file   Occupational History     Occupation: retired dentist   Social Needs     Financial resource strain: Not on file     Food insecurity     Worry: Not on file     Inability: Not on file     Transportation needs     Medical: Not on file     Non-medical: Not on file   Tobacco Use     Smoking status: Never Smoker     Smokeless tobacco: Never Used   Substance and Sexual Activity     Alcohol use: Yes     Alcohol/week: 1.0 standard drinks     Types: 1 Cans of beer per week     Comment: one beer most days     Drug use: No     Sexual activity: Never   Lifestyle     Physical activity     Days per week: Not on file     Minutes per session: Not on file     Stress: Not on file   Relationships     Social connections     Talks on phone: Not on file     Gets together: Not on file     Attends Restoration service: Not on file     Active member of club or organization: Not on file     Attends meetings of clubs or organizations: Not on file     Relationship status: Not on file     Intimate partner violence     Fear of current or ex partner: Not on file     Emotionally abused: Not on  "file     Physically abused: Not on file     Forced sexual activity: Not on file   Other Topics Concern     Not on file   Social History Narrative    . Tanya. 3 grown children and several grandkids. Retired dentist/  World traveller. Exercise enthusiast. Identical twin Brother Alonso Faust  age 83 CLL/Cancer/ICH... Non smoker, occ beer/       Current Outpatient Medications   Medication Sig Dispense Refill     carbidopa-levodopa (SINEMET)  mg per tablet Take 2 tablets by mouth 3 (three) times a day.       cholecalciferol, vitamin D3, 1,000 unit tablet Take 2,000 Units by mouth daily.        GLUCOSAMINE/CHONDROITIN SULF A (GLUCOSAMINE-CHONDROITIN ORAL) Take 2 tablets by mouth daily.        levothyroxine (SYNTHROID, LEVOTHROID) 112 MCG tablet TAKE ONE TABLET BY MOUTH ONCE DAILY  90 tablet 2     lutein 20 mg Tab Take 1 tablet by mouth daily.       multivitamin (ONE A DAY) per tablet Take 1 tablet by mouth.       sertraline (ZOLOFT) 50 MG tablet TAKE 1 &1/2 TABLETS (75 MG) BY MOUTH ONCE DAILY 135 tablet 3     vit A/vit C/vit E/zinc/copper (PRESERVISION AREDS ORAL) Take by mouth.       No current facility-administered medications for this visit.       Objective:   Vital Signs:   Visit Vitals  /76 (Patient Site: Left Arm, Patient Position: Sitting, Cuff Size: Adult Regular)   Pulse (!) 56   Temp 96.7  F (35.9  C) (Tympanic)   Resp 20   Ht 5' 8.5\" (1.74 m)   Wt 142 lb 14.4 oz (64.8 kg)   BMI 21.41 kg/m           VisionScreening:  No exam data present     PHYSICAL EXAM  General Appearance: Alert, cooperative, no distress, appears stated age  Head: Normocephalic, without obvious abnormality, atraumatic  Eyes: PERRL, conjunctiva/corneas clear, EOM's intact  Ears: Normal TM's and external ear canals, both ears  Nose: Nares normal, septum midline,mucosa normal, no drainage  Throat: Lips, mucosa, and tongue normal; teeth and gums normal  Neck: Supple, symmetrical, trachea midline, no adenopathy;  " thyroid: not enlarged, symmetric, no tenderness/mass/nodules; no carotid bruit or JVD  Lungs: Clear to auscultation bilaterally, respirations unlabored  Heart: Regular rate and rhythm, S1 and S2 normal, no murmur.  Abdomen: Soft, non-tender, bowel sounds active all four quadrants,  no masses, no organomegaly  Genitourinary: Penis normal. No hernias palpated  Musculoskeletal: Normal range of motion. No joint swelling or deformity.  No tenderness to palpation along vertebrae.  No tenderness palpation of lumbar paraspinous muscles bilaterally.    Extremities: Extremities normal, atraumatic, no cyanosis or edema  Skin: Skin color, texture, turgor normal, no rashes or lesions  Lymph nodes: Cervical, supraclavicular, and axillary nodes normal  Neurologic: Alert.  Normal speech.  No focal deficits.  Normal deep tendon reflexes.   Psychiatric: Flat affect.  Does not appear anxious or depressed.  See flow sheet for PHQ-9 and JOANIE-7.  Denies active suicidal or homicidal ideation.  Contracts for safety.        Assessment Results 11/30/2020   Activities of Daily Living No help needed   Instrumental Activities of Daily Living No help needed   Mini Cog Total Score 5   Some recent data might be hidden     A Mini-Cog score of 0-2 suggests the possibility of dementia, score of 3-5 suggests no dementia    Identified Health Risks:     The patient was provided with written information regarding signs of hearing loss.  Information on urinary incontinence and treatment options given to patient.  The patient s PHQ-9 score is consistent with moderate depression.  He was provided with information regarding depression and was advised to schedule a follow up appointment in 8-12 weeks to further address this issue.  He is at risk for falling and has been provided with information to reduce the risk of falling at home.  Patient's advanced directive was discussed and I am comfortable with the patient's wishes.

## 2021-06-14 NOTE — PROGRESS NOTES
[facility] Rehabilitation Daily Progress Note    Patient Name: Gomez Villasenor  Date: 2020  Visit #: 3  PTA visit #:  NA  Referral Diagnosis: Low back pain  Referring provider: Modesta Corrales MD  Visit Diagnosis:     ICD-10-CM    1. Acute right-sided low back pain without sciatica  M54.5    2. Back stiffness  M25.69    3. Generalized muscle weakness  M62.81      Assessment from Initial Evaluation:  Gomez Villasenor is a 89 y.o. male who presents to therapy today with chief complaints of acute right sided low back pain. Onset date of sx was 10/2020 after a fall on the L side.  Pt reported h/o prostate cancer, hypothyroid, leukopenia, dysthymia, Parkinson Disease.  Pain symptoms are sharp at time, but is typically dull and achy.  Functional impairments include getting up from a chair and initially walking, walking for long periods, and standing for long periods.  Pt demo's signs and sx consistent with likely facet arthropathy as a result from his fall.     Precautions / Restrictions : falls risk d/t PD    Assessment:     HEP/POC compliance is  good .  The patient presents to PT for his second follow up visit.  He reports a 75% improvement since beginning PT.  He is progressing well and is appropriate to follow up in 2 weeks for possible DC if doing well.    Goal Status:  Pt. will be independent with home exercise program in : 6 weeks    Pt will: be able to get up from a chair after sitting for long periods without pain in the lower back; in 8 weeks  Pt will: be able to walk 1/4-1/2 mile without pain in the lower back; in 8 weeks  Pt will: be able to stand for 20 minutes for housework without pain in the lower back; in 8 weeks    Plan / Patient Education:     Continue with initial plan of care.  Progress with home program as tolerated.  Continue with S/L mobilizations if helpful.  Core strengthening.   Consider discharge if doing well.    Subjective:     Pain Ratin  The patient reports that  his lower back is feeling good.  He forgets about it a lot.  Sometimes when he first gets up, it's painful, but goes away.  He reports a 75% improvement since beginning PT.     Objective:     Min/mod limited side bending ROM bilaterally and rotation bilaterally lumbar spine.    Exercise #1: KTC stretch  Comment #1: HEP; also double KTC HEP  Exercise #2: LTR  Comment #2: HEP  Exercise #3: PPT  Comment #3: HEP  Exercise #4: Ab march  Comment #4: x 10; progressed to LE extensions x 10 bilaterally with cues needed for technique    Appt time: 11:16AM - 11:43AM    Treatment Today     TREATMENT MINUTES COMMENTS   Evaluation     Self-care/ Home management     Manual therapy 15 -L S/L R lumbar rotation mobilizations grades I-II   Neuromuscular Re-education     Therapeutic Activity     Therapeutic Exercises 12 -Subjective measures taken  -See flow sheet; progressed abdominal strengthening as patient was ready to progress   Gait training     Modality__________________                Total 27    Blank areas are intentional and mean the treatment did not include these items.       Christine Austin, PT, DPT  12/30/2020

## 2021-06-14 NOTE — PROGRESS NOTES
[facility] Rehabilitation Daily Progress Note    Patient Name: Gomez Villasenor  Date: 2021  Visit #: 4  PTA visit #:  NA  Referral Diagnosis: Low back pain  Referring provider: Modesta Corrales MD  Visit Diagnosis:     ICD-10-CM    1. Acute right-sided low back pain without sciatica  M54.5    2. Back stiffness  M25.69    3. Generalized muscle weakness  M62.81      Assessment from Initial Evaluation:  Gomez Villasenor is a 89 y.o. male who presents to therapy today with chief complaints of acute right sided low back pain. Onset date of sx was 10/2020 after a fall on the L side.  Pt reported h/o prostate cancer, hypothyroid, leukopenia, dysthymia, Parkinson Disease.  Pain symptoms are sharp at time, but is typically dull and achy.  Functional impairments include getting up from a chair and initially walking, walking for long periods, and standing for long periods.  Pt demo's signs and sx consistent with likely facet arthropathy as a result from his fall.     Precautions / Restrictions : falls risk d/t PD    Assessment:     HEP/POC compliance is  good .  The patient demonstrates significant improvements in his pain level and function since beginning PT.  He has a good understanding of his HEP and is appropriate ton continue independently at this time.  His chart will be held for 30 days and he can return prior to this time if needed.    Goal Status:  Pt. will be independent with home exercise program in : 6 weeks;Met    Pt will: be able to get up from a chair after sitting for long periods without pain in the lower back; in 8 weeks; Met  Pt will: be able to walk 1/4-1/2 mile without pain in the lower back; in 8 weeks; Met  Pt will: be able to stand for 20 minutes for housework without pain in the lower back; in 8 weeks; Met    Plan / Patient Education:     Hold chart x 30 days.     Subjective:     Pain Ratin  The patient reports that everything is going about the same as it was 2 weeks ago.   Sometimes when he gets out of bed in the morning, he needs to take time to loosen up.  When he gets the pain it is in his kidney area on the R side.  This pain can spike up to a 5/10.    Objective:     Min/mod limited side bending ROM bilaterally and rotation bilaterally lumbar spine.  Mild pain with R side bending on the R.    Exercise #1: KTC stretch  Comment #1: HEP; also double KTC HEP  Exercise #2: LTR  Comment #2: HEP  Exercise #3: PPT  Comment #3: HEP  Exercise #4: Ab march  Comment #4: LE extensions x 6 bilaterally; progressed to table top extensions x 8 bilaterally  Exercise #5: Bridging  Comment #5: x 5    Appt time: 11:15AM - 11:44AM    Treatment Today     TREATMENT MINUTES COMMENTS   Evaluation     Self-care/ Home management     Manual therapy 10 -L S/L R lumbar rotation mobilizations grades I-II   Neuromuscular Re-education     Therapeutic Activity     Therapeutic Exercises 19 -Subjective measures taken  -See flow sheet; progressed abdominal strengthening as patient was ready to progress and added bridging to HEP  -Discussed POC   Gait training     Modality__________________                Total 29    Blank areas are intentional and mean the treatment did not include these items.       Christine Austin, PT, DPT  1/13/2021

## 2021-06-16 ENCOUNTER — OFFICE VISIT - HEALTHEAST (OUTPATIENT)
Dept: PHYSICAL THERAPY | Facility: REHABILITATION | Age: 86
End: 2021-06-16

## 2021-06-16 DIAGNOSIS — R25.8 BRADYKINESIA: ICD-10-CM

## 2021-06-16 DIAGNOSIS — G20.A1 PARKINSON DISEASE (H): ICD-10-CM

## 2021-06-16 DIAGNOSIS — R26.81 GAIT INSTABILITY: ICD-10-CM

## 2021-06-16 DIAGNOSIS — M62.81 GENERALIZED MUSCLE WEAKNESS: ICD-10-CM

## 2021-06-16 DIAGNOSIS — R29.3 POSTURAL INSTABILITY: ICD-10-CM

## 2021-06-16 NOTE — PROGRESS NOTES
Assessment and Plan:   1 annual wellness is visits no new diagnoses or serious problems see below regarding dizziness  2 he has had episodes of vertigo which seem benign we will get him into the vestibular rehabilitation clinic for evaluation  3 lack of energy and some fatigue and some depressive symptoms which been going on for quite some time doubt serious pathology; wife is very concerned; she is also concerned about his poor posture at age 89  4 chronic leukopenia stable  5 hypothyroidism on replacement  6 routine labs today    Problem List Items Addressed This Visit     Vertigo    Relevant Orders    Ambulatory referral to PT/OT      Other Visit Diagnoses     Hyperlipemia    -  Primary    Relevant Orders    Comprehensive Metabolic Panel (Completed)    Lipid Cascade (Completed)    Thyroid Stimulating Hormone (TSH) (Completed)    HM2(CBC w/o Differential) (Completed)            The patient's current medical problems were reviewed.    I have had an Advance Directives discussion with the patient.  The following health maintenance schedule was reviewed with the patient and provided in printed form in the after visit summary:   Health Maintenance   Topic Date Due     FALL RISK ASSESSMENT  03/22/2019     TD 18+ HE  11/10/2019     ADVANCE DIRECTIVES DISCUSSED WITH PATIENT  03/10/2022     PNEUMOCOCCAL POLYSACCHARIDE VACCINE AGE 65 AND OVER  Completed     INFLUENZA VACCINE RULE BASED  Completed     PNEUMOCOCCAL CONJUGATE VACCINE FOR ADULTS (PCV13 OR PREVNAR)  Completed     ZOSTER VACCINE  Completed        Subjective:   Chief Complaint: Gomez Villasenor is an 89 y.o. male here for an Annual Wellness visit.   HPI: Shayne is here today with his wife Tanya.  He has no major issue she has lots of concern about Shayne's health.  Some days he lacks ambition.  Does workout on a regular basis going to the gym 3 or 4 times a week at age 89.  Sometimes his posture is poor-points this out.  He himself has no particular  concerns he is much more interactive today easier to smile than before and he demonstrates some insight into the overall situation and expectations were reviewed.  He is an extremely high functioning 89-year-old    Review of Systems:    Please see above.  The rest of the review of systems are negative for all systems.    Patient Care Team:  Christiano Irvin MD as PCP - General  Neal Del Rio MD (Ophthalmology)  Christian Badillo MD (Dermatology)  Levon Cruz MD as Physician (Ophthalmology)  Stevie Braxton MD as Physician (Urology)  Hadley Chi MD as Physician (Urology)     Patient Active Problem List   Diagnosis     Prostate Cancer     Nephrolithiasis     Macular Degeneration     Vertigo     Ureteral stone     Hypothyroid     Past Medical History:   Diagnosis Date     Cancer     prostate     Disease of thyroid gland     hypothyroid     Hearing loss      Kidney stones 3/17/2015    this is third episode of stones     Macular degeneration      Nephrolithiasis 2015    right sided/ureteroscopic removal     Prostate cancer      Status post cystoscopy March 2015    with ureteroscopy and stone removal     Urinary incontinence       Past Surgical History:   Procedure Laterality Date     CYSTOURETHROSCOPY      Right stent exchange and stone extraction     EYE SURGERY Right     cataract removed     INSERT / REPLACE / REMOVE PROSTHESIS URETHRAL SPHINCTER N/A 9/21/2016    Procedure:  REPLACEMENT OF ARTIFICIAL URINARY SPHINCTER;  Surgeon: Stevie Braxton MD;  Location: [facility];  Service:      OTHER SURGICAL HISTORY      artificial urethral sphincter     WA CYSTOURETHROSCOPY      Description: Cystoscopy (Diagnostic);  Recorded: 11/07/2008;     WA REMV PROSTATE,RETROPUB,RADICAL      Description: Prostatectomy Retropubic Radical With Nerve Sparing;  Recorded: 11/10/2009;  Comments: 2001 artificial sphinter placed      Family History   Problem Relation Age of Onset     Heart disease Mother      No  "Medical Problems Father      Leukemia Brother      Cancer Brother       Social History     Social History     Marital status:      Spouse name: Tanya     Number of children: 3     Years of education: N/A     Occupational History     retired dentist      Social History Main Topics     Smoking status: Never Smoker     Smokeless tobacco: Never Used     Alcohol use 0.6 oz/week     1 Cans of beer per week      Comment: one beer most days     Drug use: No     Sexual activity: Not on file     Other Topics Concern     Not on file     Social History Narrative    . Tanya. 3 grown children and several grandkids. Retired dentist/  World traveller. Exercise enthusiast. Identical twin Brother Alonso Faust  age 83 CLL/Cancer/ICH... Non smoker, occ beer/       Current Outpatient Prescriptions   Medication Sig Dispense Refill     cholecalciferol, vitamin D3, 1,000 unit tablet Take 2,000 Units by mouth daily.        GLUCOSAMINE/CHONDROITIN SULF A (GLUCOSAMINE-CHONDROITIN ORAL) Take 2 tablets by mouth daily.        levothyroxine (SYNTHROID, LEVOTHROID) 125 MCG tablet TAKE 1 TABLET (125 MCG) BY MOUTH ONCE DAILY 90 tablet 3     lutein 20 mg Tab Take 1 tablet by mouth daily.       multivitamin therapeutic (THERAGRAN) tablet Take 1 tablet by mouth daily.       omega-3 fatty acids-vitamin E (FISH OIL) 1,000 mg cap Take 1 capsule by mouth daily.       Current Facility-Administered Medications   Medication Dose Route Frequency Provider Last Rate Last Dose     lidocaine 2 % jelly (XYLOCAINE)   Topical PRN Susan Petit CNP   1 application at 17 1030     lidocaine 2 % jelly (XYLOCAINE)   Topical PRN Susan Petit CNP   1 application at 17 0925      Objective:   Vital Signs:   Visit Vitals     /72 (Patient Site: Left Arm, Patient Position: Sitting, Cuff Size: Adult Regular)     Pulse 74     Ht 5' 10\" (1.778 m)     Wt 147 lb 1.6 oz (66.7 kg)     BMI 21.11 kg/m2        VisionScreening:  No " exam data present     PHYSICAL EXAM  Physical Exam:  General-very healthy-appearing looks younger than stated age easy to smile no depressive signs today  VS- see above  HEENT- neg   Neck- no adenopathy/thyromegaly/bruits  Chest- clear   Cor- reg no murmurs/gallops/ectopics  Extremities: no edema, good distal pulses  Neuro- Cr. NN-  intact, alert,   Abdomen- soft non tender, no masses; no organomegaly  Skin- no suspicious lesions for malignancy  Lymph- no pathologic nodes in neck/axilla/groin  Musculoskeletal-coronary muscle development for nonagenarian initially arm shoulders        Assessment Results 3/22/2018   Activities of Daily Living No help needed   Instrumental Activities of Daily Living No help needed   Get Up and Go Score -   Mini Cog Total Score 5   Some recent data might be hidden     A Mini-Cog score of 0-2 suggests the possibility of dementia, score of 3-5 suggests no dementia    Identified Health Risks:     The patient was provided with written information regarding signs of hearing loss.  He is at risk for falling and has been provided with information to reduce the risk of falling at home.  Patient's advanced directive was discussed and I am comfortable with the patient's wishes.    Recent Results (from the past 240 hour(s))   Comprehensive Metabolic Panel   Result Value Ref Range    Sodium 142 136 - 145 mmol/L    Potassium 4.9 3.5 - 5.0 mmol/L    Chloride 107 98 - 107 mmol/L    CO2 28 22 - 31 mmol/L    Anion Gap, Calculation 7 5 - 18 mmol/L    Glucose 87 70 - 125 mg/dL    BUN 23 8 - 28 mg/dL    Creatinine 0.86 0.70 - 1.30 mg/dL    GFR MDRD Af Amer >60 >60 mL/min/1.73m2    GFR MDRD Non Af Amer >60 >60 mL/min/1.73m2    Bilirubin, Total 0.6 0.0 - 1.0 mg/dL    Calcium 9.3 8.5 - 10.5 mg/dL    Protein, Total 7.3 6.0 - 8.0 g/dL    Albumin 3.5 3.5 - 5.0 g/dL    Alkaline Phosphatase 175 (H) 45 - 120 U/L    AST 26 0 - 40 U/L    ALT 27 0 - 45 U/L   Lipid Cascade   Result Value Ref Range    Cholesterol 185  <=199 mg/dL    Triglycerides 66 <=149 mg/dL    HDL Cholesterol 64 >=40 mg/dL    LDL Calculated 108 <=129 mg/dL    Patient Fasting > 8hrs? Yes    Thyroid Stimulating Hormone (TSH)   Result Value Ref Range    TSH 0.12 (L) 0.30 - 5.00 uIU/mL   HM2(CBC w/o Differential)   Result Value Ref Range    WBC 2.7 (L) 4.0 - 11.0 thou/uL    RBC 4.53 4.40 - 6.20 mill/uL    Hemoglobin 14.2 14.0 - 18.0 g/dL    Hematocrit 41.7 40.0 - 54.0 %    MCV 92 80 - 100 fL    MCH 31.4 27.0 - 34.0 pg    MCHC 34.1 32.0 - 36.0 g/dL    RDW 12.8 11.0 - 14.5 %    Platelets 171 140 - 440 thou/uL    MPV 7.4 7.0 - 10.0 fL

## 2021-06-16 NOTE — PROGRESS NOTES
[facility] Certification Request    March 27, 2018      Patient: Gomez Villasenor  MR Number: [id number]  YOB: 1928  Date of Visit: 3/27/2018      Dear Dr. Christiano Irvin:    Thank you for this referral.   We are seeing Gomez Villasenor in Occupational Therapy for vertigo.    Medicare and/or Medicaid requires physician review and approval of the treatment plan. Please review the plan of care and verify that you agree with the therapy plan of care by co-signing this note.      Plan of Care  Authorization / Certification Start Date: 03/27/18  Authorization / Certification End Date: 06/25/18  Authorization / Certification Number of Visits: 12  Communication with: Referral Source  Patient Related Instruction: Nature of Condition;Treatment plan and rationale;Basis of treatment;Expected outcome  Times per Week: 1  Number of Weeks: 12  Number of Visits: 12  Neuromuscular Reeducation: vestibular  Canolith Repostioning:      Goals:  Patient will be able to walk: on uneven/inclined surfaces;without loss of balance;in 12 weeks  Patient will bend: to dress;to clean;without dizziness;without loss of balance;in 12 weeks  Patient will turn head: without dizziness;for watching traffic;for conversation;in 12 weeks  Patient will look up / down: without dizziness;for drinking;for reading;in 12 weeks      If you have any questions or concerns, please don't hesitate to call.    Sincerely,      Avril Worrell, OT        Physician recommendation:     ___ Follow therapist's recommendation        ___ Modify therapy      *Physician co-signature indicates they certify the need for these services furnished within this plan and while under their care.      [facility]   Vestibular Initial Evaluation    Patient Name: Gomez Villasenor  Date of evaluation: 3/27/2018  Referral Diagnosis: vertigo  Referring provider: Christiano Irvin, *  Visit Diagnosis:     ICD-10-CM    1. Dizziness R42    2.  Unsteadiness on feet R26.81 Ambulatory referral to PT/OT   3. Nausea R11.0        Assessment:      Patient instructed on HEP for dizziness and imbalance. Progress exercises as tolerated and if indicated.    Goals:  Patient will be able to walk: on uneven/inclined surfaces;without loss of balance;in 12 weeks  Patient will bend: to dress;to clean;without dizziness;without loss of balance;in 12 weeks  Patient will turn head: without dizziness;for watching traffic;for conversation;in 12 weeks  Patient will look up / down: without dizziness;for drinking;for reading;in 12 weeks    Patient's expectations/goals are realistic.    Barriers to Learning or Achieving Goals:  No Barriers.       Plan / Patient Instructions:        Plan of Care:   Authorization / Certification Start Date: 18  Authorization / Certification End Date: 18  Authorization / Certification Number of Visits: 12  Communication with: Referral Source  Patient Related Instruction: Nature of Condition;Treatment plan and rationale;Basis of treatment;Expected outcome  Times per Week: 1  Number of Weeks: 12  Number of Visits: 12  Neuromuscular Reeducation: vestibular  Canolith Repostioning:      Plan for next visit: Progress HEP     Subjective:         History of Present Illness:    Gomez is a 89 y.o. male who presents to therapy today with complaints of dizziness, nausea and unsteadiness. Patient had sudden onset of symptoms about 3 years. He reports one 5 seconds episode of syncope about a month ago. Symptoms are intermittent. He denies history of similar symptoms prior to 3 years ago. Patient denies ear pain. Patient denies hearing changes.    Pain Ratin    Functional limitations are described as occurring with:   balance, bending, bed mobility, head turns, looking up or down, stepping over curbs         Objective:      Note: Items left blank indicates the item was not performed or not indicated at the time of the evaluation.    Patient Outcome  Measures:  Dizziness Handicap Inventory  Score in %: 20     Vestibular Disorder Examination  1. Dizziness     2. Unsteadiness on feet  Ambulatory referral to PT/OT   3. Nausea       Precautions/Restrictions: None  Posture Observation:      General standing posture is fair.    ROM:  Not Tested    Strength: Not Tested    Functional Mobility: good      Oculomotor Assessment: Normal tracking and normal saccades    VOR Function: fair    Positional Tests:  Hallpike Right:  Negative  Hallpike Left:  Negative    Balance Assessment: mild vestibular pattern on Les SOP    Treatment Today:   X1 viewing-20 seconds-instructed  Gait with head motion-instructed  Normal surface eyes closed-instructed  TREATMENT MINUTES COMMENTS   Evaluation 30    Self-care/ Home management     Neuromuscular Re-education 28    Canalith repositioning procedure           Total 58    Blank areas are intentional and mean the treatment did not include these items.     GOALS AND PLAN OF CARE WERE ESTABLISHED IN COOPERATION WITH THE PATIENT    OT Evaluation Code: (Please list factors)   Comorbidities: prostate cancer, macular degeneration, hypothyroid  Profile/History Review: Brief    Need for eval modification: No   # Treatment options: Limited    Clinical Decision Making:  Low     Occupational Profile/ Medical and Therapy History and Comorbidities Occupational Performance Clinical Decision Making   (Complexity)   brief history with review of medical/therapy records related to the presenting problem.  No comorbidities 1-3 Performance deficits that result in activity limitations and/or participation restrictions.    No Assessment Modification  Low complexity, which includes  problem-focused assessments, and consideration of a limited number of treatment options.      expanded review of medical/therapy records and additional review of physical, cognitive and psychosocial history.    May have comorbidities 3-5 Performance deficits that result in activity  limitations and/or participation restrictions.    Minimal to moderate modification of assessment Moderate complexity, which includes analysis of data from detailed assessments, and consideration of several treatment options.         Review of medical/therapy records and extensive additional review of physical, cognitive and psychosocial history.  Comorbidities affect occupational performance 5 or more Performance deficits that result in activity limitations and/or participation restrictions.    Significant modification of assessment High complexity, analysis of  Occupational profile and data,  Comprehensive assessments, multiple treatment options.            Avril Worrell  3/27/2018  9:00-9:58 AM

## 2021-06-16 NOTE — TELEPHONE ENCOUNTER
Refill Approved    Rx renewed per Medication Renewal Policy. Medication was last renewed on 7/18/20, last OV 11/30/20.    Shauna Bagley, [facility] Triage/Med Refill 4/11/2021     Requested Prescriptions   Pending Prescriptions Disp Refills     levothyroxine (SYNTHROID, LEVOTHROID) 112 MCG tablet [Pharmacy Med Name: Levothyroxine Sodium Oral Tablet 112 MCG] 90 tablet 0     Sig: TAKE ONE TABLET BY MOUTH ONCE DAILY       Thyroid Hormones Protocol Passed - 4/9/2021  2:01 AM        Passed - Provider visit in past 12 months or next 3 months     Last office visit with prescriber/PCP: 2/28/2020 Modesta Corrales MD OR same dept: Visit date not found OR same specialty: 2/28/2020 Modesta Corrales MD  Last physical: 11/30/2020 Last MTM visit: Visit date not found   Next visit within 3 mo: Visit date not found  Next physical within 3 mo: Visit date not found  Prescriber OR PCP: Modesta Corrales MD  Last diagnosis associated with med order: 1. Acquired hypothyroidism  - levothyroxine (SYNTHROID, LEVOTHROID) 112 MCG tablet [Pharmacy Med Name: Levothyroxine Sodium Oral Tablet 112 MCG]; TAKE ONE TABLET BY MOUTH ONCE DAILY   Dispense: 90 tablet; Refill: 0    If protocol passes may refill for 12 months if within 3 months of last provider visit (or a total of 15 months).             Passed - TSH on file in past 12 months for patient age 12 & older     TSH   Date Value Ref Range Status   10/19/2020 0.81 0.30 - 5.00 uIU/mL Final

## 2021-06-17 ENCOUNTER — HOSPITAL ENCOUNTER (OUTPATIENT)
Dept: CARDIOLOGY | Facility: HOSPITAL | Age: 86
Discharge: HOME | End: 2021-06-17
Attending: GENERAL ACUTE CARE HOSPITAL
Payer: COMMERCIAL

## 2021-06-17 DIAGNOSIS — R42 DIZZINESS: ICD-10-CM

## 2021-06-17 DIAGNOSIS — R00.1 SINUS BRADYCARDIA: ICD-10-CM

## 2021-06-17 DIAGNOSIS — R53.83 FATIGUE: ICD-10-CM

## 2021-06-17 DIAGNOSIS — R94.31 ABNORMAL ELECTROCARDIOGRAM: ICD-10-CM

## 2021-06-17 LAB
AORTIC ROOT: 3.5 CM
AORTIC VALVE MEAN VELOCITY: 99.4 CM/S
AV DIMENSIONLESS INDEX VTI: 0.6
AV MEAN GRADIENT: 5 MMHG
AV PEAK GRADIENT: 9.6 MMHG
AV VALVE AREA: 1.6 CM2
BSA FOR ECHO PROCEDURE: 1.71 M2
CV BLOOD PRESSURE: ABNORMAL MMHG
CV ECHO HEIGHT: 68.5 IN
CV ECHO WEIGHT: 133 LBS
DOP CALC AO PEAK VEL: 155 CM/S
DOP CALC AO VTI: 36.3 CM
DOP CALC LVOT AREA: 2.83 CM2
DOP CALC LVOT DIAMETER: 1.9 CM
DOP CALC LVOT STROKE VOLUME: 57 CM3
DOP CALCLVOT PEAK VEL VTI: 20.1 CM
EJECTION FRACTION: 63 % (ref 55–75)
INTERVENTRICULAR SEPTUM IN END DIASTOLE: 1.3 CM (ref 0.6–1)
IVS/PW RATIO: 1.3
LA AREA 1: 18.5 CM2
LA AREA 2: 17.9 CM2
LEFT ATRIUM LENGTH: 5.58 CM
LEFT ATRIUM VOLUME INDEX: 29.5 ML/M2
LEFT ATRIUM VOLUME: 50.4 ML
LEFT VENTRICLE CARDIAC INDEX: 1.6 L/MIN/M2
LEFT VENTRICLE CARDIAC OUTPUT: 2.8 L/MIN
LEFT VENTRICLE DIASTOLIC VOLUME INDEX: 49.1 CM3/M2 (ref 34–74)
LEFT VENTRICLE DIASTOLIC VOLUME: 84 CM3 (ref 62–150)
LEFT VENTRICLE HEART RATE: 49 BPM
LEFT VENTRICLE MASS INDEX: 62.5 G/M2
LEFT VENTRICLE SYSTOLIC VOLUME INDEX: 18.1 CM3/M2 (ref 11–31)
LEFT VENTRICLE SYSTOLIC VOLUME: 31 CM3 (ref 21–61)
LEFT VENTRICULAR INTERNAL DIMENSION IN DIASTOLE: 3.1 CM (ref 4.2–5.8)
LEFT VENTRICULAR MASS: 106.8 G
LEFT VENTRICULAR OUTFLOW TRACT MEAN GRADIENT: 1 MMHG
LEFT VENTRICULAR OUTFLOW TRACT MEAN VELOCITY: 52.6 CM/S
LEFT VENTRICULAR POSTERIOR WALL IN END DIASTOLE: 1 CM (ref 0.6–1)
LV STROKE VOLUME INDEX: 33.3 ML/M2
MITRAL VALVE E/A RATIO: 0.8
MV AVERAGE E/E' RATIO: 12.2 CM/S
MV DECELERATION TIME: 384 MS
MV E'TISSUE VEL-LAT: 8.77 CM/S
MV E'TISSUE VEL-MED: 6.34 CM/S
MV LATERAL E/E' RATIO: 10.5
MV MEDIAL E/E' RATIO: 14.6
MV PEAK A VELOCITY: 122 CM/S
MV PEAK E VELOCITY: 92.3 CM/S
NUC REST DIASTOLIC VOLUME INDEX: 2128 LBS
NUC REST SYSTOLIC VOLUME INDEX: 68.5 IN
PV ACCELERATION TIME: 144 MS
TRICUSPID REGURGITATION PEAK PRESSURE GRADIENT: 19.4 MMHG
TRICUSPID VALVE ANULAR PLANE SYSTOLIC EXCURSION: 2.7 CM
TRICUSPID VALVE PEAK REGURGITANT VELOCITY: 220 CM/S

## 2021-06-17 ASSESSMENT — MIFFLIN-ST. JEOR: SCORE: 1235.72

## 2021-06-17 NOTE — PROGRESS NOTES
[facility] Certification Request    April 27, 2021      Patient: Gomez Villasenor  MR Number: [id number]  YOB: 1928  Date of Visit: 4/27/2021      Dear Carroll Contreras MD:    Thank you for this referral.   We are seeing Gomez Villasenor in Physical Therapyfor Parkinson's, gait instability.    Medicare and/or Medicaid requires physician review and approval of the treatment plan. Please review the plan of care and verify that you agree with the therapy plan of care by co-signing this note.      Plan of Care  Authorization / Certification Start Date: 04/27/21  Authorization / Certification End Date: 07/26/21  Authorization / Certification Number of Visits: 10  Communication with: Referral Source  Patient Related Instruction: Treatment plan and rationale;Self Care instruction;Basis of treatment;Nature of Condition;Expected outcome;Body mechanics;Posture;Precautions;Next steps  Times per Week: 1  Number of Weeks: up to 12  Number of Visits: 6-10  Discharge Planning: when goals are met or patient is independent in self management  Precautions / Restrictions : falls risk, parkinson's  Therapeutic Exercise: ROM;Stretching;Strengthening  Neuromuscular Reeducation: kinesio tape;posture;balance/proprioception;TNE;core  Manual Therapy: soft tissue mobilization;myofascial release;joint mobilization;muscle energy  Gait Training: level and uneven ground, stepping, with and without assistive devices      Goals:  Pt. will demonstrate/verbalize independence in self-management of condition in : 6 weeks  Pt. will be independent with home exercise program in : 6 weeks    Pt will: improve 30 sed STS, and TUG testing to decrease risk of falling and improve functional strength and mobility, within 8 visits.  Pt will: report having a consistent walking program without being limited by fatigue, and  improve 2min walk test, within 8 weeks.        If you have any questions or concerns, please  don't hesitate to call.    Sincerely,      Wilma Galvez, PT, DPT        Physician recommendation:     ___ Follow therapist's recommendation        ___ Modify therapy      *Physician co-signature indicates they certify the need for these services furnished within this plan and while under their care.        [facility]   Parkinson's Initial Evaluation    Patient Name: Gomez Villasenor  Date of evaluation: 4/27/2021  Referral Diagnosis: Parkinson's Disease  Referring provider: Carroll Chaudhari MD  Visit Diagnosis:     ICD-10-CM    1. Gait instability  R26.81    2. Generalized muscle weakness  M62.81    3. Parkinson disease (H)  G20    4. Postural instability  R29.3    5. Bradykinesia  R25.8        Assessment:     Gomez Villasenor is a 89 y.o. male with PMH Parkinson's Disease who presents to therapy today with chief complaints of unsteadiness with walking and balance, onset January 2020.  On exam pt demonstrates worsening balance objective measures and decreased tolerance to standing and walking with stability since last participating in physical therapy. Falls about once every 1-2 months since last seen, with most recent fall being three days ago.  Pt is most functionally limited with turning, getting out of a chair and walking.  Patient is testing at risk of falls according to 30sec STS, TUG, MiniBest. Pt would benefit from skilled PT to decrease risk of falling and improve functional mobility to maintain independence through daily activities. Recommend 6-8 PT appointments to get back into large amplitude movements and work on balance and increased activity/walking.     POC and pathology of condition were reviewed with patient.  Pt. is in agreement with the Plan of Care      Goals:  Pt. will demonstrate/verbalize independence in self-management of condition in : 6 weeks  Pt. will be independent with home exercise program in : 6 weeks    Pt will: improve 30 sed STS, and TUG testing to  decrease risk of falling and improve functional strength and mobility, within 8 visits.  Pt will: report having a consistent walking program without being limited by fatigue, and  improve 2min walk test, within 8 weeks.      Patient's expectations/goals are realistic.    Barriers to Learning or Achieving Goals:  Chronicity of the problem.  Co-morbidities or other medical factors.  .  Age.        Plan / Patient Instructions:        Plan of Care:   Authorization / Certification Start Date: 04/27/21  Authorization / Certification End Date: 07/26/21  Authorization / Certification Number of Visits: 10  Communication with: Referral Source  Patient Related Instruction: Treatment plan and rationale;Self Care instruction;Basis of treatment;Nature of Condition;Expected outcome;Body mechanics;Posture;Precautions;Next steps  Times per Week: 1  Number of Weeks: up to 12  Number of Visits: 6-10  Discharge Planning: when goals are met or patient is independent in self management  Precautions / Restrictions : falls risk, parkinson's  Therapeutic Exercise: ROM;Stretching;Strengthening  Neuromuscular Reeducation: kinesio tape;posture;balance/proprioception;TNE;core  Manual Therapy: soft tissue mobilization;myofascial release;joint mobilization;muscle energy  Gait Training: level and uneven ground, stepping, with and without assistive devices      Plan for next visit: Review large amplitude movements, STS, balance and turning, work on increasing walking through program     Subjective:       Patient reports that he is trying to do the exercises about 5 days a week. If he does them after breakfast then the day goes better.     Fall History: have had one fall- turning off lights in living room carrying to many things. This happened last week on Friday. Saturday morning had to get glasses fixed, hit his head. This rarely happens, last one was about 2 months ago. And maybe one more    Patients wife reports he does not feel like going for  longer walks like he did when last here.     Parkinson's Since January 2020    Chief mobility complaint: balance and activity tolerance    Diagnosis/Date: January 2020    Freezing history: no  Pt functional goals: get back to longer walks/increase activity      Living situation: with wife  Caregiver support: independent in dressing bathing, wife does a lot of cooking and cleaning  Equipment available: walking sticks, uses walker for longer walks  Activity level: exercises 5 days a week, walk to paper and mail everyday    Time of evaluation: 2:36pm  Time of most recent medication: 12pm  Time of next medication: 5pm    Pain: not really, right hip a little bit (low back)       Objective:      Note: Items left blank indicates the item was not performed or not indicated at the time of the evaluation.    Balance Examination  1. Gait instability     2. Generalized muscle weakness     3. Parkinson disease (H)     4. Postural instability     5. Bradykinesia       Involved side: Right    Posture Observation: forward lean and forward head right foot turning out  Assistive Device: walking sticks  Gait Observation: weight shifted anteriorly onto front of feet, rounded head and shoulders, small steps with initial contact at mid to forefoot, decreased heel strike, decreased step height bilaterally. little to no arm swing and right side arm slightly abducted.    LE Strength: generalized weakness observed with functional mobility but within functional limits for transitions and everyday activieis    LE ROM: within functional limits     Functional test Score Previous Score from  Norms/Fall Risk Observations   Timed up and go (TUG)  (seconds) Trial 1: 13.43  Trial 2: 13.22  Trial 3: 12.84  Average: 13.16  >13 sec   AD used: none   Cognitive TUG (seconds) 15.68   Task: counting down from 50 by 3's  AD used: none   30 seconds sit<>stand 9    <8 high fall risk  9-12 mod risk UE Support: none   Four Square Step Test  (seconds) Trial 1:    16.39 stroke dowel x3 throughout with some misteps and off balance  Trial 2:12.92, stroke dowel x1  >15 seconds fall risk    Comfortable gait speed 10M       Fast gait speed 10M                2 minute walk test 103.5 meters     Use of walking sticks, some unsteadiness with turning     Balance Assessment: Mini-BEST   On 2020 (<23/28)  --  on 10/19/2020   Balance Assessment: Mini-BEST 15/28 (<23/28 falls risk) TODAY  1) Sit <> Stand: (2)  normal  2) Rise to Toes: (1)  Able to raise higher with hand assist  3) Stand on One Leg: (1) 1 L: 1-2 seconds 1 R: 2-3 seconds  4) Compensatory Stepping Correction-Forward: (1)  Multiple steps 5  5) Compensatory Stepping Correction-Backward: (0)  0 falls needs Max Assist  6) Compensatory Stepping Correction-Lateral: (0) 0 L: (0) 0 R: (0) 0  No steps bilaterally   7) Eyes Open, Firm Surface: (2) normal   8) Eyes Closed, Foam Surface: (1) holds 3-4 seconds   9) Incline, Eyes Closed: (1) falls back after 14 seconds   10) Change in Gait Speed: (1)    11) Walk with Head Turns-Horizontal: (1)  12) Walk with Pivot Turns: (1) 4+ steps  13) Step Over Obstacles: (1) slow gait speed and steps to hit box   14) TUG with Dual Tasks: (1) moderate see table      Balance Testing (FROM LAST PT Episode)  Functional test Score / AD if used  2020 Fall risk cutoff score Norms Score 10/19/2020      30 second sit<>stand 11     14 <8 high fall risk  9-12 mod risk 10  14   Timed up and go (TUG)  (seconds) Trial 1: 13.78  Trial 2: 11.59  Trial 3: 12.88  Av.75 sec   Trial 1: 12.76  Trial 2:  12.01  Trial 3: 11. 81  Av.2 >13 sec      Trial 1: 11.16  Trial 2:  11.12  Trial 3: 11.14  Av.14   Cognitive TUG (seconds) 18.35 sec   Task: Count down from 100 by 3's  - 100- 81 with 3-4 mistakes            15.56 seconds   100-91 (1 mistake) >15 sec 9-10 seconds  14.93   Comfortable gait speed 10M    6.54 seconds 11 steps   0.92 m/sec   <1.0 m/s 1.21 m/sec for 85-89 years old   5.98  seconds   Fast gait speed 10M    5.54 seconds 9 steps  1.08 m/sec      1.65 m/sec for 85-89 years old   4.80 seconds   4-square step test    Trial 1: 20.53 steps pn wood dowel on backwards step and nearly fell, mod+ assist from therapist   Trial 2: 13.85 sec did not strike any wood dowels     >15 sec    16.4  13.5      Avg 15   2 minute walk test    114.2 meters     118meters (with no assistive device, was focusing on arm swingbil)   144.1 meters for 80-85 years old men   126 meters          Treatment Today     TREATMENT MINUTES COMMENTS   Evaluation 31 Low complexity eval see above  Education on findings and recommendation for POC   Self-care/ Home management     Manual therapy     Neuromuscular Re-education 16 Reviewed large amplitude stepping standing exercises  - Forward with big arms x10 magaly  - to the side big arms x10 magaly  - Backwards arms up and back x10 bilaterally  CGA throughout stepping over walking stick, cuing for big stepping throughout    Staggered stance weight shift forward and back with arm swing and 1 UE support x30 magaly   Therapeutic Activity     Therapeutic Exercises 9 STS added to HEP performed in session 2x8 repetitions     Education on initiation of walking program to start to perform at home with walker   Gait training     Modality__________________                Total 56    Blank areas are intentional and mean the treatment did not include these items.           PT Evaluation Code: (Please list factors)  Patient History/Comorbidities: See PMH   Examination: see above, 4+ elements   Clinical Presentation: stable  Clinical Decision Making: low    Patient History/  Comorbidities Examination  (body structures and functions, activity limitations, and/or participation restrictions) Clinical Presentation Clinical Decision Making (Complexity)   No documented Comorbidities or personal factors 1-2 Elements Stable and/or uncomplicated Low   1-2 documented comorbidities or personal factor 3 Elements  Evolving clinical presentation with changing characteristics Moderate   3-4 documented comorbidities or personal factors 4 or more Unstable and unpredictable High            Wilma Galvez PT, DPT  4/27/2021  12:46 PM

## 2021-06-17 NOTE — PROGRESS NOTES
[facility] Certification Request    May 2, 2018      Patient: Gomez Villasenor  MR Number: [id number]  YOB: 1928  Date of Visit: 5/2/2018      Dear Dr. Christiano Irvin:    Thank you for this referral.   We are seeing Gomez Villasenor in Physical Therapy for weakness/unsteady gait/balance.    Medicare and/or Medicaid requires physician review and approval of the treatment plan. Please review the plan of care and verify that you agree with the therapy plan of care by co-signing this note.      Plan of Care  Authorization / Certification Start Date: 05/02/18  Authorization / Certification End Date: 07/31/18  Authorization / Certification Number of Visits: 12  Communication with: Referral Source  Patient Related Instruction: Nature of Condition;Treatment plan and rationale;Self Care instruction;Basis of treatment;Body mechanics;Posture;Precautions;Next steps;Expected outcome  Times per Week: 1  Number of Weeks: 12  Number of Visits: 12  Therapeutic Exercise: Strengthening;Stretching  Neuromuscular Reeducation: balance/proprioception    Goals:  Pt. will be independent with home exercise program in : 6 weeks  Pt will: demonstrate improved balance: MCGREGOR >= 50/56  in 12 weeks for decreased risk of future falls.  Pt will: demonstrate improved balance: mCSITB x30 sec each condition in 12 weeks for decreased risk of future falls.  Pt will: demonstrate improved balance: FRT >= 10 inches in 12 weeks for decreased risk of future falls.  Pt will: demonstrate improved balance: SLS >= 5 sec bilat in 12 weeks for decreased risk of future falls.      If you have any questions or concerns, please don't hesitate to call.    Sincerely,      Roby Yepez, PT        Physician recommendation:     ___ Follow therapist's recommendation        ___ Modify therapy      *Physician co-signature indicates they certify the need for these services furnished within this plan and while under their care.    [facility]   Initial Evaluation    Patient Name: Gomez Villasenor  Date of evaluation: 5/2/2018  PRECAUTIONS: h/o frequent falls outside  Referral Diagnosis: Unsteadiness on feet  Referring provider: Christiano Irvin, *  Visit Diagnosis:     ICD-10-CM    1. Unsteadiness on feet R26.81    2. History of fall within past 90 days Z91.81        Assessment:      Pt. is appropriate for skilled PT intervention as outlined in the Plan of Care (POC).  Pt. is a good candidate for skilled PT services to improve pain levels and function.  Based on falls assessment, patient is at an increased risk for falling. Plan of care will address this risk with lower extremity strengthening and balance training.  Goals and POC established in collaboration with the patient.  Based on below examination, patient demonstrates greatest impairment with activation of ankle strategies, which often cause him to fall forwards when walking outside.    Goals:  Pt. will be independent with home exercise program in : 6 weeks  Pt will: demonstrate improved balance: MCGREGOR >= 50/56  in 12 weeks for decreased risk of future falls.  Pt will: demonstrate improved balance: mCSITB x30 sec each condition in 12 weeks for decreased risk of future falls.  Pt will: demonstrate improved balance: FRT >= 10 inches in 12 weeks for decreased risk of future falls.  Pt will: demonstrate improved balance: SLS >= 5 sec bilat in 12 weeks for decreased risk of future falls.    Patient's expectations/goals are realistic.    Barriers to Learning or Achieving Goals:  Age.        Plan / Patient Instructions:        Plan of Care:   Authorization / Certification Start Date: 05/02/18  Authorization / Certification End Date: 07/31/18  Authorization / Certification Number of Visits: 12  Communication with: Referral Source  Patient Related Instruction: Nature of Condition;Treatment plan and rationale;Self Care instruction;Basis of treatment;Body  "mechanics;Posture;Precautions;Next steps;Expected outcome  Times per Week: 1  Number of Weeks: 12  Number of Visits: 12  Therapeutic Exercise: Strengthening;Stretching  Neuromuscular Reeducation: balance/proprioception    Plan for next visit: Plan to follow-up 1x/week for 4-6 weeks, then reassess need for further PT. Add TB rows with staggered stance and walking lunges to HEP.     Subjective:       History of Present Illness:    Gomez is a 89 y.o. male who presents to therapy today with complaints of unsteadiness on his feet.  Reports several falls within the last year. Notes to often trip over something, usually outside.  Feels partly related to forward bent posture.  Lives in 3-story house with 1-2 steps without handrail to enter. Inside stairs with handrails.  Of note, fell 3 times yesterday outside in the yard. Denies injury with falls. Able to pick himself back up without too much difficulty.    Pt seeks PT to \"improve balance and posture.\"     Objective:      Patient Outcome Measures :    Lower Extremity Functional Scale (_/80): 64   Scores range from 0-80, where a score of 80 represents maximum function. The minimal clinically important difference is a positive change of 9 points.    Examination  1. Unsteadiness on feet     2. History of fall within past 90 days       Involved side: Bilateral  Posture Observation:      General sitting posture is  fair.  General standing posture is fair.  Shoulder/Thoracic complex: Moderately increased upper thoracic kyphosis, partially correctable with cueing.    TU sec, 11 sec x2 trials without AD.  APTA: 12x without UE A.  Functional Reach Test: 7.5 inches  Balance Assessment:   Rhomberg - Eyes Open:  30 seconds  Rhomberg - Eyes Closed:  30 seconds  Rhomberg - Perturbed Surface with Eyes Open:  30 seconds  Rhomberg - Perturbed Surface with Eyes Closed:  5 seconds  Sharpened Rhomberg - Eyes Open:  R in front 30 seconds; L in front 5 seconds  Single Leg Stance:  0 " seconds     MCGREGOR/56        Treatment Today     TREATMENT MINUTES COMMENTS   Evaluation 40    Self-care/ Home management     Manual therapy     Neuromuscular Re-education 15 -Discussed therapy diagnosis, prognosis, POC, basis for tx.  -Reviewed and performed HEP with handouts provided.   Therapeutic Activity     Therapeutic Exercises     Gait training     Modality__________________                Total 55    Blank areas are intentional and mean the treatment did not include these items.            PT Evaluation Code: (Please list factors)  Patient History/Comorbidities: age  Examination: decreased balance  Clinical Presentation: Stable  Clinical Decision Making: Low    Patient History/  Comorbidities Examination  (body structures and functions, activity limitations, and/or participation restrictions) Clinical Presentation Clinical Decision Making (Complexity)   No documented Comorbidities or personal factors 1-2 Elements Stable and/or uncomplicated Low   1-2 documented comorbidities or personal factor 3 Elements Evolving clinical presentation with changing characteristics Moderate   3-4 documented comorbidities or personal factors 4 or more Unstable and unpredictable High Roby Yepez  2018  11:44 AM

## 2021-06-17 NOTE — PROGRESS NOTES
[facility] Daily Progress     Patient Name: Gomez Villasenor  Date: 2018  Visit #: 2  Referral Diagnosis: vertigo  Referring provider: Christiano Irvin, *  Visit Diagnosis:     ICD-10-CM    1. Dizziness R42    2. Unsteadiness on feet R26.81    3. Nausea R11.0          Assessment:     Patient demonstrates understanding/independence with home program. He would benefit from PT for LE strength and gait/balance exercises. Patient is set up for a PT evaluation.    Goal Status:  Patient will be able to walk: on uneven/inclined surfaces;without loss of balance;in 12 weeks  Patient will bend: to dress;to clean;without dizziness;without loss of balance;in 12 weeks  Patient will turn head: without dizziness;for watching traffic;for conversation;in 12 weeks  Patient will look up / down: without dizziness;for drinking;for reading;in 12 weeks    Plan / Patient Education:     Continue with initial plan of care. Progress with home program as tolerated.    Subjective:     Pain Ratin    Objective:       Subjective progress relative to last visit:  Intensity of dizziness is:  less  Frequency of dizziness is: less  Duration of dizziness is: less  Balance is:  better    Positional Tests:  Hallpike Right:  NT  Hallpike Left:  NT    Treatment Today:  X1 viewing-60 seconds-continue and decrease to once a day  Gait with head motion-continue and decrease to once a day  Normal surface eyes closed-continue and decrease to once a day  TREATMENT MINUTES COMMENTS   Evaluation     Self-care/ Home management     Neuromuscular Re-education 19    Canalith repositioning procedure           Total 19    Blank areas are intentional and mean the treatment did not include these items.          Avril Worrell  2018  1:09-1:28 PM

## 2021-06-17 NOTE — PROGRESS NOTES
[facility] Rehabilitation Daily Progress Note    Patient Name: Gomez Villasenor  Date: 5/17/2021  Visit #: 2  PTA visit #:    Referral Diagnosis: Parkinson disease (H)  Referring provider: Carroll Chaudhari MD  Visit Diagnosis:     ICD-10-CM    1. Generalized muscle weakness  M62.81    2. Parkinson disease (H)  G20    3. Postural instability  R29.3    4. Bradykinesia  R25.8    5. Gait instability  R26.81        Assessment from Initial Evaluation:     Gomez Villasenor is a 89 y.o. male with PMH Parkinson's Disease who presents to therapy today with chief complaints of unsteadiness with walking and balance, onset January 2020.  On exam pt demonstrates worsening balance objective measures and decreased tolerance to standing and walking with stability since last participating in physical therapy. Falls about once every 1-2 months since last seen, with most recent fall being three days ago.  Pt is most functionally limited with turning, getting out of a chair and walking.  Patient is testing at risk of falls according to 30sec STS, TUG, MiniBest. Pt would benefit from skilled PT to decrease risk of falling and improve functional mobility to maintain independence through daily activities. Recommend 6-8 PT appointments to get back into large amplitude movements and work on balance and increased activity/walking.        Precautions / Restrictions : falls risk, parkinson's      Assessment:   Patient seen for first follow up appointment. Good tolerance to stepping exercises and balance training this session. Focused on large amplitude movements and dynamic balance. Patient is limited by some depression, is encouraged with coming to therapy and is looking into joining some parkinson's groups. No falls since last seen.  HEP/POC compliance is  good .  Patient is benefitting from skilled physical therapy and is making steady progress toward functional goals.  Patient is appropriate to continue with skilled physical  therapy intervention, as indicated by initial plan of care.    Goal Status:  Pt. will demonstrate/verbalize independence in self-management of condition in : 6 weeks  Pt. will be independent with home exercise program in : 6 weeks    Pt will: improve 30 sed STS, and TUG testing to decrease risk of falling and improve functional strength and mobility, within 8 visits.  Pt will: report having a consistent walking program without being limited by fatigue, and  improve 2min walk test, within 8 weeks.      Plan / Patient Education:     Continue with initial plan of care.  Progress with home program as tolerated.  Plan for next visit: Review large amplitude movements, STS, balance and turning, work on increasing walking through program  Subjective:     Pain Rating: no pain    Patient reports that he is noticing that things are progressing. End of the day he is more unstable, depends on the day. Has continued to do exercises, five days a week seated ones, most days stepping (maybe 3 times per week).     Has been a little down cognitively, disappointing. Does like to do yard work but it has been difficult lately. Walks in morning for paper but then sometimes add one in afternoon.     Wife reports that they increased medications but she thinks it did not help at all.     Objective:     Therapeutic Exercise:  Total Minutes: 13    Nu Step WL 5, 10 minutes for rotational mobility and endurance training.     STS 2x10 repetitions    Neuro-Reeducation/Balance:  Total Minutes: 40  Multidirectional Repetitive Movements.  8-16reps  x 1-2 sets    Step and Reach:   UE support: none Chairs Nearby: two      Exercise   Reps   Sets   Effort     2A Forward Step and Reach   10 2 9     2B Sideward Step and Reach 10  2 9     2C Backward Step and Reach 10 2 9     Comments: Patient needing cuing for increasing step height, responds well to cuing. CGA throughout performance. Patient was performing a little different at home, corrected.     Rock  and Reach:  NOT TODAY      Exercise   Reps   Sets   Effort     2D Left leg Forward/Backward          2D Right leg Forward/Backward        2E Side to Side        Comments:   Other:     STS walking 10 feet to chair, tuning, and sitting and repeat x20.     Forward marching with holding blue weighted ball and backwards walking 2x5 10 feet repetitions  Side stepping holding blue weighted ball 2x5 10 feet repetitions  (to help with patient goal of being able to walk while holding on to a plate and other things)    Gait Training:   Total Minutes:       Therapeutic Activity          Treatment Today     TREATMENT MINUTES COMMENTS   Evaluation     Self-care/ Home management     Manual therapy     Neuromuscular Re-education 40 See above   Therapeutic Activity     Therapeutic Exercises 14 See above   Gait training     Modality__________________                Total 54    Blank areas are intentional and mean the treatment did not include these items.       Wilma Galvez, PT, DPT  5/17/2021

## 2021-06-17 NOTE — PROGRESS NOTES
Assessment / Impression     1. Parkinson disease (H)     2. Fatigue, unspecified type  Comprehensive Metabolic Panel    HM1(CBC and Differential)    Thyroid Cascade    Electrocardiogram Perform - Clinic    Ambulatory referral to Cardiology    Ambulatory referral to Psychiatry   3. Mild major depression (H)  Ambulatory referral to Psychiatry   4. Sinus bradycardia  Electrocardiogram Perform - Clinic    Ambulatory referral to Cardiology         Plan:     Parkinson's disease: He does have an appointment scheduled with [facility].  I think at this point would be a good time for him to follow-up with Dr. Chaudhari and see if he can see his neurologist in clinic, and then he may also go ahead and see neurology at [facility].    In regard to his fatigue, discussed with patient it may be related to general aging, also possibly relating to his depression, though would recommend we check labs as noted above.  He does normally have a lower heart rate in the 50s, though today he does have a heart rate in the 48.  It is possible this may be causing some of his fatigue too.  Recommend referral to cardiology to determine whether bradycardia related to his fatigue.    Depression: We have tried a few medications including Lexapro and Zoloft, neither which have been particularly helpful for patient.  Did not want to consider mirtazapine, as patient already has fatigue.  Recommend referral to psychiatry for further medication options.        Return in about 6 weeks (around 6/21/2021).      40 minutes spent on the date of the encounter doing chart review, patient visit, documentation and discussion with other provider(s)     Subjective:      HPI: Gomez Villasenor is a 89 y.o. male, here today with wife who presents for follow-up of general concerns including Parkinson's disease, mood, and fatigue.    Patient had been following Dr. [facility]audh[facility] who has been managing his Sinemet, however patient  states that he has had one in person visit with neurology and the remaining visits have been virtual visits.  He feels that his balance is gotten worse.  Continues to feel fatigued.    In regard to his fatigue, he is noting that he is needing to nap at least once, sometimes twice per day.  Denies any shortness of breath or difficulty breathing.    From a mood perspective, feels things about the same, not much improvement from his previous depression.  He has had approximately 10 pound weight loss since his last visit with me in the fall, though he does feel that he is eating well.  He does not feel that he has gotten more tired with adding sertraline.      Medical History:     Patient Active Problem List   Diagnosis     Prostate Cancer     Nephrolithiasis     Macular Degeneration     Hypothyroid     Leukopenia     Fatigue     Dysthymia     Parkinson disease (H)     Mild major depression (H)       Past Medical History:   Diagnosis Date     Cancer (H)     prostate     Disease of thyroid gland     hypothyroid     Hearing loss      Kidney stones 3/17/2015    this is third episode of stones     Macular degeneration      Nephrolithiasis 2015    right sided/ureteroscopic removal     Prostate cancer (H)      Status post cystoscopy March 2015    with ureteroscopy and stone removal     Urinary incontinence        Past Surgical History:   Procedure Laterality Date     CYSTOURETHROSCOPY      Right stent exchange and stone extraction     EYE SURGERY Right     cataract removed     INSERT / REPLACE / REMOVE PROSTHESIS URETHRAL SPHINCTER N/A 9/21/2016    Procedure:  REPLACEMENT OF ARTIFICIAL URINARY SPHINCTER;  Surgeon: Stevie Braxton MD;  Location: [facility];  Service:      OTHER SURGICAL HISTORY      artificial urethral sphincter     SC CYSTOURETHROSCOPY      Description: Cystoscopy (Diagnostic);  Recorded: 11/07/2008;     SC REMV PROSTATE,RETROPUB,RADICAL      Description: Prostatectomy Retropubic Radical With  "Nerve Sparing;  Recorded: 11/10/2009;  Comments:  artificial sphinter placed       Current Medications:     Current Outpatient Medications   Medication Sig     carbidopa-levodopa (SINEMET)  mg per tablet Take 3 tablets by mouth 4 (four) times a day.      cholecalciferol, vitamin D3, 1,000 unit tablet Take 2,000 Units by mouth daily.      GLUCOSAMINE/CHONDROITIN SULF A (GLUCOSAMINE-CHONDROITIN ORAL) Take 2 tablets by mouth daily.      levothyroxine (SYNTHROID, LEVOTHROID) 112 MCG tablet Take 1 tablet (112 mcg total) by mouth daily.     multivitamin (ONE A DAY) per tablet Take 1 tablet by mouth.     sertraline (ZOLOFT) 50 MG tablet TAKE 1 &1/2 TABLETS (75 MG) BY MOUTH ONCE DAILY     vit A/vit C/vit E/zinc/copper (PRESERVISION AREDS ORAL) Take by mouth.     lutein 20 mg Tab Take 1 tablet by mouth daily.       Family History:     Family History   Problem Relation Age of Onset     Heart disease Mother      No Medical Problems Father      Leukemia Brother      Cancer Brother        Review of Systems  All other systems reviewed and are negative.         Social History:     Social History     Tobacco Use   Smoking Status Never Smoker   Smokeless Tobacco Never Used     Social History     Social History Narrative    . Tanya. 3 grown children and several grandkids. Retired dentist/  World traveller. Exercise enthusiast. Identical twin Brother Alonso Faust  age 83 CLL/Cancer/ICH... Non smoker, occ beer/          Objective:     /73 (Patient Site: Left Arm, Patient Position: Sitting, Cuff Size: Adult Regular)   Pulse (!) 48   Ht 5' 8.5\" (1.74 m)   Wt 133 lb 12.8 oz (60.7 kg)   BMI 20.05 kg/m    Physical Examination: General appearance - alert, well appearing, and in no distress  Eyes: extraocular eye movements intact  Neck: supple, no significant adenopathy or thyromegaly  Lungs: clear to auscultation, no wheezes, rales or rhonchi, symmetric air entry  Heart: bradycardic, normal S1, S2, no " murmurs.  Abdomen: soft, nontender, nondistended, no masses or organomegaly  Neurological: alert, oriented, normal speech.  Mild shuffling gait.  Extremities: No edema, no clubbing or cyanosis  Psychiatric: Normal affect. Does not appear anxious or depressed.    Recent Results (from the past 168 hour(s))   HM1 (CBC with Diff)   Result Value Ref Range    WBC 3.1 (L) 4.0 - 11.0 thou/uL    RBC 3.83 (L) 4.40 - 6.20 mill/uL    Hemoglobin 12.3 (L) 14.0 - 18.0 g/dL    Hematocrit 38.3 (L) 40.0 - 54.0 %     80 - 100 fL    MCH 32.1 27.0 - 34.0 pg    MCHC 32.1 32.0 - 36.0 g/dL    RDW 14.6 (H) 11.0 - 14.5 %    Platelets 153 140 - 440 thou/uL    MPV 9.6 7.0 - 10.0 fL    Neutrophils % 73 (H) 50 - 70 %    Lymphocytes % 13 (L) 20 - 40 %    Monocytes % 13 (H) 2 - 10 %    Eosinophils % 1 0 - 6 %    Basophils % 0 0 - 2 %    Immature Granulocyte % 0 <=0 %    Neutrophils Absolute 2.3 2.0 - 7.7 thou/uL    Lymphocytes Absolute 0.4 (L) 0.8 - 4.4 thou/uL    Monocytes Absolute 0.4 0.0 - 0.9 thou/uL    Eosinophils Absolute 0.0 0.0 - 0.4 thou/uL    Basophils Absolute 0.0 0.0 - 0.2 thou/uL    Immature Granulocyte Absolute 0.0 <=0.0 thou/uL     EKG: by my interpretation, sinus bradycardia at 48bpm, otherwise normal (Cardiology read pending)      Modesta Crorales MD  5/10/2021  4:23 PM  Disclaimer: This note was dictated using speech recognition software. Minor errors in transcription may be present.

## 2021-06-17 NOTE — PATIENT INSTRUCTIONS - HE
Patient Instructions by Modesta Corrales MD at 3/26/2019 10:10 AM     Author: Modesta Corrales MD Service: -- Author Type: Physician    Filed: 3/26/2019 10:06 AM Encounter Date: 3/26/2019 Status: Signed    : Modesta Corrales MD (Physician)         Patient Education   Signs of Hearing Loss  Hearing loss is a problem shared by many people. In fact, it is one of the most common health conditions, particularly as people age. Most people over age 65 have some hearing loss, and by age 80, almost everyone does. Because hearing loss usually occurs slowly over the years, you may not realize your hearing ability has gotten worse.       Have your hearing checked  Contact your [facility] care provider if you:    Have to strain to hear normal conversation.    Have to watch other peoples faces very carefully to follow what theyre saying.    Need to ask people to repeat what theyve said.    Often misunderstand what people are saying.    Turn the volume of the television or radio up so high that others complain.    Feel that people are mumbling when theyre talking to you.    Find that the effort to hear leaves you feeling tired and irritated.    Notice, when using the phone, that you hear better with 1 ear than the other.    2471-4712 [employer]. [address] [place] [address], [place] [id number]. All rights reserved. This information is not intended as a substitute for professional medical care. Always follow your healthcare professional's instructions.         Patient Education   Depression and Suicide in Older Adults  Nearly 2 million older Americans have some type of depression. Sadly, some of them even take their own lives. Yet depression among older adults is often ignored. Learn the warning signs. You may help spare a loved one needless pain. You may also save a life.       What Is Depression?  Depression is a mood disorder that affects the way you think and feel. The most common symptom is a feeling of deep sadness.  People who are depressed also may seem tired and listless. And nothing seems to give them pleasure. Its normal to grieve or be sad sometimes. But sadness lessens or passes with time. Depression rarely goes away or improves on its own. Other symptoms of depression are:    Sleeping more or less than normal    Eating more or less than normal    Having headaches, stomachaches, or other pains that dont go away    Feeling nervous, empty, or worthless    Crying a great deal    Thinking or talking about suicide or death    Feeling confused or forgetful  What Causes It?  The causes of depression arent fully known. Certain chemicals in the brain play a role. Depression does run in families. And life stresses can also trigger depression in some people. That may be the case with older adults. They often face great burdens, such as the death of friends or a spouse. They may have failing health. And they are more likely to be alone, lonely, or poor.  How You Can Help  Often, depressed people may not want to ask for help. When they do, they may be ignored. Or, they may receive the wrong treatment. You can help by showing parents and older friends love and support. If they seem depressed, help them find the right treatment. Talk to your doctor. Or contact a local mental health center, social service agency, or hospital. With modern treatment, no one has to suffer from depression.  Resources:    National [place] of Mental Health  421.538.5618  www.nimh.nih.gov    National [place] on Mental Illness  104.508.6924  www.dimas.org    [facility] [place]  709.238.1746  www.[facility].org    National Suicide Hotline  395.626.5149 (800-SUICIDE)      2906-3431 [employer]. [address] [place] [address]. All rights reserved. This information is not intended as a substitute for professional medical care. Always follow your healthcare professional's instructions.         Patient Education   Preventing Falls in the  Home  As you get older, falls are more likely. Thats because your reaction time slows. Your muscles and joints may also get stiffer, making them less flexible. Illness, medications, and vision changes can also affect your balance. A fall could leave you unable to live on your own. To make your home safer, follow these tips:    Floors    Put nonskid pads under area rugs.    Remove throw rugs.    Replace worn floor coverings.    Tack carpets firmly to each step on carpeted stairs. Put nonskid strips on the edges of uncarpeted stairs.    Keep floors and stairs free of clutter and cords.    Arrange furniture so there are clear pathways.    Clean up any spills right away.    Bathrooms    Install grab bars in the tub or shower.    Apply nonskid strips or put a nonskid rubber mat in the tub or shower.    Sit on a bath chair to bathe.    Use bathmats with nonskid backing.    Lighting    Keep a flashlight in each room.    Put a nightlight along the pathway between the bedroom and the bathroom.    9705-5204 [employer]. [address] [place] [address]. All rights reserved. This information is not intended as a substitute for professional medical care. Always follow your healthcare professional's instructions.           Advance Directive  Patients advance directive was discussed and I am comfortable with the patients wishes.  Patient Education   Personalized Prevention Plan  You are due for the preventive services outlined below.  Your care team is available to assist you in scheduling these services.  If you have already completed any of these items, please share that information with your care team to update in your medical record.  Health Maintenance   Topic Date Due   ? ZOSTER VACCINES (2 of 3) 01/27/2009   ? TD 18+ HE  11/10/2019   ? DEPRESSION FOLLOW UP  08/07/2019   ? FALL RISK ASSESSMENT  02/07/2020   ? ADVANCE DIRECTIVES DISCUSSED WITH PATIENT  03/22/2023   ? PNEUMOCOCCAL POLYSACCHARIDE VACCINE  AGE 65 AND OVER  Completed   ? INFLUENZA VACCINE RULE BASED  Completed   ? PNEUMOCOCCAL CONJUGATE VACCINE FOR ADULTS (PCV13 OR PREVNAR)  Completed

## 2021-06-17 NOTE — PATIENT INSTRUCTIONS - HE
Patient Instructions by Roby Yepez PT at 6/21/2019 12:00 PM     Author: Roby Yepez PT Service: -- Author Type: Physical Therapist    Filed: 6/21/2019 12:34 PM Encounter Date: 6/21/2019 Status: Signed    : Roby Yepez PT (Physical Therapist)        STANDING MARCHING    While standing, draw up your knee, set it down and then alternate to your other side.    Use your arms for support if needed for balance and safety.     SLOW and controlled.  15x each leg.  1x/day.      STANDING HEEL RAISES    While standing, raise up on your toes as you lift your heels off the ground,  Then go back on your heels and lift your toes off the ground.    15x each direction.  1x/day.

## 2021-06-18 NOTE — PATIENT INSTRUCTIONS - HE
Patient Instructions by Christine Austin PT at 12/9/2020 11:15 AM     Author: Christine Austin PT Service: -- Author Type: Physical Therapist    Filed: 12/9/2020 12:09 PM Encounter Date: 12/9/2020 Status: Signed    : Christine Austin PT (Physical Therapist)        SINGLE KNEE TO CHEST STRETCH - SKTC    While Lying on your back,  hold your knee and gently pull it up towards your chest.    Alternate:    Hold 30 seconds x 2 each side        LOWER TRUNK ROTATIONS - LTR    Lying on your back with your knees bent, gently move your knees side-to-side.    Hold 5-10 seconds  x5-10 repetitions  1-2 sets      PELVIC TILT    While lying on your back, use your stomach muscles to press your spine downwards towards the ground. Do not move into a painful position.    Hold 5 seconds  x10-15 repetitions  1-2 sets

## 2021-06-18 NOTE — PATIENT INSTRUCTIONS - HE
Patient Instructions by Modesta Corrales MD at 11/30/2020 10:00 AM     Author: Modesta Corrales MD Service: -- Author Type: Physician    Filed: 11/30/2020 10:28 AM Encounter Date: 11/30/2020 Status: Addendum    : Modesta Corrales MD (Physician)    Related Notes: Original Note by Modesta Corrales MD (Physician) filed at 11/30/2020 10:18 AM         Patient Education   Signs of Hearing Loss  Hearing loss is a problem shared by many people. In fact, it is one of the most common health conditions, particularly as people age. Most people over age 65 have some hearing loss, and by age 80, almost everyone does. Because hearing loss usually occurs slowly over the years, you may not realize your hearing ability has gotten worse.       Have your hearing checked  Contact your [facility] care provider if you:    Have to strain to hear normal conversation.    Have to watch other peoples faces very carefully to follow what theyre saying.    Need to ask people to repeat what theyve said.    Often misunderstand what people are saying.    Turn the volume of the television or radio up so high that others complain.    Feel that people are mumbling when theyre talking to you.    Find that the effort to hear leaves you feeling tired and irritated.    Notice, when using the phone, that you hear better with 1 ear than the other.    5768-6408 [employer]. [address] [place] [address]. All rights reserved. This information is not intended as a substitute for professional medical care. Always follow your healthcare professional's instructions.         Patient Education   Urinary Incontinence (Male)    Urinary incontinence means not being able to control the release of urine from the bladder.  Causes  Common causes of urinary incontinence in men include:    Infection    Certain medicines    Aging    Poor pelvic muscle tone    Bladder spasms    Obesity    Urinary retention  Nervous system diseases, diabetes, sleep  apnea, urinary tract infections, prostate surgery, and pelvic trauma can also cause incontinence. Constipation and smoking have also been identified as risk factors.  Symptoms    Urge incontinence (also called overactive bladder) is a sudden urge to urinate even though there may not be much urine in the bladder. The need to urinate often during the night is common. It is due to bladder spasms.    Stress incontinence is involuntary urine leakage that can occur with sneezing, coughing, and other actions that put stress on the bladder.  Treatment  Treatment of urinary incontinence depends on the cause. Infections of the bladder are treated with antibiotics. Urinary retention is treated with a bladder catheter.  Home care  Follow these guidelines when caring for yourself at home:    Don't consume foods and drinks that may irritate the bladder. These include drinks containing alcohol, caffeinate, or carbonation; chocolate; and acidic fruits and juices.    Limit fluid intake to 6 to 8 cups a day.    Lose weight if you are overweight. This will reduce your symptoms.    If needed, wear absorbent pads to catch urine. Change pads frequently to maintain hygiene and prevent skin and bladder infections.    Bathe daily to maintain good hygiene.    If an antibiotic was prescribed to treat a bladder infection, be sure to take it until finished, even if you are feeling better before then. This is to make sure your infection has cleared.    If a catheter was left in place, it is important to keep bacteria from getting into the collection bag. Don't disconnect the catheter from the collection bag.    Use a leg band to secure the catheter drainage tube, so it does not pull on the catheter. Drain the collection bag when it becomes full using the drain spout at the bottom of the bag. Don't disconnect the bag from the catheter.    Don't pull on or try to remove a catheter. The catheter must be removed by a healthcare provider.  Follow-up  care  Follow up with your healthcare provider, or as advised.  When to seek medical advice  Call your healthcare provider right away if any of these occur:    Fever over 100.4 F (38 C), or as directed by your healthcare provider    Bladder pain or fullness    Abdominal swelling, nausea, or vomiting    Back pain    Weakness, dizziness, or fainting    If a catheter was left in place, return if:  ? Catheter falls out  ? Catheter stops draining for 6 hours  Date Last Reviewed: 10/1/2017    2559-0669 [employer]. [address] [place] [address]. All rights reserved. This information is not intended as a substitute for professional medical care. Always follow your healthcare professional's instructions.     Patient Education   Depression and Suicide in Older Adults  Nearly 2 million older Americans have some type of depression. Sadly, some of them even take their own lives. Yet depression among older adults is often ignored. Learn the warning signs. You may help spare a loved one needless pain. You may also save a life.       What Is Depression?  Depression is a mood disorder that affects the way you think and feel. The most common symptom is a feeling of deep sadness. People who are depressed also may seem tired and listless. And nothing seems to give them pleasure. Its normal to grieve or be sad sometimes. But sadness lessens or passes with time. Depression rarely goes away or improves on its own. Other symptoms of depression are:    Sleeping more or less than normal    Eating more or less than normal    Having headaches, stomachaches, or other pains that dont go away    Feeling nervous, empty, or worthless    Crying a great deal    Thinking or talking about suicide or death    Feeling confused or forgetful  What Causes It?  The causes of depression arent fully known. Certain chemicals in the brain play a role. Depression does run in families. And life stresses can also trigger depression in some  people. That may be the case with older adults. They often face great burdens, such as the death of friends or a spouse. They may have failing health. And they are more likely to be alone, lonely, or poor.  How You Can Help  Often, depressed people may not want to ask for help. When they do, they may be ignored. Or, they may receive the wrong treatment. You can help by showing parents and older friends love and support. If they seem depressed, help them find the right treatment. Talk to your doctor. Or contact a local mental health center, social service agency, or hospital. With modern treatment, no one has to suffer from depression.  Resources:    [employer] [place] of Mental Health  832.779.5016  www.nimh.nih.gov    National [place] on Mental Illness  549.125.4935  www.dimas.org    [facility]  990.889.2851  www.nmha.org    National Suicide Hotline  436.937.3254 (800-SUICIDE)      3206-9673 [employer]. [address] [place] [address]. All rights reserved. This information is not intended as a substitute for professional medical care. Always follow your healthcare professional's instructions.         Patient Education   Preventing Falls in the Home  As you get older, falls are more likely. Thats because your reaction time slows. Your muscles and joints may also get stiffer, making them less flexible. Illness, medications, and vision changes can also affect your balance. A fall could leave you unable to live on your own. To make your home safer, follow these tips:    Floors    Put nonskid pads under area rugs.    Remove throw rugs.    Replace worn floor coverings.    Tack carpets firmly to each step on carpeted stairs. Put nonskid strips on the edges of uncarpeted stairs.    Keep floors and stairs free of clutter and cords.    Arrange furniture so there are clear pathways.    Clean up any spills right away.    Bathrooms    Install grab bars in the tub or shower.    Apply nonskid  strips or put a nonskid rubber mat in the tub or shower.    Sit on a bath chair to bathe.    Use bathmats with nonskid backing.    Lighting    Keep a flashlight in each room.    Put a nightlight along the pathway between the bedroom and the bathroom.    1626-3564 [employer]. [address] [place] [address]. All rights reserved. This information is not intended as a substitute for professional medical care. Always follow your healthcare professional's instructions.           Advance Directive  Patients advance directive was discussed and I am comfortable with the patients wishes.  Patient Education   Personalized Prevention Plan  You are due for the preventive services outlined below.  Your care team is available to assist you in scheduling these services.  If you have already completed any of these items, please share that information with your care team to update in your medical record.  Health Maintenance   Topic Date Due   ? DEPRESSION ACTION PLAN  09/22/1928   ? TD 18+ HE  11/10/2019   ? MEDICARE ANNUAL WELLNESS VISIT  03/26/2020   ? FALL RISK ASSESSMENT  11/30/2021   ? ADVANCE CARE PLANNING  03/26/2024   ? Pneumococcal Vaccine: 65+ Years  Completed   ? INFLUENZA VACCINE RULE BASED  Completed   ? ZOSTER VACCINES  Completed   ? Pneumococcal Vaccine: Pediatrics (0 to 5 Years) and At-Risk Patients (6 to 64 Years)  Aged Out   ? HEPATITIS B VACCINES  Aged Out           Meet with therapist monthly.

## 2021-06-18 NOTE — PATIENT INSTRUCTIONS - HE
"Patient Instructions by Christine Austin PT at 1/13/2021 11:15 AM     Author: Christine Austin PT Service: -- Author Type: Physical Therapist    Filed: 1/13/2021 11:42 AM Encounter Date: 1/13/2021 Status: Signed    : Christine Austin PT (Physical Therapist)        BRACE - BICYCLE    While lying on your back with your knees bent,  raise up both feet and straighten one out in front of you. Then return the leg back and straighten the other. Use your stomach muscles to keep your spine from moving.    x10-15 repetitions  1-2 sets      BRIDGING    While lying on your back, tighten your lower abdominals, squeeze your buttocks and then raise your buttocks off the floor/bed as creating a \"Bridge\" with your body.    Hold 5 seconds  x10-15 repetitions  1-2 sets              "

## 2021-06-18 NOTE — PROGRESS NOTES
[facility] Daily Progress     Patient Name: Gomez Villasenor  Date: 6/6/2018  Visit #: 4/12 through 8/2/18 ([facility])  PTA visit #:    PRECAUTIONS: h/o frequent falls outside  Referral Diagnosis: Unsteadiness on feet  Referring provider: Christiano Irvin, *  Visit Diagnosis:     ICD-10-CM    1. Unsteadiness on feet R26.81    2. History of fall within past 90 days Z91.81          Assessment:     HEP/POC compliance is  good .  Patient is benefitting from skilled physical therapy and is making steady progress toward functional goals.  Patient is appropriate to continue with skilled physical therapy intervention, as indicated by initial plan of care.    Goal Status:  Pt. will be independent with home exercise program in : 6 weeks. MET  Pt will: demonstrate improved balance: MCGREGOR >= 50/56  in 12 weeks for decreased risk of future falls. MET  Pt will: demonstrate improved balance: mCSITB x30 sec each condition in 12 weeks for decreased risk of future falls. MET  Pt will: demonstrate improved balance: FRT >= 10 inches in 12 weeks for decreased risk of future falls. MET  Pt will: demonstrate improved balance: SLS >= 5 sec bilat in 12 weeks for decreased risk of future falls. PROGRESSING    Plan / Patient Education:     Review/recheck tandem stance in 2 weeks and progress to SLS as able.  Review partial squats and lunges to ensure correct performance.    Subjective:     Doing well. Balance can fluctuate day to day, but no falls since last visit.  Working with a  1x/week.    Objective:     MCGREGOR 53/56 - significantly improved from 44/56 on IE.  TUG: 10 sec without AD, indicating relatively low risk for falls.  APTA: 12x, indicating relatively low risk for falls.  FRT: 10 inches, indicating relatively low risk for falls.  Balance Assessment:  Rhomberg - Eyes Open:  30 seconds  Rhomberg - Eyes Closed:  30 seconds  Rhomberg - Perturbed Surface with Eyes Open:  30 seconds  Rhomberg - Perturbed  Surface with Eyes Closed:  30 seconds  Sharpened Rhomberg - Eyes Open:  R in front 30 seconds; L in front 10 seconds  Single Leg Stance:  2 seconds bilat      Treatment Today     TREATMENT MINUTES COMMENTS   Evaluation     Self-care/ Home management     Manual therapy     Neuromuscular Re-education 35 Balance ex per flow sheet  Balance testing   Therapeutic Activity     Therapeutic Exercises     Gait training     Modality__________________                Total 35    Blank areas are intentional and mean the treatment did not include these items.       Roby Yepez  6/6/2018

## 2021-06-18 NOTE — PATIENT INSTRUCTIONS - HE
Patient Instructions by Wilma Galvez PT at 8/13/2020 11:30 AM     Author: Wilma Galvez PT Service: -- Author Type: Physical Therapist    Filed: 8/13/2020 12:21 PM Encounter Date: 8/13/2020 Status: Incomplete    : Wilma Galvez PT (Physical Therapist)        SIT TO STAND - NO HANDS    Start by sitting in a chair. Scoot to the edge of the seat.  Bend forward with nose over toes, then raise up to standing without using your hands for support. Bring your arms out wide BIG MOTION    x10-15 repetitions, 2-3 sets once per day

## 2021-06-18 NOTE — PROGRESS NOTES
[facility] Daily Progress     Patient Name: Gomez Villasenor  Date: 6/20/2018  Visit #: 5/12 through 8/2/18 ([facility])  PTA visit #:    PRECAUTIONS: h/o frequent falls outside  Referral Diagnosis: Unsteadiness on feet  Referring provider: Christiano Irvin, *  Visit Diagnosis:     ICD-10-CM    1. Unsteadiness on feet R26.81    2. History of fall within past 90 days Z91.81          Assessment:     HEP/POC compliance is  good .  Patient is benefitting from skilled physical therapy and is making steady progress toward functional goals.  Patient is appropriate to continue with skilled physical therapy intervention, as indicated by initial plan of care.    Goal Status:  Pt. will be independent with home exercise program in : 6 weeks. MET  Pt will: demonstrate improved balance: MCGREGOR >= 50/56  in 12 weeks for decreased risk of future falls. MET  Pt will: demonstrate improved balance: mCSITB x30 sec each condition in 12 weeks for decreased risk of future falls. MET  Pt will: demonstrate improved balance: FRT >= 10 inches in 12 weeks for decreased risk of future falls. MET  Pt will: demonstrate improved balance: SLS >= 5 sec bilat in 12 weeks for decreased risk of future falls. PROGRESSING    Plan / Patient Education:     Review/recheck tandem stance in 3 weeks and progress to SLS as able.  Continue per POC with LE strength and proprioceptive training.    Subjective:     Reports to have one fall since last visit. This was yesterday while practicing tandem stance. Notes to have been feeling more confident, so he was not in his usual set-up, fell backwards into a folding door. Notes some discomfort in his mid back area, but no sharp, shooting pain. PT encouraged use of heat or ice regularly, but to contact PCP if any pain still persists beyond one week.    Objective:     Sharpened Rhomberg - Eyes Open:  R in front 30 seconds; L in front 10 seconds  Single Leg Stance:  2 seconds on R, 10 sec on  L    Treatment Today     TREATMENT MINUTES COMMENTS   Evaluation     Self-care/ Home management     Manual therapy     Neuromuscular Re-education 31 Balance ex per flow sheet   Therapeutic Activity     Therapeutic Exercises     Gait training     Modality__________________                Total 31    Blank areas are intentional and mean the treatment did not include these items.       Roby Yepez  6/20/2018

## 2021-06-18 NOTE — PROGRESS NOTES
[facility] Daily Progress     Patient Name: Gomez Villasenor  Date: 5/16/2018  Visit #: 2/12 through 8/2/18 ([facility])  PTA visit #:    PRECAUTIONS: h/o frequent falls outside  Referral Diagnosis: Unsteadiness on feet  Referring provider: Christiano Irvin, *  Visit Diagnosis:     ICD-10-CM    1. Unsteadiness on feet R26.81    2. History of fall within past 90 days Z91.81          Assessment:     HEP/POC compliance is  good .  Patient is appropriate to continue with skilled physical therapy intervention, as indicated by initial plan of care.    Goal Status:  Pt. will be independent with home exercise program in : 6 weeks  Pt will: demonstrate improved balance: MCGREGOR >= 50/56  in 12 weeks for decreased risk of future falls.  Pt will: demonstrate improved balance: mCSITB x30 sec each condition in 12 weeks for decreased risk of future falls.  Pt will: demonstrate improved balance: FRT >= 10 inches in 12 weeks for decreased risk of future falls.  Pt will: demonstrate improved balance: SLS >= 5 sec bilat in 12 weeks for decreased risk of future falls.    Plan / Patient Education:     Continue per POC, with emphasis on LE strength and proprioceptive training to improve balance and decrease risk of future falls.    Subjective:     No falls since last visit, even with working outside.  Doing HEP 1x/day most days; some days unable to get to due to fatigue.  Pt reports safe environmental set-up for HEP, with wife alongside as well during performance.    Objective:     Reviewed HEP with cueing for correct performance. Mild to moderate sway with occasional LOB during FT on pillow ex.  Added OTB rows and walking on heels- and toes to HEP.    Treatment Today     TREATMENT MINUTES COMMENTS   Evaluation     Self-care/ Home management     Manual therapy     Neuromuscular Re-education 32 Balance ex per flow sheet   Therapeutic Activity     Therapeutic Exercises     Gait training     Modality__________________                 Total 32    Blank areas are intentional and mean the treatment did not include these items.       Roby Yepez  5/16/2018

## 2021-06-18 NOTE — PATIENT INSTRUCTIONS - HE
Patient Instructions by Christine Austin PT at 12/30/2020 11:15 AM     Author: Christine Austin PT Service: -- Author Type: Physical Therapist    Filed: 12/30/2020 11:42 AM Encounter Date: 12/30/2020 Status: Signed    : Christine Austin PT (Physical Therapist)        BRACE - SINGLE KNEE EXTENSION    While lying on your back with knees bent, straighten out one knee while keeping the leg off the ground. Hold as indicated, then return to original position. Next, perform on the other leg.    Use your stomach muscles to keep your spine from moving the entire time.    x10-15 repetitions  1-2 sets

## 2021-06-18 NOTE — PROGRESS NOTES
[facility] Discharge Summary  Patient Name: Gomez Villasenor  Date: 5/15/2018  Referral Diagnosis: vertigo  Referring provider: Christiano Irvni, *  Visit Diagnosis:   1. Dizziness     2. Unsteadiness on feet     3. Nausea         Goal status: Goals met  Patient will be able to walk: on uneven/inclined surfaces;without loss of balance;in 12 weeks  Patient will bend: to dress;to clean;without dizziness;without loss of balance;in 12 weeks  Patient will turn head: without dizziness;for watching traffic;for conversation;in 12 weeks  Patient will look up / down: without dizziness;for drinking;for reading;in 12 weeks    Patient was seen for 2 visits between 3-27-18 and 4-12-18.    Therapy will be discontinued at this time.  The patient will need a new referral to resume.    Thank you for your referral.  Avril Worrell  5/15/2018  10:16 AM

## 2021-06-18 NOTE — PROGRESS NOTES
[facility] Daily Progress     Patient Name: Gomez Villasenor  Date: 5/23/2018  Visit #: 3/12 through 8/2/18 ([facility])  PTA visit #:    PRECAUTIONS: h/o frequent falls outside  Referral Diagnosis: Unsteadiness on feet  Referring provider: Christiano Irvin, *  Visit Diagnosis:     ICD-10-CM    1. Unsteadiness on feet R26.81    2. History of fall within past 90 days Z91.81          Assessment:     HEP/POC compliance is  good .  Patient is appropriate to continue with skilled physical therapy intervention, as indicated by initial plan of care.    Goal Status:  Pt. will be independent with home exercise program in : 6 weeks  Pt will: demonstrate improved balance: MCGREGOR >= 50/56  in 12 weeks for decreased risk of future falls.  Pt will: demonstrate improved balance: mCSITB x30 sec each condition in 12 weeks for decreased risk of future falls.  Pt will: demonstrate improved balance: FRT >= 10 inches in 12 weeks for decreased risk of future falls.  Pt will: demonstrate improved balance: SLS >= 5 sec bilat in 12 weeks for decreased risk of future falls.    Plan / Patient Education:     Continue per POC, with emphasis on LE strength and proprioceptive training to improve balance and decrease risk of future falls.    Subjective:     Doing HEP 1x/day most days; some days unable to get to due to fatigue.  Reports one fall since last visit: was working outside and fell into the compost pile. Denies injury.    Objective:     CGA provided for all dynamic standing balance ex.  Encouraged to practice short bursts of EC while standing with FT on pillow.    Treatment Today     TREATMENT MINUTES COMMENTS   Evaluation     Self-care/ Home management     Manual therapy     Neuromuscular Re-education 32 Balance ex per flow sheet   Therapeutic Activity     Therapeutic Exercises     Gait training     Modality__________________                Total 32    Blank areas are intentional and mean the treatment did not include  these items.       Roby Yepez  5/23/2018

## 2021-06-18 NOTE — PATIENT INSTRUCTIONS - HE
Patient Instructions by Christine Austin PT at 12/16/2020  9:45 AM     Author: Christine Austin PT Service: -- Author Type: Physical Therapist    Filed: 12/16/2020 10:13 AM Encounter Date: 12/16/2020 Status: Signed    : Christine Austin PT (Physical Therapist)        BRACE MARCHING    While lying on your back with your knees bent,  slowly raise up one foot a few inches and then set it back down.  Next, perform on your other leg.  Use your stomach muscles to keep your spine from moving.    x10-15 repetitoins  1-2 sets  Keep pelvis level  Concentrate on what your body is doing

## 2021-06-19 NOTE — LETTER
Letter by Seferino Ivan MD at      Author: Seferino Ivan MD Service: -- Author Type: --    Filed:  Encounter Date: 10/31/2019 Status: Signed       Dear Gomez Villasenor    Thank you for choosing [facility] for your care.  We are committed to providing you with the highest quality and compassionate healthcare services.  The following information pertains to your first appointment with our clinic.    Date/Time of appointment: Thursday, November 14, 2019 at 12:45 PM.    Note: Please arrive 15 minutes prior to your appointment time.  This allows time to complete forms, possible labs and nursing assessment.     Name of your Physician: Seferino Ivan MD    What to bring to your appointment:    Completed Patient History/Initial Nursing Assessment and Medication/Allergy List (these forms were sent to you).    Any paperwork or films from your physician that we have asked you to bring.    Your current insurance card(s).    Parking:    Please refer to the map included to direct you.  The [facility] is located at the [place] end of [facility] in [place], [place].      After turning onto [address] from [facility], take a right turn at the first stop sign.  We have designated parking on the left, identified as parking for Cancer Care patients (Lot D).     The Code to Enter Lot D is: 1101. This code changes monthly and will always coincide with the current month followed by 01. For example August will be 0801.  The month will continue to change but the 01 will remain constant.  If lot D is full please use Parking Lot A, directly across the street.    Please enter the Cancer Care Center on the north end of the \[facility]\"".  You will see a sign on the building.        For Hematology appointments, please take the elevator to the second floor to check in.     Also please note appointments can last 1.5-2 hours.      We hope these instructions are helpful to you.  If you have any questions or  concerns, please call us at [phone].  It is our pleasure to assist you.    Warm Regards,    Celeste Oliveira    [phone]

## 2021-06-19 NOTE — LETTER
Letter by Amrit Gutirerez MD at      Author: Amrit Gutierrez MD Service: -- Author Type: --    Filed:  Encounter Date: 11/1/2019 Status: Signed       Dear Gomez Villasenor    Thank you for choosing [facility] for your care.  We are committed to providing you with the highest quality and compassionate healthcare services.  The following information pertains to your first appointment with our clinic.    Date/Time of appointment: Friday, November 8, 2019 at 10:45 AM.    Note: Please arrive 15 minutes prior to your appointment time.  This allows time to complete forms, possible labs and nursing assessment.     Name of your Physician: Amrit Gutierrez MD    What to bring to your appointment:    Completed Patient History/Initial Nursing Assessment and Medication/Allergy List (these forms were sent to you).    Any paperwork or films from your physician that we have asked you to bring.    Your current insurance card(s).    Parking:    Please refer to the map included to direct you.  The [facility] is located at the [place] end of [facility] in [place], [place].      After turning onto [address] from [facility], take a right turn at the first stop sign.  We have designated parking on the left, identified as parking for Cancer Care patients (Lot D).     The Code to Enter Lot D is: 1101. This code changes monthly and will always coincide with the current month followed by 01. For example August will be 0801.  The month will continue to change but the 01 will remain constant.  If lot D is full please use Parking Lot A, directly across the street.    Please enter the Cancer Care Center on the north end of the [facility].  You will see a sign on the building.        For Hematology appointments, please take the elevator to the second floor to check in.     Also please note appointments can last 1.5-2 hours.      We hope these instructions are helpful to you.  If you have any questions or  concerns, please call us at [phone].  It is our pleasure to assist you.    Warm Regards,      Celeste Oliveira    [phone]

## 2021-06-19 NOTE — PROGRESS NOTES
Gomez Villasenor  9/22/1928      Assessment and Plan:  1.impacted cerumen- irrigated  2. Vague fatigue- chronic; pathology unlikely; age/perhaps depressive equivalent at age 89  3. Same meds /thyroid dose  4. RTC 6  Months or as needed      Chief Complaint: decreased hearing    Visit diagnoses:    1. Bilateral impacted cerumen  Ear cerumen removal   2. Acquired hypothyroidism     3. Chronic fatigue         Meds:  Current Outpatient Prescriptions   Medication Sig Dispense Refill     cholecalciferol, vitamin D3, 1,000 unit tablet Take 2,000 Units by mouth daily.        GLUCOSAMINE/CHONDROITIN SULF A (GLUCOSAMINE-CHONDROITIN ORAL) Take 2 tablets by mouth daily.        levothyroxine (SYNTHROID, LEVOTHROID) 125 MCG tablet Take 1 tablet (125 mcg total) by mouth daily. 90 tablet 1     lutein 20 mg Tab Take 1 tablet by mouth daily.       multivitamin therapeutic (THERAGRAN) tablet Take 1 tablet by mouth daily.       omega-3 fatty acids-vitamin E (FISH OIL) 1,000 mg cap Take 1 capsule by mouth daily.       Current Facility-Administered Medications   Medication Dose Route Frequency Provider Last Rate Last Dose     lidocaine 2 % jelly (XYLOCAINE)   Topical PRN Susan Petit CNP   1 application at 06/12/17 1030     lidocaine 2 % jelly (XYLOCAINE)   Topical PRN Susan Petit CNP   1 application at 07/27/17 0925       No Known Allergies    ROS: complete review of symptoms otherwise negative except as noted below    S: High functioning soon-to-be 89-year-old.  Chronic depressive lassitude and related issues but no new diagnoses thyroid dose has been stable.  He has decreased hearing may be getting new hearing aids       O:   Vitals:    08/08/18 1020   BP: 120/72   Patient Site: Left Arm   Patient Position: Sitting   Cuff Size: Adult Regular   Pulse: (!) 58   Weight: 144 lb 11.2 oz (65.6 kg)       Physical Exam:  General-healthy-appearing remarkably so excellent muscle tone for a soon-to-be nonagenarian  VS- see  above  HEENT- neg cerumen irrigated successfully          Christiano Irvin MD

## 2021-06-19 NOTE — PROGRESS NOTES
Audiology Report:      History:  Gomez Villasenor is seen today for comprehensive hearing evaluation. He is accompanied by his wife, Tanya, at the visit today. He has a history of normal to severe sensorineural hearing loss in both ears. Delbert reports he may have noticed a slight change in hearing since his last hearing test. He may be interested in pursuing hearing aids at this time. Delbert reportedly experience intermittent aural fullness in his left ear. 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 He denies a history of otalgia, otorrhea, tinnitus, dizziness, and family history of hearing loss.     Results:     Left Ear Right Ear   Otoscopy non-occluding cerumen non-occluding cerumen   Pure Tone Audiometry normal hearing from 250-1000 Hz sloping to severe sensorineural hearing loss  normal hearing from 250-1000 Hz sloping to moderately-severe sensorineural hearing loss    Word Recognition good excellent   Tympanometry hypercompliant (Type Ad)  shallow (Type As)     Transducer: Insert earphones and Circumaural headphones    Reliability was good  and there was good  SRT to PTA agreement. These results show stable hearing when compared to results from 12/7/16.    Hearing Aid Discussion: Delbert may be interested in pursuing hearing aids. Hearing aid options are discussed. He may be interested in pursuing a pair of Phonak Audeo B-R hearing aids. He is unsure which technology level he would like to pursue. If he decides to order the hearing aids he would like to order them in color P1 with size 1 standard receivers. He would like to make sure that the rechargeable hearing aids have a telecoil before ordering them because his [employer] is looped. He is provided a copy of the [facility] brochure as well as hearing aid pricing information. He will be contacted with his  insurance benefit information.      Plan:  Results are discussed in detail.  He should return for retesting in 1-2 years.      Please see audiogram under  media  and  audiogram  in the patient s chart.     Greg Hand, CCC-A  [place] Licensed Audiologist #[id number]

## 2021-06-19 NOTE — PROGRESS NOTES
[facility] Daily Progress     Patient Name: Gomez Villasenor  Date: 7/11/2018  Visit #: 6/12 through 8/2/18 ([facility])  PTA visit #:    PRECAUTIONS: h/o frequent falls outside  Referral Diagnosis: Unsteadiness on feet  Referring provider: Christiano Irvin, *  Visit Diagnosis:     ICD-10-CM    1. Unsteadiness on feet R26.81    2. History of fall within past 90 days Z91.81          Assessment:     HEP/POC compliance is  good .  Patient is benefitting from skilled physical therapy and is making steady progress toward functional goals.  Patient is appropriate to continue with skilled physical therapy intervention, as indicated by initial plan of care.   Goals are nearing/MET.    Goal Status:  Pt. will be independent with home exercise program in : 6 weeks. MET  Pt will: demonstrate improved balance: MCGREGOR >= 50/56  in 12 weeks for decreased risk of future falls. MET  Pt will: demonstrate improved balance: mCSITB x30 sec each condition in 12 weeks for decreased risk of future falls. MET  Pt will: demonstrate improved balance: FRT >= 10 inches in 12 weeks for decreased risk of future falls. MET  Pt will: demonstrate improved balance: SLS >= 5 sec bilat in 12 weeks for decreased risk of future falls. PROGRESSING    Plan / Patient Education:     Recheck goals next session, with likely transition to independent self-management via HEP.    Subjective:     Has not had any falls since last visit, except one minor stumble while changing the other day.  Working with his  1x/week, including working on balance.  Still working a lot in the garden. Sometimes hard to get up after kneeling on the ground.    Objective:     Pt with continued intermittent scuffing of bilat feet during swing phase of gait, but without LOB.  Pt demonstrating good postural control during gait with verbal speed and directional changes.    Treatment Today     TREATMENT MINUTES COMMENTS   Evaluation     Self-care/ Home management      Manual therapy     Neuromuscular Re-education 32 Balance ex per flow sheet   Therapeutic Activity     Therapeutic Exercises     Gait training     Modality__________________                Total 32    Blank areas are intentional and mean the treatment did not include these items.       Roby Yepez  7/11/2018

## 2021-06-19 NOTE — LETTER
Letter by Modesta Corrales MD at      Author: Modesta Corrales MD Service: -- Author Type: --    Filed:  Encounter Date: 3/26/2019 Status: (Other)         Gomez Villasenor  [address] [address]             March 26, 2019         Dear Mr. Villasenor,    Below are the results from your recent visit:    Resulted Orders   Thyroid Stimulating Hormone (TSH)   Result Value Ref Range    TSH 0.14 (L) 0.30 - 5.00 uIU/mL       You should have received a phone call from my nurse-your TSH level is still low, so we should lower your levothyroxine dose.  I sent a new prescription to your pharmacy-please  and start if you haven't already done so and return for lab check in approximately 6 weeks.      Please call with questions or contact us using My [employer] [facility].    Sincerely,        Electronically signed by Modesta Corrales MD

## 2021-06-19 NOTE — PROGRESS NOTES
[facility] Discharge Summary  Patient Name: Gomez Villasenor  Date: 8/1/2018  Visit #: 7/12 through 8/2/18 ([facility])  PTA visit #:    PRECAUTIONS: h/o frequent falls outside  Referral Diagnosis: Unsteadiness on feet  Referring provider: Christiano Irvin, *  Visit Diagnosis:   1. Unsteadiness on feet     2. History of fall within past 90 days         Goals:   Pt will: demonstrate improved balance: MCGREGOR >= 50/56  in 12 weeks for decreased risk of future falls. MET  Pt will: demonstrate improved balance: mCSITB x30 sec each condition in 12 weeks for decreased risk of future falls. MET  Pt will: demonstrate improved balance: FRT >= 10 inches in 12 weeks for decreased risk of future falls. MET  Pt will: demonstrate improved balance: SLS >= 5 sec bilat in 12 weeks for decreased risk of future falls. PROGRESSING 2-3 seconds R SLS, and 1-2 seconds L SLS      Patient was seen for 7 visits from 5/2/18 to 8/1/18 with 0 missed appointments.  The patient met goals and has demonstrated understanding of and independence in the home program for self-care, and progression to next steps.  He will initiate contact if questions or concerns arise.    Therapy will be discontinued at this time.  The patient will need a new referral to resume.    Thank you for your referral.  Roby Yepez  8/1/2018  11:06 AM    [facility] Daily Progress     Patient Name: Gomez Villasenor  Date: 8/1/2018  Visit #: 7/12 through 8/2/18 ([facility])  PTA visit #:    PRECAUTIONS: h/o frequent falls outside  Referral Diagnosis: Unsteadiness on feet  Referring provider: Christiano Irvin, *  Visit Diagnosis:     ICD-10-CM    1. Unsteadiness on feet R26.81    2. History of fall within past 90 days Z91.81          Assessment:     Patient has shown good progress with goals, and can manage his HEP individually. Overall patient showed significant improvements in static and dynamic balance.  Goals are nearing/MET.    Goal  Status:  No Data Recorded. MET  Pt will: demonstrate improved balance: MCGREGOR >= 50/56  in 12 weeks for decreased risk of future falls. MET  Pt will: demonstrate improved balance: mCSITB x30 sec each condition in 12 weeks for decreased risk of future falls. MET  Pt will: demonstrate improved balance: FRT >= 10 inches in 12 weeks for decreased risk of future falls. MET  Pt will: demonstrate improved balance: SLS >= 5 sec bilat in 12 weeks for decreased risk of future falls. PROGRESSING 2-3 seconds R SLS, and 1-2 seconds L SLS    Plan / Patient Education:     Discharge to [facility].    Subjective:     Exercises have been going pretty well, his wife helps him stay on top pf them. He is still working with the , 1x per week. He was having some lower back pain, but the  gave him some stretches and that helped a lot.  He tries to use a little bench to get up from the ground while in the garden. Patient reported he was on a ladder trimming a hedge, he felt stable (for the most part on the ladder). Due patient's fall history, patient was encouraged to avoid activities that severely challenge his balance, and find a family member that can help instead.     Objective:     Pt with continued intermittent scuffing of bilat feet during swing phase of gait, but without LOB. He is aware of the fact that he scuffs his feet, and that he looks down with his neck and trunk flexed while moving about. PT encouraged him that being aware of his positioning and posture is the first step in making positive changes.     Patient's wife brought up fact that he has used walking sticks, patient encouraged to use walking sticks if they help him stand upright and help his balance, especially while walking long distances.    Patient needed some cueing on standing straight leg hip exercises to keep the knee straight. Performed all other exercises with no to minimal verbal cueing. Patient required SBA to CGA for balance training  exercises.    Balance Assessment:   Rhomberg - Eyes Open:  30 seconds  Rhomberg - Eyes Closed:  30 seconds  Rhomberg - Perturbed Surface with Eyes Open:  30 seconds  Rhomberg - Perturbed Surface with Eyes Closed:  15 seconds (today)  Sharpened Rhomberg - Eyes Open:  R in front 30 seconds; L in front 20 seconds  Single Leg Stance: 2-3 seconds R SLS, and 1-2 seconds L SLS    Treatment Today     TREATMENT MINUTES COMMENTS   Evaluation     Self-care/ Home management     Manual therapy     Neuromuscular Re-education 13 Balance ex per flow sheet   Therapeutic Activity     Therapeutic Exercises 20 See exercise flow sheet   Gait training     Modality__________________                Total 33    Blank areas are intentional and mean the treatment did not include these items.       Roby Yepez  8/1/2018

## 2021-06-19 NOTE — LETTER
Letter by Modesta Corrales MD at      Author: Modesta Corrales MD Service: -- Author Type: --    Filed:  Encounter Date: 10/22/2019 Status: Signed         Gomez Villasenor  [address] [facility] [address]             October 22, 2019         Dear Mr. Villasenor,    Below are the results from your recent visit:    Resulted Orders   Vitamin D, Total (25-Hydroxy)   Result Value Ref Range    Vitamin D, Total (25-Hydroxy) 40.3 30.0 - 80.0 ng/mL    Narrative    Deficiency <10.0 ng/mL  Insufficiency 10.0-29.9 ng/mL  Sufficiency 30.0-80.0 ng/mL  Toxicity (possible) >100.0 ng/mL   HM1 (CBC with Diff)   Result Value Ref Range    WBC 2.8 (L) 4.0 - 11.0 thou/uL    RBC 4.09 (L) 4.40 - 6.20 mill/uL    Hemoglobin 12.8 (L) 14.0 - 18.0 g/dL    Hematocrit 40.5 40.0 - 54.0 %    MCV 99 80 - 100 fL    MCH 31.3 27.0 - 34.0 pg    MCHC 31.6 (L) 32.0 - 36.0 g/dL    RDW 14.1 11.0 - 14.5 %    Platelets 171 140 - 440 thou/uL    MPV 10.3 8.5 - 12.5 fL    Neutrophils % 71 (H) 50 - 70 %    Lymphocytes % 18 (L) 20 - 40 %    Monocytes % 10 2 - 10 %    Eosinophils % 1 0 - 6 %    Basophils % 0 0 - 2 %    Neutrophils Absolute 2.0 2.0 - 7.7 thou/uL    Lymphocytes Absolute 0.5 (L) 0.8 - 4.4 thou/uL    Monocytes Absolute 0.3 0.0 - 0.9 thou/uL    Eosinophils Absolute 0.0 0.0 - 0.4 thou/uL    Basophils Absolute 0.0 0.0 - 0.2 thou/uL       Hi Delbert, I left a voicemail for you.  If one of my staff or I haven't already spoken to you, because your white blood cell count and hemoglobin are persistently low (e.g. anemia), coupled with your fatigue, I recommend we do some more blood work and have you see a hematologist for further evaluation.  If you haven't already, please set up lab appointment, and someone will call you to schedule appointment with hematology.      Please call with questions or contact us using [employer].    Sincerely,        Electronically signed by Modesta Corrales MD

## 2021-06-21 NOTE — PROGRESS NOTES
Preoperative Exam    Scheduled Procedure: Yag Laser/right eye  Surgery Date:  10/30/2018  Surgery Location: [facility] fax: [phone]    Surgeon:  Neal Del Rio MD; [facility]    Assessment/Plan:     Problem List Items Addressed This Visit     Fatigue (Chronic)    Leukopenia (Chronic)      Other Visit Diagnoses     Pre-operative examination    -  Primary    Relevant Orders    Electrocardiogram Perform and Read (Completed)    HM1(CBC and Differential) (Completed)    Basic Metabolic Panel (Completed)    HM1 (CBC with Diff) (Completed)    Status post cataract extraction                Surgical Procedure Risk: Low (reported cardiac risk generally < 1%)  Have you had prior anesthesia?: Yes  Have you or any family members had a previous anesthesia reaction:  Yes: he, himself has not had problems.   Do you or any family members have a history of a clotting or bleeding disorder?: No  Cardiac Risk Assessment: no increased risk for major cardiac complications    Patient approved for surgery with general or local anesthesia.        Functional Status: Independent  Patient plans to recover at home with family.     Subjective:      Gomez Villasenor is a 89 y.o. male who presents for a preoperative consultation.      All other systems reviewed and are negative, other than those listed in the HPI.    Pertinent History  Do you have difficulty breathing or chest pain after walking up a flight of stairs: No  History of obstructive sleep apnea: No  Steroid use in the last 6 months: No  Frequent Aspirin/NSAID use: No  Prior Blood Transfusion: No  Prior Blood Transfusion Reaction: No  If for some reason prior to, during or after the procedure, if it is medically indicated, would you be willing to have a blood transfusion?:  There is no transfusion refusal.    Current Outpatient Prescriptions   Medication Sig Dispense Refill     cholecalciferol, vitamin D3, 1,000 unit tablet Take 2,000 Units by mouth daily.         GLUCOSAMINE/CHONDROITIN SULF A (GLUCOSAMINE-CHONDROITIN ORAL) Take 2 tablets by mouth daily.        levothyroxine (SYNTHROID, LEVOTHROID) 125 MCG tablet Take 1 tablet (125 mcg total) by mouth daily. 90 tablet 1     lutein 20 mg Tab Take 1 tablet by mouth daily.       multivitamin therapeutic (THERAGRAN) tablet Take 1 tablet by mouth daily.       vit A/vit C/vit E/zinc/copper (PRESERVISION AREDS ORAL) Take by mouth.       Current Facility-Administered Medications   Medication Dose Route Frequency Provider Last Rate Last Dose     lidocaine 2 % jelly (XYLOCAINE)   Topical PRN Susan Petit CNP   1 application at 06/12/17 1030     lidocaine 2 % jelly (XYLOCAINE)   Topical PRN Susan Petit CNP   1 application at 07/27/17 0925        No Known Allergies    Patient Active Problem List   Diagnosis     Prostate Cancer     Nephrolithiasis     Macular Degeneration     Hypothyroid     Leukopenia     Fatigue       Past Medical History:   Diagnosis Date     Cancer (H)     prostate     Disease of thyroid gland     hypothyroid     Hearing loss      Kidney stones 3/17/2015    this is third episode of stones     Macular degeneration      Nephrolithiasis 2015    right sided/ureteroscopic removal     Prostate cancer (H)      Status post cystoscopy March 2015    with ureteroscopy and stone removal     Urinary incontinence        Past Surgical History:   Procedure Laterality Date     CYSTOURETHROSCOPY      Right stent exchange and stone extraction     EYE SURGERY Right     cataract removed     INSERT / REPLACE / REMOVE PROSTHESIS URETHRAL SPHINCTER N/A 9/21/2016    Procedure:  REPLACEMENT OF ARTIFICIAL URINARY SPHINCTER;  Surgeon: Stevie Braxton MD;  Location: [facility] [place];  Service:      OTHER SURGICAL HISTORY      artificial urethral sphincter     RI CYSTOURETHROSCOPY      Description: Cystoscopy (Diagnostic);  Recorded: 11/07/2008;     RI REMV PROSTATE,RETROPUB,RADICAL      Description: Prostatectomy  "Retropubic Radical With Nerve Sparing;  Recorded: 11/10/2009;  Comments:  artificial sphinter placed       Social History     Social History     Marital status:      Spouse name: Tanya     Number of children: 3     Years of education: N/A     Occupational History     retired dentist      Social History Main Topics     Smoking status: Never Smoker     Smokeless tobacco: Never Used     Alcohol use 0.6 oz/week     1 Cans of beer per week      Comment: one beer most days     Drug use: No     Sexual activity: Not on file     Other Topics Concern     Not on file     Social History Narrative    . Tanya. 3 grown children and several grandkids. Retired dentist/  World traveller. Exercise enthusiast. Identical twin Brother Alonso Faust  age 83 CLL/Cancer/ICH... Non smoker, occ beer/              Objective:     Vitals:    10/25/18 1040   BP: 138/78   Pulse: (!) 58   Resp: 16   Weight: 146 lb (66.2 kg)   Height: 5' 10\" (1.778 m)         Physical Exam:  PE:  General-healthy appearing; looks younger than stated age/sad affect; mentally sharp;   VS- see above  HEENT- neg ; chronic age related  periorbital edema/ see ophthalmology notes  Neck- no adenopathy/thyromegaly/bruits  Chest- clear   Cor- reg no murmurs/gallops/ectopics  Extremities: no edema, good distal pulses  Neuro- Cr. NN-  intact, alert,   Abdomen- soft non tender, no masses; no organomegaly        Labs/EKG:  Recent Results (from the past 240 hour(s))   Electrocardiogram Perform and Read   Result Value Ref Range    SYSTOLIC BLOOD PRESSURE  mmHg    DIASTOLIC BLOOD PRESSURE  mmHg    VENTRICULAR RATE 54 BPM    ATRIAL RATE 54 BPM    P-R INTERVAL 176 ms    QRS DURATION 90 ms    Q-T INTERVAL 434 ms    QTC CALCULATION (BEZET) 411 ms    P Axis 88 degrees    R AXIS 12 degrees    T AXIS 63 degrees    MUSE DIAGNOSIS       Sinus bradycardia with Premature atrial complexes  Otherwise normal ECG  When compared with ECG of 07-SEP-2016 08:51,  Premature " atrial complexes are now Present  Confirmed by NIKHIL PAUL MD LOC:[facility] ([id number]) on 10/25/2018 4:16:46 PM     Basic Metabolic Panel   Result Value Ref Range    Sodium 139 136 - 145 mmol/L    Potassium 4.4 3.5 - 5.0 mmol/L    Chloride 104 98 - 107 mmol/L    CO2 28 22 - 31 mmol/L    Anion Gap, Calculation 7 5 - 18 mmol/L    Glucose 84 70 - 125 mg/dL    Calcium 9.4 8.5 - 10.5 mg/dL    BUN 21 8 - 28 mg/dL    Creatinine 0.94 0.70 - 1.30 mg/dL    GFR MDRD Af Amer >60 >60 mL/min/1.73m2    GFR MDRD Non Af Amer >60 >60 mL/min/1.73m2   HM1 (CBC with Diff)   Result Value Ref Range    WBC 3.1 (L) 4.0 - 11.0 thou/uL    RBC 4.27 (L) 4.40 - 6.20 mill/uL    Hemoglobin 13.4 (L) 14.0 - 18.0 g/dL    Hematocrit 39.7 (L) 40.0 - 54.0 %    MCV 93 80 - 100 fL    MCH 31.4 27.0 - 34.0 pg    MCHC 33.7 32.0 - 36.0 g/dL    RDW 12.1 11.0 - 14.5 %    Platelets 166 140 - 440 thou/uL    MPV 7.3 7.0 - 10.0 fL    Neutrophils % 63 50 - 70 %    Lymphocytes % 23 20 - 40 %    Monocytes % 13 (H) 2 - 10 %    Eosinophils % 1 0 - 6 %    Basophils % 0 0 - 2 %    Neutrophils Absolute 1.9 (L) 2.0 - 7.7 thou/uL    Lymphocytes Absolute 0.7 (L) 0.8 - 4.4 thou/uL    Monocytes Absolute 0.4 0.0 - 0.9 thou/uL    Eosinophils Absolute 0.0 0.0 - 0.4 thou/uL    Basophils Absolute 0.0 0.0 - 0.2 thou/uL           Immunization History   Administered Date(s) Administered     DT (pediatric) 07/31/2000     Hep A, historic 08/09/2007     Influenza T1o8-23, 01/08/2010     Influenza high dose, seasonal 09/23/2015, 10/17/2017, 10/09/2018     Influenza, inj, historic,unspecified 10/08/2007, 11/07/2008, 09/30/2009, 10/01/2010, 10/04/2011, 10/24/2012, 09/11/2014     Influenza, seasonal,quad inj 6-35 mos 09/20/2013     Pneumo Conj 13-V (2010&after) 02/13/2015     Pneumo Polysac 23-V 01/01/1994, 08/09/2007     Td,adult,historic,unspecified 11/10/2009     Yellow Fever 12/02/2008     ZOSTER, LIVE 12/02/2008           Electronically signed by Christiano Irvin MD 10/26/18 10:41 AM

## 2021-06-22 ENCOUNTER — OFFICE VISIT (OUTPATIENT)
Dept: NEUROLOGY | Facility: CLINIC | Age: 86
End: 2021-06-22
Payer: COMMERCIAL

## 2021-06-22 VITALS
WEIGHT: 133.8 LBS | BODY MASS INDEX: 20.28 KG/M2 | HEART RATE: 54 BPM | SYSTOLIC BLOOD PRESSURE: 141 MMHG | DIASTOLIC BLOOD PRESSURE: 79 MMHG | HEIGHT: 68 IN

## 2021-06-22 DIAGNOSIS — G20.A1 PARKINSON DISEASE (H): Primary | ICD-10-CM

## 2021-06-22 PROCEDURE — 99214 OFFICE O/P EST MOD 30 MIN: CPT | Performed by: PSYCHIATRY & NEUROLOGY

## 2021-06-22 ASSESSMENT — MIFFLIN-ST. JEOR: SCORE: 1231.41

## 2021-06-22 NOTE — NURSING NOTE
Chief Complaint   Patient presents with     Parkinson     Follow up.     Rosa Goss RN on 6/22/2021 at 10:27 AM     18-Nov-2020 22:40

## 2021-06-22 NOTE — LETTER
6/22/2021         RE: Gomez Villasenor  [address] [facility] [address]        Dear Colleague,    Thank you for referring your patient, Gomez Villasenor, to the [facility] [place]. Please see a copy of my visit note below.    [facility]  [place]    Gomez Villasenor MRN# [id number]   Age: 89 year old YOB: 1928               Assessment and Plan:   Assessment:   Parkinson's  Imbalance, in part due to decreased sensation in the feet        Plan:     Since increasing the carbidopa levodopa does not seem to have helped at all, we will cut back again to 2 tablets 3 times a day.  If he notices more difficulty with walking he could go up to 2 tablets 4 times a day.      He will be seeing a Parkinson's specialist at the [facility] in a couple weeks, it will be good to have a new set of eyes on him and hopefully more insightful treatment recommendations.  In the meantime I encouraged him to continue with the home exercise program from the physical therapists.             Chief Complaint/HPI:     Delbert is a 89-year-old gentleman I have followed for parkinsonism.  I first met him in January 2020.  At that time he had some tremor and balance difficulty.  He would lean forward, he had a shuffling type gait.  With some cogwheeling and bradykinesia I believe that this was Parkinson's.  Subsequent visits were mainly by telephone given the pandemic quarantine.  He continued to have balance issues, so I have increased his carbidopa levodopa over several visits, currently at 3 tablets 4 times a day.  He does not feel that has helped his walking at all.  His wife feels that things may be a bit worse.  In the mornings he feels almost nauseated.  That seems to clear up once he gets going.  He has been very fatigued.  His wife notices that about an hour after each dose of carbidopa levodopa he will be very tired and might even put his head down on the table  and rest for a bit.  On one occasion a month ago he did not have the energy to get up from the toilet and he simply rolled himself forward onto the floor so that he could rest a bit.  He denies that this was a loss of consciousness or involuntary fall.  Aside from that episode, he has not had any falls though he does catch himself against the wall at times.  He has benefited from physical therapy, he continues doing the home exercise program.    Labs were done about a month ago, including CBC and CMP which were unremarkable except for just borderline low white count and hemoglobin.  TSH was good at 1.26.  He has been seen by cardiology, echocardiogram was fine.  He had a Holter monitor done for 24 hours.          Past Medical History:    has a past medical history of Parkinson's disease (H).          Past Surgical History:    has no past surgical history on file.          Social History:     Social History     Tobacco Use     Smoking status: Never Smoker     Smokeless tobacco: Never Used   Substance Use Topics     Alcohol use: Not Currently             Family History:     Family History   Problem Relation Age of Onset     Heart Disease Mother      Cancer Brother      No Known Problems Father                 Allergies:   No Known Allergies          Medications:     Current Outpatient Medications:      carbidopa-levodopa (SINEMET)  MG tablet, Take 3 tablets by mouth 4 times daily, Disp: 1050 tablet, Rfl: 1     cholecalciferol (VITAMIN D-1000 MAX ST) 25 MCG (1000 UT) TABS, Take 2,000 Units by mouth, Disp: , Rfl:      levothyroxine (SYNTHROID/LEVOTHROID) 112 MCG tablet, TAKE ONE TABLET BY MOUTH ONCE DAILY, Disp: , Rfl:      Multiple Vitamins-Minerals (PRESERVISION AREDS 2+MULTI VIT PO), , Disp: , Rfl:      multivitamin w/minerals (MULTI-VITAMIN) tablet, Take 1 tablet by mouth, Disp: , Rfl:      sertraline (ZOLOFT) 50 MG tablet, Take 75 mg by mouth daily TAKE 1 &1/2 TABLETS (75 MG) BY MOUTH ONCE DAILY, Disp: , Rfl:  "     Omega-3 Fatty Acids (FISH OIL PO), Take 1 capsule by mouth, Disp: , Rfl:               Physical Exam:     BP (!) 141/79   Pulse 54   Ht 1.727 m (5' 8\")   Wt 60.7 kg (133 lb 12.8 oz)   BMI 20.34 kg/m     Awake, alert, no aphasia, no dysarthria  Oriented x3, attention is fine    Cranial nerves II - XII tested and intact, no nystagmus  There is no focal or generalized weakness in the extremities  Rapid alternating movements are actually quite good on both sides  Tone is OK on both sides, minimal cogwheeling in the wrists  Sensation testing shows markedly diminished vibration sensation in the feet  Temperature and pinprick sensation are preserved.  Reflexes are trace at the knees, more normal in the arms  Gait is slow, a little wide-based, extra steps to turn      Carroll Chaudhari MD             Again, thank you for allowing me to participate in the care of your patient.        Sincerely,        Carroll Chaudhari MD    "

## 2021-06-22 NOTE — PROGRESS NOTES
[facility]  [place]    Gomez Villasenor MRN# [id number]   Age: 89 year old YOB: 1928               Assessment and Plan:   Assessment:   Parkinson's  Imbalance, in part due to decreased sensation in the feet        Plan:     Since increasing the carbidopa levodopa does not seem to have helped at all, we will cut back again to 2 tablets 3 times a day.  If he notices more difficulty with walking he could go up to 2 tablets 4 times a day.      He will be seeing a Parkinson's specialist at the [facility] in a couple weeks, it will be good to have a new set of eyes on him and hopefully more insightful treatment recommendations.  In the meantime I encouraged him to continue with the home exercise program from the physical therapists.             Chief Complaint/HPI:     Delbert is a 89-year-old gentleman I have followed for parkinsonism.  I first met him in January 2020.  At that time he had some tremor and balance difficulty.  He would lean forward, he had a shuffling type gait.  With some cogwheeling and bradykinesia I believe that this was Parkinson's.  Subsequent visits were mainly by telephone given the pandemic quarantine.  He continued to have balance issues, so I have increased his carbidopa levodopa over several visits, currently at 3 tablets 4 times a day.  He does not feel that has helped his walking at all.  His wife feels that things may be a bit worse.  In the mornings he feels almost nauseated.  That seems to clear up once he gets going.  He has been very fatigued.  His wife notices that about an hour after each dose of carbidopa levodopa he will be very tired and might even put his head down on the table and rest for a bit.  On one occasion a month ago he did not have the energy to get up from the toilet and he simply rolled himself forward onto the floor so that he could rest a bit.  He denies that this was a loss of consciousness or involuntary fall.  Aside from  "that episode, he has not had any falls though he does catch himself against the wall at times.  He has benefited from physical therapy, he continues doing the home exercise program.    Labs were done about a month ago, including CBC and CMP which were unremarkable except for just borderline low white count and hemoglobin.  TSH was good at 1.26.  He has been seen by cardiology, echocardiogram was fine.  He had a Holter monitor done for 24 hours.          Past Medical History:    has a past medical history of Parkinson's disease (H).          Past Surgical History:    has no past surgical history on file.          Social History:     Social History     Tobacco Use     Smoking status: Never Smoker     Smokeless tobacco: Never Used   Substance Use Topics     Alcohol use: Not Currently             Family History:     Family History   Problem Relation Age of Onset     Heart Disease Mother      Cancer Brother      No Known Problems Father                 Allergies:   No Known Allergies          Medications:     Current Outpatient Medications:      carbidopa-levodopa (SINEMET)  MG tablet, Take 3 tablets by mouth 4 times daily, Disp: 1050 tablet, Rfl: 1     cholecalciferol (VITAMIN D-1000 MAX ST) 25 MCG (1000 UT) TABS, Take 2,000 Units by mouth, Disp: , Rfl:      levothyroxine (SYNTHROID/LEVOTHROID) 112 MCG tablet, TAKE ONE TABLET BY MOUTH ONCE DAILY, Disp: , Rfl:      Multiple Vitamins-Minerals (PRESERVISION AREDS 2+MULTI VIT PO), , Disp: , Rfl:      multivitamin w/minerals (MULTI-VITAMIN) tablet, Take 1 tablet by mouth, Disp: , Rfl:      sertraline (ZOLOFT) 50 MG tablet, Take 75 mg by mouth daily TAKE 1 &1/2 TABLETS (75 MG) BY MOUTH ONCE DAILY, Disp: , Rfl:      Omega-3 Fatty Acids (FISH OIL PO), Take 1 capsule by mouth, Disp: , Rfl:               Physical Exam:     BP (!) 141/79   Pulse 54   Ht 1.727 m (5' 8\")   Wt 60.7 kg (133 lb 12.8 oz)   BMI 20.34 kg/m     Awake, alert, no aphasia, no dysarthria  Oriented " x3, attention is fine    Cranial nerves II - XII tested and intact, no nystagmus  There is no focal or generalized weakness in the extremities  Rapid alternating movements are actually quite good on both sides  Tone is OK on both sides, minimal cogwheeling in the wrists  Sensation testing shows markedly diminished vibration sensation in the feet  Temperature and pinprick sensation are preserved.  Reflexes are trace at the knees, more normal in the arms  Gait is slow, a little wide-based, extra steps to turn      Carroll Chaudhari MD

## 2021-06-23 NOTE — PATIENT INSTRUCTIONS - HE
1. effexor sent in 37.5 mg daily then after ~ 2 weeks increase to 75 mg daily (printed Rx)  2. [facility]

## 2021-06-23 NOTE — PROGRESS NOTES
Gomez Villasenor  9/22/1928      Assessment and Plan:  1. Chronic lassitude/low motivation; noted for years but now worse; tends to obsess on things and worry- new;  probably depressive variant trial of low dose effexor  2. Recent labs ok, mild decrease TSH not likely related to #1  3. Same meds and plan  4. F/u [facility] closer to home- Dr Levy  5. F/u [facility] for prostate Ca- PSAs <1      Chief Complaint: fatigue    Visit diagnoses:    1. Dysthymia  venlafaxine (EFFEXOR XR) 37.5 MG 24 hr capsule    venlafaxine (EFFEXOR XR) 75 MG 24 hr capsule   2. Fatigue, unspecified type     3. Acquired hypothyroidism         Meds:  Current Outpatient Medications   Medication Sig Dispense Refill     cholecalciferol, vitamin D3, 1,000 unit tablet Take 2,000 Units by mouth daily.        GLUCOSAMINE/CHONDROITIN SULF A (GLUCOSAMINE-CHONDROITIN ORAL) Take 2 tablets by mouth daily.        levothyroxine (SYNTHROID, LEVOTHROID) 125 MCG tablet Take 1 tablet (125 mcg) by mouth once daily 90 tablet 3     multivitamin therapeutic (THERAGRAN) tablet Take 1 tablet by mouth daily.       vit A/vit C/vit E/zinc/copper (PRESERVISION AREDS ORAL) Take by mouth.       lutein 20 mg Tab Take 1 tablet by mouth daily.       venlafaxine (EFFEXOR XR) 37.5 MG 24 hr capsule Take 1 capsule (37.5 mg total) by mouth daily. 30 capsule 2     venlafaxine (EFFEXOR XR) 75 MG 24 hr capsule Take 1 capsule (75 mg total) by mouth daily. (take in AM) 90 capsule 3     Current Facility-Administered Medications   Medication Dose Route Frequency Provider Last Rate Last Dose     lidocaine 2 % jelly (XYLOCAINE)   Topical PRN Susan Petit CNP   1 application at 06/12/17 1030     lidocaine 2 % jelly (XYLOCAINE)   Topical PRN Susan Petit CNP   1 application at 07/27/17 0925       No Known Allergies    ROS: complete review of symptoms otherwise negative except as noted below    S:  Chronically low ambition fatigue now worries too much/  willing to try antidepressant which is a new development. No pain issues/ devoted wife (Tanya age 86) with list of concerns mostly chronic issues/ goes to [facility] annual in April        O:   Vitals:    02/07/19 1011   BP: 120/80   Patient Site: Left Arm   Patient Position: Sitting   Cuff Size: Adult Regular   Pulse: 76   Weight: 142 lb 1.6 oz (64.5 kg)       Physical Exam:  General- pleasant interactive man. With vigilant/devoted spouse doing much of the symptom description  VS- see above  HEENT- neg   Neck- no adenopathy/thyromegaly/bruits  Chest- clear   Cor- reg no murmurs/gallops/ectopics   -          Christiano Irvin MD      Results for orders placed or performed in visit on 10/25/18   Basic Metabolic Panel   Result Value Ref Range    Sodium 139 136 - 145 mmol/L    Potassium 4.4 3.5 - 5.0 mmol/L    Chloride 104 98 - 107 mmol/L    CO2 28 22 - 31 mmol/L    Anion Gap, Calculation 7 5 - 18 mmol/L    Glucose 84 70 - 125 mg/dL    Calcium 9.4 8.5 - 10.5 mg/dL    BUN 21 8 - 28 mg/dL    Creatinine 0.94 0.70 - 1.30 mg/dL    GFR MDRD Af Amer >60 >60 mL/min/1.73m2    GFR MDRD Non Af Amer >60 >60 mL/min/1.73m2       Results for orders placed or performed in visit on 03/22/18   Comprehensive Metabolic Panel   Result Value Ref Range    Sodium 142 136 - 145 mmol/L    Potassium 4.9 3.5 - 5.0 mmol/L    Chloride 107 98 - 107 mmol/L    CO2 28 22 - 31 mmol/L    Anion Gap, Calculation 7 5 - 18 mmol/L    Glucose 87 70 - 125 mg/dL    BUN 23 8 - 28 mg/dL    Creatinine 0.86 0.70 - 1.30 mg/dL    GFR MDRD Af Amer >60 >60 mL/min/1.73m2    GFR MDRD Non Af Amer >60 >60 mL/min/1.73m2    Bilirubin, Total 0.6 0.0 - 1.0 mg/dL    Calcium 9.3 8.5 - 10.5 mg/dL    Protein, Total 7.3 6.0 - 8.0 g/dL    Albumin 3.5 3.5 - 5.0 g/dL    Alkaline Phosphatase 175 (H) 45 - 120 U/L    AST 26 0 - 40 U/L    ALT 27 0 - 45 U/L     Lab Results   Component Value Date    PSA <0.1 11/09/2015 Feb- 2018: PSA <1 at [facility]

## 2021-06-23 NOTE — TELEPHONE ENCOUNTER
New Appointment Needed  What is the reason for the visit:    Depression.   Provider Preference: PCP only. Declined partner.   How soon do you need to be seen?: Any available. No open visits prior to PCP departure.   Waitlist offered?: N/A  Okay to leave a detailed message:  Yes

## 2021-06-23 NOTE — TELEPHONE ENCOUNTER
Refill Approved    Rx renewed per Medication Renewal Policy. Medication was last renewed on 7/14/18  OV 10/25/18.    Melissa Lockhart, [facility] Triage/Med Refill 1/19/2019     Requested Prescriptions   Pending Prescriptions Disp Refills     levothyroxine (SYNTHROID, LEVOTHROID) 125 MCG tablet [Pharmacy Med Name: Levothyroxine Sodium Oral Tablet 125 MCG] 90 tablet 0     Sig: Take 1 tablet (125 mcg) by mouth once daily    Thyroid Hormones Protocol Passed - 1/19/2019  7:01 AM       Passed - Provider visit in past 12 months or next 3 months    Last office visit with prescriber/PCP: 8/8/2018 Christiano Irvin MD OR same dept: 8/8/2018 Christiano Irvin MD OR same specialty: 8/8/2018 Christiano Irvin MD  Last physical: 10/25/2018 Last MTM visit: Visit date not found   Next visit within 3 mo: Visit date not found  Next physical within 3 mo: Visit date not found  Prescriber OR PCP: Christiano Irvin MD  Last diagnosis associated with med order: 1. Hypothyroid  - levothyroxine (SYNTHROID, LEVOTHROID) 125 MCG tablet [Pharmacy Med Name: Levothyroxine Sodium Oral Tablet 125 MCG]; Take 1 tablet (125 mcg) by mouth once daily  Dispense: 90 tablet; Refill: 0    If protocol passes may refill for 12 months if within 3 months of last provider visit (or a total of 15 months).            Passed - TSH on file in past 12 months for patient age 12 & older    TSH   Date Value Ref Range Status   03/22/2018 0.12 (L) 0.30 - 5.00 uIU/mL Final

## 2021-06-25 NOTE — PROGRESS NOTES
[facility] Daily Progress Note    Patient Name: Gomez Villasenor  Date: 6/2/2021  Visit #: 4  PTA visit #:    Referral Diagnosis: Parkinson disease (H)  Referring provider: Carroll Chaudhari MD  Visit Diagnosis:     ICD-10-CM    1. Parkinson disease (H)  G20    2. Generalized muscle weakness  M62.81    3. Postural instability  R29.3    4. Bradykinesia  R25.8    5. Gait instability  R26.81        Assessment from Initial Evaluation:     Gomez Villasenor is a 89 y.o. male with PMH Parkinson's Disease who presents to therapy today with chief complaints of unsteadiness with walking and balance, onset January 2020.  On exam pt demonstrates worsening balance objective measures and decreased tolerance to standing and walking with stability since last participating in physical therapy. Falls about once every 1-2 months since last seen, with most recent fall being three days ago.  Pt is most functionally limited with turning, getting out of a chair and walking.  Patient is testing at risk of falls according to 30sec STS, TUG, MiniBest. Pt would benefit from skilled PT to decrease risk of falling and improve functional mobility to maintain independence through daily activities. Recommend 6-8 PT appointments to get back into large amplitude movements and work on balance and increased activity/walking.        Precautions / Restrictions : fall's risk, parkinson's      Assessment:   Patient seen for follow up of balance issues and Parkinson's disease. Good tolerance to stepping exercises and balance training this session. Focused on large amplitude movements, dynamic balance, and walking while carrying an object. Added/ reviewed some core exercises as patient is benefiting from core activation when carrying a ball and walking. Patient continues to be limited by some depression, is encouraged with coming to therapy and is looking into joining some parkinson's groups. No falls since last seen.  HEP/POC  compliance is  good .  Patient is benefitting from skilled physical therapy and is making steady progress toward functional goals.  Patient is appropriate to continue with skilled physical therapy intervention, as indicated by initial plan of care.    Goal Status:  Pt. will demonstrate/verbalize independence in self-management of condition in : 6 weeks  Pt. will be independent with home exercise program in : 6 weeks    Pt will: improve 30 sed STS, and TUG testing to decrease risk of falling and improve functional strength and mobility, within 8 visits.  Pt will: report having a consistent walking program without being limited by fatigue, and  improve 2min walk test, within 8 weeks.      Plan / Patient Education:     Continue with initial plan of care.  Progress with home program as tolerated.  Plan for next visit: Review large amplitude movements, STS, balance and turning, work on increasing walking through program  Subjective:     Pain Rating: no pain currently, but has had intense hip pain in the past week    Patient reports that the past week was a little better, or at least more of the same. Hip comes and goes, but it is doing okay once he is up and moving. He has not fallen since his last visit.     Still has had not much energy to do things. Did not get a chance to contact the doctor, she went over there and asked him to call (but she has not heard from them), she does have an appointment in a couple weeks.     His next appointment with his new parkinson's doctor is in July.    Objective:     Therapeutic Exercise:  Total Minutes: 24    Nu Step WL 5, 10 minutes for rotational mobility and endurance training.     STS 2x10 repetitions, lost balance posterior on first repetition of first set    Core strengthening exercise:  - Bridging with small marching x10 magaly  - Abdominal strengthening magaly leg lift and leg extension x10 magaly  (re introduced to HEP)    Neuro-Reeducation/Balance:  Total Minutes:  32  Multidirectional Repetitive Movements.  8-16reps  x 1-2 sets    Step and Reach:   UE support: none Chairs Nearby: one      Exercise   Reps   Sets   Effort     2A Forward Step and Reach   10 2 (alternating on 2nd set) 9     2B Sideward Step and Reach 10  2 (alternating on 2nd set) 9     2C Backward Step and Reach 10 2 (alternating on 2nd set) 9     Comments: Patient needing cuing for increasing step height, responds well to cuing. CGA throughout performance. Adding alternating steps on backwards step and reach was difficult for patient today. Can continue in further sessions, should avoid completing at home.    Rock and Reach:  NOT TODAY      Exercise   Reps   Sets   Effort     2D Left leg Forward/Backward          2D Right leg Forward/Backward        2E Side to Side        Comments:   Other:     STS walking 10 feet to chair, tuning, and sitting and repeat x20. Last 15 repetitions completed with blue medicine ball in hand    Forward walking with holding blue weighted ball and backwards walking 4 x 15 feet repetitions each direction  Side stepping holding blue weighted ball 4 x 15 feet repetitions each direction    Gait Training:   Total Minutes:       Therapeutic Activity      Treatment Today     TREATMENT MINUTES COMMENTS   Evaluation     Self-care/ Home management     Manual therapy     Neuromuscular Re-education 32 See above   Therapeutic Activity     Therapeutic Exercises 24 See above   Gait training     Modality__________________                Total 56    Blank areas are intentional and mean the treatment did not include these items.         Wilma Galvez, PT, DPT  6/2/2021

## 2021-06-25 NOTE — PROGRESS NOTES
[facility] Daily Progress Note    Patient Name: Gomez Villasenor  Date: 5/26/2021  Visit #: 3  PTA visit #:    Referral Diagnosis: Parkinson disease (H)  Referring provider: Carroll Chaudhari MD  Visit Diagnosis:     ICD-10-CM    1. Parkinson disease (H)  G20    2. Generalized muscle weakness  M62.81    3. Postural instability  R29.3    4. Bradykinesia  R25.8    5. Gait instability  R26.81        Assessment from Initial Evaluation:     Gomez Villasenor is a 89 y.o. male with PMH Parkinson's Disease who presents to therapy today with chief complaints of unsteadiness with walking and balance, onset January 2020.  On exam pt demonstrates worsening balance objective measures and decreased tolerance to standing and walking with stability since last participating in physical therapy. Falls about once every 1-2 months since last seen, with most recent fall being three days ago.  Pt is most functionally limited with turning, getting out of a chair and walking.  Patient is testing at risk of falls according to 30sec STS, TUG, MiniBest. Pt would benefit from skilled PT to decrease risk of falling and improve functional mobility to maintain independence through daily activities. Recommend 6-8 PT appointments to get back into large amplitude movements and work on balance and increased activity/walking.        Precautions / Restrictions : fall's risk, parkinson's      Assessment:   Patient seen for follow up of balance issues and Parkinson's disease. Good tolerance to stepping exercises and balance training this session. Focused on large amplitude movements, dynamic balance, and walking while carrying an object. Patient continues to be limited by some depression, is encouraged with coming to therapy and is looking into joining some parkinson's groups. No falls since last seen.  HEP/POC compliance is  good .  Patient is benefitting from skilled physical therapy and is making steady progress toward  functional goals.  Patient is appropriate to continue with skilled physical therapy intervention, as indicated by initial plan of care.    Goal Status:  Pt. will demonstrate/verbalize independence in self-management of condition in : 6 weeks  Pt. will be independent with home exercise program in : 6 weeks    Pt will: improve 30 sed STS, and TUG testing to decrease risk of falling and improve functional strength and mobility, within 8 visits.  Pt will: report having a consistent walking program without being limited by fatigue, and  improve 2min walk test, within 8 weeks.      Plan / Patient Education:     Continue with initial plan of care.  Progress with home program as tolerated.  Plan for next visit: Review large amplitude movements, STS, balance and turning, work on increasing walking through program  Subjective:     Pain Rating: no pain currently, but has had intense hip pain in the past week    Patient reports that he has had a bit of a slower week, he is feeling some pain in his right hip. He saw PT last year for this hip issue. He has had a harder time doing exercises this week because of the hip hurting. He has not fallen since his last visit but does have a cut on his head today that he reports was from hitting his head on the bathroom mirror over the sink.    Has been a little down cognitively, disappointing. Walks in morning for paper but hasn't done his afternoon walk because of the hip.    His next appointment with his new parkinson's doctor is in July.    He saw a cardiologist last week because of his low heart rate, he will be wearing a heart monitor soon to monitor.    Wife reports that they increased medications but she thinks it did not help at all.     Objective:     Therapeutic Exercise:  Total Minutes: 13    Nu Step WL 5, 11 minutes for rotational mobility and endurance training.     STS 2x10 repetitions, lost balance posterior on first repetition of first  set    Neuro-Reeducation/Balance:  Total Minutes: 40  Multidirectional Repetitive Movements.  8-16reps  x 1-2 sets    Step and Reach:   UE support: none Chairs Nearby: one      Exercise   Reps   Sets   Effort     2A Forward Step and Reach   10 2 (alternating on 2nd set) 9     2B Sideward Step and Reach 10  2 (alternating on 2nd set) 9     2C Backward Step and Reach 10 2 (alternating on 2nd set) 9     Comments: Patient needing cuing for increasing step height, responds well to cuing. CGA throughout performance. Adding alternating steps on backwards step and reach was difficult for patient today. Can continue in further sessions, should avoid completing at home.    Rock and Reach:  NOT TODAY      Exercise   Reps   Sets   Effort     2D Left leg Forward/Backward          2D Right leg Forward/Backward        2E Side to Side        Comments:   Other:     STS walking 10 feet to chair, tuning, and sitting and repeat x20. Last 15 repetitions completed with blue medicine ball in hand    Forward walking with holding blue weighted ball and backwards walking 4 x 15 feet repetitions each direction  Side stepping holding blue weighted ball 4 x 15 feet repetitions each direction  Forward walking 15 feet, left side stepping 15 feet, right side stepping 15 feet, backward walking 15 feet holding blue med ball  (to help with patient goal of being able to walk while holding on to a plate and other things)    Gait Training:   Total Minutes:       Therapeutic Activity      Treatment Time: 11:04 - 11:57    Treatment Today     TREATMENT MINUTES COMMENTS   Evaluation     Self-care/ Home management     Manual therapy     Neuromuscular Re-education 41 See above   Therapeutic Activity     Therapeutic Exercises 14 See above   Gait training     Modality__________________                Total 55    Blank areas are intentional and mean the treatment did not include these items.     Ken Simons, SPT    I attest that I was present in the room,  making skilled assessments and directing the service provided today.  I was responsible for the assessment and treatment of the patient.  During this time, I was not engaged in treating another patient or doing other tasks.    Wilma Galvez, PT, DPT  5/26/2021

## 2021-06-26 NOTE — PROGRESS NOTES
[facility] Daily Progress Note    Patient Name: Gomez Villasenor  Date: 6/9/2021  Visit #: 5  PTA visit #:    Referral Diagnosis: Parkinson disease (H)  Referring provider: Carroll Chaudhari MD  Visit Diagnosis:     ICD-10-CM    1. Parkinson disease (H)  G20    2. Generalized muscle weakness  M62.81    3. Postural instability  R29.3    4. Bradykinesia  R25.8    5. Gait instability  R26.81        Assessment from Initial Evaluation:     Gomez Villasenor is a 89 y.o. male with PMH Parkinson's Disease who presents to therapy today with chief complaints of unsteadiness with walking and balance, onset January 2020.  On exam pt demonstrates worsening balance objective measures and decreased tolerance to standing and walking with stability since last participating in physical therapy. Falls about once every 1-2 months since last seen, with most recent fall being three days ago.  Pt is most functionally limited with turning, getting out of a chair and walking.  Patient is testing at risk of falls according to 30sec STS, TUG, MiniBest. Pt would benefit from skilled PT to decrease risk of falling and improve functional mobility to maintain independence through daily activities. Recommend 6-8 PT appointments to get back into large amplitude movements and work on balance and increased activity/walking.        Precautions / Restrictions : fall's risk, parkinson's      Assessment:   Patient seen for follow up of balance issues and Parkinson's disease. Good tolerance to stepping exercises and balance training this session. Improved mood this session and good tolerance to new core and weight shifting exercise. Focused on large amplitude movements, dynamic balance, and walking while carrying an object. Added/ reviewed some core exercises as patient is benefiting from core activation when carrying a ball and walking. Patient continues to be limited by some depression, is encouraged with coming to therapy  and is looking into joining some parkinson's groups. HEP/POC compliance is  good .  Patient is benefitting from skilled physical therapy and is making steady progress toward functional goals.  Patient is appropriate to continue with skilled physical therapy intervention, as indicated by initial plan of care.    Goal Status:  Pt. will demonstrate/verbalize independence in self-management of condition in : 6 weeks  Pt. will be independent with home exercise program in : 6 weeks    Pt will: improve 30 sed STS, and TUG testing to decrease risk of falling and improve functional strength and mobility, within 8 visits.  Pt will: report having a consistent walking program without being limited by fatigue, and  improve 2min walk test, within 8 weeks.      Plan / Patient Education:     Continue with initial plan of care.  Progress with home program as tolerated.  Plan for next visit: Review large amplitude movements, STS, balance and turning, work on increasing walking through program  Subjective:     Pain Rating: no pain currently    Nothing new, doing good. Plans to go to doctor next week to discuss medication and his energy level. He has been trying to get outside and do one project a day.  Walking to paper often, exercises going okay.     Hip pain is about the same, most of the time it is good, not bothering him as much. No pain as much but can feel it when doing certain things. He has not fallen since his last visit. Almost falls or misstep happen when head gets in front of feet. This happened once this past week and caught himself on the chair.    His next appointment with his new parkinson's doctor is in July.    Objective:     Therapeutic Exercise:  Total Minutes: 24    Nu Step WL 5, 10 minutes for rotational mobility and endurance training.     STS 2x10 repetitions, lost balance posterior on first repetition of first set    Core strengthening exercise:  - Bridging with small marching x10 magaly  - Abdominal  strengthening magaly leg lift and leg extension x10 magaly  (re introduced to HEP)  - Row with L3 band 2x15 repetitions for postural strengthening and mobility of thoracic spine    Standing trunk rotation with orange weighted ball tapping on wall 2x10 repetitions working on core strengthening as well as rotational stability and weight shifting balance in standing. CGA provided throughout.    Neuro-Reeducation/Balance:  Total Minutes: 32  Multidirectional Repetitive Movements.  8-16reps  x 1-2 sets    Step and Reach:   UE support: none Chairs Nearby: one      Exercise   Reps   Sets   Effort     2A Forward Step and Reach   15 2 (alternating on 2nd set) 9     2B Sideward Step and Reach 15 2 (alternating on 2nd set) 9     2C Backward Step and Reach 15 2 (alternating on 2nd set) 9     Comments: Patient needing cuing for increasing step height, responds well to cuing. CGA throughout performance. Adding alternating steps on backwards step and reach was difficult for patient today. Can continue in further sessions, should avoid completing at home.    Rock and Reach:  NOT TODAY      Exercise   Reps   Sets   Effort     2D Left leg Forward/Backward          2D Right leg Forward/Backward        2E Side to Side        Comments:   Other:     STS walking 10 feet to chair, tuning, and sitting and repeat 2x10 repetitions.Repetitions completed with orange medicine ball in hand    Forward walking with holding orange weighted ball and backwards walking 4 x 15 feet repetitions each direction  Side stepping holding orange weighted ball 4 x 15 feet repetitions each direction    Gait Training:   Total Minutes:       Therapeutic Activity      Treatment Today     TREATMENT MINUTES COMMENTS   Evaluation     Self-care/ Home management     Manual therapy     Neuromuscular Re-education 28 See above   Therapeutic Activity     Therapeutic Exercises 28 See above   Gait training     Modality__________________                Total 56    Blank areas are  intentional and mean the treatment did not include these items.         Wilma Galvez, PT, DPT  6/9/2021

## 2021-06-26 NOTE — PROGRESS NOTES
[facility] Daily Progress Note    Patient Name: Gomez Villasenor  Date: 6/16/2021  Visit #: 6  PTA visit #:    Referral Diagnosis: Parkinson disease (H)  Referring provider: Carroll Chaudhari MD  Visit Diagnosis:     ICD-10-CM    1. Parkinson disease (H)  G20    2. Generalized muscle weakness  M62.81    3. Postural instability  R29.3    4. Bradykinesia  R25.8    5. Gait instability  R26.81        Assessment from Initial Evaluation:     Gomez Villasenor is a 89 y.o. male with PMH Parkinson's Disease who presents to therapy today with chief complaints of unsteadiness with walking and balance, onset January 2020.  On exam pt demonstrates worsening balance objective measures and decreased tolerance to standing and walking with stability since last participating in physical therapy. Falls about once every 1-2 months since last seen, with most recent fall being three days ago.  Pt is most functionally limited with turning, getting out of a chair and walking.  Patient is testing at risk of falls according to 30sec STS, TUG, MiniBest. Pt would benefit from skilled PT to decrease risk of falling and improve functional mobility to maintain independence through daily activities. Recommend 6-8 PT appointments to get back into large amplitude movements and work on balance and increased activity/walking.      Precautions / Restrictions : fall's risk, parkinson's      Assessment:   Patient seen for follow up of balance issues and Parkinson's disease. Good tolerance to stepping exercises and balance training this session. Improved mood this session and good tolerance to increased time with exercise on feet and increased repetitions. Focused on large amplitude movements, dynamic balance, and walking while carrying an object. Also focused on ambulation without assistive device. Patient continues to be limited by some depression, is encouraged with coming to therapy and is looking into joining some  parkinson's groups. HEP/POC compliance is  good .  Patient is benefitting from skilled physical therapy and is making steady progress toward functional goals.  Patient is appropriate to continue with skilled physical therapy intervention, as indicated by initial plan of care.    Goal Status:  Pt. will demonstrate/verbalize independence in self-management of condition in : 6 weeks  Pt. will be independent with home exercise program in : 6 weeks    Pt will: improve 30 sed STS, and TUG testing to decrease risk of falling and improve functional strength and mobility, within 8 visits.  Pt will: report having a consistent walking program without being limited by fatigue, and  improve 2min walk test, within 8 weeks.      Plan / Patient Education:     Continue with initial plan of care.  Progress with home program as tolerated.  Plan for next visit:  large amplitude movements, STS, balance and turning, work on increasing walking through program  Subjective:     Pain Rating: no pain currently    Patient reports that he had a busy last week, so did not do exercise as much as before. Nothing new to report doing good. Has been walking further this past week.     Plans to go to doctor next week to discuss medication and his energy level. He has been trying to get outside and do one project a day.      Going to cardiologist tomorrow, primary care referred to check all possibilities causing lack of energy.     His next appointment with his new parkinson's doctor is in July.    Objective:     Therapeutic Exercise:  Total Minutes: 20  Nu Step WL 5, 10 minutes for rotational mobility and endurance training.     STS 2x10 repetitions, lost balance posterior on first repetition of first set     STS with two big steps over walking stick and arms out, x10 repetitions    Core strengthening exercise (NOT TODAY)  - Bridging with small marching x10 magaly  - Abdominal strengthening magaly leg lift and leg extension x10 magaly  (re introduced to  HEP)  - Row with L3 band 2x15 repetitions for postural strengthening and mobility of thoracic spine    Standing trunk rotation with orange weighted ball tapping on wall 2x10 repetitions working on core strengthening as well as rotational stability and weight shifting balance in standing. CGA provided throughout.    Neuro-Reeducation/Balance:  Total Minutes: 30  Multidirectional Repetitive Movements.  8-16reps  x 1-2 sets    Step and Reach:   UE support: none Chairs Nearby: one      Exercise   Reps   Sets   Effort     2A Forward Step and Reach   16 2 (alternating on 2nd set) 9     2B Sideward Step and Reach 16 2 (alternating on 2nd set) 9     2C Backward Step and Reach 16 2 (alternating on 2nd set) 9     Comments: Patient needing cuing for increasing step height, responds well to cuing. CGA throughout performance. Adding alternating steps on backwards step and reach was difficult for patient today. Can continue in further sessions, should avoid completing at home.    Rock and Reach:  NOT TODAY      Exercise   Reps   Sets   Effort     2D Left leg Forward/Backward          2D Right leg Forward/Backward        2E Side to Side        Comments:   Other:       Forward walking with holding orange weighted ball and backwards walking 4 x 15 feet repetitions each direction  Side stepping holding orange weighted ball 4 x 15 feet repetitions each direction  Second set without weight cuing for big stepping    Gait Training:   Total Minutes:       Therapeutic Activity  BIG walking: with two scarves in hands cuing for big step, contact with heel and magaly arm swing. Patient responds well to cuing to heel contact so he does not get on toes. Getting on toes is when he becomes unsteady and out of control of walking  500 feet x2 repetitions with a break in between    Treatment Today     TREATMENT MINUTES COMMENTS   Evaluation     Self-care/ Home management     Manual therapy     Neuromuscular Re-education 30 See above   Therapeutic  Activity 8 See above   Therapeutic Exercises 20 See above   Gait training     Modality__________________                Total 58    Blank areas are intentional and mean the treatment did not include these items.         Wilma Galvez, PT, DPT  6/16/2021

## 2021-06-29 NOTE — PROGRESS NOTES
Progress Notes by Wendy Walsh PT at 8/6/2020 11:00 AM     Author: Wendy Walsh PT Service: -- Author Type: Physical Therapist    Filed: 8/7/2020  4:24 PM Encounter Date: 8/6/2020 Status: Attested    : Wendy Walsh PT (Physical Therapist) Cosigner: Carroll Chaudhari MD at 8/12/2020  3:29 PM    Attestation signed by Carroll Chaudhari MD at 8/12/2020  3:29 PM    Agree with [facility] Certification Request    August 7, 2020      Patient: Gomez Villasenor  MR Number: [id number]  YOB: 1928  Date of Visit: 8/6/2020      Dear Carroll Contreras MD:    Thank you for this referral.   We are seeing Gomez Villasenor in Physical Therapyfor Parkinson's Disease.    Medicare and/or Medicaid requires physician review and approval of the treatment plan. Please review the plan of care and verify that you agree with the therapy plan of care by co-signing this note.      Plan of Care  Authorization / Certification Start Date: 08/06/20  Authorization / Certification End Date: 11/04/20  Authorization / Certification Number of Visits: 20  Communication with: Referral Source  Patient Related Instruction: Nature of Condition;Treatment plan and rationale;Self Care instruction;Basis of treatment;Body mechanics;Posture;Precautions;Next steps;Expected outcome  Times per Week: 2-3  Number of Weeks: 4-6  Number of Visits: 12-15  Discharge Planning: when goals have been met  Precautions / Restrictions : falls risk indicated      Goals:  No data recorded  Pt will: Pateint will demonstrate improved balance with decrease in TUG time to less than 12 seconds ad TUG cognitive less than 15 seconds in 8 weeks  Pt will: demonstrated increase in functional strength and endurance with increase in 2 minute walk test to >125 meters and a RPE less than/equal to 3/10 in 8 weeks  Pt will: increase Mini BESTest score to > 18/28 in order to decrease his risk for falls in 8  weeks  Pt will: increase comfortable gait speed to >1.0 m/sec for improved saefty with ambulation and demonstrate improved gait mechanics and pattern in 8 weeks        If you have any questions or concerns, please don't hesitate to call.    Sincerely,      Wendy Walsh, PT, DPT        Physician recommendation:     ___ Follow therapist's recommendation        ___ Modify therapy      *Physician co-signature indicates they certify the need for these services furnished within this plan and while under their care.        [facility]   Balance Initial Evaluation    Patient Name: Gomez Villasenor  Date of evaluation: 8/7/2020  Referral Diagnosis: Parkinson disease (H)  Referring provider: Carroll Chaudhari MD  Visit Diagnosis:     ICD-10-CM    1. Bradykinesia  R25.8    2. Postural instability  R29.3    3. Gait instability  R26.81    4. Parkinson's disease (H)  G20        Assessment:        Gomez Villasenor is a 89 y.o. male who presents to therapy today with chief complaints of feeling unbalanced and falling. Onset date of sx was 3-4 years ago.  Pt reported h/o therapy for balance two times over the last 2 years with some improvements. However, this winter he met with a neurologist and was diagnosed with Parkinson's Disease. He has been started on medication and it has been increased since. He has had several falls the most recent was last night. Patient presents with an increase in falls risk based on TUG, TUG cognitive, mini BESTest score and gait speed. Patient will benefit from skilled therapy to increase strengthe, improved balance and decrease risk for falls.     Impairments in  posture, ROM, strength, cognition, gait/locomotion and balance  The POC is dynamic and will be modified on an ongoing basis.  Barriers to achieving goals as noted in the assessment section may affect outcome.  Prognosis to achieve goals is  good   Pt. is appropriate for skilled PT intervention as outlined in the  Plan of Care (POC).  Pt. is a good candidate for skilled PT services to improve pain levels and function.  Based on falls assessment, patient is at an increased risk for falling. Plan of care will address this risk with lower extremity strengthening and balance training.    Goals:  No data recorded  Pt will: Pateint will demonstrate improved balance with decrease in TUG time to less than 12 seconds ad TUG cognitive less than 15 seconds in 8 weeks  Pt will: demonstrated increase in functional strength and endurance with increase in 2 minute walk test to >125 meters and a RPE less than/equal to 3/10 in 8 weeks  Pt will: increase Mini BESTest score to > 18/28 in order to decrease his risk for falls in 8 weeks  Pt will: increase comfortable gait speed to >1.0 m/sec for improved saefty with ambulation and demonstrate improved gait mechanics and pattern in 8 weeks      Patient's expectations/goals are realistic.    Barriers to Learning or Achieving Goals:  Chronicity of the problem.  Co-morbidities or other medical factors.  .       Plan / Patient Instructions:        Plan of Care:   Authorization / Certification Start Date: 08/06/20  Authorization / Certification End Date: 11/04/20  Authorization / Certification Number of Visits: 20  Communication with: Referral Source  Patient Related Instruction: Nature of Condition;Treatment plan and rationale;Self Care instruction;Basis of treatment;Body mechanics;Posture;Precautions;Next steps;Expected outcome  Times per Week: 2-3  Number of Weeks: 4-6  Number of Visits: 12-15  Discharge Planning: when goals have been met  Precautions / Restrictions : falls risk indicated      POC and pathology of condition were reviewed with patient.  Pt. is in agreement with the Plan of Care  A Home Exercise Program (HEP) was initiated today.  Pt. was instructed in exercises by PT and patient was given a handout with detailed instructions.  Treatment techniques, plan of care, and goals were  discussed with the patient.  The patient agrees to the plan as outlined.  The plan of care is dynamic and will be modified on an ongoing basis.    Plan for next visit: begin PD specific exercises, large amplitude, high effort exercises for improved balance and strength.      Subjective:       Was seeing therapy for balance issues the last 2 years, now in 2020 he saw his Dr Chaudhari in neurology and was diagnosed with Parkinson's diease  He started Carbadopa levadopa in  and increased to 2 pills 3x/day  Last taken 8:00AM  And due again around noon time.     Denies freezing gait, smaller steps, initial symptoms was balance and slowing down  He has been going to a personal training for strengthening       Social information:   Living Situation:single family home, has assistance Yes and wife lives at home with wife, has low and upper levels but does not have to do that often. has railings put in the bederoom, bathroom and stairs   Occupation:retired dentist     Work Status:NA   Equipment Available: None    Pain Ratin  Pain rating at best: 0  Pain rating at worst: 0  Pain description: none    Functional limitations are described as occurring with:   balance    Patient reports benefit from:  movement or exercise        Objective:      Note: Items left blank indicates the item was not performed or not indicated at the time of the evaluation.    Balance Examination  1. Bradykinesia     2. Postural instability     3. Gait instability     4. Parkinson's disease (H)       Involved side: Bilateral  Posture Observation:      Cervical:  Moderate forward head  Shoulder/Thoracic complex: Moderately increased upper thoracic kyphosis  Assistive Device:  SEC      Balance Testing:  Functional test Score / AD if used Previous Score from  Fall risk cutoff score Norms Observations   30 second sit<>stand 11    <8 high fall risk  9-12 mod risk 10    Timed up and go (TUG)  (seconds) Trial 1: 13.78  Trial 2: 11.59  Trial 3:  12.88  Av.75 sec   >13 sec     Cognitive TUG (seconds) 18.35 sec   Task: Count down from 100 by 3's  - 100- 81 with 3-4 mistakes         >15 sec 9-10 seconds    Comfortable gait speed 10M   6.54 seconds 11 steps   0.92 m/sec  <1.0 m/s 1.21 m/sec for 85-89 years old     Fast gait speed 10M   5.54 seconds 9 steps  1.08 m/sec    1.65 m/sec for 85-89 years old     4-square step test   Trial 1: 20.53 steps pn wood dowel on backwards step and nearly fell, mod+ assist from therapist   Trial 2: 13.85 sec did not strike any wood dowels    >15 sec     2 minute walk test   114.2 meters     144.1 meters for 80-85 years old men     6 minute walk test                                MiniBest <23/28= falls risk  Balance Assessment: Mini-BEST  (<23/28)   1) Sit <> Stand: (2) Normal    2) Rise to Toes: (1) able to rise higher with hand assist    3) Stand on One Leg: (0) severe L: 1-2 seconds  R: 2-3 seconds    4) Compensatory Stepping Correction-Forward: (1) Moderate - multiple little steps to correct    5) Compensatory Stepping Correction-Backward: (0) Severe - falls and needs max assist to correct    6) Compensatory Stepping Correction-Lateral: (0) Severe L: (2) one large step,  R: (0) falls without stepping, max assist to correct balance    7) Eyes Open, Firm Surface: (2) Normal    8) Eyes Closed, Foam Surface: (1) unable to hold more than 20 seconds, falls backwards and uses rail to correct balance with min-mod assist    9) Incline, Eyes Closed: (1) falls backwards after 20 seconds    10) Change in Gait Speed: (2) Normal    11) Walk with Head Turns-Horizontal: (1) Moderate - slows speed and mild deviation for path   12) Walk with Pivot Turns: (1) Moderate - slow speed and 4+ steps to turn   13) Step Over Obstacles: (1) Moderate - slow gait and steps cautiously    14) TUG with Dual Tasks: (1) Moderate  TUG:  See above   Cognitive TUG:  See above         Gait observation: weight shiftedf anteriorly onto front of feet,  forward head and rounded shoulders, small steps with initial contact of mid to forefoot, little to no arm swing and right side and arm held in mild abduction, moderated decrease in left arm swing.     Treatment Today   8/6/2020  TREATMENT MINUTES COMMENTS   Evaluation 55 -Patient educated on pathology  -Discussed POC and education on large amplitude movements and training, reviewed some exercises he does with his  at the gym.    Self-care/ Home management     Manual therapy     Neuromuscular Re-education     Therapeutic Activity     Therapeutic Exercises     Gait training     Modality__________________                Total 55    Blank areas are intentional and mean the treatment did not include these items.     PT Evaluation Code: (Please list factors)  Patient History/Comorbidities: as above   Examination: as above   Clinical Presentation: changing/unstable due to progressive nature of Parksinon's Disease   Clinical Decision Making: moderate     Patient History/  Comorbidities Examination  (body structures and functions, activity limitations, and/or participation restrictions) Clinical Presentation Clinical Decision Making (Complexity)   No documented Comorbidities or personal factors 1-2 Elements Stable and/or uncomplicated Low   1-2 documented comorbidities or personal factor 3 Elements Evolving clinical presentation with changing characteristics Moderate   3-4 documented comorbidities or personal factors 4 or more Unstable and unpredictable High            Wendy Walsh, PT, DPT  8/7/2020  11:09 AM

## 2021-06-30 NOTE — PROGRESS NOTES
Progress Notes by Christine Austin PT at 12/9/2020 11:15 AM     Author: Christine Austin PT Service: -- Author Type: Physical Therapist    Filed: 12/9/2020  1:24 PM Encounter Date: 12/9/2020 Status: Attested    : Christine Austin PT (Physical Therapist) Cosigner: Modesta Corrales MD at 12/9/2020  1:41 PM    Attestation signed by Modesta Corrales MD at 12/9/2020  1:41 PM    gustavo Petty.                    [facility] Certification Request    December 9, 2020      Patient: Gomez Villasenor  MR Number: [id number]  YOB: 1928  Date of Visit: 12/9/2020      Dear Modesta Jules MD:    Thank you for this referral.   We are seeing Gomez Villasenor in Physical Therapy for low back pain.    Medicare and/or Medicaid requires physician review and approval of the treatment plan. Please review the plan of care and verify that you agree with the therapy plan of care by co-signing this note.      Plan of Care  Authorization / Certification Start Date: 12/09/20  Authorization / Certification End Date: 02/07/21  Authorization / Certification Number of Visits: 8  Communication with: Referral Source  Patient Related Instruction: Nature of Condition;Treatment plan and rationale;Self Care instruction;Basis of treatment;Body mechanics;Posture  Times per Week: 1 to every other week  Number of Weeks: 4-8  Number of Visits: 4-8  Discharge Planning: Patient will be discharged when independent in self-management of condition or when goals are met.  Precautions / Restrictions : falls risk d/t PD  Therapeutic Exercise: ROM;Stretching;Strengthening  Manual Therapy: soft tissue mobilization;myofascial release;joint mobilization;muscle energy;strain counterstrain  Modalities: electrical stimulation;ultrasound      Goals:  Pt. will be independent with home exercise program in : 6 weeks    Pt will: be able to get up from a chair after sitting for long periods without pain in the lower back;  in 8 weeks  Pt will: be able to walk 1/4-1/2 mile without pain in the lower back; in 8 weeks  Pt will: be able to stand for 20 minutes for housework without pain in the lower back; in 8 weeks        If you have any questions or concerns, please don't hesitate to call.    Sincerely,      Christine Austin, PT, DPT        Physician recommendation:     ___ Follow therapist's recommendation        ___ Modify therapy      *Physician co-signature indicates they certify the need for these services furnished within this plan and while under their care.    [facility]   Lumbo-Pelvic Initial Evaluation    Patient Name: Gomez Villasenor  Date of evaluation: 12/9/2020  Referral Diagnosis: Parkinson disease (H)  Referring provider: Modesta Corrales MD  Visit Diagnosis:     ICD-10-CM    1. Acute right-sided low back pain without sciatica  M54.5    2. Back stiffness  M25.69    3. Generalized muscle weakness  M62.81        Assessment:        Gomez Villasenor is a 89 y.o. male who presents to therapy today with chief complaints of acute right sided low back pain. Onset date of sx was 10/2020 after a fall on the L side.  Pt reported h/o prostate cancer, hypothyroid, leukopenia, dysthymia, Parkinson Disease.  Pain symptoms are sharp at time, but is typically dull and achy.  Functional impairments include getting up from a chair and initially walking, walking for long periods, and standing for long periods.  Pt demo's signs and sx consistent with likely facet arthropathy as a result from his fall.   Pt. is appropriate for skilled PT intervention as outlined in the Plan of Care (POC).  Pt. is a good candidate for skilled PT services to improve pain levels and function.    Goals:  Pt. will be independent with home exercise program in : 6 weeks    Pt will: be able to get up from a chair after sitting for long periods without pain in the lower back; in 8 weeks  Pt will: be able to walk 1/4-1/2 mile without pain  in the lower back; in 8 weeks  Pt will: be able to stand for 20 minutes for housework without pain in the lower back; in 8 weeks      Patient's expectations/goals are realistic.    Barriers to Learning or Achieving Goals:  No Barriers.       Plan / Patient Instructions:        Plan of Care:   Authorization / Certification Start Date: 12/09/20  Authorization / Certification End Date: 02/07/21  Authorization / Certification Number of Visits: 8  Communication with: Referral Source  Patient Related Instruction: Nature of Condition;Treatment plan and rationale;Self Care instruction;Basis of treatment;Body mechanics;Posture  Times per Week: 1 to every other week  Number of Weeks: 4-8  Number of Visits: 4-8  Discharge Planning: Patient will be discharged when independent in self-management of condition or when goals are met.  Precautions / Restrictions : falls risk d/t PD  Therapeutic Exercise: ROM;Stretching;Strengthening  Manual Therapy: soft tissue mobilization;myofascial release;joint mobilization;muscle energy;strain counterstrain  Modalities: electrical stimulation;ultrasound      POC and pathology of condition were reviewed with patient.  Pt. is in agreement with the Plan of Care  A Home Exercise Program (HEP) was initiated today.  Pt. was instructed in exercises by PT and patient was given a handout with detailed instructions.  Treatment techniques, plan of care, and goals were discussed with the patient.  The patient agrees to the plan as outlined.  The plan of care is dynamic and will be modified on an ongoing basis.    Plan for next visit: Continue with S/L mobilizations if helpful.  Core strengthening.     Subjective:       The patient reports that he had a fall in 10/2020.  His R lower back is bothersome, but the fall was on the L.  The pain varies from day to day depending on his other activities he is doing.  He sees a  1x/week and did some more serious stretching to help him out.  At the time, it  didn't seem to help, but is feeling better today.  He is comfortable when sitting, but when he starts to walk, it is the most uncomfortable.    Social information:   Living Situation:single family home   Occupation:retired   Work Status:NA   Equipment Available: walker 4WW and walking stick    Pain Rating:3  Pain rating at best: 0  Pain rating at worst: 10  Pain description: sharp, dull, achy    Functional limitations are described as occurring with:   getting up from a chair and initially walking, walking for long periods, standing for long periods    Patient reports benefit from:  rest  , pain medication, stretching       Objective:      Note: Items left blank indicates the item was not performed or not indicated at the time of the evaluation.    Patient Outcome Measures :    Modified Oswestry Low Back Pain Disablity Questionnaire  in %: 26     Scores range from 0-100%, where a score of 0% represents minimal pain and maximal function. The minimal clinically important difference is a score reduction of 12%.    Examination  1. Acute right-sided low back pain without sciatica     2. Back stiffness     3. Generalized muscle weakness       Involved side: Right  Posture Observation:      General standing posture is fair.  Lumbopelvic complex: Moderately decreased lumbar lordosis    Lumbar ROM:    Date: 12/9/2020     *Indicate scale AROM AROM AROM   Lumbar Flexion WNL     Lumbar Extension Mod limited no pain      Right Left Right Left Right Left   Lumbar Sidebending Pain R Pain R       Lumbar Rotation Major limited Major limited       Thoracic Flexion      Thoracic Extension      Thoracic Sidebending         Thoracic Rotation           Lower Extremity Strength:    Date: 12/9/2020     LE strength/5 Right Left Right Left Right Left   Hip Flexion (L1-3) 5 5       Hip Extension (L5-S1)         Hip Abduction (L4-5) 4 4       Hip Adduction (L2-3) 4 4       Hip External Rotation 4 4       Hip Internal Rotation 4 4       Knee  Extension (L3-4)         Knee Flexion         Ankle Dorsiflexion (L4-5) 5 5       Great Toe Extension (L5)         Ankle Plantar flexion (S1)         Abdominals        Sensation    WNL per patient     Reflex Testing  Lumbar Dermatomes Right Left UE Reflexes Right Left   Iliac Crest and Groin (L1)   Biceps (C5-6)     Anterior Medial Thigh (L2)   Brachioradialis (C5-6)     Anterior Thigh, Medial Epicondyle Femur (L3)   Triceps (C7-8)     Lateral Thigh, Anterior Knee, Medial Leg/Malleolus (L4)   Hoffmanns test     Lateral Leg, Dorsal Foot (L5)   LE Reflexes     Lateral Foot (S1)   Patellar (L3-4)     Posterior Leg (S2)   Achilles (S1-2)     Other:   Babinski Response       Palpation: No palpatory tenderness.  Increased muscle tone bilateral lumbar paraspinal mm.    Lumbar Special Tests:     Lumbar Special Tests Right Left SI Tests Right  Left   Quadrant test   SI Compression - -   Straight leg raise   SI Distraction - -   Crossover response   POSH Test     Slump   Sacral Thrust     Sit-up test  FADIR     Trunk extensor endurance test  Resisted Abduction     Prone instability test  Other:     Pubic shotgun  Other:       Repeated Motion Testing:  Not indicated    Passive Mobility - Joint Integrity:  Hypomobile; Mild pain with R facet joints L3/4 and L4/5    LE Screen:  Hip and knee ROM WNL    Appt time: 11:19AM - 12:15PM    Treatment Today     TREATMENT MINUTES COMMENTS   Evaluation 31 -Low complexity lumbar evaluation  -Patient educated on pathology  -Discussed POC   Self-care/ Home management     Manual therapy 10 -L S/L R lumbar rotation mobilizations grades I-II   Neuromuscular Re-education     Therapeutic Activity     Therapeutic Exercises 15 -Demo/performance of HEP  Exercise #1: KTC stretch  Comment #1: 30 seconds bilaterally  Exercise #2: LTR  Comment #2: x 3 bilaterally  Exercise #3: PPT  Comment #3: x 10  Exercise #4: swinging in staggered stance x30 sec bilaterally     Gait training      Modality__________________                Total 56    Blank areas are intentional and mean the treatment did not include these items.     PT Evaluation Code: (Please list factors)  Patient History/Comorbidities: Hx of prostate cancer, hypothyroid, leukopenia, dysthymia, Parkinson Disease  Examination: Hypomobility lumbar spine  Clinical Presentation: Stable  Clinical Decision Making: Low complexity    Patient History/  Comorbidities Examination  (body structures and functions, activity limitations, and/or participation restrictions) Clinical Presentation Clinical Decision Making (Complexity)   No documented Comorbidities or personal factors 1-2 Elements Stable and/or uncomplicated Low   1-2 documented comorbidities or personal factor 3 Elements Evolving clinical presentation with changing characteristics Moderate   3-4 documented comorbidities or personal factors 4 or more Unstable and unpredictable High            Christine Austin, PT, DPT  12/9/2020  1:01 PM

## 2021-06-30 NOTE — PROGRESS NOTES
Progress Notes by Chetan Noriega MD at 5/21/2021 10:30 AM     Author: Chetan Noriega MD Service: -- Author Type: Physician    Filed: 5/21/2021 12:28 PM Encounter Date: 5/21/2021 Status: Signed    : Chetan Noriega MD (Physician)           Thank you, Modesta Jules MD, for asking the [facility] team to see Mr. Gomez Villasenor to evaluate bradycardia.      Assessment/Recommendations   Assessment:    1. Dizziness and fatigue. His degree of bradycardia is mild and he has been bradycardic before the onset of his symptoms, so I think it is less likely his bradycardia is causing this.  2. Abnormal electrocardiogram showing sinus bradycardia.  3. Parkinson disease.    Plan:  1. 24-hour Holter monitor.  2. Transthoracic echocardiogram.  3. If his Holter monitor shows consistent bradycardia despite his routine activities during the monitoring period, we can consider a treadmill stress electrocardiogram to evaluate for chronotropic incompetence.   4. Follow-up in 6 weeks.          History of Present Illness   Mr. Gomez Villasenor is a 89 y.o. male with a significant past history of hypothyroidism and recently diagnosed Parkinson disease presenting for evaluation of bradycardia. He has been increasingly tired and dizzy over the past year. His dizziness is unpredictable and does not necessarily happen when standing up or walking. He has not had syncope. He denies any chest pain/pressure/tightness, shortness of breath at rest or with exertion, lower extremity swelling, palpitations, paroxysmal nocturnal dyspnea (PND), or orthopnea.      Cardiac Problems and Cardiac Diagnostics     Most Recent Cardiac testing:  ECG dated 5/10/2021 (personaly reviewed and interpreted): sinus bradycardia 48 bpm, otherwise normal. Review of prior ECGs shows he has had sinus bradycardia going back to 2016.       Medications  Allergies   Current Outpatient Medications   Medication Sig Dispense  Refill   ? carbidopa-levodopa (SINEMET)  mg per tablet Take 3 tablets by mouth 4 (four) times a day.      ? cholecalciferol, vitamin D3, 1,000 unit tablet Take 2,000 Units by mouth daily.      ? GLUCOSAMINE/CHONDROITIN SULF A (GLUCOSAMINE-CHONDROITIN ORAL) Take 2 tablets by mouth daily.      ? levothyroxine (SYNTHROID, LEVOTHROID) 112 MCG tablet Take 1 tablet (112 mcg total) by mouth daily. 90 tablet 1   ? multivitamin (ONE A DAY) per tablet Take 1 tablet by mouth.     ? sertraline (ZOLOFT) 50 MG tablet TAKE 1 &1/2 TABLETS (75 MG) BY MOUTH ONCE DAILY 135 tablet 3   ? vit A/vit C/vit E/zinc/copper (PRESERVISION AREDS ORAL) Take by mouth.       No current facility-administered medications for this visit.       No Known Allergies     Physical Examination Review of Systems   Vitals:    05/21/21 1039   BP: 142/80   Pulse: (!) 52   Resp: 10     Body mass index is 19.93 kg/m .  Wt Readings from Last 3 Encounters:   05/21/21 133 lb (60.3 kg)   05/10/21 133 lb 12.8 oz (60.7 kg)   11/30/20 142 lb 14.4 oz (64.8 kg)       General Appearance:   Pleasant  male, appears  stated age. no acute distress, thin body habitus   ENT/Mouth: Facemask.    EYES:  no scleral icterus, normal conjunctivae   Neck: no carotid bruits. No anterior cervical lymphadenopaty   Respiratory:   lungs are clear to auscultation, no rales or wheezing,  equal chest wall expansion    Cardiovascular:   Bradycardic but regular. Normal first and second heart sounds with no murmurs, rubs, or gallops; the carotid, radial and posterior tibial pulses are intact, Jugular venous pressure normal, no edema bilaterally    Abdomen/GI:  no organomegaly, masses, bruits, or tenderness; bowel sounds are present   Extremities: no cyanosis or clubbing   Skin: no xanthelasma, warm.    Heme/lymph/ Immunology No apparent bleeding noted.   Neurologic: Alert and oriented. normal gait, no tremors     Psychiatric: Pleasant, calm, appropriate affect.    A complete 10 system review  of systems was performed and is negative except as mentioned in the HPI or below:  General: Weight Loss  Eyes: WNL  Ears/Nose/Throat: Hearing Loss  Lungs: WNL  Heart: WNL  Stomach: WNL  Bladder: Frequent Urination at Night  Muscle/Joints: WNL  Skin: WNL  Nervous System: Daytime Sleepiness, Dizziness, Loss of Balance  Mental Health: Depression, Anxiety     Blood: WNL       Past History   Past Medical History:   Past Medical History:   Diagnosis Date   ? Cancer (H)     prostate   ? Disease of thyroid gland     hypothyroid   ? Hearing loss    ? Kidney stones 3/17/2015    this is third episode of stones   ? Macular degeneration    ? Nephrolithiasis 2015    right sided/ureteroscopic removal   ? Prostate cancer (H)    ? Status post cystoscopy March 2015    with ureteroscopy and stone removal   ? Urinary incontinence        Past Surgical History:   Past Surgical History:   Procedure Laterality Date   ? CYSTOURETHROSCOPY      Right stent exchange and stone extraction   ? EYE SURGERY Right     cataract removed   ? INSERT / REPLACE / REMOVE PROSTHESIS URETHRAL SPHINCTER N/A 9/21/2016    Procedure:  REPLACEMENT OF ARTIFICIAL URINARY SPHINCTER;  Surgeon: Stevie Braxton MD;  [facility]: [facility];  Service:    ? OTHER SURGICAL HISTORY      artificial urethral sphincter   ? DC CYSTOURETHROSCOPY      Description: Cystoscopy (Diagnostic);  Recorded: 11/07/2008;   ? DC REMV PROSTATE,RETROPUB,RADICAL      Description: Prostatectomy Retropubic Radical With Nerve Sparing;  Recorded: 11/10/2009;  Comments: 2001 artificial sphinter placed       Family History:   Family History   Problem Relation Age of Onset   ? Heart disease Mother    ? No Medical Problems Father    ? Leukemia Brother    ? Cancer Brother        Social History:   Social History     Socioeconomic History   ? Marital status:      Spouse name: Tanya   ? Number of children: 3   ? Years of education: Not on file   ? Highest education level: Not on file    Occupational History   ? Occupation: retired dentist   Social Needs   ? Financial resource strain: Not on file   ? Food insecurity     Worry: Not on file     Inability: Not on file   ? Transportation needs     Medical: Not on file     Non-medical: Not on file   Tobacco Use   ? Smoking status: Never Smoker   ? Smokeless tobacco: Never Used   Substance and Sexual Activity   ? Alcohol use: Yes     Alcohol/week: 1.0 standard drinks     Types: 1 Cans of beer per week     Comment: one beer most days   ? Drug use: No   ? Sexual activity: Never   Lifestyle   ? Physical activity     Days per week: Not on file     Minutes per session: Not on file   ? Stress: Not on file   Relationships   ? Social connections     Talks on phone: Not on file     Gets together: Not on file     Attends Bahai service: Not on file     Active member of club or organization: Not on file     Attends meetings of clubs or organizations: Not on file     Relationship status: Not on file   ? Intimate partner violence     Fear of current or ex partner: Not on file     Emotionally abused: Not on file     Physically abused: Not on file     Forced sexual activity: Not on file   Other Topics Concern   ? Not on file   Social History Narrative    . Tanya. 3 grown children and several grandkids. Retired dentist/  World traveller. Exercise enthusiast. Identical twin Brother Alonso Faust  age 83 CLL/Cancer/ICH... Non smoker, occ beer/               Lab Results    Chemistry/lipid CBC Cardiac Enzymes/BNP/TSH/INR   Lab Results   Component Value Date    CHOL 185 2018    HDL 64 2018    LDLCALC 108 2018    TRIG 66 2018    CREATININE 0.82 05/10/2021    BUN 23 05/10/2021    K 4.6 05/10/2021     05/10/2021     05/10/2021    CO2 26 05/10/2021    Lab Results   Component Value Date    WBC 3.1 (L) 05/10/2021    HGB 12.3 (L) 05/10/2021    HCT 38.3 (L) 05/10/2021     05/10/2021     05/10/2021    Lab Results    Component Value Date    TSH 1.26 05/10/2021          Chetan Noriega MD [facility]  Non-Invasive Cardiologist  [facility]  Pager [phone]

## 2021-07-06 ENCOUNTER — COMMUNICATION - HEALTHEAST (OUTPATIENT)
Dept: SCHEDULING | Facility: CLINIC | Age: 86
End: 2021-07-06

## 2021-07-06 VITALS — WEIGHT: 133 LBS | HEIGHT: 69 IN | BODY MASS INDEX: 19.7 KG/M2

## 2021-07-06 VITALS
BODY MASS INDEX: 19.7 KG/M2 | WEIGHT: 133 LBS | SYSTOLIC BLOOD PRESSURE: 142 MMHG | HEART RATE: 52 BPM | HEIGHT: 69 IN | DIASTOLIC BLOOD PRESSURE: 80 MMHG | RESPIRATION RATE: 10 BRPM

## 2021-07-06 NOTE — TELEPHONE ENCOUNTER
Telephone Encounter by Noemi Caputo RN at 7/6/2021  4:48 PM     Author: Noemi Caputo RN Service: -- Author Type: Registered Nurse    Filed: 7/6/2021  5:04 PM Encounter Date: 7/6/2021 Status: Signed    : Noemi Caputo RN (Registered Nurse)       Daughter Rajendra reports pt fell yesterday and got a skin tear on L forearm approx 3 inches x 1 1/2 inches in size. Denies hitting head or any other injury. Bleeding stopped. Rajendra states there is visible debri inside wound. Advised Urgent Care or ED now. Will go to [facility].   They asked if they could wait until tomorrow. Advised we do not recommend waiting. We advise get medical attention tonight.     Reason for Disposition  ? Skin is split open or gaping (or length > 1/2 inch or 12 mm on the skin, 1/4 inch or 6 mm on the face)    Additional Information  ? Negative: [1] Major bleeding (e.g., actively dripping or spurting) AND [2] can't be stopped  ? Negative: Amputation  ? Negative: Shock suspected (e.g., cold/pale/clammy skin, too weak to stand, low BP, rapid pulse)  ? Negative: [1] Knife wound (or other possibly deep cut) AND [2] to chest, abdomen, back, neck, or head  ? Negative: [1] Cutter (self-mutilator) AND [2] suicidal or out-of-control  ? Negative: Sounds like a life-threatening emergency to the triager  ? Negative: [1] Animal bite AND [2] broken skin  ? Negative: [1] Human bite AND [2] broken skin  ? Negative: [1] Bleeding AND [2] won't stop after 10 minutes of direct pressure (using correct technique)    Protocols used: CUTS AND [id number]

## 2021-07-14 ENCOUNTER — OFFICE VISIT (OUTPATIENT)
Dept: FAMILY MEDICINE | Facility: CLINIC | Age: 86
End: 2021-07-14
Payer: COMMERCIAL

## 2021-07-14 VITALS
HEART RATE: 55 BPM | SYSTOLIC BLOOD PRESSURE: 143 MMHG | BODY MASS INDEX: 19.55 KG/M2 | TEMPERATURE: 97 F | HEIGHT: 69 IN | DIASTOLIC BLOOD PRESSURE: 62 MMHG | WEIGHT: 132 LBS

## 2021-07-14 DIAGNOSIS — F33.1 MODERATE EPISODE OF RECURRENT MAJOR DEPRESSIVE DISORDER (H): ICD-10-CM

## 2021-07-14 DIAGNOSIS — T14.8XXA SKIN AVULSION: Primary | ICD-10-CM

## 2021-07-14 DIAGNOSIS — G20.A1 PARKINSON'S DISEASE (H): ICD-10-CM

## 2021-07-14 DIAGNOSIS — R00.1 SINUS BRADYCARDIA: ICD-10-CM

## 2021-07-14 PROCEDURE — 99214 OFFICE O/P EST MOD 30 MIN: CPT | Performed by: FAMILY MEDICINE

## 2021-07-14 PROCEDURE — 96127 BRIEF EMOTIONAL/BEHAV ASSMT: CPT | Performed by: FAMILY MEDICINE

## 2021-07-14 RX ORDER — CARBIDOPA AND LEVODOPA 25; 100 MG/1; MG/1
2 TABLET ORAL 4 TIMES DAILY
COMMUNITY
Start: 2021-04-08

## 2021-07-14 ASSESSMENT — MIFFLIN-ST. JEOR: SCORE: 1231.19

## 2021-07-14 NOTE — LETTER
My Depression Action Plan  Name: Gomez Villasenor   Date of Birth 9/22/1928  Date: 7/14/2021    My doctor: Tatyana Valdes   [address] [facility] [id number] [phone]          GREEN    ZONE   Good Control    What it looks like:     Things are going generally well. You have normal ups and downs. You may even feel depressed from time to time, but bad moods usually last less than a day.   What you need to do:  1. Continue to care for yourself (see self care plan)  2. Check your depression survival kit and update it as needed  3. Follow your physician s recommendations including any medication.  4. Do not stop taking medication unless you consult with your physician first.           YELLOW         ZONE Getting Worse    What it looks like:     Depression is starting to interfere with your life.     It may be hard to get out of bed; you may be starting to isolate yourself from others.    Symptoms of depression are starting to last most all day and this has happened for several days.     You may have suicidal thoughts but they are not constant.   What you need to do:     1. Call your care team. Your response to treatment will improve if you keep your care team informed of your progress. Yellow periods are signs an adjustment may need to be made.     2. Continue your self-care.  Just get dressed and ready for the day.  Don't give yourself time to talk yourself out of it.    3. Talk to someone in your support network.    4. Open up your Depression Self-Care Plan/Wellness Kit.           RED    ZONE Medical Alert - Get Help    What it looks like:     Depression is seriously interfering with your life.     You may experience these or other symptoms: You can t get out of bed most days, can t work or engage in other necessary activities, you have trouble taking care of basic hygiene, or basic responsibilities, thoughts of suicide or death that  will not go away, self-injurious behavior.     What you need to do:  1. Call your care team and request a same-day appointment. If they are not available (weekends or after hours) call your local crisis line, emergency room or 911.          Depression Self-Care Plan / Wellness Kit    Many people find that medication and therapy are helpful treatments for managing depression. In addition, making small changes to your everyday life can help to boost your mood and improve your wellbeing. Below are some tips for you to consider. Be sure to talk with your medical provider and/or behavioral health consultant if your symptoms are worsening or not improving.     Sleep   Sleep hygiene  means all of the habits that support good, restful sleep. It includes maintaining a consistent bedtime and wake time, using your bedroom only for sleeping or sex, and keeping the bedroom dark and free of distractions like a computer, smartphone, or television.     Develop a Healthy Routine  Maintain good hygiene. Get out of bed in the morning, make your bed, brush your teeth, take a shower, and get dressed. Don t spend too much time viewing media that makes you feel stressed. Find time to relax each day.    Exercise  Get some form of exercise every day. This will help reduce pain and release endorphins, the  feel good  chemicals in your brain. It can be as simple as just going for a walk or doing some gardening, anything that will get you moving.      Diet  Strive to eat healthy foods, including fruits and vegetables. Drink plenty of water. Avoid excessive sugar, caffeine, alcohol, and other mood-altering substances.     Stay Connected with Others  Stay in touch with friends and family members.    Manage Your Mood  Try deep breathing, massage therapy, biofeedback, or meditation. Take part in fun activities when you can. Try to find something to smile about each day.     Psychotherapy  Be open to working with a therapist if your provider  recommends it.     Medication  Be sure to take your medication as prescribed. Most anti-depressants need to be taken every day. It usually takes several weeks for medications to work. Not all medicines work for all people. It is important to follow-up with your provider to make sure you have a treatment plan that is working for you. Do not stop your medication abruptly without first discussing it with your provider.    Crisis Resources   These hotlines are for both adults and children. They and are open 24 hours a day, 7 days a week unless noted otherwise.      National Suicide Prevention Lifeline   6-595-644-QOAT (4892)      Crisis Text Line    www.crisistextline.org  Text HOME to 789956 from anywhere in the [place], anytime, about any type of crisis. A live, trained crisis counselor will receive the text and respond quickly.      [facility] Lifeline for LGBTQ Youth  A national crisis intervention and suicide lifeline for LGBTQ youth under 25. Provides a safe place to talk without judgement. Call 1-269.345.1840; text START to 634400 or visit www.thetrevorproject.org to talk to a trained counselor.      For [facility] crisis numbers, visit the [facility] website at:  [place]://[place].gov[place]hs/people-we-serve/adults/health-care/mental-health/resources/crisis-contacts.jsp

## 2021-07-14 NOTE — PROGRESS NOTES
Assessment & Plan     Skin avulsion  Left forearm skin avulsion healing well with appropriate granulation tissue and no current signs of infection.  He will continue to monitor for signs of infection.    Moderate episode of recurrent major depressive disorder (H)  Doing well on current sertraline dose, refill provided.  - sertraline (ZOLOFT) 50 MG tablet  Dispense: 90 tablet; Refill: 1    Parkinson's disease (H)  Recently established with new neurologist at [facility].  His carbidopa-levodopa dose has been adjusted.  He continues to have issues with imbalance but notes improvement since undergoing physical therapy.  He uses a cane and occasionally a rolling walker for ambulation.    Sinus bradycardia  History of sinus bradycardia and hypotension, with stable levels today.  He is scheduled to meet with cardiology to discuss pacemaker placement, however, him and his wife are hesitant towards this given his age.  He is primarily asymptomatic with only occasional dizziness/lightheadedness.    Return in about 6 months (around 1/14/2022) for Follow up.    Tatyana Valdes DO  [facility]    Beulah Mandujano is a 89 year old who presents for the following health issues  accompanied by his spouse:    HPI     Presented to [facility] with 2 skin avulsions that occurred on 7[place]2021 [place].  He lost his footing but caught his balance by holding onto a tree.  He was able to clean the wounds thoroughly afterwards.  One was on his left anterior forearm and one on the right anterior hand.  These were cleansed and dressed with topical bacitracin and wrapped in gauze.  He continues to wrap it with gauze, there is occasional sticking of the gauze to the wound.  It has been healing well with minimal drainage.    He is requesting a refill of his sertraline.  Mood is stable.    He has Parkinson's diagnosis was confirmed as mild.  He recently began seeing a new neurologist  "through [facility].  His wife continues to be concerned about his imbalance but he notes great improvement since undergoing 2 rounds of physical therapy.  He typically uses a cane but also no occasional rolling walker to ambulate.        Objective    BP (!) 143/62   Pulse 55   Temp 97  F (36.1  C) (Oral)   Ht 1.74 m (5' 8.5\")   Wt 59.9 kg (132 lb)   BMI 19.78 kg/m    Body mass index is 19.78 kg/m .  Physical Exam   GENERAL: healthy, alert and no distress  NECK: no adenopathy, no asymmetry, masses, or scars and thyroid normal to palpation  RESP: lungs clear to auscultation - no rales, rhonchi or wheezes  CV: Bradycardic no audible murmur  Skin: 0.5 cm healing scab right anterior hand, 4 cm skin avulsion left anterior forearm with underlying granulation tissue with no drainage or surrounding redness        "

## 2021-07-14 NOTE — LETTER
My Depression Action Plan  Name: Gomez Villasenor   Date of Birth 9/22/1928  Date: 7/14/2021    My doctor: Tatyana Valdes   [address] [facility] [id number] [phone]          GREEN    ZONE   Good Control    What it looks like:     Things are going generally well. You have normal ups and downs. You may even feel depressed from time to time, but bad moods usually last less than a day.   What you need to do:  1. Continue to care for yourself (see self care plan)  2. Check your depression survival kit and update it as needed  3. Follow your physician s recommendations including any medication.  4. Do not stop taking medication unless you consult with your physician first.           YELLOW         ZONE Getting Worse    What it looks like:     Depression is starting to interfere with your life.     It may be hard to get out of bed; you may be starting to isolate yourself from others.    Symptoms of depression are starting to last most all day and this has happened for several days.     You may have suicidal thoughts but they are not constant.   What you need to do:     1. Call your care team. Your response to treatment will improve if you keep your care team informed of your progress. Yellow periods are signs an adjustment may need to be made.     2. Continue your self-care.  Just get dressed and ready for the day.  Don't give yourself time to talk yourself out of it.    3. Talk to someone in your support network.    4. Open up your Depression Self-Care Plan/Wellness Kit.           RED    ZONE Medical Alert - Get Help    What it looks like:     Depression is seriously interfering with your life.     You may experience these or other symptoms: You can t get out of bed most days, can t work or engage in other necessary activities, you have trouble taking care of basic hygiene, or basic responsibilities, thoughts of suicide or death that  will not go away, self-injurious behavior.     What you need to do:  1. Call your care team and request a same-day appointment. If they are not available (weekends or after hours) call your local crisis line, emergency room or 911.          Depression Self-Care Plan / Wellness Kit    Many people find that medication and therapy are helpful treatments for managing depression. In addition, making small changes to your everyday life can help to boost your mood and improve your wellbeing. Below are some tips for you to consider. Be sure to talk with your medical provider and/or behavioral health consultant if your symptoms are worsening or not improving.     Sleep   Sleep hygiene  means all of the habits that support good, restful sleep. It includes maintaining a consistent bedtime and wake time, using your bedroom only for sleeping or sex, and keeping the bedroom dark and free of distractions like a computer, smartphone, or television.     Develop a Healthy Routine  Maintain good hygiene. Get out of bed in the morning, make your bed, brush your teeth, take a shower, and get dressed. Don t spend too much time viewing media that makes you feel stressed. Find time to relax each day.    Exercise  Get some form of exercise every day. This will help reduce pain and release endorphins, the  feel good  chemicals in your brain. It can be as simple as just going for a walk or doing some gardening, anything that will get you moving.      Diet  Strive to eat healthy foods, including fruits and vegetables. Drink plenty of water. Avoid excessive sugar, caffeine, alcohol, and other mood-altering substances.     Stay Connected with Others  Stay in touch with friends and family members.    Manage Your Mood  Try deep breathing, massage therapy, biofeedback, or meditation. Take part in fun activities when you can. Try to find something to smile about each day.     Psychotherapy  Be open to working with a therapist if your provider  recommends it.     Medication  Be sure to take your medication as prescribed. Most anti-depressants need to be taken every day. It usually takes several weeks for medications to work. Not all medicines work for all people. It is important to follow-up with your provider to make sure you have a treatment plan that is working for you. Do not stop your medication abruptly without first discussing it with your provider.    Crisis Resources   These hotlines are for both adults and children. They and are open 24 hours a day, 7 days a week unless noted otherwise.      National Suicide Prevention Lifeline   1-620-250-DTDX (7010)      Crisis Text Line    www.crisistextline.org  Text HOME to 754829 from anywhere in the [place], anytime, about any type of crisis. A live, trained crisis counselor will receive the text and respond quickly.      [facility] Lifeline for LGBTQ Youth  A national crisis intervention and suicide lifeline for LGBTQ youth under 25. Provides a safe place to talk without judgement. Call 1-923.960.7018; text START to 570056 or visit www.thetrevorproject.org to talk to a trained counselor.      For [facility] crisis numbers, visit the [facility] website at:  [place]://[place].gov/dhs/people-we-serve/adults/health-care/mental-health/resources/crisis-contacts.jsp

## 2021-08-03 ENCOUNTER — OFFICE VISIT (OUTPATIENT)
Dept: CARDIOLOGY | Facility: CLINIC | Age: 86
End: 2021-08-03
Payer: COMMERCIAL

## 2021-08-03 VITALS
RESPIRATION RATE: 12 BRPM | WEIGHT: 130.4 LBS | BODY MASS INDEX: 19.76 KG/M2 | SYSTOLIC BLOOD PRESSURE: 146 MMHG | HEIGHT: 68 IN | HEART RATE: 52 BPM | DIASTOLIC BLOOD PRESSURE: 80 MMHG

## 2021-08-03 DIAGNOSIS — R42 POSTURAL DIZZINESS WITH PRESYNCOPE: ICD-10-CM

## 2021-08-03 DIAGNOSIS — R00.1 SINUS BRADYCARDIA: ICD-10-CM

## 2021-08-03 DIAGNOSIS — R55 POSTURAL DIZZINESS WITH PRESYNCOPE: ICD-10-CM

## 2021-08-03 PROCEDURE — 99215 OFFICE O/P EST HI 40 MIN: CPT | Performed by: INTERNAL MEDICINE

## 2021-08-03 ASSESSMENT — MIFFLIN-ST. JEOR: SCORE: 1215.99

## 2021-08-03 NOTE — PROGRESS NOTES
[facility]  Cardiac Electrophysiology     Consultation Note: Shlomo Obrien MD    Primary Care: Tatyana Jefferson      Thank you, Tatyana Jefferson, for asking the [facility] team to see . Gomez Villasenor to evaluate recurrent lightheadedness.    Assessment/Recommendations   Assessment:      Postural dizziness with presyncope: The patient's history is consistent with autonomic nervous system dysfunction which is further exacerbated by his Parkinson's disease.  The patient is chronically under hydrated with chronic bladder problems contributing to his decreased oral intake.  I discussed the physiology of his symptoms which is a forme fruste of vasovagal type syncope.  The patient's recent Holter monitor shows normal heart rate ranges during his 2 symptomatic events suggesting that his bradycardia is not the primary  for the symptoms.  Pacemaker therapy is unlikely to significantly improve the symptoms that he currently is experiencing.    Bradycardia: The patient demonstrates some mild degree of chronotropic incompetence and mild bradycardia on his recent Holter monitor.  There were no significant pauses.  The patient's symptomatic events however were not associated with bradycardia or other pathologic rhythm disturbance as noted above.    Plan:    I recommended to the patient and his wife that he increase his daily noncaffeinated liquid consumption to 70-80 ounces with a push towards the majority occurring in the first 4-6 hours of the day.    I instructed the patient in the use of sequential active skeletal muscle contraction of the lower extremities to assist in venous return before standing and walking.  I also strongly encouraged him to continue with his physical therapy for his Parkinson's disease.    Patient will see me back in the office in 3 months.  If the patient's symptoms have not significantly improved with consistent hydration then we could consider a implantable  "loop recorder.                  History of Present Illness/Subjective    Mr. Gomez Villasenor is a 89 year old male with no significant past cardiac history.  The patient is referred for evaluation of recurrent dizzy episodes.  The patient describes this as a \"lightheaded\" sensation which largely occurs when he is standing.  This has been going on for at least 2 or 3 years according to the patient's wife.  She notes that he will become lightheaded with standing after [employer].  He has not had yaima syncope.  He notes no palpitations.  He is not having any other cardiac symptoms of dyspnea on exertion.  He is having difficulties with his balance secondary to his Parkinson's disease and has had several recent falls as result of loss of balance, but no syncope.  The patient also is troubled by urinary incontinence and nocturia and he feels that his fluid consumption has decreased since he worries about that problem.  Currently the patient consumes around 24 ounces of noncaffeinated liquid or less daily.    ECG:   Personally reviewed.  ECG from May of this year demonstrates mild sinus bradycardia and no acute changes.    ECHO:   Dated: June 2021  Narrative & Impression    Normal left ventricular size.The calculated left ventricular ejection fraction is 63%. This represents a normal ejection fraction. Mild sigmoid septum hypertrophy noted. Normal left ventricle diastolic function.    Normal right ventricular size and systolic function.    No hemodynamically significant valvular heart abnormalities    No previous study for comparison.    Other Studies:   Holter monitor dated June 2021  Conclusion    Holter monitor report:  Ordering physician:Chetan Noriega MD  Indication: Bradycardia, fatigue and dizziness.     Patient was monitored for 24 hours.  Baseline rhythm, sinus rhythm and sinus bradycardia, heart rate averaging 57 bpm.   Lowest heart rate of 42 bpm and max of 110 bpm at 9:10 AM during an 11 beat run of " "atrial tachycardia.      Relatively blunted ventricular rates ranging mostly between 50 bpm to 70 bpm.      No prolonged pauses, no high-grade AV block, rate histograms are suggestive but do not confirm sinus node dysfunction and chronotropic incompetence.     Infrequent atrial ectopy burden of 1.4% of total beats, a single 11 beats run of atrial tachycardia, no evidence of atrial fibrillation or flutter.     Rare PVCs, no ventricular runs, sustained or nonsustained VT.     Reported symptoms of dizziness correlated with sinus rhythm and atrial pair.  Reports symptoms of being \"confused\" correlated with sinus rhythm, 62 bpm.            Physical Examination Review of Systems   BP (!) 146/80 (BP Location: Left arm, Patient Position: Sitting, Cuff Size: Adult Large)   Pulse 52   Resp 12   Ht 1.727 m (5' 8\")   Wt 59.1 kg (130 lb 6.4 oz)   BMI 19.83 kg/m    Body mass index is 19.83 kg/m .  Wt Readings from Last 3 Encounters:   08/03/21 59.1 kg (130 lb 6.4 oz)   07/14/21 59.9 kg (132 lb)   07/08/21 60.3 kg (133 lb)     [unfilled]    General     Appearance:   The patient is alert oriented to person place and situation.    The patient is in no acute distress at the time of my evaluation.   HEENT:  Pupils are equal, round, and reactive to light.  Conjunctiva and sclera are clear.  ENT: Oral mucosa is moist and without redness. No evident nasal discharge.   Neck: Without palpable thyroid or appreciable lymph nodes.   Chest: Symmetric, without deformity.   Lungs:   Clear bilaterally with no rales, rhonchi, or wheezes.     Cardiovascular:   Rhythm is regular. S1 and S2 are normal. No significant murmur is present. JVP is normal. Lower extremities demonstrate no significant edema. Distal pulses are intact bilaterally.   Abdomen:  Soft, non-tender, and without hepatosplenomegaly. Bowel sounds present.   Extremities: Without deformity.   Skin: Skin is warm, dry, and otherwise intact.   Neurologic: Gait is mildly halting and the " patient uses a walking stick.          A 12 point comprehensive review of systems was  negative except as noted.      Medical History  Surgical History Family History Social History   Past Medical History:   Diagnosis Date     Cancer (H)     prostate     Disease of thyroid gland     hypothyroid     Hearing loss      Kidney stones 03/17/2015    this is third episode of stones     Macular degeneration      Nephrolithiasis 01/01/2015    right sided/ureteroscopic removal     Parkinson's disease (H)      Postural dizziness with presyncope      Prostate cancer (H)      Sinus bradycardia 8/3/2021     Status post cystoscopy 03/01/2015    with ureteroscopy and stone removal     Urinary incontinence     Past Surgical History:   Procedure Laterality Date     C REMV PROSTATE,RETROPUB,RADICAL      Description: Prostatectomy Retropubic Radical With Nerve Sparing;  Recorded: 11/10/2009;  Comments: 2001 artificial sphinter placed     CYSTOURETHROSCOPY      Description: Cystoscopy (Diagnostic);  Recorded: 11/07/2008;     CYSTOURETHROSCOPY      Right stent exchange and stone extraction     EYE SURGERY Right     cataract removed     INSERT / REPLACE / REMOVE PROSTHESIS URETHRAL SPHINCTER N/A 9/21/2016    Procedure:  REPLACEMENT OF ARTIFICIAL URINARY SPHINCTER;  Surgeon: Stevie Braxton MD;  Location: [facility];  Service:      OTHER SURGICAL HISTORY      artificial urethral sphincter    Family History   Problem Relation Age of Onset     Heart Disease Mother      Cancer Brother      No Known Problems Father      Leukemia Brother      Cancer Brother     Social History     Socioeconomic History     Marital status:      Spouse name: Not on file     Number of children: 3     Years of education: Not on file     Highest education level: Not on file   Occupational History     Not on file   Tobacco Use     Smoking status: Never Smoker     Smokeless tobacco: Never Used   Substance and Sexual Activity     Alcohol use: Yes      Alcohol/week: 1.0 standard drinks     Comment: Alcoholic Drinks/day: one beer most days     Drug use: No     Sexual activity: Not Currently   Other Topics Concern     Parent/sibling w/ CABG, MI or angioplasty before 65F 55M? No   Social History Narrative     Not on file     Social Determinants of Health     Financial Resource Strain:      Difficulty of Paying Living Expenses:    Food Insecurity:      Worried About Running Out of Food in the Last Year:      Ran Out of Food in the Last Year:    Transportation Needs:      Lack of Transportation (Medical):      Lack of Transportation (Non-Medical):    Physical Activity:      Days of Exercise per Week:      Minutes of Exercise per Session:    Stress:      Feeling of Stress :    Social Connections:      Frequency of Communication with Friends and Family:      Frequency of Social Gatherings with Friends and Family:      Attends [employer] Services:      Active Member of Clubs or Organizations:      Attends Club or Organization Meetings:      Marital Status:    Intimate Partner Violence:      Fear of Current or Ex-Partner:      Emotionally Abused:      Physically Abused:      Sexually Abused:           Medications  Allergies   Scheduled Meds:  Current Outpatient Medications   Medication Sig Dispense Refill     carbidopa-levodopa (SINEMET)  MG tablet Take 2 tablets by mouth 3 times daily        cholecalciferol (VITAMIN D-1000 MAX ST) 25 MCG (1000 UT) TABS Take 2,000 Units by mouth       levothyroxine (SYNTHROID/LEVOTHROID) 112 MCG tablet TAKE ONE TABLET BY MOUTH ONCE DAILY       Multiple Vitamins-Minerals (PRESERVISION AREDS 2+MULTI VIT PO)        multivitamin w/minerals (MULTI-VITAMIN) tablet Take 1 tablet by mouth       sertraline (ZOLOFT) 50 MG tablet Take 1.5 tablets (75 mg) by mouth daily TAKE 1 &1/2 TABLETS (75 MG) BY MOUTH ONCE DAILY 90 tablet 1    No Known Allergies      Lab Results    Chemistry/lipid CBC Cardiac Enzymes/BNP/TSH/INR   Lab Results   Component  Value Date    CHOL 185 03/22/2018    HDL 64 03/22/2018    TRIG 66 03/22/2018    BUN 23 05/10/2021     05/10/2021    CO2 26 05/10/2021    Lab Results   Component Value Date    WBC 3.1 (L) 05/10/2021    HGB 12.3 (L) 05/10/2021    HCT 38.3 (L) 05/10/2021     05/10/2021     05/10/2021    Lab Results   Component Value Date    TSH 1.26 05/10/2021

## 2021-08-03 NOTE — LETTER
8/3/2021      RE: Gomez Villasenor  [address]  [facility] [address]       Dear Colleague,    Thank you for the opportunity to participate in the care of your patient, Gomez Villasenor, at the [facility]. Please see a copy of my visit note below.    [facility]  Cardiac Electrophysiology     Consultation Note: Shlomo Obrien MD    Primary Care: Tatyana Jefferson      Thank you, Tatyana Jefferson, for asking [facility] team to see Mr. Gomez Villasenor to evaluate recurrent lightheadedness.    Assessment/Recommendations   Assessment:      Postural dizziness with presyncope: The patient's history is consistent with autonomic nervous system dysfunction which is further exacerbated by his Parkinson's disease.  The patient is chronically under hydrated with chronic bladder problems contributing to his decreased oral intake.  I discussed the physiology of his symptoms which is a forme fruste of vasovagal type syncope.  The patient's recent Holter monitor shows normal heart rate ranges during his 2 symptomatic events suggesting that his bradycardia is not the primary  for the symptoms.  Pacemaker therapy is unlikely to significantly improve the symptoms that he currently is experiencing.    Bradycardia: The patient demonstrates some mild degree of chronotropic incompetence and mild bradycardia on his recent Holter monitor.  There were no significant pauses.  The patient's symptomatic events however were not associated with bradycardia or other pathologic rhythm disturbance as noted above.    Plan:    I recommended to the patient and his wife that he increase his daily noncaffeinated liquid consumption to 70-80 ounces with a push towards the majority occurring in the first 4-6 hours of the day.    I instructed the patient in the use of sequential active skeletal muscle contraction of the  "lower extremities to assist in venous return before standing and walking.  I also strongly encouraged him to continue with his physical therapy for his Parkinson's disease.    Patient will see me back in the office in 3 months.  If the patient's symptoms have not significantly improved with consistent hydration then we could consider a implantable loop recorder.                  History of Present Illness/Subjective    Mr. Gomez Villasenor is a 89 year old male with no significant past cardiac history.  The patient is referred for evaluation of recurrent dizzy episodes.  The patient describes this as a \"lightheaded\" sensation which largely occurs when he is standing.  This has been going on for at least 2 or 3 years according to the patient's wife.  She notes that he will become lightheaded with standing after [employer].  He has not had yaima syncope.  He notes no palpitations.  He is not having any other cardiac symptoms of dyspnea on exertion.  He is having difficulties with his balance secondary to his Parkinson's disease and has had several recent falls as result of loss of balance, but no syncope.  The patient also is troubled by urinary incontinence and nocturia and he feels that his fluid consumption has decreased since he worries about that problem.  Currently the patient consumes around 24 ounces of noncaffeinated liquid or less daily.    ECG:   Personally reviewed.  ECG from May of this year demonstrates mild sinus bradycardia and no acute changes.    ECHO:   Dated: June 2021  Narrative & Impression    Normal left ventricular size.The calculated left ventricular ejection fraction is 63%. This represents a normal ejection fraction. Mild sigmoid septum hypertrophy noted. Normal left ventricle diastolic function.    Normal right ventricular size and systolic function.    No hemodynamically significant valvular heart abnormalities    No previous study for comparison.    Other Studies:   Holter monitor dated " "June 2021  Conclusion    Holter monitor report:  Ordering physician:Chetan Noriega MD  Indication: Bradycardia, fatigue and dizziness.     Patient was monitored for 24 hours.  Baseline rhythm, sinus rhythm and sinus bradycardia, heart rate averaging 57 bpm.   Lowest heart rate of 42 bpm and max of 110 bpm at 9:10 AM during an 11 beat run of atrial tachycardia.      Relatively blunted ventricular rates ranging mostly between 50 bpm to 70 bpm.      No prolonged pauses, no high-grade AV block, rate histograms are suggestive but do not confirm sinus node dysfunction and chronotropic incompetence.     Infrequent atrial ectopy burden of 1.4% of total beats, a single 11 beats run of atrial tachycardia, no evidence of atrial fibrillation or flutter.     Rare PVCs, no ventricular runs, sustained or nonsustained VT.     Reported symptoms of dizziness correlated with sinus rhythm and atrial pair.  Reports symptoms of being \"confused\" correlated with sinus rhythm, 62 bpm.            Physical Examination Review of Systems   BP (!) 146/80 (BP Location: Left arm, Patient Position: Sitting, Cuff Size: Adult Large)   Pulse 52   Resp 12   Ht 1.727 m (5' 8\")   Wt 59.1 kg (130 lb 6.4 oz)   BMI 19.83 kg/m    Body mass index is 19.83 kg/m .  Wt Readings from Last 3 Encounters:   08/03/21 59.1 kg (130 lb 6.4 oz)   07/14/21 59.9 kg (132 lb)   07/08/21 60.3 kg (133 lb)     [unfilled]    General     Appearance:   The patient is alert oriented to person place and situation.    The patient is in no acute distress at the time of my evaluation.   HEENT:  Pupils are equal, round, and reactive to light.  Conjunctiva and sclera are clear.  ENT: Oral mucosa is moist and without redness. No evident nasal discharge.   Neck: Without palpable thyroid or appreciable lymph nodes.   Chest: Symmetric, without deformity.   Lungs:   Clear bilaterally with no rales, rhonchi, or wheezes.     Cardiovascular:   Rhythm is regular. S1 and S2 are normal. " No significant murmur is present. JVP is normal. Lower extremities demonstrate no significant edema. Distal pulses are intact bilaterally.   Abdomen:  Soft, non-tender, and without hepatosplenomegaly. Bowel sounds present.   Extremities: Without deformity.   Skin: Skin is warm, dry, and otherwise intact.   Neurologic: Gait is mildly halting and the patient uses a walking stick.          A 12 point comprehensive review of systems was  negative except as noted.      Medical History  Surgical History Family History Social History   Past Medical History:   Diagnosis Date     Cancer (H)     prostate     Disease of thyroid gland     hypothyroid     Hearing loss      Kidney stones 03/17/2015    this is third episode of stones     Macular degeneration      Nephrolithiasis 01/01/2015    right sided/ureteroscopic removal     Parkinson's disease (H)      Postural dizziness with presyncope      Prostate cancer (H)      Sinus bradycardia 8/3/2021     Status post cystoscopy 03/01/2015    with ureteroscopy and stone removal     Urinary incontinence     Past Surgical History:   Procedure Laterality Date     C REMV PROSTATE,RETROPUB,RADICAL      Description: Prostatectomy Retropubic Radical With Nerve Sparing;  Recorded: 11/10/2009;  Comments: 2001 artificial sphinter placed     CYSTOURETHROSCOPY      Description: Cystoscopy (Diagnostic);  Recorded: 11/07/2008;     CYSTOURETHROSCOPY      Right stent exchange and stone extraction     EYE SURGERY Right     cataract removed     INSERT / REPLACE / REMOVE PROSTHESIS URETHRAL SPHINCTER N/A 9/21/2016    Procedure:  REPLACEMENT OF ARTIFICIAL URINARY SPHINCTER;  Surgeon: Stevie Braxton MD;  Location: [facility];  Service:      OTHER SURGICAL HISTORY      artificial urethral sphincter    Family History   Problem Relation Age of Onset     Heart Disease Mother      Cancer Brother      No Known Problems Father      Leukemia Brother      Cancer Brother     Social History      Socioeconomic History     Marital status:      Spouse name: Not on file     Number of children: 3     Years of education: Not on file     Highest education level: Not on file   Occupational History     Not on file   Tobacco Use     Smoking status: Never Smoker     Smokeless tobacco: Never Used   Substance and Sexual Activity     Alcohol use: Yes     Alcohol/week: 1.0 standard drinks     Comment: Alcoholic Drinks/day: one beer most days     Drug use: No     Sexual activity: Not Currently   Other Topics Concern     Parent/sibling w/ CABG, MI or angioplasty before 65F 55M? No   Social History Narrative     Not on file     Social Determinants of Health     Financial Resource Strain:      Difficulty of Paying Living Expenses:    Food Insecurity:      Worried About Running Out of Food in the Last Year:      Ran Out of Food in the Last Year:    Transportation Needs:      Lack of Transportation (Medical):      Lack of Transportation (Non-Medical):    Physical Activity:      Days of Exercise per Week:      Minutes of Exercise per Session:    Stress:      Feeling of Stress :    Social Connections:      Frequency of Communication with Friends and Family:      Frequency of Social Gatherings with Friends and Family:      Attends [employer] Services:      Active Member of Clubs or Organizations:      Attends Club or Organization Meetings:      Marital Status:    Intimate Partner Violence:      Fear of Current or Ex-Partner:      Emotionally Abused:      Physically Abused:      Sexually Abused:           Medications  Allergies   Scheduled Meds:  Current Outpatient Medications   Medication Sig Dispense Refill     carbidopa-levodopa (SINEMET)  MG tablet Take 2 tablets by mouth 3 times daily        cholecalciferol (VITAMIN D-1000 MAX ST) 25 MCG (1000 UT) TABS Take 2,000 Units by mouth       levothyroxine (SYNTHROID/LEVOTHROID) 112 MCG tablet TAKE ONE TABLET BY MOUTH ONCE DAILY       Multiple Vitamins-Minerals  (PRESERVISION AREDS 2+MULTI VIT PO)        multivitamin w/minerals (MULTI-VITAMIN) tablet Take 1 tablet by mouth       sertraline (ZOLOFT) 50 MG tablet Take 1.5 tablets (75 mg) by mouth daily TAKE 1 &1/2 TABLETS (75 MG) BY MOUTH ONCE DAILY 90 tablet 1    No Known Allergies      Lab Results    Chemistry/lipid CBC Cardiac Enzymes/BNP/TSH/INR   Lab Results   Component Value Date    CHOL 185 03/22/2018    HDL 64 03/22/2018    TRIG 66 03/22/2018    BUN 23 05/10/2021     05/10/2021    CO2 26 05/10/2021    Lab Results   Component Value Date    WBC 3.1 (L) 05/10/2021    HGB 12.3 (L) 05/10/2021    HCT 38.3 (L) 05/10/2021     05/10/2021     05/10/2021    Lab Results   Component Value Date    TSH 1.26 05/10/2021               Please do not hesitate to contact me if you have any questions/concerns.     Sincerely,     Shlomo Obrien MD

## 2021-10-10 ENCOUNTER — HEALTH MAINTENANCE LETTER (OUTPATIENT)
Age: 86
End: 2021-10-10

## 2022-01-24 ENCOUNTER — TELEPHONE (OUTPATIENT)
Dept: FAMILY MEDICINE | Facility: CLINIC | Age: 87
End: 2022-01-24
Payer: COMMERCIAL

## 2022-01-24 NOTE — TELEPHONE ENCOUNTER
Reason for call:  Patient reporting a symptom    Symptom or request: high blood pressure at office visit today at [facility] 173/87     Duration (how long have symptoms been present):   Taken several times at clinic and that was the lowest one    Have you been treated for this before? No    Additional comments: patient wife wants patient to be seen by Dr Valdes    Phone Number patient can be reached at:  Home number on file [phone] (home)    Best Time:  any    Can we leave a detailed message on this number:  YES    Call taken on 1/24/2022 at 4:11 PM by PAOLO OWEN

## 2022-01-26 ENCOUNTER — OFFICE VISIT (OUTPATIENT)
Dept: FAMILY MEDICINE | Facility: CLINIC | Age: 87
End: 2022-01-26
Payer: COMMERCIAL

## 2022-01-26 VITALS
DIASTOLIC BLOOD PRESSURE: 73 MMHG | SYSTOLIC BLOOD PRESSURE: 141 MMHG | HEART RATE: 49 BPM | HEIGHT: 68 IN | WEIGHT: 134 LBS | BODY MASS INDEX: 20.31 KG/M2

## 2022-01-26 DIAGNOSIS — G20.A1 PARKINSON DISEASE (H): ICD-10-CM

## 2022-01-26 DIAGNOSIS — C61 MALIGNANT NEOPLASM PROSTATE (H): ICD-10-CM

## 2022-01-26 DIAGNOSIS — F33.1 MODERATE EPISODE OF RECURRENT MAJOR DEPRESSIVE DISORDER (H): ICD-10-CM

## 2022-01-26 DIAGNOSIS — R03.0 ELEVATED BLOOD PRESSURE READING WITHOUT DIAGNOSIS OF HYPERTENSION: Primary | ICD-10-CM

## 2022-01-26 PROCEDURE — 99214 OFFICE O/P EST MOD 30 MIN: CPT | Performed by: FAMILY MEDICINE

## 2022-01-26 RX ORDER — DONEPEZIL HYDROCHLORIDE 5 MG/1
TABLET, FILM COATED ORAL
COMMUNITY
Start: 2022-01-25 | End: 2022-06-15 | Stop reason: SINTOL

## 2022-01-26 ASSESSMENT — MIFFLIN-ST. JEOR: SCORE: 1227.32

## 2022-01-26 NOTE — PROGRESS NOTES
"  Assessment & Plan     Elevated blood pressure reading without diagnosis of hypertension  Blood pressure intermittently elevated but given his Parkinson's and occasional dizziness/risk of falls we will set systolic goal of less than 150.  He is within this on recheck today so we will continue to monitor.  Ideally would like to avoid polypharmacy given his age and comorbidities.    Parkinson disease (H)  Follows with [facility] neurology.  Will beginning Sinemet for memory loss soon.  Also scheduled for Parkinson's OT.    Moderate episode of recurrent major depressive disorder (H)  Mood stable on sertraline.    Malignant neoplasm prostate (H)  History of prostate cancer s/p prostatectomy and artificial sphincter in 2001.      Tatyana Valdes [facility]    Beulah Mandujano is a 89 year old who presents for the following health issues with his wife Tanya.  Chief Complaint   Patient presents with     Hypertension     f/u     HPI   Blood pressure:  Recently saw neurology and had high blood pressure at that time.  Recommended PCP follow-up.  They do not check blood pressures at home.  He reports no prior history of hypertension.  He does admit to intermittent dizziness and balance issues secondary to his Parkinson's.    Parkinson's:  Recently saw neurology and they discussed starting Aricept which they have not picked up from the pharmacy yet.  They were notified of possible GI side effects which they will monitor for.  He notes slowed physical health and mentation.  They will be going to occupational therapy soon at a [facility] practice that specializes in Parkinson's.  Although he has help from his wife, he continues to perform most of his ADLs on his own.      Objective    BP (!) 141/73   Pulse (!) 49   Ht 1.727 m (5' 8\")   Wt 60.8 kg (134 lb)   BMI 20.37 kg/m    Body mass index is 20.37 kg/m .  Physical Exam   GENERAL: healthy, alert and no " distress  RESP: lungs clear to auscultation - no rales, rhonchi or wheezes  CV: bradycardia, normal S1 S2, no S3 or S4 and no murmur, click or rub  NEURO: Slowed speech and movements  PSYCH: Slowed mentation, memory intact with occasional word finding difficulties

## 2022-01-30 ENCOUNTER — HEALTH MAINTENANCE LETTER (OUTPATIENT)
Age: 87
End: 2022-01-30

## 2022-03-07 ENCOUNTER — TRANSFERRED RECORDS (OUTPATIENT)
Dept: HEALTH INFORMATION MANAGEMENT | Facility: CLINIC | Age: 87
End: 2022-03-07
Payer: COMMERCIAL

## 2022-04-19 ENCOUNTER — NURSE TRIAGE (OUTPATIENT)
Dept: NURSING | Facility: CLINIC | Age: 87
End: 2022-04-19
Payer: COMMERCIAL

## 2022-04-19 NOTE — TELEPHONE ENCOUNTER
Please squeeze nostrils together and lean head forward.  Recommend being seen at urgent care if unable to get bleeding to stop.    Tatyana Valdes, DO

## 2022-04-19 NOTE — TELEPHONE ENCOUNTER
Nurse Triage SBAR    Is this a 2nd Level Triage? YES, LICENSED PRACTITIONER REVIEW IS REQUIRED    Situation: Wife, Tanya, calling about patient.  Verbal consent given by patient over the phone    Background: Says patient has had a nose bleed for about an hour.  Says this is the first nose bleed he has ever had.    Assessment: He is holding pressure on it but is unable to get it to stop.  No dizziness. No injury to the nose.      Protocol Recommended Disposition:   Go To ED/UCC Now (Or To Office With PCP Approval)    Recommendation:  Please advise if patient should be seen in ED or if he can be seen in clinic or Walk  In clinic.    Routed to provider   10:21 am call placed to clinic to have message addressed.    Does the patient meet one of the following criteria for ADS visit consideration? 16+ years old, with an [facility] PCP     TIP  Providers, please consider if this condition is appropriate for management at one of our Acute and Diagnostic Services sites.     If patient is a good candidate, please use dotphrase <dot>triageresponse and select Refer to ADS to document.    Kirsten Beltre, AVELINO  Triage Nurse Advisor      Reason for Disposition    Bleeding present > 30 minutes and using correct method of direct pressure    Additional Information    Negative: Fainted (passed out), or too weak to stand following large blood loss    Negative: Sounds like a life-threatening emergency to the triager    Negative: Nosebleed followed nose injury    Protocols used: NOSEBLEED-A-OH

## 2022-06-15 ENCOUNTER — OFFICE VISIT (OUTPATIENT)
Dept: FAMILY MEDICINE | Facility: CLINIC | Age: 87
End: 2022-06-15
Payer: COMMERCIAL

## 2022-06-15 VITALS
DIASTOLIC BLOOD PRESSURE: 85 MMHG | HEART RATE: 46 BPM | BODY MASS INDEX: 19.16 KG/M2 | HEIGHT: 68 IN | SYSTOLIC BLOOD PRESSURE: 175 MMHG | WEIGHT: 126.4 LBS | RESPIRATION RATE: 14 BRPM

## 2022-06-15 DIAGNOSIS — G20.A1 PARKINSON'S DISEASE (H): ICD-10-CM

## 2022-06-15 DIAGNOSIS — E55.9 VITAMIN D DEFICIENCY: ICD-10-CM

## 2022-06-15 DIAGNOSIS — E03.9 HYPOTHYROIDISM, UNSPECIFIED TYPE: ICD-10-CM

## 2022-06-15 DIAGNOSIS — Z00.00 ENCOUNTER FOR MEDICARE ANNUAL WELLNESS EXAM: Primary | ICD-10-CM

## 2022-06-15 DIAGNOSIS — I10 PRIMARY HYPERTENSION: ICD-10-CM

## 2022-06-15 DIAGNOSIS — C61 MALIGNANT NEOPLASM PROSTATE (H): ICD-10-CM

## 2022-06-15 DIAGNOSIS — F33.1 MODERATE EPISODE OF RECURRENT MAJOR DEPRESSIVE DISORDER (H): ICD-10-CM

## 2022-06-15 PROBLEM — D72.819 LEUKOPENIA: Status: RESOLVED | Noted: 2018-10-26 | Resolved: 2022-06-15

## 2022-06-15 PROBLEM — N20.0 NEPHROLITHIASIS: Status: ACTIVE | Noted: 2020-07-13

## 2022-06-15 PROBLEM — H35.30 MACULAR DEGENERATION: Status: ACTIVE | Noted: 2020-07-13

## 2022-06-15 PROBLEM — R53.83 FATIGUE: Status: RESOLVED | Noted: 2018-10-26 | Resolved: 2022-06-15

## 2022-06-15 PROBLEM — F34.1 DYSTHYMIA: Status: RESOLVED | Noted: 2019-02-08 | Resolved: 2022-06-15

## 2022-06-15 LAB
ALBUMIN SERPL-MCNC: 3.4 G/DL (ref 3.5–5)
ALP SERPL-CCNC: 163 U/L (ref 45–120)
ALT SERPL W P-5'-P-CCNC: <9 U/L (ref 0–45)
ANION GAP SERPL CALCULATED.3IONS-SCNC: 10 MMOL/L (ref 5–18)
AST SERPL W P-5'-P-CCNC: 19 U/L (ref 0–40)
BILIRUB SERPL-MCNC: 0.5 MG/DL (ref 0–1)
BUN SERPL-MCNC: 22 MG/DL (ref 8–28)
CALCIUM SERPL-MCNC: 9 MG/DL (ref 8.5–10.5)
CHLORIDE BLD-SCNC: 103 MMOL/L (ref 98–107)
CO2 SERPL-SCNC: 26 MMOL/L (ref 22–31)
CREAT SERPL-MCNC: 0.91 MG/DL (ref 0.7–1.3)
ERYTHROCYTE [DISTWIDTH] IN BLOOD BY AUTOMATED COUNT: 14.4 % (ref 10–15)
GFR SERPL CREATININE-BSD FRML MDRD: 79 ML/MIN/1.73M2
GLUCOSE BLD-MCNC: 79 MG/DL (ref 70–125)
HCT VFR BLD AUTO: 37.1 % (ref 40–53)
HGB BLD-MCNC: 12.2 G/DL (ref 13.3–17.7)
MCH RBC QN AUTO: 33.1 PG (ref 26.5–33)
MCHC RBC AUTO-ENTMCNC: 32.9 G/DL (ref 31.5–36.5)
MCV RBC AUTO: 101 FL (ref 78–100)
PLATELET # BLD AUTO: 139 10E3/UL (ref 150–450)
POTASSIUM BLD-SCNC: 4.8 MMOL/L (ref 3.5–5)
PROT SERPL-MCNC: 7.2 G/DL (ref 6–8)
RBC # BLD AUTO: 3.69 10E6/UL (ref 4.4–5.9)
SODIUM SERPL-SCNC: 139 MMOL/L (ref 136–145)
TSH SERPL DL<=0.005 MIU/L-ACNC: 1.4 UIU/ML (ref 0.3–5)
WBC # BLD AUTO: 2.7 10E3/UL (ref 4–11)

## 2022-06-15 PROCEDURE — 84443 ASSAY THYROID STIM HORMONE: CPT | Performed by: FAMILY MEDICINE

## 2022-06-15 PROCEDURE — 82306 VITAMIN D 25 HYDROXY: CPT | Performed by: FAMILY MEDICINE

## 2022-06-15 PROCEDURE — 99397 PER PM REEVAL EST PAT 65+ YR: CPT | Performed by: FAMILY MEDICINE

## 2022-06-15 PROCEDURE — 36415 COLL VENOUS BLD VENIPUNCTURE: CPT | Performed by: FAMILY MEDICINE

## 2022-06-15 PROCEDURE — 80053 COMPREHEN METABOLIC PANEL: CPT | Performed by: FAMILY MEDICINE

## 2022-06-15 PROCEDURE — 85027 COMPLETE CBC AUTOMATED: CPT | Performed by: FAMILY MEDICINE

## 2022-06-15 RX ORDER — SERTRALINE HYDROCHLORIDE 100 MG/1
100 TABLET, FILM COATED ORAL DAILY
Qty: 90 TABLET | Refills: 3 | Status: SHIPPED | OUTPATIENT
Start: 2022-06-15 | End: 2023-07-07

## 2022-06-15 ASSESSMENT — ENCOUNTER SYMPTOMS
JOINT SWELLING: 0
ABDOMINAL PAIN: 0
NAUSEA: 0
ARTHRALGIAS: 0
PARESTHESIAS: 0
HEARTBURN: 0
NERVOUS/ANXIOUS: 0
PALPITATIONS: 0
SORE THROAT: 0
DIZZINESS: 1
DIARRHEA: 0
SHORTNESS OF BREATH: 0
WEAKNESS: 1
DYSURIA: 0
FREQUENCY: 1
EYE PAIN: 0
FEVER: 0
HEMATOCHEZIA: 0
CONSTIPATION: 0
MYALGIAS: 0
HEADACHES: 0
CHILLS: 0
HEMATURIA: 0
COUGH: 0

## 2022-06-15 ASSESSMENT — ACTIVITIES OF DAILY LIVING (ADL)
CURRENT_FUNCTION: HOUSEWORK REQUIRES ASSISTANCE
CURRENT_FUNCTION: TRANSPORTATION REQUIRES ASSISTANCE
CURRENT_FUNCTION: PREPARING MEALS REQUIRES ASSISTANCE
CURRENT_FUNCTION: SHOPPING REQUIRES ASSISTANCE
CURRENT_FUNCTION: TELEPHONE REQUIRES ASSISTANCE
CURRENT_FUNCTION: MEDICATION ADMINISTRATION REQUIRES ASSISTANCE

## 2022-06-15 ASSESSMENT — PATIENT HEALTH QUESTIONNAIRE - PHQ9
SUM OF ALL RESPONSES TO PHQ QUESTIONS 1-9: 5
10. IF YOU CHECKED OFF ANY PROBLEMS, HOW DIFFICULT HAVE THESE PROBLEMS MADE IT FOR YOU TO DO YOUR WORK, TAKE CARE OF THINGS AT HOME, OR GET ALONG WITH OTHER PEOPLE: SOMEWHAT DIFFICULT
SUM OF ALL RESPONSES TO PHQ QUESTIONS 1-9: 5

## 2022-06-15 NOTE — PATIENT INSTRUCTIONS
Patient Education   Personalized Prevention Plan  You are due for the preventive services outlined below.  Your care team is available to assist you in scheduling these services.  If you have already completed any of these items, please share that information with your care team to update in your medical record.  Health Maintenance Due   Topic Date Due     ANNUAL REVIEW OF HM ORDERS  Never done     Activities of Daily Living    Your Health Risk Assessment indicates you have difficulties with activities of daily living such as housework, bathing, preparing meals, taking medication, etc. Please make a follow up appointment for us to address this issue in more detail.    Signs of Hearing Loss      Hearing much better with one ear can be a sign of hearing loss.   Hearing loss is a problem shared by many people. In fact, it is one of the most common health problems, particularly as people age. Most people age 65 and older have some hearing loss. By age 80, almost everyone does. Hearing loss often occurs slowly over the years. So you may not realize your hearing has gotten worse.  Have your hearing checked  Call your healthcare provider if you:    Have to strain to hear normal conversation    Have to watch other people s faces very carefully to follow what they re saying    Need to ask people to repeat what they ve said    Often misunderstand what people are saying    Turn the volume of the television or radio up so high that others complain    Feel that people are mumbling when they re talking to you    Find that the effort to hear leaves you feeling tired and irritated    Notice, when using the phone, that you hear better with one ear than the other  Kb last reviewed this educational content on 1/1/2020 2000-2021 [employer]. All rights reserved. This information is not intended as a substitute for professional medical care. Always follow your healthcare professional's  instructions.          Urinary Incontinence (Male)    Urinary incontinence means not being able to control the release of urine from the bladder.   Causes  Common causes of urinary incontinence in men include:    Infection    Certain medicines    Aging    Poor pelvic muscle tone    Bladder spasms    Obesity    Trouble urinating and fully emptying the bladder (urinary retention)  Other things that can cause incontinence are:     Nervous system diseases    Diabetes    Sleep apnea    Urinary tract infections    Prostate surgery    Pelvic injury  Constipation and smoking have also been identified as risk factors.   Symptoms    Urge incontinence (overactive bladder). This is a sudden urge to urinate. It occurs even though there may not be much urine in the bladder. The need to urinate often during the night is common. It's due to bladder spasms.    Stress incontinence. This is urine leakage that you can't control. It can occur with sneezing, coughing, and other actions that put stress on the bladder.    Treatment  Treatment depends on what is causing the condition. Bladder infections are treated with antibiotics. Urinary retention is treated with a bladder catheter.   Home care  Follow these guidelines when caring for yourself at home:    Don't have any foods and drinks that may irritate the bladder. This includes:  ? Chocolate  ? Alcohol  ? Caffeine  ? Carbonated drinks  ? Acidic fruits and juices    Limit fluids to 6 to 8 cups a day.    Lose weight if you are overweight. This will reduce your symptoms.    If advised, do regular pelvic muscle-strengthening exercises such as Kegel exercises.    If needed, wear absorbent pads to catch urine. Change the pads often. This is for good hygiene and to prevent skin and bladder infections.    Bathe daily for good hygiene.    If an antibiotic was prescribed to treat a bladder infection, take it until it's finished. Keep taking it even if you are feeling better. This is to make  sure your infection has cleared.    If a catheter was left in place, keep bacteria from getting into the collection bag. Don't disconnect the catheter from the collection bag.    Use a leg band to secure the catheter drainage tube, so it does not pull on the catheter. Drain the collection bag when it becomes full. To do this, use the drain spout at the bottom of the bag. Don't disconnect the bag from the catheter.    Don't pull on or try to remove a catheter. The catheter must be removed by a healthcare provider.    If you smoke, stop. Ask your provider for help if you can't do this on your own.  Follow-up care  Follow up with your healthcare provider, or as advised.  When to get medical advice  Call your healthcare provider right away if any of these occur:    Fever over 100.4 F (38 C), or as directed by your provider    Bladder pain or fullness    Belly swelling, nausea, or vomiting    Back pain    Weakness, dizziness, or fainting    If a catheter was left in place, return if:  ? The catheter falls out  ? The catheter stops draining for 6 hours  ? Your urine gets cloudy or smells bad  [employer] last reviewed this educational content on 1/1/2020 2000-2021 [employer]. All rights reserved. This information is not intended as a substitute for professional medical care. Always follow your healthcare professional's instructions.        Your Health Risk Assessment indicates you feel you are not in good emotional health.    Recreation   Recreation is not limited to sports and team events. It includes any activity that provides relaxation, interest, enjoyment, and exercise. Recreation provides an outlet for physical, mental, and social energy. It can give a sense of worth and achievement. It can help you stay healthy.    Mental Exercise and Social Involvement  Mental and emotional health is as important as physical health. Keep in touch with friends and family. Stay as active as possible. Continue to learn  "and challenge yourself.   Things you can do to stay mentally active are:    Learn something new, like a foreign language or musical instrument.     Play SCRABBLE or do crossword puzzles. If you cannot find people to play these games with you at home, you can play them with others on your computer through the Internet.     Join a games club--anything from card games to chess or checkers or lawn bowling.     Start a new hobby.     Go back to school.     Volunteer.     Read.   Keep up with world events.    Depression and Suicide in Older Adults    Nearly 2 million older Americans have some type of depression. Some of them even take their own lives. Yet depression among older adults is often ignored. Learn the warning signs. You may help spare a loved one needless pain. You may also save a life.   What is depression?  Depression is a common and serious illness that affects the way you think and feel. It is not a normal part of aging, nor is it a sign of weakness, a character flaw, or something you can snap out of. Most people with depression need treatment to get better. The most common symptom is a feeling of deep sadness. People who are depressed also may seem tired and listless. And nothing seems to give them pleasure. It s normal to grieve or be sad sometimes. But sadness lessens or passes with time. Depression rarely goes away or improves on its own. A person with clinical depression can't \"snap out of it.\" Other symptoms of depression are:     Sleeping more or less than normal    Eating more or less than normal    Having headaches, stomachaches, or other pains that don t go away    Feeling nervous,  empty,  or worthless    Crying a great deal    Thinking or talking about suicide or death    Loss of interest in activities previously enjoyed    Social isolation    Feeling confused or forgetful  What causes it?  The causes of depression aren t fully known. But it is thought to result from a complex blend of these " factors:     Biochemistry. Certain chemicals in the brain play a role.    Genes. Depression does run in families.    Life stress. Life stresses can also trigger depression in some people. Older adults often face many stressors, such as death of friends or a spouse, health problems, and financial concerns.    Chronic conditions. This includes conditions such as diabetes, heart disease, or cancer. These can cause symptoms of depression. Medicine side effects can cause changes in thoughts and behaviors.  How you can help  Often, depressed people may not want to ask for help. When they do, they may be ignored. Or, they may receive the wrong treatment. You can help by showing parents and older friends love and support. If they seem depressed, don t lecture the person, ignore the symptoms, or discount the symptoms as a  normal  part of aging -which they are not. Get involved, listen, and show interest and support.   Help them understand that depression is a treatable illness. Tell them you can help them find the right treatment. Offer to go to their healthcare provider's appointment with them for support when the symptoms are discussed. With their approval, contact a local mental health center, social service agency, or hospital about services.   You can be an advocate for him or her at healthcare appointments. Many older adults have chronic illnesses that can cause symptoms of depression. Medicine side effects can change thoughts and behaviors. You can help make sure that the healthcare provider looks at all of these factors. He or she should refer your family member or friend to a mental healthcare provider when needed. in some cases, untreated depression can lead to a misdiagnosis. A person may be diagnosed with a brain disorder such as dementia. If the healthcare provider does not take the issue of depression seriously, help your family member or friend to find another provider.   Don't be afraid to ask  If you think  an older person you care about could be suicidal, ask,  Have you thought about suicide?  Most people will tell you the truth. If they say  yes,  they may already have a plan for how and when they will attempt it. Find out as much as you can. The more detailed the plan, and the easier it is to carry out, the more danger the person is in right now. Tell the person you are there for them and do not want them to harm him or herself. Don't wait to get help for the person. Call the person's healthcare provider, local hospital, or emergency services.   To learn more    National Suicide Prevention Lifeline (crisis hotline) 560-335-IHRK (177-821-7839)    National [place] of Mental Isivqh649-055-3836pzd.[facility] Hospitalh.nih.gov    National [place] on Mental Moirhgr367-285-7714bwv.dimas.org    Mental Health Eisihyk653-741-0782hiq.[facility].org    National Suicide Lrozury688-OIIGXPX (624-256-7288)    Call 911  Never leave the person alone. A person who is actively suicidal needs psychiatric care right away. They will need constant supervision. Never leave the person out of sight. Call 911 or the national 24-hour suicide crisis hotline at 907-820-ZMCA (263-195-7293). You can also take the person to the closest emergency room.   Kb last reviewed this educational content on 5/1/2020 2000-2021 [employer]. All rights reserved. This information is not intended as a substitute for professional medical care. Always follow your healthcare professional's instructions.          Preventing Falls at Home  A person can fall for many reasons. Older adults may fall because reaction time slows down as we age. Your muscles and joints may get stiff, weak, or less flexible because of illness, medicines, or a physical condition.   Other health problems that make falls more likely include:     Arthritis    Dizziness or lightheadedness when you stand up (orthostatic hypotension)    History of a stroke    Dizziness    Anemia    Certain  medicines taken for mental illness or to control blood pressure.    Problems with balance or gait    Bladder or urinary problems    History of falling    Changes in vision (vision impairment)    Changes in thinking skills and memory (cognitive impairment)  Injuries from a fall can include serious injuries such as broken bones, dislocated joints, internal bleeding and cuts. Injuries like these can limit your independence.   Prevention tips  To help prevent falls and fall-related injuries, follow the tips below.    Floors  To make floors safer:     Put nonskid pads under area rugs.    Remove small rugs.    Replace worn floor coverings.    Tack carpets firmly to each step on carpeted stairs. Put nonskid strips on the edges of uncarpeted stairs.    Keep floors and stairs free of clutter and cords.    Arrange furniture so there are clear pathways.    Clean up any spills right away.  Bathrooms    To make bathrooms safer:     Install grab bars in the tub or shower.    Apply nonskid strips or put a nonskid rubber mat in the tub or shower.    Sit on a bath chair to bathe.    Use bathmats with nonskid backing.  Lighting  To improve visibility in your home:      Keep a flashlight in each room. Or put a lamp next to the bed within easy reach.    Put nightlights in the bedrooms, hallways, kitchen, and bathrooms.    Make sure all stairways have good lighting.    Take your time when going up and down stairs.    Put handrails on both sides of stairs and in walkways for more support. To prevent injury to your wrist or arm, don t use handrails to pull yourself up.    Install grab bars to pull yourself up.    Move or rearrange items that you use often. This will make them easier to find or reach.    Look at your home to find any safety hazards. Especially look at doorways, walkways, and the driveway. Remove or repair any safety problems that you find.  Other changes to make    Look around to find any safety hazards. Look closely at  doorways, walkways, and the driveway. Remove or repair any safety problems that you find.    Wear shoes that fit well.    Take your time when going up and down stairs.    Put handrails on both sides of stairs and in walkways for more support. To prevent injury to your wrist or arm, don t use handrails to pull yourself up.    Install grab bars wherever needed to pull yourself up.    Arrange items that you use often. This will make them easier to find or reach.    Kb last reviewed this educational content on 3/1/2020    8425-8236 [employer]. All rights reserved. This information is not intended as a substitute for professional medical care. Always follow your healthcare professional's instructions.

## 2022-06-15 NOTE — PROGRESS NOTES
"SUBJECTIVE:   Gomez Villasenor is a 89 year old male who presents for Preventive Visit.    Chief Complaint   Patient presents with     Annual Visit     Not fasting     Weight Loss     Patient noticed weight loss in the last 6 months     Patient has been advised of split billing requirements and indicates understanding: Yes  Are you in the first 12 months of your Medicare coverage?  No    Healthy Habits:     In general, how would you rate your overall health?  Good    Frequency of exercise:  4-5 days/week    Duration of exercise:  15-30 minutes    Do you usually eat at least 4 servings of fruit and vegetables a day, include whole grains    & fiber and avoid regularly eating high fat or \"junk\" foods?  Yes    Taking medications regularly:  Yes    Ability to successfully perform activities of daily living:  Telephone requires assistance, transportation requires assistance, shopping requires assistance, preparing meals requires assistance, housework requires assistance and medication administration requires assistance    Home Safety:  Throw rugs in the hallway    Hearing Impairment:  Difficulty following a conversation in a noisy restaurant or crowded room, feel that people are mumbling or not speaking clearly, difficulty following dialogue in the theater, difficult to understand a speaker at a public meeting or Voodoo service, need to ask people to speak up or repeat themselves, find that men's voices are easier to understand than woman's, difficulty understanding soft or whispered speech and difficulty understanding speech on the telephone    In the past 6 months, have you been bothered by leaking of urine? Yes    In general, how would you rate your overall mental or emotional health?  Fair      PHQ-2 Total Score: 6    Answers for HPI/ROS submitted by the patient on 6/15/2022  If you checked off any problems, how difficult have these problems made it for you to do your work, take care of things at home, or get " along with other people?: Somewhat difficult  PHQ9 TOTAL SCORE: 5    Answers for HPI/ROS submitted by the patient on 6/15/2022  If you checked off any problems, how difficult have these problems made it for you to do your work, take care of things at home, or get along with other people?: Somewhat difficult  PHQ9 TOTAL SCORE: 5    Do you feel safe in your environment? Yes    Have you ever done Advance Care Planning? (For example, a Health Directive, POLST, or a discussion with a medical provider or your loved ones about your wishes): Yes, patient states has an Advance Care Planning document and will bring a copy to the clinic.    Fall risk  Fallen 2 or more times in the past year?: Yes  Any fall with injury in the past year?: No  click delete button to remove this line now  Cognitive Screening   1) Repeat 3 items (Leader, Season, Table)      2) Clock draw:   NORMAL  3) 3 item recall:   Recalls 2 objects   Results: NORMAL clock, 1-2 items recalled: COGNITIVE IMPAIRMENT LESS LIKELY    Mini-CogTM Copyright RAVINDRA Nunez. Licensed by the author for use in [facility]; reprinted with permission (yony@[facility]). All rights reserved.      Do you have sleep apnea, excessive snoring or daytime drowsiness?: no    Reviewed and updated as needed this visit by clinical staff   Tobacco  Allergies  Meds  Problems  Med Hx  Surg Hx  Fam Hx            Reviewed and updated as needed this visit by Provider   Tobacco  Allergies  Meds  Problems  Med Hx  Surg Hx  Fam Hx           Social History     Tobacco Use     Smoking status: Never Smoker     Smokeless tobacco: Never Used   Substance Use Topics     Alcohol use: Yes     Alcohol/week: 1.0 standard drink     Comment: Alcoholic Drinks/day: one beer most days     If you drink alcohol do you typically have >3 drinks per day or >7 drinks per week? No        Alcohol Use 6/15/2022   Prescreen: >3 drinks/day or >7 drinks/week? Not Applicable   Prescreen: >3 drinks/day or >7  "drinks/week? -   No flowsheet data found.    Current providers sharing in care for this patient include:     Patient Care Team:  Tatyana Vadles DO as PCP - General (Family Medicine)  Carroll Chaudhari MD as Assigned Neuroscience Provider  Tatyana Valdes DO as Assigned PCP  Shlomo Obrien MD as Assigned Heart and Vascular Provider    The following health maintenance items are reviewed in Epic and correct as of today:  Health Maintenance Due   Topic Date Due     ANNUAL REVIEW OF  ORDERS  Never done     Lab work is in process  Labs reviewed in EPIC    OBJECTIVE:   BP (!) 175/85 (BP Location: Left arm, Patient Position: Sitting, Cuff Size: Adult Regular)   Pulse (!) 46   Resp 14   Ht 1.727 m (5' 8\")   Wt 57.3 kg (126 lb 6.4 oz)   BMI 19.22 kg/m   Estimated body mass index is 19.22 kg/m  as calculated from the following:    Height as of this encounter: 1.727 m (5' 8\").    Weight as of this encounter: 57.3 kg (126 lb 6.4 oz).  Physical Exam  GENERAL: healthy, alert and no distress  EYES: Eyes grossly normal to inspection, PERRL and conjunctivae and sclerae normal, hearing aids in place  HENT: ear canals and TM's normal, nose and mouth without ulcers or lesions  NECK: no adenopathy, no asymmetry, masses, or scars and thyroid normal to palpation  RESP: lungs clear to auscultation - no rales, rhonchi or wheezes  CV: regular rate and rhythm, normal S1 S2, no S3 or S4, no murmur, click or rub, no peripheral edema  ABDOMEN: soft, nontender  MS: no gross musculoskeletal defects noted, no edema  SKIN: no suspicious lesions or rashes  NEURO: Normal strength and tone, mentation intact and speech normal  PSYCH: Slightly delayed mentation/response time, flat affect, appropriate mood    Diagnostic Test Results:  Labs reviewed in Epic    ASSESSMENT / PLAN:   Gomez was seen today for annual visit and weight loss.    Diagnoses and all orders for this visit:    Encounter for Medicare annual wellness exam  COUNSELING:  Reviewed " "preventive health counseling, as reflected in patient instructions  Special attention given to:       Regular exercise       Healthy diet/nutrition       Vision screening       Hearing screening       Bladder control       Fall risk prevention       Prostate cancer screening    Estimated body mass index is 19.22 kg/m  as calculated from the following:    Height as of this encounter: 1.727 m (5' 8\").    Weight as of this encounter: 57.3 kg (126 lb 6.4 oz).    He reports that he has never smoked. He has never used smokeless tobacco.      Appropriate preventive services were discussed with this patient, including applicable screening as appropriate for cardiovascular disease, diabetes, osteopenia/osteoporosis, and glaucoma.  As appropriate for age/gender, discussed screening for colorectal cancer, prostate cancer, breast cancer, and cervical cancer. Checklist reviewing preventive services available has been given to the patient.    Reviewed patients plan of care and provided an AVS. The Basic Care Plan (routine screening as documented in Health Maintenance) for Gomez meets the Care Plan requirement. This Care Plan has been established and reviewed with the Patient.    Identified Health Risks:    The patient reports that he has difficulty with activities of daily living.   The patient was provided with written information regarding signs of hearing loss.  Information on urinary incontinence and treatment options given to patient.  The patient was provided with suggestions to help him develop a healthy emotional lifestyle.  The patient's PHQ-9 score is consistent with mild depression. He was provided with information regarding depression and was advised to schedule a follow up appointment in 4 weeks to further address this issue.  He is at risk for falling and has been provided with information to reduce the risk of falling at home.  -     CBC with platelets  -     Comprehensive metabolic panel (BMP + Alb, Alk Phos, " ALT, AST, Total. Bili, TP)    Moderate episode of recurrent major depressive disorder (H)  Dysthymic mood and absence of SI/HI.  After discussion, we will plan to increase sertraline to 100 mg daily.  They will message with update in 4 weeks to determine if this dose should be continued.  -     sertraline (ZOLOFT) 100 MG tablet; Take 1 tablet (100 mg) by mouth daily    Hypothyroidism, unspecified type  Currently on levothyroxine 112 mcg daily, reassess level today and provide dose adjustment if necessary.  -     TSH    Parkinson's disease (H)  Using a walker to ambulate majority of the time but notes ongoing occasional falls.  We discussed wearing an emergency button in case he were to fall when his wife is not nearby.  He is also pursuing PT for gait/balance training.  He remains on Sinemet and is scheduled to follow-up with neurology in the next month or so.      Malignant neoplasm prostate (H)  S/p prostatectomy with artificial urethral sphincter placement 2001 which was replaced in 2016.  He admits to intermittent urinary incontinence will consider urology follow-up for device reassessment.      Primary hypertension  Blood pressure elevated on recheck today.  Due to his Parkinson's he has fluctuating blood pressure readings and there is concern for hypotension if we initiate an antihypertensive since he is already predisposed to falls.  Will discuss starting a low-dose medication with him and his wife.        Patient has been advised of split billing requirements and indicates understanding: Yes      Tatyana Valdes DO  [facility]

## 2022-06-16 LAB — DEPRECATED CALCIDIOL+CALCIFEROL SERPL-MC: 53 UG/L (ref 20–75)

## 2022-06-20 ENCOUNTER — TELEPHONE (OUTPATIENT)
Dept: FAMILY MEDICINE | Facility: CLINIC | Age: 87
End: 2022-06-20
Payer: COMMERCIAL

## 2022-06-20 NOTE — TELEPHONE ENCOUNTER
----- Message from Tatyana Valdes DO sent at 6/20/2022  7:38 AM CDT -----  Team - please call patient with results (please relay to wife, patient has memory loss)    Your recent results returned. He has slight abnormalities in some of his other blood levels including hemoglobin, white blood cell count, platelets, and liver enzyme.  These are not grossly out of range and stable from his prior values.  As we discussed, we expect to see some of these changes with age.  Please let us know if you have any questions.    Tatyana Valdes DO

## 2022-06-20 NOTE — TELEPHONE ENCOUNTER
This medication can cause some GI side effects so it is possible but hard to say if it was the direct cause of his vomiting/diarrhea.  They can either go back to his original dose or continue on the higher dose to see if his symptoms improve.    Tatyana Valdes, DO

## 2022-06-20 NOTE — TELEPHONE ENCOUNTER
Called and spoke to patient. Informed patient of note from provider.   Pt started increasing Zoloft per MD. Started on Thursday and had vomiting and diarrhea on Saturday. Only one episode. Wife is wondering if this could be due to the med adjustment. Please advise. Ok to leave detailed message if no answer.

## 2022-06-24 ENCOUNTER — TRANSFERRED RECORDS (OUTPATIENT)
Dept: HEALTH INFORMATION MANAGEMENT | Facility: CLINIC | Age: 87
End: 2022-06-24

## 2022-08-12 DIAGNOSIS — E03.9 ACQUIRED HYPOTHYROIDISM: Primary | ICD-10-CM

## 2022-08-12 RX ORDER — LEVOTHYROXINE SODIUM 112 UG/1
TABLET ORAL
Qty: 90 TABLET | Refills: 3 | Status: SHIPPED | OUTPATIENT
Start: 2022-08-12 | End: 2023-08-07

## 2022-08-12 NOTE — TELEPHONE ENCOUNTER
"Outpatient Medication Detail     Disp Refills Start End DONNA   levothyroxine (SYNTHROID, LEVOTHROID) 112 MCG tablet 90 tablet 3 6/21/2021  No   Sig - Route: Take 1 tablet (112 mcg total) by mouth daily. - Oral   Sent to pharmacy as: levothyroxine 112 mcg tablet (SYNTHROID, LEVOTHROID)   Notes to Pharmacy: Will call when when needed   E-Prescribing Status: Receipt confirmed by pharmacy (6/21/2021  2:24 PM CDT)     Last office visit provider:  6/15/22     Requested Prescriptions   Pending Prescriptions Disp Refills     levothyroxine (SYNTHROID/LEVOTHROID) 112 MCG tablet [Pharmacy Med Name: Levothyroxine Sodium Oral Tablet 112 MCG] 90 tablet 0     Sig: Take 1 tablet (112 mcg) by mouth once daily       Thyroid Protocol Passed - 8/12/2022  2:59 PM        Passed - Patient is 12 years or older        Passed - Recent (12 mo) or future (30 days) visit within the authorizing provider's specialty     Patient has had an office visit with the authorizing provider or a provider within the authorizing providers department within the previous 12 mos or has a future within next 30 days. See \"Patient Info\" tab in inbasket, or \"Choose Columns\" in Meds & Orders section of the refill encounter.              Passed - Medication is active on med list        Passed - Normal TSH on file in past 12 months     Recent Labs   Lab Test 06/15/22  1257   TSH 1.40                   Waston Croft RN 08/12/22 3:00 PM  "

## 2022-09-24 ENCOUNTER — HEALTH MAINTENANCE LETTER (OUTPATIENT)
Age: 87
End: 2022-09-24

## 2022-11-30 ENCOUNTER — TRANSFERRED RECORDS (OUTPATIENT)
Dept: HEALTH INFORMATION MANAGEMENT | Facility: CLINIC | Age: 87
End: 2022-11-30

## 2023-04-14 ENCOUNTER — TRANSFERRED RECORDS (OUTPATIENT)
Dept: HEALTH INFORMATION MANAGEMENT | Facility: CLINIC | Age: 88
End: 2023-04-14
Payer: MEDICARE

## 2023-05-16 ENCOUNTER — PATIENT OUTREACH (OUTPATIENT)
Dept: CARE COORDINATION | Facility: CLINIC | Age: 88
End: 2023-05-16
Payer: MEDICARE

## 2023-05-31 ENCOUNTER — PATIENT OUTREACH (OUTPATIENT)
Dept: CARE COORDINATION | Facility: CLINIC | Age: 88
End: 2023-05-31
Payer: MEDICARE

## 2023-06-27 ENCOUNTER — TELEPHONE (OUTPATIENT)
Dept: FAMILY MEDICINE | Facility: CLINIC | Age: 88
End: 2023-06-27
Payer: MEDICARE

## 2023-06-27 NOTE — TELEPHONE ENCOUNTER
Reason for Call:  Appointment Request    Patient requesting this type of appt:  Preventive     Requested provider: Tatyana Valdes    Reason patient unable to be scheduled: Not within requested timeframe    When does patient want to be seen/preferred time: open    Comments: His wife Maday is trying to schedule annual wellness for herself and   To come in together. My schedule went out till January. They would like to get in sooner they are due now and have some questions on his medications  for parkinson     Could we send this information to you in [facility] or would you prefer to receive a phone call?:   Patient would prefer a phone call   Okay to leave a detailed message?: Yes at Home number on file [phone] (home)    Call taken on 6/27/2023 at 2:05 PM by Rhea Burt

## 2023-07-06 ENCOUNTER — LAB REQUISITION (OUTPATIENT)
Dept: LAB | Facility: CLINIC | Age: 88
End: 2023-07-06
Payer: COMMERCIAL

## 2023-07-06 DIAGNOSIS — I10 ESSENTIAL (PRIMARY) HYPERTENSION: ICD-10-CM

## 2023-07-06 DIAGNOSIS — D64.9 ANEMIA, UNSPECIFIED: ICD-10-CM

## 2023-07-10 LAB
ANION GAP SERPL CALCULATED.3IONS-SCNC: 8 MMOL/L (ref 7–15)
BUN SERPL-MCNC: 25.1 MG/DL (ref 8–23)
CALCIUM SERPL-MCNC: 8.7 MG/DL (ref 8.2–9.6)
CHLORIDE SERPL-SCNC: 107 MMOL/L (ref 98–107)
CREAT SERPL-MCNC: 0.85 MG/DL (ref 0.67–1.17)
DEPRECATED CALCIDIOL+CALCIFEROL SERPL-MC: 43 UG/L (ref 20–75)
DEPRECATED HCO3 PLAS-SCNC: 25 MMOL/L (ref 22–29)
ERYTHROCYTE [DISTWIDTH] IN BLOOD BY AUTOMATED COUNT: 14.6 % (ref 10–15)
GFR SERPL CREATININE-BSD FRML MDRD: 81 ML/MIN/1.73M2
GLUCOSE SERPL-MCNC: 78 MG/DL (ref 70–99)
HCT VFR BLD AUTO: 36.6 % (ref 40–53)
HGB BLD-MCNC: 11.8 G/DL (ref 13.3–17.7)
MCH RBC QN AUTO: 32.7 PG (ref 26.5–33)
MCHC RBC AUTO-ENTMCNC: 32.2 G/DL (ref 31.5–36.5)
MCV RBC AUTO: 101 FL (ref 78–100)
PLATELET # BLD AUTO: 178 10E3/UL (ref 150–450)
POTASSIUM SERPL-SCNC: 4.4 MMOL/L (ref 3.4–5.3)
RBC # BLD AUTO: 3.61 10E6/UL (ref 4.4–5.9)
SODIUM SERPL-SCNC: 140 MMOL/L (ref 136–145)
WBC # BLD AUTO: 2.5 10E3/UL (ref 4–11)

## 2023-07-10 PROCEDURE — 80048 BASIC METABOLIC PNL TOTAL CA: CPT | Mod: ORL | Performed by: FAMILY MEDICINE

## 2023-07-10 PROCEDURE — P9604 ONE-WAY ALLOW PRORATED TRIP: HCPCS | Mod: ORL | Performed by: FAMILY MEDICINE

## 2023-07-10 PROCEDURE — 36415 COLL VENOUS BLD VENIPUNCTURE: CPT | Mod: ORL | Performed by: FAMILY MEDICINE

## 2023-07-10 PROCEDURE — 85027 COMPLETE CBC AUTOMATED: CPT | Mod: ORL | Performed by: FAMILY MEDICINE

## 2023-07-10 PROCEDURE — 82306 VITAMIN D 25 HYDROXY: CPT | Mod: ORL | Performed by: FAMILY MEDICINE

## 2023-07-18 ENCOUNTER — LAB REQUISITION (OUTPATIENT)
Dept: LAB | Facility: CLINIC | Age: 88
End: 2023-07-18
Payer: COMMERCIAL

## 2023-07-18 DIAGNOSIS — D64.9 ANEMIA, UNSPECIFIED: ICD-10-CM

## 2023-07-18 DIAGNOSIS — I10 ESSENTIAL (PRIMARY) HYPERTENSION: ICD-10-CM

## 2023-07-19 LAB
ANION GAP SERPL CALCULATED.3IONS-SCNC: 7 MMOL/L (ref 7–15)
BUN SERPL-MCNC: 24.7 MG/DL (ref 8–23)
CALCIUM SERPL-MCNC: 8.7 MG/DL (ref 8.2–9.6)
CHLORIDE SERPL-SCNC: 106 MMOL/L (ref 98–107)
CREAT SERPL-MCNC: 0.87 MG/DL (ref 0.67–1.17)
DEPRECATED HCO3 PLAS-SCNC: 28 MMOL/L (ref 22–29)
ERYTHROCYTE [DISTWIDTH] IN BLOOD BY AUTOMATED COUNT: 14.5 % (ref 10–15)
GFR SERPL CREATININE-BSD FRML MDRD: 80 ML/MIN/1.73M2
GLUCOSE SERPL-MCNC: 82 MG/DL (ref 70–99)
HCT VFR BLD AUTO: 35.8 % (ref 40–53)
HGB BLD-MCNC: 11.5 G/DL (ref 13.3–17.7)
MCH RBC QN AUTO: 32.9 PG (ref 26.5–33)
MCHC RBC AUTO-ENTMCNC: 32.1 G/DL (ref 31.5–36.5)
MCV RBC AUTO: 102 FL (ref 78–100)
PLATELET # BLD AUTO: 150 10E3/UL (ref 150–450)
POTASSIUM SERPL-SCNC: 4.4 MMOL/L (ref 3.4–5.3)
RBC # BLD AUTO: 3.5 10E6/UL (ref 4.4–5.9)
SODIUM SERPL-SCNC: 141 MMOL/L (ref 136–145)
WBC # BLD AUTO: 2.4 10E3/UL (ref 4–11)

## 2023-07-19 PROCEDURE — 80048 BASIC METABOLIC PNL TOTAL CA: CPT | Mod: ORL | Performed by: FAMILY MEDICINE

## 2023-07-19 PROCEDURE — P9604 ONE-WAY ALLOW PRORATED TRIP: HCPCS | Mod: ORL | Performed by: FAMILY MEDICINE

## 2023-07-19 PROCEDURE — 85027 COMPLETE CBC AUTOMATED: CPT | Mod: ORL | Performed by: FAMILY MEDICINE

## 2023-07-19 PROCEDURE — 36415 COLL VENOUS BLD VENIPUNCTURE: CPT | Mod: ORL | Performed by: FAMILY MEDICINE

## 2023-07-20 ENCOUNTER — MEDICAL CORRESPONDENCE (OUTPATIENT)
Dept: HEALTH INFORMATION MANAGEMENT | Facility: CLINIC | Age: 88
End: 2023-07-20
Payer: MEDICARE

## 2023-07-22 ENCOUNTER — MEDICAL CORRESPONDENCE (OUTPATIENT)
Dept: HEALTH INFORMATION MANAGEMENT | Facility: CLINIC | Age: 88
End: 2023-07-22

## 2023-07-22 PROCEDURE — G0180 MD CERTIFICATION HHA PATIENT: HCPCS | Performed by: FAMILY MEDICINE

## 2023-07-24 ENCOUNTER — TELEPHONE (OUTPATIENT)
Dept: FAMILY MEDICINE | Facility: CLINIC | Age: 88
End: 2023-07-24
Payer: MEDICARE

## 2023-07-24 NOTE — TELEPHONE ENCOUNTER
Home Care is calling regarding an established patient with [facility].       Requesting orders from: Tatyana Valdes  Provider is following patient: Yes  Is this a 60-day recertification request?  No    Orders Requested    Skilled Nursing  Request for initial certification (first set of orders)   Frequency:  1x/wk for 5 wks for general health assessment, monitor BP, and monitor incision s/p pacemaker placement.  Home health aid 1 x week for 4 weeks shower assistance    Confirmed ok to leave a detailed message with call back.  Contact information confirmed and updated as needed.    Tala Moore RN

## 2023-07-24 NOTE — TELEPHONE ENCOUNTER
Left detailed message for Dasha YOU at [facility] for requested orders per Dr. Valdes.

## 2023-07-25 DIAGNOSIS — Z53.9 DIAGNOSIS NOT YET DEFINED: Primary | ICD-10-CM

## 2023-07-31 ENCOUNTER — MEDICAL CORRESPONDENCE (OUTPATIENT)
Dept: HEALTH INFORMATION MANAGEMENT | Facility: CLINIC | Age: 88
End: 2023-07-31
Payer: MEDICARE

## 2023-08-03 ENCOUNTER — TELEPHONE (OUTPATIENT)
Dept: FAMILY MEDICINE | Facility: CLINIC | Age: 88
End: 2023-08-03
Payer: MEDICARE

## 2023-08-03 NOTE — TELEPHONE ENCOUNTER
Order/Referral Request    Who is requesting: Angela with [facility]     Orders being requested: verbal orders for OT one time a week times 2.     Reason service is needed/diagnosis: healthcare, light meal prep, adapt to equipment as needed    When are orders needed by: ASAP    Does patient have an appointment scheduled?: No    Where to send orders:  verbal call back    Could we send this information to you in [facility] or would you prefer to receive a phone call?:   Patient would prefer a phone call   Okay to leave a detailed message?: Yes at Other phone number:  [phone]

## 2023-08-05 ENCOUNTER — HEALTH MAINTENANCE LETTER (OUTPATIENT)
Age: 88
End: 2023-08-05

## 2023-08-05 DIAGNOSIS — E03.9 ACQUIRED HYPOTHYROIDISM: ICD-10-CM

## 2023-08-05 NOTE — TELEPHONE ENCOUNTER
"Routing refill request to provider for review/approval because:  Labs not current:  TSH  Patient needs to be seen because it has been more than 1 year since last office visit.    Last Written Prescription Date:  8/12/22  Last Fill Quantity: 90,  # refills: 3   Last office visit provider:   6/15/22 with abebe    Requested Prescriptions   Pending Prescriptions Disp Refills    levothyroxine (SYNTHROID/LEVOTHROID) 112 MCG tablet [Pharmacy Med Name: Levothyroxine Sodium Oral Tablet 112 MCG] 90 tablet 0     Sig: Take 1 tablet (112 mcg) by mouth once daily       Thyroid Protocol Failed - 8/5/2023  3:29 PM        Failed - Recent (12 mo) or future (30 days) visit within the authorizing provider's specialty     Patient has had an office visit with the authorizing provider or a provider within the authorizing providers department within the previous 12 mos or has a future within next 30 days. See \"Patient Info\" tab in inbasket, or \"Choose Columns\" in Meds & Orders section of the refill encounter.              Failed - Normal TSH on file in past 12 months     Recent Labs   Lab Test 06/15/22  1257   TSH 1.40              Passed - Patient is 12 years or older        Passed - Medication is active on med list             TYSON FOLEY RN 08/05/23 3:29 PM  "

## 2023-08-07 RX ORDER — LEVOTHYROXINE SODIUM 112 UG/1
TABLET ORAL
Qty: 30 TABLET | Refills: 0 | Status: SHIPPED | OUTPATIENT
Start: 2023-08-07 | End: 2023-08-25

## 2023-08-07 NOTE — TELEPHONE ENCOUNTER
Please schedule med check for further refills, needs labs- okay with held.    Tatyana Valdes, DO

## 2023-08-08 ENCOUNTER — MEDICAL CORRESPONDENCE (OUTPATIENT)
Dept: HEALTH INFORMATION MANAGEMENT | Facility: CLINIC | Age: 88
End: 2023-08-08
Payer: MEDICARE

## 2023-08-12 DIAGNOSIS — F33.1 MODERATE EPISODE OF RECURRENT MAJOR DEPRESSIVE DISORDER (H): ICD-10-CM

## 2023-08-12 NOTE — TELEPHONE ENCOUNTER
"Routing refill request to provider for review/approval because:  PHQ9 needs updating    Last Written Prescription Date:  7/7/23  Last Fill Quantity: 30,  # refills: 0   Last office visit provider:  6/15/22     Requested Prescriptions   Pending Prescriptions Disp Refills    sertraline (ZOLOFT) 100 MG tablet [Pharmacy Med Name: Sertraline HCl Oral Tablet 100 MG] 30 tablet 0     Sig: TAKE 1 TABLET BY MOUTH ONCE DAILY       SSRIs Protocol Failed - 8/12/2023  2:50 AM        Failed - PHQ-9 score less than 5 in past 6 months     Please review last PHQ-9 score.           Passed - Medication is active on med list        Passed - Patient is age 18 or older        Passed - Recent (6 mo) or future (30 days) visit within the authorizing provider's specialty     Patient had office visit in the last 6 months or has a visit in the next 30 days with authorizing provider or within the authorizing provider's specialty.  See \"Patient Info\" tab in inbasket, or \"Choose Columns\" in Meds & Orders section of the refill encounter.                 Arcelia Donnelly RN 08/12/23 2:50 AM  "

## 2023-08-13 RX ORDER — SERTRALINE HYDROCHLORIDE 100 MG/1
100 TABLET, FILM COATED ORAL DAILY
Qty: 30 TABLET | Refills: 0 | Status: SHIPPED | OUTPATIENT
Start: 2023-08-13 | End: 2023-08-24

## 2023-08-21 ENCOUNTER — MEDICAL CORRESPONDENCE (OUTPATIENT)
Dept: HEALTH INFORMATION MANAGEMENT | Facility: CLINIC | Age: 88
End: 2023-08-21
Payer: MEDICARE

## 2023-08-24 ENCOUNTER — OFFICE VISIT (OUTPATIENT)
Dept: FAMILY MEDICINE | Facility: CLINIC | Age: 88
End: 2023-08-24
Payer: COMMERCIAL

## 2023-08-24 ENCOUNTER — ANCILLARY PROCEDURE (OUTPATIENT)
Dept: GENERAL RADIOLOGY | Facility: CLINIC | Age: 88
End: 2023-08-24
Attending: FAMILY MEDICINE
Payer: COMMERCIAL

## 2023-08-24 VITALS
BODY MASS INDEX: 19.55 KG/M2 | HEIGHT: 68 IN | SYSTOLIC BLOOD PRESSURE: 140 MMHG | WEIGHT: 129 LBS | HEART RATE: 72 BPM | DIASTOLIC BLOOD PRESSURE: 84 MMHG

## 2023-08-24 DIAGNOSIS — M25.512 CHRONIC LEFT SHOULDER PAIN: ICD-10-CM

## 2023-08-24 DIAGNOSIS — R39.81 FUNCTIONAL URINARY INCONTINENCE: ICD-10-CM

## 2023-08-24 DIAGNOSIS — G89.29 CHRONIC LEFT SHOULDER PAIN: ICD-10-CM

## 2023-08-24 DIAGNOSIS — E06.3 HYPOTHYROIDISM DUE TO HASHIMOTO'S THYROIDITIS: Primary | ICD-10-CM

## 2023-08-24 DIAGNOSIS — F33.0 MILD EPISODE OF RECURRENT MAJOR DEPRESSIVE DISORDER (H): ICD-10-CM

## 2023-08-24 DIAGNOSIS — F33.1 MODERATE EPISODE OF RECURRENT MAJOR DEPRESSIVE DISORDER (H): ICD-10-CM

## 2023-08-24 DIAGNOSIS — G20.A1 PARKINSON'S DISEASE (H): ICD-10-CM

## 2023-08-24 DIAGNOSIS — Z95.0 HISTORY OF CARDIAC PACEMAKER: ICD-10-CM

## 2023-08-24 PROBLEM — Z85.46 HISTORY OF PROSTATE CANCER: Status: ACTIVE | Noted: 2023-08-24

## 2023-08-24 PROBLEM — Z80.42 FAMILY HISTORY OF MALIGNANT NEOPLASM OF PROSTATE: Status: RESOLVED | Noted: 2020-07-13 | Resolved: 2023-08-24

## 2023-08-24 PROBLEM — R00.1 SINUS BRADYCARDIA: Status: RESOLVED | Noted: 2021-08-03 | Resolved: 2023-08-24

## 2023-08-24 PROBLEM — R26.89 IMBALANCE: Status: ACTIVE | Noted: 2022-02-14

## 2023-08-24 PROBLEM — R26.9 GAIT DIFFICULTY: Status: ACTIVE | Noted: 2022-02-14

## 2023-08-24 LAB — TSH SERPL DL<=0.005 MIU/L-ACNC: 1.17 UIU/ML (ref 0.3–4.2)

## 2023-08-24 PROCEDURE — 99214 OFFICE O/P EST MOD 30 MIN: CPT | Performed by: FAMILY MEDICINE

## 2023-08-24 PROCEDURE — 36415 COLL VENOUS BLD VENIPUNCTURE: CPT | Performed by: FAMILY MEDICINE

## 2023-08-24 PROCEDURE — 84443 ASSAY THYROID STIM HORMONE: CPT | Performed by: FAMILY MEDICINE

## 2023-08-24 PROCEDURE — 73030 X-RAY EXAM OF SHOULDER: CPT | Mod: TC | Performed by: RADIOLOGY

## 2023-08-24 RX ORDER — SERTRALINE HYDROCHLORIDE 100 MG/1
100 TABLET, FILM COATED ORAL DAILY
Qty: 90 TABLET | Refills: 3 | Status: SHIPPED | OUTPATIENT
Start: 2023-08-24 | End: 2024-09-16

## 2023-08-24 ASSESSMENT — ANXIETY QUESTIONNAIRES
IF YOU CHECKED OFF ANY PROBLEMS ON THIS QUESTIONNAIRE, HOW DIFFICULT HAVE THESE PROBLEMS MADE IT FOR YOU TO DO YOUR WORK, TAKE CARE OF THINGS AT HOME, OR GET ALONG WITH OTHER PEOPLE: SOMEWHAT DIFFICULT
GAD7 TOTAL SCORE: 2
5. BEING SO RESTLESS THAT IT IS HARD TO SIT STILL: NOT AT ALL
GAD7 TOTAL SCORE: 2
6. BECOMING EASILY ANNOYED OR IRRITABLE: NOT AT ALL
3. WORRYING TOO MUCH ABOUT DIFFERENT THINGS: SEVERAL DAYS
7. FEELING AFRAID AS IF SOMETHING AWFUL MIGHT HAPPEN: NOT AT ALL
4. TROUBLE RELAXING: NOT AT ALL
1. FEELING NERVOUS, ANXIOUS, OR ON EDGE: SEVERAL DAYS
2. NOT BEING ABLE TO STOP OR CONTROL WORRYING: NOT AT ALL

## 2023-08-24 ASSESSMENT — PATIENT HEALTH QUESTIONNAIRE - PHQ9
SUM OF ALL RESPONSES TO PHQ QUESTIONS 1-9: 6
10. IF YOU CHECKED OFF ANY PROBLEMS, HOW DIFFICULT HAVE THESE PROBLEMS MADE IT FOR YOU TO DO YOUR WORK, TAKE CARE OF THINGS AT HOME, OR GET ALONG WITH OTHER PEOPLE: SOMEWHAT DIFFICULT
SUM OF ALL RESPONSES TO PHQ QUESTIONS 1-9: 6

## 2023-08-24 NOTE — PROGRESS NOTES
Assessment & Plan     Hypothyroidism due to Hashimoto's thyroiditis  Reassess TSH on levothyroxine and plan to send refills on 112 mcg daily with dose adjustment if needed  - TSH    Parkinson's disease (H)  Slowly progressive on carbidopa-levodopa.  Now using wheeled walker exclusively.  Once completes home PT, will be going back to [facility] specialty clinic for Parkinson's related PT.  They will let us know if they are interested in social work referral to help with resources if there are concerns for escalation of care.    Mild episode of recurrent major depressive disorder (H)  Mood stable on sertraline 100 mg daily.  Refills provided.  - sertraline (ZOLOFT) 100 MG tablet  Dispense: 90 tablet; Refill: 3    Functional urinary incontinence  History of prostate cancer s/p prostatectomy with artificial urethral sphincter placement 2001 which was placed in 2016.  Has had urinary incontinence for many years now.  Has been using a briefs/pad inserts because of this, DME order placed for supplies.  - Incontinence Supplies Order for DME - ONLY FOR DME    Chronic left shoulder pain  Atraumatic left shoulder pain with progressing symptoms.  Not currently using OTC medications.  Discussed appropriate Tylenol dose.  We will assess with x-ray to gauge degree of degenerative changes, and to rule out any structural abnormalities given prior history of syncopal fall as discussed below.  Patient may consider steroid injection if Tylenol is not helpful.  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  Vitally stable today.    I spent a total of 31 minutes on the day of the visit.   Time spent by me doing chart review, history and exam, documentation and further activities per the note      DO ELIZABETH Silva [facility]    Beulah Mandujano is a 89 year old, presenting for the following health issues:  Recheck  "Medication        8/24/2023    11:54 AM   Additional Questions   Roomed by robert null cma   Accompanied by wife       History of Present Illness       Reason for visit:  Blood check    He eats 4 or more servings of fruits and vegetables daily.He consumes 0 sweetened beverage(s) daily.He exercises with enough effort to increase his heart rate 20 to 29 minutes per day.  He exercises with enough effort to increase his heart rate 3 or less days per week.   He is taking medications regularly.     Hospitalized 6/29/2023 after syncopal episode.  Found to have sinus pause now s/p pacemaker placement.  Was discharged to TCU, now has 1 more week of in home PT. will plan to resume [facility] PT for Parkinson's symptoms.    Chronic left shoulder pain.  Had previously been just with exercise, now with decreased range of motion.    Feels mood is stable on sertraline.  Does have up-and-down days.  Can become frustrated that he is unable to do the things he used to.  Now using wheeled walker exclusively.      Objective    BP (!) 140/84   Pulse 72   Ht 1.727 m (5' 8\")   Wt 58.5 kg (129 lb)   BMI 19.61 kg/m    Body mass index is 19.61 kg/m .  Physical Exam   GENERAL: alert, no distress, and elderly  RESP: lungs clear to auscultation - no rales, rhonchi or wheezes  CV: regular rates and rhythm and no murmur, click or rub  NEURO: Decrease tone, flat facies  PSYCH: mentation appears normal but slowed response time, appropriate mood  Ext: Decreased range of motion left shoulder to 90 degrees extension and decreased internal rotation as well.  Tenderness along AC joint.  Strength 3.5/5 bilaterally                    Answers submitted by the patient for this visit:  Patient Health Questionnaire (Submitted on 8/24/2023)  If you checked off any problems, how difficult have these problems made it for you to do your work, take care of things at home, or get along with other people?: Somewhat difficult  PHQ9 TOTAL SCORE: 6  JOANIE-7 " (Submitted on 8/24/2023)  JOANIE 7 TOTAL SCORE: 2

## 2023-08-25 ENCOUNTER — TELEPHONE (OUTPATIENT)
Dept: FAMILY MEDICINE | Facility: CLINIC | Age: 88
End: 2023-08-25

## 2023-08-25 DIAGNOSIS — E03.9 ACQUIRED HYPOTHYROIDISM: ICD-10-CM

## 2023-08-25 RX ORDER — LEVOTHYROXINE SODIUM 112 UG/1
112 TABLET ORAL DAILY
Qty: 90 TABLET | Refills: 3 | Status: SHIPPED | OUTPATIENT
Start: 2023-08-25 | End: 2024-09-16

## 2023-08-25 NOTE — TELEPHONE ENCOUNTER
----- Message from Tatyana Valdes DO sent at 8/25/2023  8:18 AM CDT -----  Team - please call patient with results.    -thyroid level is good range, refills provided  -xray showed arhtritis in the main ball and socket joint of the shoulder. Please try the tylenol and we could do a shoulder injection if desired in the future.    Tatyana Valdes DO

## 2023-09-26 DIAGNOSIS — I49.5 SICK SINUS SYNDROME (H): ICD-10-CM

## 2023-09-26 DIAGNOSIS — Z95.0 CARDIAC PACEMAKER IN SITU: Primary | ICD-10-CM

## 2023-11-10 ENCOUNTER — ANCILLARY PROCEDURE (OUTPATIENT)
Dept: CARDIOLOGY | Facility: CLINIC | Age: 88
End: 2023-11-10
Attending: INTERNAL MEDICINE
Payer: COMMERCIAL

## 2023-11-10 VITALS
BODY MASS INDEX: 19.61 KG/M2 | WEIGHT: 129 LBS | RESPIRATION RATE: 14 BRPM | SYSTOLIC BLOOD PRESSURE: 134 MMHG | HEART RATE: 85 BPM | DIASTOLIC BLOOD PRESSURE: 66 MMHG

## 2023-11-10 DIAGNOSIS — Z95.0 CARDIAC PACEMAKER IN SITU: ICD-10-CM

## 2023-11-10 DIAGNOSIS — I49.5 SICK SINUS SYNDROME (H): Primary | ICD-10-CM

## 2023-11-10 DIAGNOSIS — I44.2 CHB (COMPLETE HEART BLOCK) (H): ICD-10-CM

## 2023-11-10 PROCEDURE — 99214 OFFICE O/P EST MOD 30 MIN: CPT | Performed by: INTERNAL MEDICINE

## 2023-11-10 NOTE — LETTER
11/10/2023    Tatyana Valdes DO  [address]  [facility] [address]    RE: Gomez Villasenor       Dear Colleague,     I had the pleasure of [facility]  Cardiac Electrophysiology     Progress Note: Shlomo Obrien MD    Primary Care: Tatyana Valdes MD    Primary Cardiologist:     Primary Electrophysiologist: Shlomo Obrien MD    Assessment:       Sick sinus syndrome: Diagnosis in June 2023 with 2 syncopal episodes and patient underwent dual-chamber pacemaker implant at that time ([facility]).  Device interrogation today shows normal device function.  No evidence of any sustained atrial arrhythmia.  Orthostatic hypotension: Chronic problem for the patient that he and his wife report that since the pacemaker implant he has had none of the lightheaded or dizzy sensations.  Struggles with his Parkinson's disease but otherwise is without symptoms.  Parkinson's disease: Chronic problem for the patient and they report he continues to struggle with his balance including a recent fall striking his head.      Recommendations:  No change in device prescription today.  No change in current medications  Patient will be referred to general cardiology for long-term follow-up management.    Time spent: 35 minutes spent on the date of the encounter doing chart review, history and exam, documentation and further activities as noted above.    Subjective:  Gomez Villasenor (89 year old male) returns to the arrhythmia clinic for interval reevaluation of his dizziness and device evaluation.  The patient was at the neurosurgical clinic at  when he developed syncope this past June.  He had a second syncopal episode on the way into the hospital and ultimately underwent dual-chamber pacemaker implantation.  The patient's pacemaker recovery was otherwise uneventful.  He and his wife report that following the device implant his dizziness and  lightheadedness have largely resolved.  He continues to struggle with his symptoms resulting from his Parkinson's disease.  He had a recent fall stumbling forward and striking the top of his head.  He notes no other new symptoms.    Current Outpatient Medications   Medication Sig Dispense Refill    carbidopa-levodopa (SINEMET)  MG tablet Take 3 tablets by mouth 3 times daily      cholecalciferol (VITAMIN D-1000 MAX ST) 25 MCG (1000 UT) TABS Take 2,000 Units by mouth      levothyroxine (SYNTHROID/LEVOTHROID) 112 MCG tablet Take 1 tablet (112 mcg) by mouth daily 90 tablet 3    Multiple Vitamins-Minerals (PRESERVISION AREDS 2+MULTI VIT PO)       multivitamin w/minerals (MULTI-VITAMIN) tablet Take 1 tablet by mouth      sertraline (ZOLOFT) 100 MG tablet Take 1 tablet (100 mg) by mouth daily 90 tablet 3       Review of Systems:     Family History  Family History   Problem Relation Age of Onset    Heart Disease Mother     Cancer Brother     No Known Problems Father     Leukemia Brother     Cancer Brother        Social History   reports that he has never smoked. He has never used smokeless tobacco. He reports current alcohol use of about 1.0 standard drink of alcohol per week. He reports that he does not use drugs.    Objective:   Vital signs in last 24 hours:  /66 (BP Location: Right arm, Patient Position: Sitting, Cuff Size: Adult Regular)   Pulse 85   Resp 14   Wt 58.5 kg (129 lb)   BMI 19.61 kg/m      Physical Exam:  General: The patient is alert oriented to person place and situation.  The patient is in no acute distress at the time of my evaluation.  Eyes: Pupils are equal, round, and reactive to light.  Conjunctiva and sclera are clear.  ENT: Oral mucosa is moist and without redness. No evident nasal discharge.  Pulmonary: Lungs are clear bilaterally with no rales, rhonchi, or wheezes.    Cardiovascular exam: Rhythm is regular. S1 and S2 are normal. No significant murmur is present. JVP is normal.  Lower extremities demonstrate no significant edema. Distal pulses are intact bilaterally.  Abdomen is soft, nontender, no organomegaly, and bowel sounds present.  Musculoskeletal: Spine is straight. Extremities without deformity.  Neuro: Gait is normal.   Skin is warm, dry, and otherwise intact.      Cardiographics:   Interrogation of the patient's dual-chamber pacemaker demonstrates normal battery monitoring voltage.  The atrial and ventricular lead characteristics are stable and normal.  Device diagnostics demonstrate 96% right atrial pacing and 5% RV pacing.  No sustained atrial or ventricular tachyarrhythmia.    Lab Results:         Outside record review:          Thank you for allowing me to participate in the care of your patient.      Sincerely,     Shlomo Obrien MD     [facility]  cc:   Diego Dyer MD  [address] [facility] [address]  [place] [address]

## 2023-11-10 NOTE — Clinical Note
Hi team, Please see my note from today. I requested follow-up scheduling with the patient in general cardiology to establish long-term care in 6 months. Routine device clinic follow-up per protocol No scheduled follow-up with EP MD unless the patient has rhythm disturbance and/or device related issues.  Thanks Shlomo

## 2023-11-10 NOTE — PROGRESS NOTES
[facility]  Cardiac Electrophysiology     Progress Note: Shlomo Obrien MD    Primary Care: Tatyana Valdes MD    Primary Cardiologist:     Primary Electrophysiologist: Shlomo Obrien MD    Assessment:       Sick sinus syndrome: Diagnosis in June 2023 with 2 syncopal episodes and patient underwent dual-chamber pacemaker implant at that time ([facility]).  Device interrogation today shows normal device function.  No evidence of any sustained atrial arrhythmia.  Orthostatic hypotension: Chronic problem for the patient that he and his wife report that since the pacemaker implant he has had none of the lightheaded or dizzy sensations.  Struggles with his Parkinson's disease but otherwise is without symptoms.  Parkinson's disease: Chronic problem for the patient and they report he continues to struggle with his balance including a recent fall striking his head.      Recommendations:  No change in device prescription today.  No change in current medications  Patient will be referred to general cardiology for long-term follow-up management.    Time spent: 35 minutes spent on the date of the encounter doing chart review, history and exam, documentation and further activities as noted above.    Subjective:  Gomez Villasenor (89 year old male) returns to the arrhythmia clinic for interval reevaluation of his dizziness and device evaluation.  The patient was at the neurosurgical clinic at [facility] when he developed syncope this past June.  He had a second syncopal episode on the way into the hospital and ultimately underwent dual-chamber pacemaker implantation.  The patient's pacemaker recovery was otherwise uneventful.  He and his wife report that following the device implant his dizziness and lightheadedness have largely resolved.  He continues to struggle with his symptoms resulting from his Parkinson's disease.  He had a recent fall stumbling forward and striking the top of his head.  He notes no other new  symptoms.    Current Outpatient Medications   Medication Sig Dispense Refill    carbidopa-levodopa (SINEMET)  MG tablet Take 3 tablets by mouth 3 times daily      cholecalciferol (VITAMIN D-1000 MAX ST) 25 MCG (1000 UT) TABS Take 2,000 Units by mouth      levothyroxine (SYNTHROID/LEVOTHROID) 112 MCG tablet Take 1 tablet (112 mcg) by mouth daily 90 tablet 3    Multiple Vitamins-Minerals (PRESERVISION AREDS 2+MULTI VIT PO)       multivitamin w/minerals (MULTI-VITAMIN) tablet Take 1 tablet by mouth      sertraline (ZOLOFT) 100 MG tablet Take 1 tablet (100 mg) by mouth daily 90 tablet 3       Review of Systems:     Family History  Family History   Problem Relation Age of Onset    Heart Disease Mother     Cancer Brother     No Known Problems Father     Leukemia Brother     Cancer Brother        Social History   reports that he has never smoked. He has never used smokeless tobacco. He reports current alcohol use of about 1.0 standard drink of alcohol per week. He reports that he does not use drugs.    Objective:   Vital signs in last 24 hours:  /66 (BP Location: Right arm, Patient Position: Sitting, Cuff Size: Adult Regular)   Pulse 85   Resp 14   Wt 58.5 kg (129 lb)   BMI 19.61 kg/m      Physical Exam:  General: The patient is alert oriented to person place and situation.  The patient is in no acute distress at the time of my evaluation.  Eyes: Pupils are equal, round, and reactive to light.  Conjunctiva and sclera are clear.  ENT: Oral mucosa is moist and without redness. No evident nasal discharge.  Pulmonary: Lungs are clear bilaterally with no rales, rhonchi, or wheezes.    Cardiovascular exam: Rhythm is regular. S1 and S2 are normal. No significant murmur is present. JVP is normal. Lower extremities demonstrate no significant edema. Distal pulses are intact bilaterally.  Abdomen is soft, nontender, no organomegaly, and bowel sounds present.  Musculoskeletal: Spine is straight. Extremities without  deformity.  Neuro: Gait is normal.   Skin is warm, dry, and otherwise intact.      Cardiographics:   Interrogation of the patient's dual-chamber pacemaker demonstrates normal battery monitoring voltage.  The atrial and ventricular lead characteristics are stable and normal.  Device diagnostics demonstrate 96% right atrial pacing and 5% RV pacing.  No sustained atrial or ventricular tachyarrhythmia.    Lab Results:         Outside record review:

## 2023-11-13 ENCOUNTER — TELEPHONE (OUTPATIENT)
Dept: CARDIOLOGY | Facility: CLINIC | Age: 88
End: 2023-11-13
Payer: MEDICARE

## 2023-11-13 NOTE — TELEPHONE ENCOUNTER
Shlomo Obrien MD  P [facility] Scheduling Registration Pool - e; P [facility] Device Pool - e; P [facility] Ep Support Pool - e  Hi team,  Please see my note from today.  I requested follow-up scheduling with the patient in general cardiology to establish long-term care in 6 months.  Routine device clinic follow-up per protocol  No scheduled follow-up with EP MD unless the patient has rhythm disturbance and/or device related issues.  Thanks  Shlomo   [employer]

## 2023-11-13 NOTE — TELEPHONE ENCOUNTER
Follow up recommendations noted for device/Gen Card only follow up. Updated [employer] notes.   Ailyn Muir RN

## 2023-11-19 LAB
MDC_IDC_LEAD_CONNECTION_STATUS: NORMAL
MDC_IDC_LEAD_CONNECTION_STATUS: NORMAL
MDC_IDC_LEAD_IMPLANT_DT: NORMAL
MDC_IDC_LEAD_IMPLANT_DT: NORMAL
MDC_IDC_LEAD_LOCATION: NORMAL
MDC_IDC_LEAD_LOCATION: NORMAL
MDC_IDC_LEAD_LOCATION_DETAIL_1: NORMAL
MDC_IDC_LEAD_LOCATION_DETAIL_1: NORMAL
MDC_IDC_LEAD_MFG: NORMAL
MDC_IDC_LEAD_MFG: NORMAL
MDC_IDC_LEAD_MODEL: NORMAL
MDC_IDC_LEAD_MODEL: NORMAL
MDC_IDC_LEAD_POLARITY_TYPE: NORMAL
MDC_IDC_LEAD_POLARITY_TYPE: NORMAL
MDC_IDC_LEAD_SERIAL: NORMAL
MDC_IDC_LEAD_SERIAL: NORMAL
MDC_IDC_MSMT_BATTERY_DTM: NORMAL
MDC_IDC_MSMT_BATTERY_REMAINING_LONGEVITY: 125 MO
MDC_IDC_MSMT_BATTERY_RRT_TRIGGER: 2.62
MDC_IDC_MSMT_BATTERY_STATUS: NORMAL
MDC_IDC_MSMT_BATTERY_VOLTAGE: 3.13 V
MDC_IDC_MSMT_LEADCHNL_RA_IMPEDANCE_VALUE: 399 OHM
MDC_IDC_MSMT_LEADCHNL_RA_IMPEDANCE_VALUE: 551 OHM
MDC_IDC_MSMT_LEADCHNL_RA_PACING_THRESHOLD_AMPLITUDE: 1.5 V
MDC_IDC_MSMT_LEADCHNL_RA_PACING_THRESHOLD_AMPLITUDE: 1.62 V
MDC_IDC_MSMT_LEADCHNL_RA_PACING_THRESHOLD_PULSEWIDTH: 0.4 MS
MDC_IDC_MSMT_LEADCHNL_RA_PACING_THRESHOLD_PULSEWIDTH: 0.4 MS
MDC_IDC_MSMT_LEADCHNL_RA_SENSING_INTR_AMPL: 1.25 MV
MDC_IDC_MSMT_LEADCHNL_RA_SENSING_INTR_AMPL: 2.38 MV
MDC_IDC_MSMT_LEADCHNL_RV_IMPEDANCE_VALUE: 399 OHM
MDC_IDC_MSMT_LEADCHNL_RV_IMPEDANCE_VALUE: 532 OHM
MDC_IDC_MSMT_LEADCHNL_RV_PACING_THRESHOLD_AMPLITUDE: 0.62 V
MDC_IDC_MSMT_LEADCHNL_RV_PACING_THRESHOLD_AMPLITUDE: 0.75 V
MDC_IDC_MSMT_LEADCHNL_RV_PACING_THRESHOLD_PULSEWIDTH: 0.4 MS
MDC_IDC_MSMT_LEADCHNL_RV_PACING_THRESHOLD_PULSEWIDTH: 0.4 MS
MDC_IDC_MSMT_LEADCHNL_RV_SENSING_INTR_AMPL: 6.12 MV
MDC_IDC_MSMT_LEADCHNL_RV_SENSING_INTR_AMPL: 6.75 MV
MDC_IDC_PG_IMPLANT_DTM: NORMAL
MDC_IDC_PG_MFG: NORMAL
MDC_IDC_PG_MODEL: NORMAL
MDC_IDC_PG_SERIAL: NORMAL
MDC_IDC_PG_TYPE: NORMAL
MDC_IDC_SESS_CLINIC_NAME: NORMAL
MDC_IDC_SESS_DTM: NORMAL
MDC_IDC_SESS_TYPE: NORMAL
MDC_IDC_SET_BRADY_AT_MODE_SWITCH_RATE: 150 {BEATS}/MIN
MDC_IDC_SET_BRADY_HYSTRATE: NORMAL
MDC_IDC_SET_BRADY_LOWRATE: 70 {BEATS}/MIN
MDC_IDC_SET_BRADY_MAX_SENSOR_RATE: 120 {BEATS}/MIN
MDC_IDC_SET_BRADY_MAX_TRACKING_RATE: 120 {BEATS}/MIN
MDC_IDC_SET_BRADY_MODE: NORMAL
MDC_IDC_SET_BRADY_PAV_DELAY_LOW: 180 MS
MDC_IDC_SET_BRADY_SAV_DELAY_LOW: 150 MS
MDC_IDC_SET_LEADCHNL_RA_PACING_AMPLITUDE: NORMAL
MDC_IDC_SET_LEADCHNL_RA_PACING_ANODE_ELECTRODE_1: NORMAL
MDC_IDC_SET_LEADCHNL_RA_PACING_ANODE_LOCATION_1: NORMAL
MDC_IDC_SET_LEADCHNL_RA_PACING_CAPTURE_MODE: NORMAL
MDC_IDC_SET_LEADCHNL_RA_PACING_CATHODE_ELECTRODE_1: NORMAL
MDC_IDC_SET_LEADCHNL_RA_PACING_CATHODE_LOCATION_1: NORMAL
MDC_IDC_SET_LEADCHNL_RA_PACING_POLARITY: NORMAL
MDC_IDC_SET_LEADCHNL_RA_PACING_PULSEWIDTH: 0.4 MS
MDC_IDC_SET_LEADCHNL_RA_SENSING_ANODE_ELECTRODE_1: NORMAL
MDC_IDC_SET_LEADCHNL_RA_SENSING_ANODE_LOCATION_1: NORMAL
MDC_IDC_SET_LEADCHNL_RA_SENSING_CATHODE_ELECTRODE_1: NORMAL
MDC_IDC_SET_LEADCHNL_RA_SENSING_CATHODE_LOCATION_1: NORMAL
MDC_IDC_SET_LEADCHNL_RA_SENSING_POLARITY: NORMAL
MDC_IDC_SET_LEADCHNL_RA_SENSING_SENSITIVITY: 0.3 MV
MDC_IDC_SET_LEADCHNL_RV_PACING_AMPLITUDE: NORMAL
MDC_IDC_SET_LEADCHNL_RV_PACING_ANODE_ELECTRODE_1: NORMAL
MDC_IDC_SET_LEADCHNL_RV_PACING_ANODE_LOCATION_1: NORMAL
MDC_IDC_SET_LEADCHNL_RV_PACING_CAPTURE_MODE: NORMAL
MDC_IDC_SET_LEADCHNL_RV_PACING_CATHODE_ELECTRODE_1: NORMAL
MDC_IDC_SET_LEADCHNL_RV_PACING_CATHODE_LOCATION_1: NORMAL
MDC_IDC_SET_LEADCHNL_RV_PACING_POLARITY: NORMAL
MDC_IDC_SET_LEADCHNL_RV_PACING_PULSEWIDTH: 0.4 MS
MDC_IDC_SET_LEADCHNL_RV_SENSING_ANODE_ELECTRODE_1: NORMAL
MDC_IDC_SET_LEADCHNL_RV_SENSING_ANODE_LOCATION_1: NORMAL
MDC_IDC_SET_LEADCHNL_RV_SENSING_CATHODE_ELECTRODE_1: NORMAL
MDC_IDC_SET_LEADCHNL_RV_SENSING_CATHODE_LOCATION_1: NORMAL
MDC_IDC_SET_LEADCHNL_RV_SENSING_POLARITY: NORMAL
MDC_IDC_SET_LEADCHNL_RV_SENSING_SENSITIVITY: 0.9 MV
MDC_IDC_SET_ZONE_DETECTION_INTERVAL: 330 MS
MDC_IDC_SET_ZONE_DETECTION_INTERVAL: 400 MS
MDC_IDC_SET_ZONE_STATUS: NORMAL
MDC_IDC_SET_ZONE_STATUS: NORMAL
MDC_IDC_SET_ZONE_TYPE: NORMAL
MDC_IDC_SET_ZONE_VENDOR_TYPE: NORMAL
MDC_IDC_STAT_AT_BURDEN_PERCENT: 0 %
MDC_IDC_STAT_AT_DTM_END: NORMAL
MDC_IDC_STAT_AT_DTM_START: NORMAL
MDC_IDC_STAT_AT_MODE_SW_COUNT: 0
MDC_IDC_STAT_BRADY_AP_VP_PERCENT: 4.45 %
MDC_IDC_STAT_BRADY_AP_VS_PERCENT: 92.05 %
MDC_IDC_STAT_BRADY_AS_VP_PERCENT: 0.15 %
MDC_IDC_STAT_BRADY_AS_VS_PERCENT: 3.36 %
MDC_IDC_STAT_BRADY_DTM_END: NORMAL
MDC_IDC_STAT_BRADY_DTM_START: NORMAL
MDC_IDC_STAT_BRADY_RA_PERCENT_PACED: 96.32 %
MDC_IDC_STAT_BRADY_RV_PERCENT_PACED: 4.6 %
MDC_IDC_STAT_EPISODE_RECENT_COUNT: 0
MDC_IDC_STAT_EPISODE_RECENT_COUNT_DTM_END: NORMAL
MDC_IDC_STAT_EPISODE_RECENT_COUNT_DTM_START: NORMAL
MDC_IDC_STAT_EPISODE_TOTAL_COUNT: 0
MDC_IDC_STAT_EPISODE_TOTAL_COUNT_DTM_END: NORMAL
MDC_IDC_STAT_EPISODE_TOTAL_COUNT_DTM_START: NORMAL
MDC_IDC_STAT_EPISODE_TYPE: NORMAL
MDC_IDC_STAT_TACHYTHERAPY_RECENT_DTM_END: NORMAL
MDC_IDC_STAT_TACHYTHERAPY_RECENT_DTM_START: NORMAL
MDC_IDC_STAT_TACHYTHERAPY_TOTAL_DTM_END: NORMAL
MDC_IDC_STAT_TACHYTHERAPY_TOTAL_DTM_START: NORMAL

## 2023-11-19 PROCEDURE — 93280 PM DEVICE PROGR EVAL DUAL: CPT | Performed by: INTERNAL MEDICINE

## 2024-02-08 ENCOUNTER — OFFICE VISIT (OUTPATIENT)
Dept: FAMILY MEDICINE | Facility: CLINIC | Age: 89
End: 2024-02-08
Payer: COMMERCIAL

## 2024-02-08 VITALS
RESPIRATION RATE: 20 BRPM | SYSTOLIC BLOOD PRESSURE: 113 MMHG | HEIGHT: 68 IN | DIASTOLIC BLOOD PRESSURE: 77 MMHG | BODY MASS INDEX: 19.85 KG/M2 | HEART RATE: 75 BPM | WEIGHT: 131 LBS

## 2024-02-08 DIAGNOSIS — F33.0 MILD EPISODE OF RECURRENT MAJOR DEPRESSIVE DISORDER (H): ICD-10-CM

## 2024-02-08 DIAGNOSIS — E06.3 HYPOTHYROIDISM DUE TO HASHIMOTO'S THYROIDITIS: ICD-10-CM

## 2024-02-08 DIAGNOSIS — D64.9 ANEMIA, UNSPECIFIED TYPE: ICD-10-CM

## 2024-02-08 DIAGNOSIS — Z00.00 ENCOUNTER FOR MEDICARE ANNUAL WELLNESS EXAM: Primary | ICD-10-CM

## 2024-02-08 DIAGNOSIS — H61.23 BILATERAL IMPACTED CERUMEN: ICD-10-CM

## 2024-02-08 DIAGNOSIS — G20.A1 PARKINSON'S DISEASE, UNSPECIFIED WHETHER DYSKINESIA PRESENT, UNSPECIFIED WHETHER MANIFESTATIONS FLUCTUATE (H): ICD-10-CM

## 2024-02-08 PROBLEM — I49.5 SICK SINUS SYNDROME (H): Status: RESOLVED | Noted: 2023-11-10 | Resolved: 2024-02-08

## 2024-02-08 PROBLEM — F02.80 DEMENTIA IN OTHER DISEASES CLASSIFIED ELSEWHERE, UNSPECIFIED SEVERITY, WITHOUT BEHAVIORAL DISTURBANCE, PSYCHOTIC DISTURBANCE, MOOD DISTURBANCE, AND ANXIETY (H): Status: ACTIVE | Noted: 2024-02-08

## 2024-02-08 PROBLEM — F02.80 DEMENTIA IN OTHER DISEASES CLASSIFIED ELSEWHERE, UNSPECIFIED SEVERITY, WITHOUT BEHAVIORAL DISTURBANCE, PSYCHOTIC DISTURBANCE, MOOD DISTURBANCE, AND ANXIETY (H): Status: RESOLVED | Noted: 2024-02-08 | Resolved: 2024-02-08

## 2024-02-08 LAB
ERYTHROCYTE [DISTWIDTH] IN BLOOD BY AUTOMATED COUNT: 14.7 % (ref 10–15)
HCT VFR BLD AUTO: 36.4 % (ref 40–53)
HGB BLD-MCNC: 11.9 G/DL (ref 13.3–17.7)
MCH RBC QN AUTO: 32.7 PG (ref 26.5–33)
MCHC RBC AUTO-ENTMCNC: 32.7 G/DL (ref 31.5–36.5)
MCV RBC AUTO: 100 FL (ref 78–100)
PLATELET # BLD AUTO: 139 10E3/UL (ref 150–450)
RBC # BLD AUTO: 3.64 10E6/UL (ref 4.4–5.9)
WBC # BLD AUTO: 2.8 10E3/UL (ref 4–11)

## 2024-02-08 PROCEDURE — 99213 OFFICE O/P EST LOW 20 MIN: CPT | Mod: 25 | Performed by: FAMILY MEDICINE

## 2024-02-08 PROCEDURE — 69209 REMOVE IMPACTED EAR WAX UNI: CPT | Performed by: FAMILY MEDICINE

## 2024-02-08 PROCEDURE — G0438 PPPS, INITIAL VISIT: HCPCS | Performed by: FAMILY MEDICINE

## 2024-02-08 PROCEDURE — 84443 ASSAY THYROID STIM HORMONE: CPT | Performed by: FAMILY MEDICINE

## 2024-02-08 PROCEDURE — 36415 COLL VENOUS BLD VENIPUNCTURE: CPT | Performed by: FAMILY MEDICINE

## 2024-02-08 PROCEDURE — 85027 COMPLETE CBC AUTOMATED: CPT | Performed by: FAMILY MEDICINE

## 2024-02-08 ASSESSMENT — PATIENT HEALTH QUESTIONNAIRE - PHQ9
SUM OF ALL RESPONSES TO PHQ QUESTIONS 1-9: 9
SUM OF ALL RESPONSES TO PHQ QUESTIONS 1-9: 9
10. IF YOU CHECKED OFF ANY PROBLEMS, HOW DIFFICULT HAVE THESE PROBLEMS MADE IT FOR YOU TO DO YOUR WORK, TAKE CARE OF THINGS AT HOME, OR GET ALONG WITH OTHER PEOPLE: NOT DIFFICULT AT ALL

## 2024-02-08 NOTE — PATIENT INSTRUCTIONS
Your Health Risk Assessment indicates you feel you are not in good health    A healthy lifestyle helps keep the body fit and the mind alert. It helps protect you from disease, helps you fight disease, and helps prevent chronic disease (disease that doesn't go away) from getting worse. This is important as you get older and begin to notice twinges in muscles and joints and a decline in the strength and stamina you once took for granted. A healthy lifestyle includes good healthcare, good nutrition, weight control, recreation, and regular exercise. Avoid harmful substances and do what you can to keep safe. Another part of a healthy lifestyle is stay mentally active and socially involved.    Good healthcare   Have a wellness visit every year.   If you have new symptoms, let us know right away. Don't wait until the next checkup.   Take medicines exactly as prescribed and keep your medicines in a safe place. Tell us if your medicine causes problems.   Healthy diet and weight control   Eat 3 or 4 small, nutritious, low-fat, high-fiber meals a day. Include a variety of fruits, vegetables, and whole-grain foods.   Make sure you get enough calcium in your diet. Calcium, vitamin D, and exercise help prevent osteoporosis (bone thinning).   If you live alone, try eating with others when you can. That way you get a good meal and have company while you eat it.   Try to keep a healthy weight. If you eat more calories than your body uses for energy, it will be stored as fat and you will gain weight.     Recreation   Recreation is not limited to sports and team events. It includes any activity that provides relaxation, interest, enjoyment, and exercise. Recreation provides an outlet for physical, mental, and social energy. It can give a sense of worth and achievement. It can help you stay healthy.    Mental Exercise and Social Involvement  Mental and emotional health is as important as physical health. Keep in touch with friends and  family. Stay as active as possible. Continue to learn and challenge yourself.   Things you can do to stay mentally active are:  Learn something new, like a foreign language or musical instrument.   Play SCRABBLE or do crossword puzzles. If you cannot find people to play these games with you at home, you can play them with others on your computer through the Internet.   Join a games club--anything from card games to chess or checkers or lawn bowling.   Start a new hobby.   Go back to school.   Volunteer.   Read.   Keep up with world events.  Eating Healthy Foods: Care Instructions  With every meal, you can make healthy food choices. Try to eat a variety of fruits, vegetables, whole grains, lean proteins, and low-fat dairy products. This can help you get the right balance of nutrients, including vitamins and minerals. Small changes add up over time. You can start by adding one healthy food to your meals each day.    Try to make half your plate fruits and vegetables, one-fourth whole grains, and one-fourth lean proteins. Try including dairy with your meals.   Eat more fruits and vegetables. Try to have them with most meals and snacks.   Foods for healthy eating    Fruits    These can be fresh, frozen, canned, or dried.  Try to choose whole fruit rather than fruit juice.  Eat a variety of colors.    Vegetables    These can be fresh, frozen, canned, or dried.  Beans, peas, and lentils count too.    Whole grains    Choose whole-grain breads, cereals, and noodles.  Try brown rice.    Lean proteins    These can include lean meat, poultry, fish, and eggs.  You can also have tofu, beans, peas, lentils, nuts, and seeds.    Dairy    Try milk, yogurt, and cheese.  Choose low-fat or fat-free when you can.  If you need to, use lactose-free milk or fortified plant-based milk products, such as soy milk.    Water    Drink water when you're thirsty.  Limit sugar-sweetened drinks, including soda, fruit drinks, and sports drinks.  Where  "can you learn more?  Go to https://www.[employer].net/patiented  Enter T756 in the search box to learn more about \"Eating Healthy Foods: Care Instructions.\"  Current as of: February 28, 2023               Content Version: 13.8    3255-2096 [employer].   Care instructions adapted under license by your healthcare professional. If you have questions about a medical condition or this instruction, always ask your healthcare professional. [employer] disclaims any warranty or liability for your use of this information.      Nutrition for Older Adults: Care Instructions  Overview     Good nutrition is important at any age. But it is especially important for older adults. Eating a healthy diet helps keep your body strong. And it can help lower your risk for disease.  As you get older, your body needs more of certain nutrients. These include vitamin B12, calcium, and vitamin D. But it may be harder for you to get these and other important nutrients. This could be for many reasons. You may not feel as hungry as you used to. Or you could have problems with your teeth or mouth that make it hard to chew. Or you may not enjoy planning and preparing meals, especially if you live alone.  Talk with your doctor if you want help getting the most nutrition from what you eat. The doctor may have you work with a dietitian to help you plan meals.  Follow-up care is a key part of your treatment and safety. Be sure to make and go to all appointments, and call your doctor if you are having problems. It's also a good idea to know your test results and keep a list of the medicines you take.  How can you care for yourself at home?  To stay healthy  Eat a variety of foods. The more you vary the foods you eat, the more vitamins, minerals, and other nutrients you get.  Take a multivitamin every day. Choose one with about 100% of the daily value (DV) for vitamins and minerals. Do not take more than 100% of the daily value " for any vitamin or mineral unless your doctor tells you to. Talk with your doctor if you are not sure which multivitamin is right for you.  Eat lots of fruits and vegetables. Fresh, frozen, or no-salt canned vegetables and fruits in their own juice or light syrup are good choices.  Include foods that are high in vitamin B12 in your diet. Good choices are fortified breakfast cereal, nonfat or low-fat milk and other dairy products, meat, poultry, fish, and eggs.  Get enough calcium and vitamin D. Good choices include nonfat or low-fat milk, cheese, and yogurt. Other good options are tofu, orange juice with added calcium, and some leafy green vegetables, such as nga greens and kale. If you don't use milk products, talk to your doctor about calcium and vitamin D supplements.  Eat protein foods every day. Good choices include lean meat, fish, poultry, eggs, and cheese. Other good options are cooked beans, peanut butter, and nuts and seeds.  Choose whole grains for half of the grains you eat. Look for 100% whole wheat bread, whole-grain cereals, brown rice, and other whole grains.  If you have constipation  Eat high-fiber foods every day. These include fruits, vegetables, cooked dried beans, and whole grains.  Drink plenty of fluids. If you have kidney, heart, or liver disease and have to limit fluids, talk with your doctor before you increase the amount of fluids you drink.  Ask your doctor if stool softeners may help keep your bowels regular.  If you have mouth problems that make chewing hard  Pick canned or cooked fruits and vegetables. These are often softer.  Chop or shred meat, poultry, and fish. Add sauce or gravy to the meat to help keep it moist.  Pick other protein foods that are soft. These include cheese, peanut butter, cooked beans, cottage cheese, and eggs.  If you have trouble shopping for yourself  Ask a local food store to deliver groceries to your home.  Contact a volunteer center and ask for  "help.  Ask a family member or neighbor to help you.  If you have trouble preparing meals  If you are able, take a cooking class.  Use a microwave oven to cook TV dinners and other frozen or prepared foods.  Take part in group meal programs. You can find these through senior citizen programs.  Have meals brought to your home. Your community may offer programs that deliver meals, such as Meals on Wheels.  If your appetite is poor  Try eating smaller amounts of food more often. For example, eat 4 or 5 small meals a day instead of 1 or 2 large meals.  Eat with family and friends. Or take part in group meal programs offered through volunteer programs. Eating with others may help your appetite. And it helps you be more social.  Ask your doctor if your medicines could cause appetite or taste problems. If so, ask about changing medicines.  Add spices and herbs to increase the flavor of food.  If you think you are depressed, ask your doctor for help. Depression can affect your appetite. And it can make it hard to do everyday activities like grocery shopping and making meals. Treatment can help.  When should you call for help?  Watch closely for changes in your health, and be sure to contact your doctor if you have any problems.  Where can you learn more?  Go to https://www.[employer].net/patiented  Enter L643 in the search box to learn more about \"Nutrition for Older Adults: Care Instructions.\"  Current as of: February 26, 2023               Content Version: 13.8    8360-7521 [employer].   Care instructions adapted under license by your healthcare professional. If you have questions about a medical condition or this instruction, always ask your healthcare professional. [employer] disclaims any warranty or liability for your use of this information.      Learning About Activities of Daily Living  What are activities of daily living?     Activities of daily living (ADLs) are the basic self-care " tasks you do every day. As you age, and if you have health problems, you may find that it's harder to do these things for yourself. That's when you may need some help.  Your doctor uses ADLs to measure how much help you need. Knowing what you can and can't do for yourself is an important first step to getting help. And when you have the help you need, you can stay as independent as possible.  Your doctor will want to know if you are able to do tasks such as:  Take a bath or shower without help.  Go to the bathroom by yourself.  Dress and undress without help.  Shave, comb your hair, and brush teeth on your own.  Get in and out of bed or a chair without help.  Feed yourself without help.  If you are having trouble doing basic self-care tasks, talk with your doctor. You may want to bring a caregiver or family member who can help the doctor understand your needs and abilities.  How will a doctor assess your ADLs?  Asking about ADLs is part of a routine health checkup your doctor will likely do as you age. Your health check might be done in a doctor's office, in your home, or at a hospital. The goal is to find out if you are having any problems that could make your health problems worse or that make it unsafe for you to be on your own.  To measure your ADLs, your doctor will ask how hard it is for you to do routine tasks. He or she may also want to know if you have changed the way you do a task because of a health problem. He or she may watch how you:  Walk back and forth.  Keep your balance while you stand or walk.  Move from sitting to standing or from a bed to a chair.  Button or unbutton a shirt or sweater.  Remove and put on your shoes.  It's normal to feel a little worried or anxious if you find you can't do all the things you used to be able to do. Talking with your doctor about ADLs isn't a test that you either pass or fail. It's just a way to get more information about your health and safety.  Follow-up care is  a key part of your treatment and safety. Be sure to make and go to all appointments, and call your doctor if you are having problems. It's also a good idea to know your test results and keep a list of the medicines you take.  Current as of: February 26, 2023               Content Version: 13.8    3735-8839 [employer].   Care instructions adapted under license by your healthcare professional. If you have questions about a medical condition or this instruction, always ask your healthcare professional. [employer] disclaims any warranty or liability for your use of this information.      Preventing Falls: Care Instructions  Injuries and health problems such as trouble walking or poor eyesight can increase your risk of falling. So can some medicines. But there are things you can do to help prevent falls. You can exercise to get stronger. You can also arrange your home to make it safer.    Talk to your doctor about the medicines you take. Ask if any of them increase the risk of falls and whether they can be changed or stopped.   Try to exercise regularly. It can help improve your strength and balance. This can help lower your risk of falling.     Practice fall safety and prevention.    Wear low-heeled shoes that fit well and give your feet good support. Talk to your doctor if you have foot problems that make this hard.  Carry a cellphone or wear a medical alert device that you can use to call for help.  Use stepladders instead of chairs to reach high objects. Don't climb if you're at risk for falls. Ask for help, if needed.  Wear the correct eyeglasses, if you need them.    Make your home safer.    Remove rugs, cords, clutter, and furniture from walkways.  Keep your house well lit. Use night-lights in hallways and bathrooms.  Install and use sturdy handrails on stairways.  Wear nonskid footwear, even inside. Don't walk barefoot or in socks without shoes.    Be safe outside.    Use handrails,  "curb cuts, and ramps whenever possible.  Keep your hands free by using a shoulder bag or backpack.  Try to walk in well-lit areas. Watch out for uneven ground, changes in pavement, and debris.  Be careful in the winter. Walk on the grass or gravel when sidewalks are slippery. Use de-icer on steps and walkways. Add non-slip devices to shoes.    Put grab bars and nonskid mats in your shower or tub and near the toilet. Try to use a shower chair or bath bench when bathing.   Get into a tub or shower by putting in your weaker leg first. Get out with your strong side first. Have a phone or medical alert device in the bathroom with you.   Where can you learn more?  Go to https://www.[employer].net/patiented  Enter G117 in the search box to learn more about \"Preventing Falls: Care Instructions.\"  Current as of: July 18, 2023               Content Version: 13.8    5563-1584 [employer].   Care instructions adapted under license by your healthcare professional. If you have questions about a medical condition or this instruction, always ask your healthcare professional. [employer] disclaims any warranty or liability for your use of this information.      Hearing Loss: Care Instructions  Overview     Hearing loss is a sudden or slow decrease in how well you hear. It can range from slight to profound. Permanent hearing loss can occur with aging. It also can happen when you are exposed long-term to loud noise. Examples include listening to loud music, riding motorcycles, or being around other loud machines.  Hearing loss can affect your work and home life. It can make you feel lonely or depressed. You may feel that you have lost your independence. But hearing aids and other devices can help you hear better and feel connected to others.  Follow-up care is a key part of your treatment and safety. Be sure to make and go to all appointments, and call your doctor if you are having problems. It's also a good " idea to know your test results and keep a list of the medicines you take.  How can you care for yourself at home?  Avoid loud noises whenever possible. This helps keep your hearing from getting worse.  Always wear hearing protection around loud noises.  Wear a hearing aid as directed.  A professional can help you pick a hearing aid that will work best for you.  You can also get hearing aids over the counter for mild to moderate hearing loss.  Have hearing tests as your doctor suggests. They can show whether your hearing has changed. Your hearing aid may need to be adjusted.  Use other devices as needed. These may include:  Telephone amplifiers and hearing aids that can connect to a television, stereo, radio, or microphone.  Devices that use lights or vibrations. These alert you to the doorbell, a ringing telephone, or a baby monitor.  Television closed-captioning. This shows the words at the bottom of the screen. Most new TVs can do this.  TTY (text telephone). This lets you type messages back and forth on the telephone instead of talking or listening. These devices are also called TDD. When messages are typed on the keyboard, they are sent over the phone line to a receiving TTY. The message is shown on a monitor.  Use text messaging, social media, and email if it is hard for you to communicate by telephone.  Try to learn a listening technique called speechreading. It is not lipreading. You pay attention to people's gestures, expressions, posture, and tone of voice. These clues can help you understand what a person is saying. Face the person you are talking to, and have them face you. Make sure the lighting is good. You need to see the other person's face clearly.  Think about counseling if you need help to adjust to your hearing loss.  When should you call for help?  Watch closely for changes in your health, and be sure to contact your doctor if:    You think your hearing is getting worse.     You have new  "symptoms, such as dizziness or nausea.   Where can you learn more?  Go to https://www.[employer].net/patiented  Enter R798 in the search box to learn more about \"Hearing Loss: Care Instructions.\"  Current as of: February 28, 2023               Content Version: 13.8    0445-0540 [employer].   Care instructions adapted under license by your healthcare professional. If you have questions about a medical condition or this instruction, always ask your healthcare professional. [employer] disclaims any warranty or liability for your use of this information.      Learning About Stress  What is stress?     Stress is your body's response to a hard situation. Your body can have a physical, emotional, or mental response. Stress is a fact of life for most people, and it affects everyone differently. What causes stress for you may not be stressful for someone else.  A lot of things can cause stress. You may feel stress when you go on a job interview, take a test, or run a race. This kind of short-term stress is normal and even useful. It can help you if you need to work hard or react quickly. For example, stress can help you finish an important job on time.  Long-term stress is caused by ongoing stressful situations or events. Examples of long-term stress include long-term health problems, ongoing problems at work, or conflicts in your family. Long-term stress can harm your health.  How does stress affect your health?  When you are stressed, your body responds as though you are in danger. It makes hormones that speed up your heart, make you breathe faster, and give you a burst of energy. This is called the fight-or-flight stress response. If the stress is over quickly, your body goes back to normal and no harm is done.  But if stress happens too often or lasts too long, it can have bad effects. Long-term stress can make you more likely to get sick, and it can make symptoms of some diseases worse. If " you tense up when you are stressed, you may develop neck, shoulder, or low back pain. Stress is linked to high blood pressure and heart disease.  Stress also harms your emotional health. It can make you kendall, tense, or depressed. Your relationships may suffer, and you may not do well at work or school.  What can you do to manage stress?  You can try these things to help manage stress:   Do something active. Exercise or activity can help reduce stress. Walking is a great way to get started. Even everyday activities such as housecleaning or yard work can help.  Try yoga or malik chi. These techniques combine exercise and meditation. You may need some training at first to learn them.  Do something you enjoy. For example, listen to music or go to a movie. Practice your hobby or do volunteer work.  Meditate. This can help you relax, because you are not worrying about what happened before or what may happen in the future.  Do guided imagery. Imagine yourself in any setting that helps you feel calm. You can use online videos, books, or a teacher to guide you.  Do breathing exercises. For example:  From a standing position, bend forward from the waist with your knees slightly bent. Let your arms dangle close to the floor.  Breathe in slowly and deeply as you return to a standing position. Roll up slowly and lift your head last.  Hold your breath for just a few seconds in the standing position.  Breathe out slowly and bend forward from the waist.  Let your feelings out. Talk, laugh, cry, and express anger when you need to. Talking with supportive friends or family, a counselor, or a eusebio leader about your feelings is a healthy way to relieve stress. Avoid discussing your feelings with people who make you feel worse.  Write. It may help to write about things that are bothering you. This helps you find out how much stress you feel and what is causing it. When you know this, you can find better ways to cope.  What can you do to  "prevent stress?  You might try some of these things to help prevent stress:  Manage your time. This helps you find time to do the things you want and need to do.  Get enough sleep. Your body recovers from the stresses of the day while you are sleeping.  Get support. Your family, friends, and community can make a difference in how you experience stress.  Limit your news feed. Avoid or limit time on social media or news that may make you feel stressed.  Do something active. Exercise or activity can help reduce stress. Walking is a great way to get started.  Where can you learn more?  Go to https://www.[employer].net/patiented  Enter N032 in the search box to learn more about \"Learning About Stress.\"  Current as of: February 26, 2023               Content Version: 13.8    4538-0599 [employer].   Care instructions adapted under license by your healthcare professional. If you have questions about a medical condition or this instruction, always ask your healthcare professional. [employer] disclaims any warranty or liability for your use of this information.      Learning About Sleeping Well  What does sleeping well mean?     Sleeping well means getting enough sleep to feel good and stay healthy. How much sleep is enough varies among people.  The number of hours you sleep and how you feel when you wake up are both important. If you do not feel refreshed, you probably need more sleep. Another sign of not getting enough sleep is feeling tired during the day.  Experts recommend that adults get at least 7 or more hours of sleep per day. Children and older adults need more sleep.  Why is getting enough sleep important?  Getting enough quality sleep is a basic part of good health. When your sleep suffers, your physical health, mood, and your thoughts can suffer too. You may find yourself feeling more grumpy or stressed. Not getting enough sleep also can lead to serious problems, including injury, " "accidents, anxiety, and depression.  What might cause poor sleeping?  Many things can cause sleep problems, including:  Changes to your sleep schedule.  Stress. Stress can be caused by fear about a single event, such as giving a speech. Or you may have ongoing stress, such as worry about work or school.  Depression, anxiety, and other mental or emotional conditions.  Changes in your sleep habits or surroundings. This includes changes that happen where you sleep, such as noise, light, or sleeping in a different bed. It also includes changes in your sleep pattern, such as having jet lag or working a late shift.  Health problems, such as pain, breathing problems, and restless legs syndrome.  Lack of regular exercise.  Using alcohol, nicotine, or caffeine before bed.  How can you help yourself?  Here are some tips that may help you sleep more soundly and wake up feeling more refreshed.  Your sleeping area   Use your bedroom only for sleeping and sex. A bit of light reading may help you fall asleep. But if it doesn't, do your reading elsewhere in the house. Try not to use your TV, computer, smartphone, or tablet while you are in bed.  Be sure your bed is big enough to stretch out comfortably, especially if you have a sleep partner.  Keep your bedroom quiet, dark, and cool. Use curtains, blinds, or a sleep mask to block out light. To block out noise, use earplugs, soothing music, or a \"white noise\" machine.  Your evening and bedtime routine   Create a relaxing bedtime routine. You might want to take a warm shower or bath, or listen to soothing music.  Go to bed at the same time every night. And get up at the same time every morning, even if you feel tired.  What to avoid   Limit caffeine (coffee, tea, caffeinated sodas) during the day, and don't have any for at least 6 hours before bedtime.  Avoid drinking alcohol before bedtime. Alcohol can cause you to wake up more often during the night.  Try not to smoke or use " "tobacco, especially in the evening. Nicotine can keep you awake.  Limit naps during the day, especially close to bedtime.  Avoid lying in bed awake for too long. If you can't fall asleep or if you wake up in the middle of the night and can't get back to sleep within about 20 minutes, get out of bed and go to another room until you feel sleepy.  Avoid taking medicine right before bed that may keep you awake or make you feel hyper or energized. Your doctor can tell you if your medicine may do this and if you can take it earlier in the day.  If you can't sleep   Imagine yourself in a peaceful, pleasant scene. Focus on the details and feelings of being in a place that is relaxing.  Get up and do a quiet or boring activity until you feel sleepy.  Avoid drinking any liquids before going to bed to help prevent waking up often to use the bathroom.  Where can you learn more?  Go to https://www.[employer].net/patiented  Enter J942 in the search box to learn more about \"Learning About Sleeping Well.\"  Current as of: July 11, 2023               Content Version: 13.8    8420-7735 [employer].   Care instructions adapted under license by your healthcare professional. If you have questions about a medical condition or this instruction, always ask your healthcare professional. [employer] disclaims any warranty or liability for your use of this information.      Bladder Training: Care Instructions  Your Care Instructions     Bladder training is used to treat urge incontinence and stress incontinence. Urge incontinence means that the need to urinate comes on so fast that you can't get to a toilet in time. Stress incontinence means that you leak urine because of pressure on your bladder. For example, it may happen when you laugh, cough, or lift something heavy.  Bladder training can increase how long you can wait before you have to urinate. It can also help your bladder hold more urine. And it can give you " better control over the urge to urinate.  It is important to remember that bladder training takes a few weeks to a few months to make a difference. You may not see results right away, but don't give up.  Follow-up care is a key part of your treatment and safety. Be sure to make and go to all appointments, and call your doctor if you are having problems. It's also a good idea to know your test results and keep a list of the medicines you take.  How can you care for yourself at home?  Work with your doctor to come up with a bladder training program that is right for you. You may use one or more of the following methods.  Delayed urination  In the beginning, try to keep from urinating for 5 minutes after you first feel the need to go.  While you wait, take deep, slow breaths to relax. Kegel exercises can also help you delay the need to go to the bathroom.  After some practice, when you can easily wait 5 minutes to urinate, try to wait 10 minutes before you urinate.  Slowly increase the waiting period until you are able to control when you have to urinate.  Scheduled urination  Empty your bladder when you first wake up in the morning.  Schedule times throughout the day when you will urinate.  Start by going to the bathroom every hour, even if you don't need to go.  Slowly increase the time between trips to the bathroom.  When you have found a schedule that works well for you, keep doing it.  If you wake up during the night and have to urinate, do it. Apply your schedule to waking hours only.  Kegel exercises  These tighten and strengthen pelvic muscles, which can help you control the flow of urine. (If doing these exercises causes pain, stop doing them and talk with your doctor.) To do Kegel exercises:  Squeeze your muscles as if you were trying not to pass gas. Or squeeze your muscles as if you were stopping the flow of urine. Your belly, legs, and buttocks shouldn't move.  Hold the squeeze for 3 seconds, then relax  "for 5 to 10 seconds.  Start with 3 seconds, then add 1 second each week until you are able to squeeze for 10 seconds.  Repeat the exercise 10 times a session. Do 3 to 8 sessions a day.  When should you call for help?  Watch closely for changes in your health, and be sure to contact your doctor if:    Your incontinence is getting worse.     You do not get better as expected.   Where can you learn more?  Go to https://www.[employer].net/patiented  Enter V684 in the search box to learn more about \"Bladder Training: Care Instructions.\"  Current as of: February 28, 2023               Content Version: 13.8    7950-2040 [employer].   Care instructions adapted under license by your healthcare professional. If you have questions about a medical condition or this instruction, always ask your healthcare professional. [employer] disclaims any warranty or liability for your use of this information.      Learning About Depression Screening  What is depression screening?  Depression screening is a way to see if you have depression symptoms. It may be done by a doctor or counselor. It's often part of a routine checkup. That's because your mental health is just as important as your physical health.  Depression is a mental health condition that affects how you feel, think, and act. You may:  Have less energy.  Lose interest in your daily activities.  Feel sad and grouchy for a long time.  Depression is very common. It affects people of all ages.  Many things can lead to depression. Some people become depressed after they have a stroke or find out they have a major illness like cancer or heart disease. The death of a loved one or a breakup may lead to depression. It can run in families. Most experts believe that a combination of inherited genes and stressful life events can cause it.  What happens during screening?  You may be asked to fill out a form about your depression symptoms. You and the doctor will " "discuss your answers. The doctor may ask you more questions to learn more about how you think, act, and feel.  What happens after screening?  If you have symptoms of depression, your doctor will talk to you about your options.  Doctors usually treat depression with medicines or counseling. Often, combining the two works best. Many people don't get help because they think that they'll get over the depression on their own. But people with depression may not get better unless they get treatment.  The cause of depression is not well understood. There may be many factors involved. But if you have depression, it's not your fault.  A serious symptom of depression is thinking about death or suicide. If you or someone you care about talks about this or about feeling hopeless, get help right away.  It's important to know that depression can be treated. Medicine, counseling, and self-care may help.  Where can you learn more?  Go to https://www.[employer].net/patiented  Enter T185 in the search box to learn more about \"Learning About Depression Screening.\"  Current as of: June 25, 2023               Content Version: 13.8    7064-3685 [employer].   Care instructions adapted under license by your healthcare professional. If you have questions about a medical condition or this instruction, always ask your healthcare professional. [employer] disclaims any warranty or liability for your use of this information.      Preventive Care Advice   This is general advice given by our system to help you stay healthy. However, your care team may have specific advice just for you. Please talk to your care team about your preventive care needs.  Nutrition  Eat 5 or more servings of fruits and vegetables each day.  Try wheat bread, brown rice and whole grain pasta (instead of white bread, rice, and pasta).  Get enough calcium and vitamin D. Check the label on foods and aim for 100% of the RDA (recommended daily " allowance).  Lifestyle  Exercise at least 150 minutes each week  (30 minutes a day, 5 days a week).  Do muscle strengthening activities 2 days a week. These help control your weight and prevent disease.  No smoking.  Wear sunscreen to prevent skin cancer.  Have a dental exam and cleaning every 6 months.  Yearly exams  See your health care team every year to talk about:  Any changes in your health.  Any medicines your care team has prescribed.  Preventive care, family planning, and ways to prevent chronic diseases.  Shots (vaccines)   HPV shots (up to age 26), if you've never had them before.  Hepatitis B shots (up to age 59), if you've never had them before.  COVID-19 shot: Get this shot when it's due.  Flu shot: Get a flu shot every year.  Tetanus shot: Get a tetanus shot every 10 years.  Pneumococcal, hepatitis A, and RSV shots: Ask your care team if you need these based on your risk.  Shingles shot (for age 50 and up)  General health tests  Diabetes screening:  Starting at age 35, Get screened for diabetes at least every 3 years.  If you are younger than age 35, ask your care team if you should be screened for diabetes.  Cholesterol test: At age 39, start having a cholesterol test every 5 years, or more often if advised.  Bone density scan (DEXA): At age 50, ask your care team if you should have this scan for osteoporosis (brittle bones).  Hepatitis C: Get tested at least once in your life.  STIs (sexually transmitted infections)  Before age 24: Ask your care team if you should be screened for STIs.  After age 24: Get screened for STIs if you're at risk. You are at risk for STIs (including HIV) if:  You are sexually active with more than one person.  You don't use condoms every time.  You or a partner was diagnosed with a sexually transmitted infection.  If you are at risk for HIV, ask about PrEP medicine to prevent HIV.  Get tested for HIV at least once in your life, whether you are at risk for HIV or  not.  Cancer screening tests  Cervical cancer screening: If you have a cervix, begin getting regular cervical cancer screening tests starting at age 21.  Breast cancer scan (mammogram): If you've ever had breasts, begin having regular mammograms starting at age 40. This is a scan to check for breast cancer.  Colon cancer screening: It is important to start screening for colon cancer at age 45.  Have a colonoscopy test every 10 years (or more often if you're at risk) Or, ask your provider about stool tests like a FIT test every year or Cologuard test every 3 years.  To learn more about your testing options, visit:   https://www.[email or web address].pdf.  For help making a decision, visit:   https://bit.[facility]/jx40173.  Prostate cancer screening test: If you have a prostate, ask your care team if a prostate cancer screening test (PSA) at age 55 is right for you.  Lung cancer screening: If you are a current or former smoker ages 50 to 80, ask your care team if ongoing lung cancer screenings are right for you.  For informational purposes only. Not to replace the advice of your health care provider. Copyright   2023 [employer]. All rights reserved. Clinically reviewed by the [facility] Transitions Program. [employer] [id number] - REV 01/24.

## 2024-02-08 NOTE — PROGRESS NOTES
Preventive Care Visit  [facility]  Tatyana Valdes DO, Family Medicine  Feb 8, 2024    Assessment & Plan     Encounter for Medicare annual wellness exam  Counseling  Appropriate preventive services were discussed with this patient, including applicable screening as appropriate for fall prevention, nutrition, physical activity, and cognition.  Checklist reviewing preventive services available has been given to the patient.  Reviewed patient's diet, addressing concerns and/or questions.   He is at risk for psychosocial distress and has been provided with information to reduce risk.   Discussed possible causes of fatigue. Updated plan of care.  Patient reported difficulty with activities of daily living were addressed today.The patient was provided with written information regarding signs of hearing loss.   Information on urinary incontinence and treatment options given to patient.   The patient's PHQ-9 score is consistent with mild depression. He was provided with information regarding depression.     Mild episode of recurrent major depressive disorder (H)  Mood stable on sertraline.    Parkinson's disease, unspecified whether dyskinesia present, unspecified whether manifestations fluctuate  Continues to participate in home PT.  Uses wheeled walker for gait/balance.    Bilateral impacted cerumen  Water lavage performed by MA with successful cerumen removal.  - carbamide peroxide (DEBROX) 6.5 % otic solution  Dispense: 15 mL; Refill: 0  - VT REMOVAL IMPACTED CERUMEN IRRIGATION/LVG UNILAT    Anemia, unspecified type  Baseline hemoglobin around 11.5.  Reassess today.  - CBC with platelets    Hypothyroidism due to Hashimoto's thyroiditis  Reassess TSH to ensure therapeutic on levothyroxine.  - TSH      Patient has been advised of split billing requirements and indicates understanding: Yes        Subjective   Delbert is a 89 year old, presenting for the following:  Wellness Visit (Not  fasting)        2/8/2024     9:46 AM   Additional Questions   Roomed by JEREMIAH Kuhn CMA   Accompanied by wife         Health Care Directive  Patient does not have a Health Care Directive or Living Will: Patient states has Advance Directive and will bring in a copy to clinic.    HPI        2/8/2024   General Health   How would you rate your overall physical health? (!) FAIR   Feel stress (tense, anxious, or unable to sleep) Only a little   (!) STRESS CONCERN      2/8/2024   Nutrition   Diet: Regular (no restrictions)         6/15/2022   Exercise   Frequency of exercise: 4-5 days/week         2/8/2024   Social Factors   Worry food won't last until get money to buy more Patient declined   Food not last or not have enough money for food? Patient declined   Do you have housing?  Patient declined   Are you worried about losing your housing? Patient declined   Lack of transportation? Patient declined   Unable to get utilities (heat,electricity)? Patient declined         2/8/2024   Fall Risk   Fallen 2 or more times in the past year? Yes   Trouble with walking or balance? Yes   Gait Speed Test (Document in seconds) 6.5   Gait Speed Test Interpretation Greater than 5.01 seconds - ABNORMAL          2/8/2024   Activities of Daily Living- Home Safety   Needs help with the following daily activites Transportation    Shopping    Preparing meals    Housework    Laundry    Medication administration    Money management   Safety concerns in the home None of the above         2/8/2024   Dental   Dentist two times every year? Yes         2/8/2024   Hearing Screening   Hearing concerns? (!) I FEEL THAT PEOPLE ARE MUMBLING OR NOT SPEAKING CLEARLY.    (!) I NEED TO ASK PEOPLE TO SPEAK UP OR REPEAT THEMSELVES.    (!) IT'S HARD TO FOLLOW A CONVERSATION IN A NOISY RESTAURANT OR CROWDED ROOM.    (!) TROUBLE UNDERSTANDING SOFT OR WHISPERED SPEECH.         2/8/2024   Driving Risk Screening   Patient/family members have concerns about driving No          2/8/2024   General Alertness/Fatigue Screening   Have you been more tired than usual lately? (!) YES         2/8/2024   Urinary Incontinence Screening   Bothered by leaking urine in past 6 months Yes         2/8/2024   TB Screening   Were you born outside of [place]?  No       Today's PHQ-9 Score:       2/8/2024     9:59 AM   PHQ-9 SCORE   PHQ-9 Total Score MyChart 9 (Mild depression)   PHQ-9 Total Score 9         2/8/2024   Substance Use   Alcohol more than 3/day or more than 7/wk No   Do you have a current opioid prescription? No   How severe/bad is pain from 1 to 10? 0/10 (No Pain)   Do you use any other substances recreationally? No     Social History     Tobacco Use    Smoking status: Never    Smokeless tobacco: Never   Substance Use Topics    Alcohol use: Yes     Alcohol/week: 1.0 standard drink of alcohol     Comment: Alcoholic Drinks/day: one beer most days    Drug use: No             Reviewed and updated as needed this visit by Provider                      Current providers sharing in care for this patient include:  Patient Care Team:  Tatyana Valdes DO as PCP - General (Family Medicine)  Tatyana Valdes DO as Assigned PCP  Shlomo Obrien MD as Assigned Heart and Vascular Provider    The following health maintenance items are reviewed in Epic and correct as of today:  Health Maintenance   Topic Date Due    ANNUAL REVIEW OF HM ORDERS  Never done    RSV VACCINE (Pregnancy & 60+) (1 - 1-dose 60+ series) Never done    IPV IMMUNIZATION (3 of 3 - Adult catch-up series) 06/03/1992    PHQ-9  08/08/2024    TSH W/FREE T4 REFLEX  08/24/2024    MEDICARE ANNUAL WELLNESS VISIT  02/08/2025    FALL RISK ASSESSMENT  02/08/2025    ADVANCE CARE PLANNING  06/15/2027    DTAP/TDAP/TD IMMUNIZATION (2 - Td or Tdap) 11/30/2030    DEPRESSION ACTION PLAN  Completed    INFLUENZA VACCINE  Completed    Pneumococcal Vaccine: 65+ Years  Completed    ZOSTER IMMUNIZATION  Completed    COVID-19 Vaccine  Completed    HPV IMMUNIZATION   "Aged Out    MENINGITIS IMMUNIZATION  Aged Out    RSV MONOCLONAL ANTIBODY  Aged Out     Review of Systems       Objective    Exam  /77   Pulse 75   Resp 20   Ht 1.727 m (5' 8\")   Wt 59.4 kg (131 lb)   BMI 19.92 kg/m     Estimated body mass index is 19.92 kg/m  as calculated from the following:    Height as of this encounter: 1.727 m (5' 8\").    Weight as of this encounter: 59.4 kg (131 lb).    Physical Exam  GENERAL: alert and no distress  EYES: Eyes grossly normal to inspection, PERRL and conjunctivae and sclerae normal  HENT: ear canals normal, cerumen impaction bilaterally, nose and mouth without ulcers or lesions  NECK: no adenopathy, no asymmetry, masses, or scars  RESP: lungs clear to auscultation - no rales, rhonchi or wheezes  CV: regular rate and rhythm, normal S1 S2, no S3 or S4, no murmur, click or rub, trace peripheral edema to midshin bilaterally  ABDOMEN: soft, nontender  MS: no gross musculoskeletal defects noted, no edema  SKIN: no suspicious lesions or rashes  NEURO: Normal strength and tone, mentation intact, slowed speech  PSYCH: mentation appears normal, affect normal/bright        2/8/2024   Mini Cog   Mini-Cog Not Completed (choose reason) Known dementia    Known dementia            Signed Electronically by: Tatyana Valdes DO    Answers submitted by the patient for this visit:  Patient Health Questionnaire (Submitted on 2/8/2024)  If you checked off any problems, how difficult have these problems made it for you to do your work, take care of things at home, or get along with other people?: Not difficult at all  PHQ9 TOTAL SCORE: 9    "

## 2024-02-09 LAB — TSH SERPL DL<=0.005 MIU/L-ACNC: 1.16 UIU/ML (ref 0.3–4.2)

## 2024-02-22 ENCOUNTER — APPOINTMENT (OUTPATIENT)
Dept: CT IMAGING | Facility: HOSPITAL | Age: 89
End: 2024-02-22
Attending: EMERGENCY MEDICINE
Payer: COMMERCIAL

## 2024-02-22 ENCOUNTER — HOSPITAL ENCOUNTER (EMERGENCY)
Facility: HOSPITAL | Age: 89
Discharge: HOME | End: 2024-02-22
Attending: EMERGENCY MEDICINE | Admitting: EMERGENCY MEDICINE
Payer: COMMERCIAL

## 2024-02-22 VITALS
TEMPERATURE: 98 F | RESPIRATION RATE: 14 BRPM | HEART RATE: 70 BPM | SYSTOLIC BLOOD PRESSURE: 159 MMHG | DIASTOLIC BLOOD PRESSURE: 117 MMHG | OXYGEN SATURATION: 100 %

## 2024-02-22 DIAGNOSIS — R19.7 DIARRHEA, UNSPECIFIED TYPE: ICD-10-CM

## 2024-02-22 DIAGNOSIS — M62.81 GENERALIZED MUSCLE WEAKNESS: ICD-10-CM

## 2024-02-22 LAB
ALBUMIN SERPL BCG-MCNC: 3.6 G/DL (ref 3.5–5.2)
ALBUMIN UR-MCNC: 20 MG/DL
ALP SERPL-CCNC: 164 U/L (ref 40–150)
ALT SERPL W P-5'-P-CCNC: 6 U/L (ref 0–70)
ANION GAP SERPL CALCULATED.3IONS-SCNC: 4 MMOL/L (ref 7–15)
APPEARANCE UR: CLEAR
AST SERPL W P-5'-P-CCNC: 25 U/L (ref 0–45)
BASOPHILS # BLD AUTO: 0 10E3/UL (ref 0–0.2)
BASOPHILS NFR BLD AUTO: 0 %
BILIRUB SERPL-MCNC: 0.4 MG/DL
BILIRUB UR QL STRIP: NEGATIVE
BUN SERPL-MCNC: 25.8 MG/DL (ref 8–23)
CALCIUM SERPL-MCNC: 8.5 MG/DL (ref 8.2–9.6)
CHLORIDE SERPL-SCNC: 103 MMOL/L (ref 98–107)
COLOR UR AUTO: YELLOW
CREAT SERPL-MCNC: 0.88 MG/DL (ref 0.67–1.17)
DEPRECATED HCO3 PLAS-SCNC: 31 MMOL/L (ref 22–29)
EGFRCR SERPLBLD CKD-EPI 2021: 79 ML/MIN/1.73M2
EOSINOPHIL # BLD AUTO: 0 10E3/UL (ref 0–0.7)
EOSINOPHIL NFR BLD AUTO: 0 %
ERYTHROCYTE [DISTWIDTH] IN BLOOD BY AUTOMATED COUNT: 14.6 % (ref 10–15)
FLUAV RNA SPEC QL NAA+PROBE: NEGATIVE
FLUBV RNA RESP QL NAA+PROBE: NEGATIVE
GLUCOSE SERPL-MCNC: 92 MG/DL (ref 70–99)
GLUCOSE UR STRIP-MCNC: NEGATIVE MG/DL
HCT VFR BLD AUTO: 38 % (ref 40–53)
HGB BLD-MCNC: 12.4 G/DL (ref 13.3–17.7)
HGB UR QL STRIP: NEGATIVE
HOLD SPECIMEN: NORMAL
HYALINE CASTS: 6 /LPF
IMM GRANULOCYTES # BLD: 0 10E3/UL
IMM GRANULOCYTES NFR BLD: 0 %
KETONES UR STRIP-MCNC: NEGATIVE MG/DL
LEUKOCYTE ESTERASE UR QL STRIP: NEGATIVE
LYMPHOCYTES # BLD AUTO: 0.4 10E3/UL (ref 0.8–5.3)
LYMPHOCYTES NFR BLD AUTO: 17 %
MCH RBC QN AUTO: 32.9 PG (ref 26.5–33)
MCHC RBC AUTO-ENTMCNC: 32.6 G/DL (ref 31.5–36.5)
MCV RBC AUTO: 101 FL (ref 78–100)
MONOCYTES # BLD AUTO: 0.3 10E3/UL (ref 0–1.3)
MONOCYTES NFR BLD AUTO: 12 %
MUCOUS THREADS #/AREA URNS LPF: PRESENT /LPF
NEUTROPHILS # BLD AUTO: 1.6 10E3/UL (ref 1.6–8.3)
NEUTROPHILS NFR BLD AUTO: 71 %
NITRATE UR QL: NEGATIVE
NRBC # BLD AUTO: 0 10E3/UL
NRBC BLD AUTO-RTO: 0 /100
PH UR STRIP: 5.5 [PH] (ref 5–7)
PLATELET # BLD AUTO: 136 10E3/UL (ref 150–450)
POTASSIUM SERPL-SCNC: 4.2 MMOL/L (ref 3.4–5.3)
PROT SERPL-MCNC: 7.8 G/DL (ref 6.4–8.3)
RBC # BLD AUTO: 3.77 10E6/UL (ref 4.4–5.9)
RBC URINE: 1 /HPF
RSV RNA SPEC NAA+PROBE: NEGATIVE
SARS-COV-2 RNA RESP QL NAA+PROBE: NEGATIVE
SODIUM SERPL-SCNC: 138 MMOL/L (ref 135–145)
SP GR UR STRIP: 1.02 (ref 1–1.03)
TROPONIN T SERPL HS-MCNC: 17 NG/L
TSH SERPL DL<=0.005 MIU/L-ACNC: 1.04 UIU/ML (ref 0.3–4.2)
UROBILINOGEN UR STRIP-MCNC: <2 MG/DL
WBC # BLD AUTO: 2.3 10E3/UL (ref 4–11)
WBC URINE: 1 /HPF

## 2024-02-22 PROCEDURE — 87637 SARSCOV2&INF A&B&RSV AMP PRB: CPT | Performed by: EMERGENCY MEDICINE

## 2024-02-22 PROCEDURE — 84443 ASSAY THYROID STIM HORMONE: CPT | Performed by: EMERGENCY MEDICINE

## 2024-02-22 PROCEDURE — 84484 ASSAY OF TROPONIN QUANT: CPT | Performed by: EMERGENCY MEDICINE

## 2024-02-22 PROCEDURE — 85025 COMPLETE CBC W/AUTO DIFF WBC: CPT | Performed by: EMERGENCY MEDICINE

## 2024-02-22 PROCEDURE — 93005 ELECTROCARDIOGRAM TRACING: CPT | Performed by: STUDENT IN AN ORGANIZED HEALTH CARE EDUCATION/TRAINING PROGRAM

## 2024-02-22 PROCEDURE — 96361 HYDRATE IV INFUSION ADD-ON: CPT

## 2024-02-22 PROCEDURE — 81001 URINALYSIS AUTO W/SCOPE: CPT | Performed by: EMERGENCY MEDICINE

## 2024-02-22 PROCEDURE — 99285 EMERGENCY DEPT VISIT HI MDM: CPT | Mod: 25

## 2024-02-22 PROCEDURE — 36415 COLL VENOUS BLD VENIPUNCTURE: CPT | Performed by: EMERGENCY MEDICINE

## 2024-02-22 PROCEDURE — 258N000003 HC RX IP 258 OP 636: Performed by: EMERGENCY MEDICINE

## 2024-02-22 PROCEDURE — 93005 ELECTROCARDIOGRAM TRACING: CPT | Performed by: EMERGENCY MEDICINE

## 2024-02-22 PROCEDURE — 70450 CT HEAD/BRAIN W/O DYE: CPT

## 2024-02-22 PROCEDURE — 96360 HYDRATION IV INFUSION INIT: CPT

## 2024-02-22 PROCEDURE — 80053 COMPREHEN METABOLIC PANEL: CPT | Performed by: EMERGENCY MEDICINE

## 2024-02-22 RX ADMIN — SODIUM CHLORIDE 500 ML: 9 INJECTION, SOLUTION INTRAVENOUS at 15:40

## 2024-02-22 NOTE — ED TRIAGE NOTES
"Pt has hx of parkinson's. Over last week weakness in bilat legs causing pt to have falls and inability to walk. Wife reports episode of diarrhea 2 nights ago.No blood in stool. Pt reports feeling weak and \"my legs wont work.\"     Triage Assessment (Adult)       Row Name 02/22/24 1443          Triage Assessment    Airway WDL WDL        Respiratory WDL    Respiratory WDL WDL        Skin Circulation/Temperature WDL    Skin Circulation/Temperature WDL WDL        Cardiac WDL    Cardiac WDL WDL        Peripheral/Neurovascular WDL    Peripheral Neurovascular WDL WDL        Cognitive/Neuro/Behavioral WDL    Cognitive/Neuro/Behavioral WDL WDL                     "

## 2024-02-22 NOTE — ED PROVIDER NOTES
EMERGENCY DEPARTMENT ENCOUNTER      NAME: Gomez Villasenor  AGE: 89 year old male  YOB: 1928  MRN: [id number]  EVALUATION DATE & TIME: No admission date for patient encounter.    PCP: Tatyana Valdes    ED PROVIDER: Mohsen Penny M.D.      Chief Complaint   Patient presents with    Extremity Weakness         FINAL IMPRESSION:  1. Generalized muscle weakness    2. Diarrhea, unspecified type          ED COURSE & MEDICAL DECISION MAKIN year old male presents to the Emergency Department for evaluation of generalized weakness.  Patient has a history of Parkinson's disease.  Resides at home with his wife.  Is well established with neurology.  Recently had a diarrheal illness over the last couple of days which seems to be improving.  Today had muscle weakness, says that this was fairly acute change for him, seems to affect both lower extremities equally to the point where he was having some difficulty walking with a walker which is his baseline.  He arrives to the emergency department somewhat hypertensive but otherwise vitally stable and nontoxic-appearing.  He really does not have any other complaints like fever, chest pain, abdominal pain, focal weakness, etc.  On exam his strength seems fairly symmetric and preserved, he is able to easily resist gravity.  He underwent a lab and imaging workup as below.  Screening noncontrast head CT is negative for anything like hemorrhage or CT evidence of acute stroke.  Persistent ventriculomegaly was noted.  This appears similar to prior, and I do not think the patient's current symptoms seem likely to represent anything like normal pressure or hydrocephalus given relative acuity of symptoms isolated to extremity weakness and not representing gait instability, confusion, or urinary incontinence.  Without any other hard neurological lateralizing symptoms I do not think further advanced imaging like MRI would be likely useful.  Indeed this would be very  challenging to coordinate given the patient's pacemaker. He has not had any changes in his medications, certainly this could be related to progressive underlying neurodegenerative disease however seems rather acute for this.  He had screening lab evaluations revealed stable metabolic profile and renal function.  Screening EKG shows sinus rhythm, high-sensitivity troponin negative.  Patient's abdomen is benign, he has no pain, nothing to suggest an acute intra-abdominal process.  Diarrhea overall seems to be improving.  He received some IV fluids here.  Urine analysis negative and no apparent source of infection identified.  Once screening lab work was completed, reevaluated the patient.  Discussed options with the patient and the family.  At this point in the absence of anything acute, they are strongly motivated to return home.  I did offer/initially advised him to consider admission given the relative acuity of these symptoms to see if he would benefit from anything else like a neurology consultation.  Patient would strongly prefer to return home if at all possible.  I do have a wheelchair at home for him, he lives with his wife alone however does have other family members that can assist more and he is well-connected with primary and neurology and thinks he can connect with these clinics to continue monitoring things with appropriate return precautions.  Patient declined offer for admission.  Strict return precautions were reviewed for any severe progression of weakness or other acute symptoms.  He was discharged with family in stable condition.    At the conclusion of the encounter I discussed the results of all of the tests and the disposition. The questions were answered. The patient or family acknowledged understanding and was agreeable with the care plan.     3:17 PM I met the patient and performed my initial interview and exam.  7:01 PM I rechecked and updated the patient.     Medical Decision  Making  Obtained supplemental history:Supplemental history obtained?: Family Member/Significant Other  Reviewed external records: External records reviewed?: No  Care impacted by chronic illness:Other: Parkinson's disease  Care significantly affected by social determinants of health:N/A  Did you consider but not order tests?: Work up considered but not performed and documented in chart, if applicable  Did you interpret images independently?: Independent interpretation of ECG and images noted in documentation, when applicable.  Consultation discussion with other provider:Did you involve another provider (consultant, , pharmacy, etc.)?: No  Discharge. No recommendations on prescription strength medication(s). I considered admission, but discharged the patient after share decision making conversation.        MEDICATIONS GIVEN IN THE EMERGENCY:  Medications   sodium chloride 0.9% BOLUS 500 mL (0 mLs Intravenous Stopped 2/22/24 1836)       NEW PRESCRIPTIONS STARTED AT TODAY'S ER VISIT  Discharge Medication List as of 2/22/2024  8:17 PM             =================================================================    HPI    Patient information was obtained from: The patient and his wife    Use of : N/A         Gomez Villasenor is a 89 year old male with a pertinent history of parkinson's disease who presents to this ED by private vehicle with spouse for evaluation of bilateral leg weakness.     The patient has had increased bilateral leg weakness over the last week. The patient was diagnosed with Parkinson's 5 years ago. He reports that there has been a fast decline in his leg strength the last few days. The patient had an episode of diarrhea two days ago.The patient denies any new pain, fever, cough, shortness of breath, abdominal pain, or blood in his stool. The patient has no new medication changes. He had a routine physical two weeks ago that was normal.     REVIEW OF SYSTEMS   All systems reviewed and  negative except as noted in HPI.    PAST MEDICAL HISTORY:  Past Medical History:   Diagnosis Date    Cancer (H)     prostate    Cardiac pacemaker in situ 11/10/2023    Medtronic dual-chamber implanted 6/30/2023 ([facility])    Disease of thyroid gland     hypothyroid    Hearing loss     Kidney stones 03/17/2015    this is third episode of stones    Macular degeneration     Nephrolithiasis 01/01/2015    right sided/ureteroscopic removal    Parkinson's disease     Postural dizziness with presyncope     Prostate cancer (H)     Sinus bradycardia 08/03/2021    Status post cystoscopy 03/01/2015    with ureteroscopy and stone removal    Urinary incontinence        PAST SURGICAL HISTORY:  Past Surgical History:   Procedure Laterality Date    CYSTOURETHROSCOPY      Description: Cystoscopy (Diagnostic);  Recorded: 11/07/2008;    CYSTOURETHROSCOPY      Right stent exchange and stone extraction    EP PACEMAKER  06/30/2023    EYE SURGERY Right     cataract removed    INSERT / REPLACE / REMOVE PROSTHESIS URETHRAL SPHINCTER N/A 09/21/2016    Procedure:  REPLACEMENT OF ARTIFICIAL URINARY SPHINCTER;  Surgeon: Stevie Braxton MD;  [facility]: [facility];  Service:     OTHER SURGICAL HISTORY      artificial urethral sphincter    ZZC REMV PROSTATE,RETROPUB,RADICAL      Description: Prostatectomy Retropubic Radical With Nerve Sparing;  Recorded: 11/10/2009;  Comments: 2001 artificial sphinter placed           CURRENT MEDICATIONS:    No current facility-administered medications for this encounter.     Current Outpatient Medications   Medication    carbamide peroxide (DEBROX) 6.5 % otic solution    carbidopa-levodopa (SINEMET)  MG tablet    cholecalciferol (VITAMIN D-1000 MAX ST) 25 MCG (1000 UT) TABS    levothyroxine (SYNTHROID/LEVOTHROID) 112 MCG tablet    Multiple Vitamins-Minerals (PRESERVISION AREDS 2+MULTI VIT PO)    multivitamin w/minerals (MULTI-VITAMIN) tablet    sertraline (ZOLOFT) 100 MG tablet         ALLERGIES:  No  Known Allergies    FAMILY HISTORY:  Family History   Problem Relation Age of Onset    Heart Disease Mother     Cancer Brother     No Known Problems Father     Leukemia Brother     Cancer Brother        SOCIAL HISTORY:   Social History     Socioeconomic History    Marital status:     Number of children: 3   Tobacco Use    Smoking status: Never    Smokeless tobacco: Never   Substance and Sexual Activity    Alcohol use: Yes     Alcohol/week: 1.0 standard drink of alcohol     Comment: Alcoholic Drinks/day: one beer most days    Drug use: No    Sexual activity: Not Currently   Other Topics Concern    Parent/sibling w/ CABG, MI or angioplasty before 65F 55M? No     Social Determinants of Health     Financial Resource Strain: Unknown (2/8/2024)    Financial Resource Strain     Within the past 12 months, have you or your family members you live with been unable to get utilities (heat, electricity) when it was really needed?: Patient declined   Food Insecurity: Unknown (2/8/2024)    Food Insecurity     Within the past 12 months, did you worry that your food would run out before you got money to buy more?: Patient declined     Within the past 12 months, did the food you bought just not last and you didn t have money to get more?: Patient declined   Transportation Needs: Unknown (2/8/2024)    Transportation Needs     Within the past 12 months, has lack of transportation kept you from medical appointments, getting your medicines, non-medical meetings or appointments, work, or from getting things that you need?: Patient declined   Stress: No Stress Concern Present (2/8/2024)    [employer] - Occupational Stress Questionnaire     Feeling of Stress : Only a little   Housing Stability: Unknown (2/8/2024)    Housing Stability     Do you have housing? : Patient declined     Are you worried about losing your housing?: Patient declined       VITALS:  BP (!) 159/117   Pulse 70   Temp 98  F (36.7  C)    Resp 14   SpO2 100%     PHYSICAL EXAM    Constitutional: Well-developed elderly male patient, laying in bed, no acute distress  HENT: Normocephalic, Atraumatic. Neck Supple.  Eyes: EOMI, Conjunctiva normal.  Respiratory: Breathing comfortably on room air. Speaks full sentences easily. Lungs clear to ascultation.  Cardiovascular: Normal heart rate, Regular rhythm. No peripheral edema.  Abdomen: Soft, nontender  Musculoskeletal: Good range of motion in all major joints. No major deformities noted.  Integument: Warm, Dry.  Neurologic: Awake, alert, oriented.  Face symmetric.  Speech is normal.  Cranial nerves II through XII grossly intact.  Strength is 5 out of 5 throughout bilateral upper extremities including  strength, elbow flexion, elbow extension, shoulder abduction.  Strength in the bilateral lower extremities seems preserved and symmetric including hip flexion, knee extension, plantarflexion and dorsiflexion bilaterally.  Sensation to light touch is intact including no saddle anesthesia.  Psychiatric: Cooperative. Affect appropriate.     LAB:  All pertinent labs reviewed and interpreted.  Labs Ordered and Resulted from Time of ED Arrival to Time of ED Departure   ROUTINE UA WITH MICROSCOPIC REFLEX TO CULTURE - Abnormal       Result Value    Color Urine Yellow      Appearance Urine Clear      Glucose Urine Negative      Bilirubin Urine Negative      Ketones Urine Negative      Specific Gravity Urine 1.023      Blood Urine Negative      pH Urine 5.5      Protein Albumin Urine 20 (*)     Urobilinogen Urine <2.0      Nitrite Urine Negative      Leukocyte Esterase Urine Negative      Mucus Urine Present (*)     RBC Urine 1      WBC Urine 1      Hyaline Casts Urine 6 (*)    CBC WITH PLATELETS AND DIFFERENTIAL - Abnormal    WBC Count 2.3 (*)     RBC Count 3.77 (*)     Hemoglobin 12.4 (*)     Hematocrit 38.0 (*)      (*)     MCH 32.9      MCHC 32.6      RDW 14.6      Platelet Count 136 (*)     %  Neutrophils 71      % Lymphocytes 17      % Monocytes 12      % Eosinophils 0      % Basophils 0      % Immature Granulocytes 0      NRBCs per 100 WBC 0      Absolute Neutrophils 1.6      Absolute Lymphocytes 0.4 (*)     Absolute Monocytes 0.3      Absolute Eosinophils 0.0      Absolute Basophils 0.0      Absolute Immature Granulocytes 0.0      Absolute NRBCs 0.0     COMPREHENSIVE METABOLIC PANEL - Abnormal    Sodium 138      Potassium 4.2      Carbon Dioxide (CO2) 31 (*)     Anion Gap 4 (*)     Urea Nitrogen 25.8 (*)     Creatinine 0.88      GFR Estimate 79      Calcium 8.5      Chloride 103      Glucose 92      Alkaline Phosphatase 164 (*)     AST 25      ALT 6      Protein Total 7.8      Albumin 3.6      Bilirubin Total 0.4     TSH WITH FREE T4 REFLEX - Normal    TSH 1.04     TROPONIN T, HIGH SENSITIVITY - Normal    Troponin T, High Sensitivity 17     INFLUENZA A/B, RSV, & SARS-COV2 PCR - Normal    Influenza A PCR Negative      Influenza B PCR Negative      RSV PCR Negative      SARS CoV2 PCR Negative         RADIOLOGY:  Reviewed all pertinent imaging. Please see official radiology report.  Head CT w/o contrast   Final Result   IMPRESSION:   1.  No CT evidence for acute intracranial process.   2.  Ventriculomegaly of the lateral and 3rd ventricles raises the question of normal-pressure hydrocephalus again noted. Clinical correlation recommended.   3.  Brain atrophy and presumed chronic microvascular ischemic changes as above.          EKG:    Performed at: 14:51    Impression: Electronic atrial pacemaker, no acute ischemic changes.    Rate: 73  Rhythm: Atrial pacemaker  Axis: 36  AZ Interval: 176  QRS Interval: 96  QTc Interval: 440  ST Changes: No acute ST changes  Comparison: Compared to May 10, 2021, electronic atrial pacemaker is replaced sinus rhythm and ventricular rate is slightly increased.    I have independently reviewed and interpreted the EKG(s) documented above.        Razia NEIL, am serving  as a scribe to document services personally performed by Dr. Mohsen Penny, based on my observation and the provider's statements to me. I, Mohsen Penny MD attest that Razia Burakroserobert is acting in a scribe capacity, has observed my performance of the services and has documented them in accordance with my direction.    Mohsen Penny M.D.  Emergency Medicine  [facility] EMERGENCY DEPARTMENT  [address] [place] [id number]  [phone]  Dept: [phone]       Mohsen Penny MD  02/23/24 0712

## 2024-02-23 LAB
ATRIAL RATE - MUSE: 73 BPM
DIASTOLIC BLOOD PRESSURE - MUSE: NORMAL MMHG
INTERPRETATION ECG - MUSE: NORMAL
P AXIS - MUSE: NORMAL DEGREES
PR INTERVAL - MUSE: 176 MS
QRS DURATION - MUSE: 96 MS
QT - MUSE: 400 MS
QTC - MUSE: 440 MS
R AXIS - MUSE: 36 DEGREES
SYSTOLIC BLOOD PRESSURE - MUSE: NORMAL MMHG
T AXIS - MUSE: 51 DEGREES
VENTRICULAR RATE- MUSE: 73 BPM

## 2024-02-23 NOTE — DISCHARGE INSTRUCTIONS
You were seen in the emergency department for bilateral leg weakness.  Your evaluation here included labs and imaging.  The testing here has not showed an acute serious or life-threatening cause of the symptoms.  We do think you could have represented a little bit of dehydration related to your recent gastrointestinal illness.  We discussed options including potentially observing him in the hospital for further consultations like neurology however since you have elected to go home we would strongly recommend continuing to follow-up closely with your primary and neurologist.  If you have any severe worsening of symptoms we would need to reevaluate you in the emergency department right away.  Please make sure you are drinking plenty of liquids to stay well-hydrated and eating balanced meals.  Try to get as much assistance from family as possible at home to help with mobility and activities of daily living.  Again if you have severe worsening we would need to reevaluate you right away in the emergency department

## 2024-02-27 ENCOUNTER — ANCILLARY PROCEDURE (OUTPATIENT)
Dept: CARDIOLOGY | Facility: CLINIC | Age: 89
End: 2024-02-27
Attending: INTERNAL MEDICINE
Payer: COMMERCIAL

## 2024-02-27 DIAGNOSIS — Z95.0 CARDIAC PACEMAKER IN SITU: ICD-10-CM

## 2024-02-27 DIAGNOSIS — I44.2 CHB (COMPLETE HEART BLOCK) (H): ICD-10-CM

## 2024-02-27 DIAGNOSIS — I49.5 SICK SINUS SYNDROME (H): ICD-10-CM

## 2024-02-28 LAB
MDC_IDC_EPISODE_DTM: NORMAL
MDC_IDC_EPISODE_DURATION: 1691 S
MDC_IDC_EPISODE_ID: 1
MDC_IDC_EPISODE_TYPE: NORMAL
MDC_IDC_LEAD_CONNECTION_STATUS: NORMAL
MDC_IDC_LEAD_CONNECTION_STATUS: NORMAL
MDC_IDC_LEAD_IMPLANT_DT: NORMAL
MDC_IDC_LEAD_IMPLANT_DT: NORMAL
MDC_IDC_LEAD_LOCATION: NORMAL
MDC_IDC_LEAD_LOCATION: NORMAL
MDC_IDC_LEAD_LOCATION_DETAIL_1: NORMAL
MDC_IDC_LEAD_LOCATION_DETAIL_1: NORMAL
MDC_IDC_LEAD_MFG: NORMAL
MDC_IDC_LEAD_MFG: NORMAL
MDC_IDC_LEAD_MODEL: NORMAL
MDC_IDC_LEAD_MODEL: NORMAL
MDC_IDC_LEAD_POLARITY_TYPE: NORMAL
MDC_IDC_LEAD_POLARITY_TYPE: NORMAL
MDC_IDC_LEAD_SERIAL: NORMAL
MDC_IDC_LEAD_SERIAL: NORMAL
MDC_IDC_MSMT_BATTERY_DTM: NORMAL
MDC_IDC_MSMT_BATTERY_REMAINING_LONGEVITY: 117 MO
MDC_IDC_MSMT_BATTERY_RRT_TRIGGER: 2.62
MDC_IDC_MSMT_BATTERY_STATUS: NORMAL
MDC_IDC_MSMT_BATTERY_VOLTAGE: 3.04 V
MDC_IDC_MSMT_LEADCHNL_RA_IMPEDANCE_VALUE: 380 OHM
MDC_IDC_MSMT_LEADCHNL_RA_IMPEDANCE_VALUE: 570 OHM
MDC_IDC_MSMT_LEADCHNL_RA_PACING_THRESHOLD_AMPLITUDE: 1.62 V
MDC_IDC_MSMT_LEADCHNL_RA_PACING_THRESHOLD_PULSEWIDTH: 0.4 MS
MDC_IDC_MSMT_LEADCHNL_RA_SENSING_INTR_AMPL: 1.25 MV
MDC_IDC_MSMT_LEADCHNL_RA_SENSING_INTR_AMPL: 1.25 MV
MDC_IDC_MSMT_LEADCHNL_RV_IMPEDANCE_VALUE: 380 OHM
MDC_IDC_MSMT_LEADCHNL_RV_IMPEDANCE_VALUE: 494 OHM
MDC_IDC_MSMT_LEADCHNL_RV_PACING_THRESHOLD_AMPLITUDE: 0.75 V
MDC_IDC_MSMT_LEADCHNL_RV_PACING_THRESHOLD_PULSEWIDTH: 0.4 MS
MDC_IDC_MSMT_LEADCHNL_RV_SENSING_INTR_AMPL: 6.38 MV
MDC_IDC_MSMT_LEADCHNL_RV_SENSING_INTR_AMPL: 6.38 MV
MDC_IDC_PG_IMPLANT_DTM: NORMAL
MDC_IDC_PG_MFG: NORMAL
MDC_IDC_PG_MODEL: NORMAL
MDC_IDC_PG_SERIAL: NORMAL
MDC_IDC_PG_TYPE: NORMAL
MDC_IDC_SESS_CLINIC_NAME: NORMAL
MDC_IDC_SESS_DTM: NORMAL
MDC_IDC_SESS_TYPE: NORMAL
MDC_IDC_SET_BRADY_AT_MODE_SWITCH_RATE: 150 {BEATS}/MIN
MDC_IDC_SET_BRADY_HYSTRATE: NORMAL
MDC_IDC_SET_BRADY_LOWRATE: 70 {BEATS}/MIN
MDC_IDC_SET_BRADY_MAX_SENSOR_RATE: 120 {BEATS}/MIN
MDC_IDC_SET_BRADY_MAX_TRACKING_RATE: 120 {BEATS}/MIN
MDC_IDC_SET_BRADY_MODE: NORMAL
MDC_IDC_SET_BRADY_PAV_DELAY_LOW: 180 MS
MDC_IDC_SET_BRADY_SAV_DELAY_LOW: 150 MS
MDC_IDC_SET_LEADCHNL_RA_PACING_AMPLITUDE: 3.25 V
MDC_IDC_SET_LEADCHNL_RA_PACING_ANODE_ELECTRODE_1: NORMAL
MDC_IDC_SET_LEADCHNL_RA_PACING_ANODE_LOCATION_1: NORMAL
MDC_IDC_SET_LEADCHNL_RA_PACING_CAPTURE_MODE: NORMAL
MDC_IDC_SET_LEADCHNL_RA_PACING_CATHODE_ELECTRODE_1: NORMAL
MDC_IDC_SET_LEADCHNL_RA_PACING_CATHODE_LOCATION_1: NORMAL
MDC_IDC_SET_LEADCHNL_RA_PACING_POLARITY: NORMAL
MDC_IDC_SET_LEADCHNL_RA_PACING_PULSEWIDTH: 0.4 MS
MDC_IDC_SET_LEADCHNL_RA_SENSING_ANODE_ELECTRODE_1: NORMAL
MDC_IDC_SET_LEADCHNL_RA_SENSING_ANODE_LOCATION_1: NORMAL
MDC_IDC_SET_LEADCHNL_RA_SENSING_CATHODE_ELECTRODE_1: NORMAL
MDC_IDC_SET_LEADCHNL_RA_SENSING_CATHODE_LOCATION_1: NORMAL
MDC_IDC_SET_LEADCHNL_RA_SENSING_POLARITY: NORMAL
MDC_IDC_SET_LEADCHNL_RA_SENSING_SENSITIVITY: 0.3 MV
MDC_IDC_SET_LEADCHNL_RV_PACING_AMPLITUDE: 1.5 V
MDC_IDC_SET_LEADCHNL_RV_PACING_ANODE_ELECTRODE_1: NORMAL
MDC_IDC_SET_LEADCHNL_RV_PACING_ANODE_LOCATION_1: NORMAL
MDC_IDC_SET_LEADCHNL_RV_PACING_CAPTURE_MODE: NORMAL
MDC_IDC_SET_LEADCHNL_RV_PACING_CATHODE_ELECTRODE_1: NORMAL
MDC_IDC_SET_LEADCHNL_RV_PACING_CATHODE_LOCATION_1: NORMAL
MDC_IDC_SET_LEADCHNL_RV_PACING_POLARITY: NORMAL
MDC_IDC_SET_LEADCHNL_RV_PACING_PULSEWIDTH: 0.4 MS
MDC_IDC_SET_LEADCHNL_RV_SENSING_ANODE_ELECTRODE_1: NORMAL
MDC_IDC_SET_LEADCHNL_RV_SENSING_ANODE_LOCATION_1: NORMAL
MDC_IDC_SET_LEADCHNL_RV_SENSING_CATHODE_ELECTRODE_1: NORMAL
MDC_IDC_SET_LEADCHNL_RV_SENSING_CATHODE_LOCATION_1: NORMAL
MDC_IDC_SET_LEADCHNL_RV_SENSING_POLARITY: NORMAL
MDC_IDC_SET_LEADCHNL_RV_SENSING_SENSITIVITY: 0.9 MV
MDC_IDC_SET_ZONE_DETECTION_INTERVAL: 330 MS
MDC_IDC_SET_ZONE_DETECTION_INTERVAL: 400 MS
MDC_IDC_SET_ZONE_STATUS: NORMAL
MDC_IDC_SET_ZONE_STATUS: NORMAL
MDC_IDC_SET_ZONE_TYPE: NORMAL
MDC_IDC_SET_ZONE_VENDOR_TYPE: NORMAL
MDC_IDC_STAT_AT_BURDEN_PERCENT: 0 %
MDC_IDC_STAT_AT_DTM_END: NORMAL
MDC_IDC_STAT_AT_DTM_START: NORMAL
MDC_IDC_STAT_BRADY_AP_VP_PERCENT: 13.74 %
MDC_IDC_STAT_BRADY_AP_VS_PERCENT: 84.43 %
MDC_IDC_STAT_BRADY_AS_VP_PERCENT: 0.22 %
MDC_IDC_STAT_BRADY_AS_VS_PERCENT: 1.62 %
MDC_IDC_STAT_BRADY_DTM_END: NORMAL
MDC_IDC_STAT_BRADY_DTM_START: NORMAL
MDC_IDC_STAT_BRADY_RA_PERCENT_PACED: 98.04 %
MDC_IDC_STAT_BRADY_RV_PERCENT_PACED: 13.98 %
MDC_IDC_STAT_EPISODE_RECENT_COUNT: 0
MDC_IDC_STAT_EPISODE_RECENT_COUNT: 1
MDC_IDC_STAT_EPISODE_RECENT_COUNT_DTM_END: NORMAL
MDC_IDC_STAT_EPISODE_RECENT_COUNT_DTM_START: NORMAL
MDC_IDC_STAT_EPISODE_TOTAL_COUNT: 0
MDC_IDC_STAT_EPISODE_TOTAL_COUNT: 1
MDC_IDC_STAT_EPISODE_TOTAL_COUNT_DTM_END: NORMAL
MDC_IDC_STAT_EPISODE_TOTAL_COUNT_DTM_START: NORMAL
MDC_IDC_STAT_EPISODE_TYPE: NORMAL
MDC_IDC_STAT_TACHYTHERAPY_RECENT_DTM_END: NORMAL
MDC_IDC_STAT_TACHYTHERAPY_RECENT_DTM_START: NORMAL
MDC_IDC_STAT_TACHYTHERAPY_TOTAL_DTM_END: NORMAL
MDC_IDC_STAT_TACHYTHERAPY_TOTAL_DTM_START: NORMAL

## 2024-02-28 PROCEDURE — 93296 REM INTERROG EVL PM/IDS: CPT | Performed by: INTERNAL MEDICINE

## 2024-02-28 PROCEDURE — 93294 REM INTERROG EVL PM/LDLS PM: CPT | Performed by: INTERNAL MEDICINE

## 2024-05-06 ENCOUNTER — NURSE TRIAGE (OUTPATIENT)
Dept: NURSING | Facility: CLINIC | Age: 89
End: 2024-05-06
Payer: COMMERCIAL

## 2024-05-07 ENCOUNTER — OFFICE VISIT (OUTPATIENT)
Dept: FAMILY MEDICINE | Facility: CLINIC | Age: 89
End: 2024-05-07
Payer: COMMERCIAL

## 2024-05-07 ENCOUNTER — TELEPHONE (OUTPATIENT)
Dept: FAMILY MEDICINE | Facility: CLINIC | Age: 89
End: 2024-05-07

## 2024-05-07 VITALS
HEART RATE: 74 BPM | RESPIRATION RATE: 12 BRPM | BODY MASS INDEX: 19.86 KG/M2 | TEMPERATURE: 97.4 F | WEIGHT: 130.6 LBS | SYSTOLIC BLOOD PRESSURE: 127 MMHG | DIASTOLIC BLOOD PRESSURE: 77 MMHG
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
## 2024-05-07 DIAGNOSIS — G20.A1 PARKINSON'S DISEASE, UNSPECIFIED WHETHER DYSKINESIA PRESENT, UNSPECIFIED WHETHER MANIFESTATIONS FLUCTUATE (H): ICD-10-CM

## 2024-05-07 DIAGNOSIS — S00.01XA ABRASION OF SCALP, INITIAL ENCOUNTER: ICD-10-CM

## 2024-05-07 PROCEDURE — 99213 OFFICE O/P EST LOW 20 MIN: CPT | Performed by: PHYSICIAN ASSISTANT

## 2024-05-07 RX ORDER — RESPIRATORY SYNCYTIAL VIRUS VACCINE 120MCG/0.5
0.5 KIT INTRAMUSCULAR ONCE
Qty: 1 EACH | Refills: 0 | Status: CANCELLED | OUTPATIENT
Start: 2024-05-07 | End: 2024-05-07

## 2024-05-07 ASSESSMENT — ANXIETY QUESTIONNAIRES
8. IF YOU CHECKED OFF ANY PROBLEMS, HOW DIFFICULT HAVE THESE MADE IT FOR YOU TO DO YOUR WORK, TAKE CARE OF THINGS AT HOME, OR GET ALONG WITH OTHER PEOPLE?: NOT DIFFICULT AT ALL
6. BECOMING EASILY ANNOYED OR IRRITABLE: NOT AT ALL
GAD7 TOTAL SCORE: 6
GAD7 TOTAL SCORE: 6
7. FEELING AFRAID AS IF SOMETHING AWFUL MIGHT HAPPEN: NOT AT ALL
2. NOT BEING ABLE TO STOP OR CONTROL WORRYING: MORE THAN HALF THE DAYS
3. WORRYING TOO MUCH ABOUT DIFFERENT THINGS: MORE THAN HALF THE DAYS
IF YOU CHECKED OFF ANY PROBLEMS ON THIS QUESTIONNAIRE, HOW DIFFICULT HAVE THESE PROBLEMS MADE IT FOR YOU TO DO YOUR WORK, TAKE CARE OF THINGS AT HOME, OR GET ALONG WITH OTHER PEOPLE: NOT DIFFICULT AT ALL
4. TROUBLE RELAXING: NOT AT ALL
GAD7 TOTAL SCORE: 6
1. FEELING NERVOUS, ANXIOUS, OR ON EDGE: MORE THAN HALF THE DAYS
5. BEING SO RESTLESS THAT IT IS HARD TO SIT STILL: NOT AT ALL
7. FEELING AFRAID AS IF SOMETHING AWFUL MIGHT HAPPEN: NOT AT ALL

## 2024-05-07 ASSESSMENT — PATIENT HEALTH QUESTIONNAIRE - PHQ9
SUM OF ALL RESPONSES TO PHQ QUESTIONS 1-9: 10
10. IF YOU CHECKED OFF ANY PROBLEMS, HOW DIFFICULT HAVE THESE PROBLEMS MADE IT FOR YOU TO DO YOUR WORK, TAKE CARE OF THINGS AT HOME, OR GET ALONG WITH OTHER PEOPLE: SOMEWHAT DIFFICULT
SUM OF ALL RESPONSES TO PHQ QUESTIONS 1-9: 10

## 2024-05-07 NOTE — TELEPHONE ENCOUNTER
I would not be able to work him in this week.  Could use held next week.  Is it regarding an orthopedic concern?  Could consider [facility] walk-in.    Tatyana Valdes, DO

## 2024-05-07 NOTE — TELEPHONE ENCOUNTER
Called and spoke with patient's wife. Patient had a fall last Wednesday where he had hit his head. PT noted changes in mentation on Friday and recommended he see primary care this week. Per chart review, declined disposition for 911 and instead requests appt in clinic. Scheduled with Carrie for evaluation today 5/7. We did discuss that imaging may be recommended and that we do not have ability to do advanced imaging in clinic. They prefer to see primary care.    Tala Oliver RN

## 2024-05-07 NOTE — PROGRESS NOTES
Assessment & Plan     Fall, initial encounter  Abrasion of scalp, initial encounter  Parkinson's disease, unspecified whether dyskinesia present, unspecified whether manifestations fluctuate (H)  Patient is here today to follow up on recent fall where he hit his head and now has scalp abrasion without signs of infection.   Suspect fall may be related to worsening of Parkinson's Disease.  Reviewed concerns for injury to head, given one week since fall and no cognitive changes, would recommend further monitoring vs imaging at this point.  Did discuss red flags and warnings signs to have imaging if occur ie: vision changes, headache, slurred speech etc.  Patient and wife agree with plan.  Recommend they resume PT as needed. Discussed given advancing parkinson's and recent fall may want to consider long term plans for safety as they currently live independently.  Follow up as needed.  Discussed wound care for scalp abrasion- wash with warm soapy water, keep covered as needed. Return to clinic if any signs or symptoms of infection.        Depression Screening Follow Up         No data to display                    Follow Up Actions Taken  Crisis resource information provided in the After Visit Summary  Patient to follow up with PCP.  Clinic staff to schedule appointment if able.    Discussed the following ways the patient can remain in a safe environment:  be around others    Risks, benefits and alternatives were discussed with patient. Agreeable to the plan of care.      Subjective   Delbert is a 89 year old, presenting for the following health issues:  Fall (Fell last Wednesday.  PT and wife has noticed some changes with mentation and coordination. )      5/7/2024     1:30 PM   Additional Questions   Roomed by CATRACHITA Up CMA([facility])     Fall    History of Present Illness       Mental Health Follow-up:  Patient presents to follow-up on Depression & Anxiety.Patient's depression since last visit has been:  No change  The  patient is not having other symptoms associated with depression.  Patient's anxiety since last visit has been:  No change  The patient is not having other symptoms associated with anxiety.  Any significant life events: No  Patient is not feeling anxious or having panic attacks.  Patient has no concerns about alcohol or drug use.    Reason for visit:  Follow up  Symptom onset:  3-7 days ago  Symptom intensity:  Moderate  Symptom progression:  Worsening  Had these symptoms before:  Yes  Has tried/received treatment for these symptoms:  Yes  Previous treatment was successful:  Yes  Prior treatment description:  Meds  What makes it worse:  No  What makes it better:  Sleep    He eats 4 or more servings of fruits and vegetables daily.He consumes 0 sweetened beverage(s) daily.He exercises with enough effort to increase his heart rate 9 or less minutes per day.  He exercises with enough effort to increase his heart rate 4 days per week.   He is taking medications regularly.           Patient is here today for follow up on his recent fall  Fell about 1 week ago, hit his head  He was not seen immediately after fall, but did go to PT appointment and PT recommended he be checked out  Not taking any anticoagulation meds  No vision changes, headache, nausea or vomiting, slurred speech, muscle weakness  Wife denies any acute mental/cognitive changes  Has noticed insidious changes to voice loudness or changes to demeanor but those have been more longstanding in nature and slow to change      Review of Systems  Constitutional, HEENT, cardiovascular, pulmonary, gi and gu systems are negative, except as otherwise noted.      Objective    /77   Pulse 74   Temp 97.4  F (36.3  C) (Oral)   Resp 12   Wt 59.2 kg (130 lb 9.6 oz)   BMI 19.86 kg/m    Body mass index is 19.86 kg/m .  Physical Exam   GENERAL: alert and no distress  NECK: no adenopathy, no asymmetry, masses, or scars  RESP: lungs clear to auscultation - no rales,  rhonchi or wheezes  CV: regular rate and rhythm, normal S1 S2, no S3 or S4, no murmur, click or rub, no peripheral edema  MS: no gross musculoskeletal defects noted, no edema  SKIN: large abrasion to scalp on top of his head          Signed Electronically by: Carrie Dueñas PA-C

## 2024-05-07 NOTE — TELEPHONE ENCOUNTER
Wife, who has consent to communicate, reporting  patient had a fall and hit his head on Wednesday last week, and was at PT for Parkinson's on Friday.  PT recommended he see his PCP.  Caller says she has noticed some changes in mentation since the fall.    Disposition to call 911.  Caller is deferring that, but will call his PCP office in the morning for a same day appointment or further recommendations.    Sunni Collins RN  [facility]        Reason for Disposition   [1] ACUTE NEURO SYMPTOM AND [2] present now  (DEFINITION: difficult to awaken OR confused thinking and talking OR slurred speech OR weakness of arms OR unsteady walking)    Protocols used: Head Injury-A-AH

## 2024-05-07 NOTE — TELEPHONE ENCOUNTER
Reason for Call:  Appointment Request    Patient requesting this type of appt:  recent fall, physical therapist recommended seeing primary care     Requested provider: Tatyana Valdes or any other provider at [place]     Reason patient unable to be scheduled: Not within requested timeframe    When does patient want to be seen/preferred time:  this week, after 10 am    Comments: patient's spouse states she does not want to look out into next week for this issue, requesting he be seen this week    Could we send this information to you in [employer] or would you prefer to receive a phone call?:   Patient would prefer a phone call   Okay to leave a detailed message?: Yes at Home number on file [phone] (home)    Call taken on 5/7/2024 at 7:19 AM by Cj Timmons

## 2024-05-31 ENCOUNTER — ANCILLARY PROCEDURE (OUTPATIENT)
Dept: CARDIOLOGY | Facility: CLINIC | Age: 89
End: 2024-05-31
Attending: INTERNAL MEDICINE
Payer: COMMERCIAL

## 2024-05-31 DIAGNOSIS — Z95.0 CARDIAC PACEMAKER IN SITU: ICD-10-CM

## 2024-05-31 DIAGNOSIS — I49.5 SICK SINUS SYNDROME (H): ICD-10-CM

## 2024-05-31 DIAGNOSIS — I44.2 CHB (COMPLETE HEART BLOCK) (H): ICD-10-CM

## 2024-06-03 LAB
MDC_IDC_LEAD_CONNECTION_STATUS: NORMAL
MDC_IDC_LEAD_CONNECTION_STATUS: NORMAL
MDC_IDC_LEAD_IMPLANT_DT: NORMAL
MDC_IDC_LEAD_IMPLANT_DT: NORMAL
MDC_IDC_LEAD_LOCATION: NORMAL
MDC_IDC_LEAD_LOCATION: NORMAL
MDC_IDC_LEAD_LOCATION_DETAIL_1: NORMAL
MDC_IDC_LEAD_LOCATION_DETAIL_1: NORMAL
MDC_IDC_LEAD_MFG: NORMAL
MDC_IDC_LEAD_MFG: NORMAL
MDC_IDC_LEAD_MODEL: NORMAL
MDC_IDC_LEAD_MODEL: NORMAL
MDC_IDC_LEAD_POLARITY_TYPE: NORMAL
MDC_IDC_LEAD_POLARITY_TYPE: NORMAL
MDC_IDC_LEAD_SERIAL: NORMAL
MDC_IDC_LEAD_SERIAL: NORMAL
MDC_IDC_MSMT_BATTERY_DTM: NORMAL
MDC_IDC_MSMT_BATTERY_REMAINING_LONGEVITY: 113 MO
MDC_IDC_MSMT_BATTERY_RRT_TRIGGER: 2.62
MDC_IDC_MSMT_BATTERY_STATUS: NORMAL
MDC_IDC_MSMT_BATTERY_VOLTAGE: 3.02 V
MDC_IDC_MSMT_LEADCHNL_RA_IMPEDANCE_VALUE: 361 OHM
MDC_IDC_MSMT_LEADCHNL_RA_IMPEDANCE_VALUE: 551 OHM
MDC_IDC_MSMT_LEADCHNL_RA_PACING_THRESHOLD_AMPLITUDE: 1.62 V
MDC_IDC_MSMT_LEADCHNL_RA_PACING_THRESHOLD_PULSEWIDTH: 0.4 MS
MDC_IDC_MSMT_LEADCHNL_RA_SENSING_INTR_AMPL: 2.88 MV
MDC_IDC_MSMT_LEADCHNL_RA_SENSING_INTR_AMPL: 2.88 MV
MDC_IDC_MSMT_LEADCHNL_RV_IMPEDANCE_VALUE: 380 OHM
MDC_IDC_MSMT_LEADCHNL_RV_IMPEDANCE_VALUE: 494 OHM
MDC_IDC_MSMT_LEADCHNL_RV_PACING_THRESHOLD_AMPLITUDE: 0.75 V
MDC_IDC_MSMT_LEADCHNL_RV_PACING_THRESHOLD_PULSEWIDTH: 0.4 MS
MDC_IDC_MSMT_LEADCHNL_RV_SENSING_INTR_AMPL: 5.75 MV
MDC_IDC_MSMT_LEADCHNL_RV_SENSING_INTR_AMPL: 5.75 MV
MDC_IDC_PG_IMPLANT_DTM: NORMAL
MDC_IDC_PG_MFG: NORMAL
MDC_IDC_PG_MODEL: NORMAL
MDC_IDC_PG_SERIAL: NORMAL
MDC_IDC_PG_TYPE: NORMAL
MDC_IDC_SESS_CLINIC_NAME: NORMAL
MDC_IDC_SESS_DTM: NORMAL
MDC_IDC_SESS_TYPE: NORMAL
MDC_IDC_SET_BRADY_AT_MODE_SWITCH_RATE: 150 {BEATS}/MIN
MDC_IDC_SET_BRADY_HYSTRATE: NORMAL
MDC_IDC_SET_BRADY_LOWRATE: 70 {BEATS}/MIN
MDC_IDC_SET_BRADY_MAX_SENSOR_RATE: 120 {BEATS}/MIN
MDC_IDC_SET_BRADY_MAX_TRACKING_RATE: 120 {BEATS}/MIN
MDC_IDC_SET_BRADY_MODE: NORMAL
MDC_IDC_SET_BRADY_PAV_DELAY_LOW: 180 MS
MDC_IDC_SET_BRADY_SAV_DELAY_LOW: 150 MS
MDC_IDC_SET_LEADCHNL_RA_PACING_AMPLITUDE: 3.25 V
MDC_IDC_SET_LEADCHNL_RA_PACING_ANODE_ELECTRODE_1: NORMAL
MDC_IDC_SET_LEADCHNL_RA_PACING_ANODE_LOCATION_1: NORMAL
MDC_IDC_SET_LEADCHNL_RA_PACING_CAPTURE_MODE: NORMAL
MDC_IDC_SET_LEADCHNL_RA_PACING_CATHODE_ELECTRODE_1: NORMAL
MDC_IDC_SET_LEADCHNL_RA_PACING_CATHODE_LOCATION_1: NORMAL
MDC_IDC_SET_LEADCHNL_RA_PACING_POLARITY: NORMAL
MDC_IDC_SET_LEADCHNL_RA_PACING_PULSEWIDTH: 0.4 MS
MDC_IDC_SET_LEADCHNL_RA_SENSING_ANODE_ELECTRODE_1: NORMAL
MDC_IDC_SET_LEADCHNL_RA_SENSING_ANODE_LOCATION_1: NORMAL
MDC_IDC_SET_LEADCHNL_RA_SENSING_CATHODE_ELECTRODE_1: NORMAL
MDC_IDC_SET_LEADCHNL_RA_SENSING_CATHODE_LOCATION_1: NORMAL
MDC_IDC_SET_LEADCHNL_RA_SENSING_POLARITY: NORMAL
MDC_IDC_SET_LEADCHNL_RA_SENSING_SENSITIVITY: 0.3 MV
MDC_IDC_SET_LEADCHNL_RV_PACING_AMPLITUDE: 1.5 V
MDC_IDC_SET_LEADCHNL_RV_PACING_ANODE_ELECTRODE_1: NORMAL
MDC_IDC_SET_LEADCHNL_RV_PACING_ANODE_LOCATION_1: NORMAL
MDC_IDC_SET_LEADCHNL_RV_PACING_CAPTURE_MODE: NORMAL
MDC_IDC_SET_LEADCHNL_RV_PACING_CATHODE_ELECTRODE_1: NORMAL
MDC_IDC_SET_LEADCHNL_RV_PACING_CATHODE_LOCATION_1: NORMAL
MDC_IDC_SET_LEADCHNL_RV_PACING_POLARITY: NORMAL
MDC_IDC_SET_LEADCHNL_RV_PACING_PULSEWIDTH: 0.4 MS
MDC_IDC_SET_LEADCHNL_RV_SENSING_ANODE_ELECTRODE_1: NORMAL
MDC_IDC_SET_LEADCHNL_RV_SENSING_ANODE_LOCATION_1: NORMAL
MDC_IDC_SET_LEADCHNL_RV_SENSING_CATHODE_ELECTRODE_1: NORMAL
MDC_IDC_SET_LEADCHNL_RV_SENSING_CATHODE_LOCATION_1: NORMAL
MDC_IDC_SET_LEADCHNL_RV_SENSING_POLARITY: NORMAL
MDC_IDC_SET_LEADCHNL_RV_SENSING_SENSITIVITY: 0.9 MV
MDC_IDC_SET_ZONE_DETECTION_INTERVAL: 330 MS
MDC_IDC_SET_ZONE_DETECTION_INTERVAL: 400 MS
MDC_IDC_SET_ZONE_STATUS: NORMAL
MDC_IDC_SET_ZONE_STATUS: NORMAL
MDC_IDC_SET_ZONE_TYPE: NORMAL
MDC_IDC_SET_ZONE_VENDOR_TYPE: NORMAL
MDC_IDC_STAT_AT_BURDEN_PERCENT: 0 %
MDC_IDC_STAT_AT_DTM_END: NORMAL
MDC_IDC_STAT_AT_DTM_START: NORMAL
MDC_IDC_STAT_BRADY_AP_VP_PERCENT: 9.18 %
MDC_IDC_STAT_BRADY_AP_VS_PERCENT: 87.13 %
MDC_IDC_STAT_BRADY_AS_VP_PERCENT: 0.75 %
MDC_IDC_STAT_BRADY_AS_VS_PERCENT: 2.94 %
MDC_IDC_STAT_BRADY_DTM_END: NORMAL
MDC_IDC_STAT_BRADY_DTM_START: NORMAL
MDC_IDC_STAT_BRADY_RA_PERCENT_PACED: 96.16 %
MDC_IDC_STAT_BRADY_RV_PERCENT_PACED: 9.93 %
MDC_IDC_STAT_EPISODE_RECENT_COUNT: 0
MDC_IDC_STAT_EPISODE_RECENT_COUNT_DTM_END: NORMAL
MDC_IDC_STAT_EPISODE_RECENT_COUNT_DTM_START: NORMAL
MDC_IDC_STAT_EPISODE_TOTAL_COUNT: 0
MDC_IDC_STAT_EPISODE_TOTAL_COUNT: 1
MDC_IDC_STAT_EPISODE_TOTAL_COUNT_DTM_END: NORMAL
MDC_IDC_STAT_EPISODE_TOTAL_COUNT_DTM_START: NORMAL
MDC_IDC_STAT_EPISODE_TYPE: NORMAL
MDC_IDC_STAT_TACHYTHERAPY_RECENT_DTM_END: NORMAL
MDC_IDC_STAT_TACHYTHERAPY_RECENT_DTM_START: NORMAL
MDC_IDC_STAT_TACHYTHERAPY_TOTAL_DTM_END: NORMAL
MDC_IDC_STAT_TACHYTHERAPY_TOTAL_DTM_START: NORMAL

## 2024-06-03 PROCEDURE — 93294 REM INTERROG EVL PM/LDLS PM: CPT | Performed by: INTERNAL MEDICINE

## 2024-06-03 PROCEDURE — 93296 REM INTERROG EVL PM/IDS: CPT | Performed by: INTERNAL MEDICINE

## 2024-09-16 ENCOUNTER — ANCILLARY PROCEDURE (OUTPATIENT)
Dept: CARDIOLOGY | Facility: CLINIC | Age: 89
End: 2024-09-16
Attending: INTERNAL MEDICINE
Payer: COMMERCIAL

## 2024-09-16 ENCOUNTER — VIRTUAL VISIT (OUTPATIENT)
Dept: FAMILY MEDICINE | Facility: CLINIC | Age: 89
End: 2024-09-16
Payer: COMMERCIAL

## 2024-09-16 DIAGNOSIS — G20.B2 PARKINSON'S DISEASE WITH DYSKINESIA AND FLUCTUATING MANIFESTATIONS (H): ICD-10-CM

## 2024-09-16 DIAGNOSIS — Z95.0 CARDIAC PACEMAKER IN SITU: ICD-10-CM

## 2024-09-16 DIAGNOSIS — I44.2 CHB (COMPLETE HEART BLOCK) (H): ICD-10-CM

## 2024-09-16 DIAGNOSIS — F33.1 MODERATE EPISODE OF RECURRENT MAJOR DEPRESSIVE DISORDER (H): ICD-10-CM

## 2024-09-16 DIAGNOSIS — E03.9 ACQUIRED HYPOTHYROIDISM: ICD-10-CM

## 2024-09-16 DIAGNOSIS — Z51.5 END OF LIFE CARE: Primary | ICD-10-CM

## 2024-09-16 DIAGNOSIS — I49.5 SICK SINUS SYNDROME (H): ICD-10-CM

## 2024-09-16 LAB
MDC_IDC_LEAD_CONNECTION_STATUS: NORMAL
MDC_IDC_LEAD_CONNECTION_STATUS: NORMAL
MDC_IDC_LEAD_IMPLANT_DT: NORMAL
MDC_IDC_LEAD_IMPLANT_DT: NORMAL
MDC_IDC_LEAD_LOCATION: NORMAL
MDC_IDC_LEAD_LOCATION: NORMAL
MDC_IDC_LEAD_LOCATION_DETAIL_1: NORMAL
MDC_IDC_LEAD_LOCATION_DETAIL_1: NORMAL
MDC_IDC_LEAD_MFG: NORMAL
MDC_IDC_LEAD_MFG: NORMAL
MDC_IDC_LEAD_MODEL: NORMAL
MDC_IDC_LEAD_MODEL: NORMAL
MDC_IDC_LEAD_POLARITY_TYPE: NORMAL
MDC_IDC_LEAD_POLARITY_TYPE: NORMAL
MDC_IDC_LEAD_SERIAL: NORMAL
MDC_IDC_LEAD_SERIAL: NORMAL
MDC_IDC_MSMT_BATTERY_DTM: NORMAL
MDC_IDC_MSMT_BATTERY_REMAINING_LONGEVITY: 110 MO
MDC_IDC_MSMT_BATTERY_RRT_TRIGGER: 2.62
MDC_IDC_MSMT_BATTERY_STATUS: NORMAL
MDC_IDC_MSMT_BATTERY_VOLTAGE: 3 V
MDC_IDC_MSMT_LEADCHNL_RA_IMPEDANCE_VALUE: 380 OHM
MDC_IDC_MSMT_LEADCHNL_RA_IMPEDANCE_VALUE: 551 OHM
MDC_IDC_MSMT_LEADCHNL_RA_PACING_THRESHOLD_AMPLITUDE: 1.38 V
MDC_IDC_MSMT_LEADCHNL_RA_PACING_THRESHOLD_PULSEWIDTH: 0.4 MS
MDC_IDC_MSMT_LEADCHNL_RA_SENSING_INTR_AMPL: 1.25 MV
MDC_IDC_MSMT_LEADCHNL_RA_SENSING_INTR_AMPL: 1.25 MV
MDC_IDC_MSMT_LEADCHNL_RV_IMPEDANCE_VALUE: 380 OHM
MDC_IDC_MSMT_LEADCHNL_RV_IMPEDANCE_VALUE: 494 OHM
MDC_IDC_MSMT_LEADCHNL_RV_PACING_THRESHOLD_AMPLITUDE: 0.75 V
MDC_IDC_MSMT_LEADCHNL_RV_PACING_THRESHOLD_PULSEWIDTH: 0.4 MS
MDC_IDC_MSMT_LEADCHNL_RV_SENSING_INTR_AMPL: 7.38 MV
MDC_IDC_MSMT_LEADCHNL_RV_SENSING_INTR_AMPL: 7.38 MV
MDC_IDC_PG_IMPLANT_DTM: NORMAL
MDC_IDC_PG_MFG: NORMAL
MDC_IDC_PG_MODEL: NORMAL
MDC_IDC_PG_SERIAL: NORMAL
MDC_IDC_PG_TYPE: NORMAL
MDC_IDC_SESS_CLINIC_NAME: NORMAL
MDC_IDC_SESS_DTM: NORMAL
MDC_IDC_SESS_TYPE: NORMAL
MDC_IDC_SET_BRADY_AT_MODE_SWITCH_RATE: 150 {BEATS}/MIN
MDC_IDC_SET_BRADY_HYSTRATE: NORMAL
MDC_IDC_SET_BRADY_LOWRATE: 70 {BEATS}/MIN
MDC_IDC_SET_BRADY_MAX_SENSOR_RATE: 120 {BEATS}/MIN
MDC_IDC_SET_BRADY_MAX_TRACKING_RATE: 120 {BEATS}/MIN
MDC_IDC_SET_BRADY_MODE: NORMAL
MDC_IDC_SET_BRADY_PAV_DELAY_LOW: 180 MS
MDC_IDC_SET_BRADY_SAV_DELAY_LOW: 150 MS
MDC_IDC_SET_LEADCHNL_RA_PACING_AMPLITUDE: 3.25 V
MDC_IDC_SET_LEADCHNL_RA_PACING_ANODE_ELECTRODE_1: NORMAL
MDC_IDC_SET_LEADCHNL_RA_PACING_ANODE_LOCATION_1: NORMAL
MDC_IDC_SET_LEADCHNL_RA_PACING_CAPTURE_MODE: NORMAL
MDC_IDC_SET_LEADCHNL_RA_PACING_CATHODE_ELECTRODE_1: NORMAL
MDC_IDC_SET_LEADCHNL_RA_PACING_CATHODE_LOCATION_1: NORMAL
MDC_IDC_SET_LEADCHNL_RA_PACING_POLARITY: NORMAL
MDC_IDC_SET_LEADCHNL_RA_PACING_PULSEWIDTH: 0.4 MS
MDC_IDC_SET_LEADCHNL_RA_SENSING_ANODE_ELECTRODE_1: NORMAL
MDC_IDC_SET_LEADCHNL_RA_SENSING_ANODE_LOCATION_1: NORMAL
MDC_IDC_SET_LEADCHNL_RA_SENSING_CATHODE_ELECTRODE_1: NORMAL
MDC_IDC_SET_LEADCHNL_RA_SENSING_CATHODE_LOCATION_1: NORMAL
MDC_IDC_SET_LEADCHNL_RA_SENSING_POLARITY: NORMAL
MDC_IDC_SET_LEADCHNL_RA_SENSING_SENSITIVITY: 0.3 MV
MDC_IDC_SET_LEADCHNL_RV_PACING_AMPLITUDE: 1.5 V
MDC_IDC_SET_LEADCHNL_RV_PACING_ANODE_ELECTRODE_1: NORMAL
MDC_IDC_SET_LEADCHNL_RV_PACING_ANODE_LOCATION_1: NORMAL
MDC_IDC_SET_LEADCHNL_RV_PACING_CAPTURE_MODE: NORMAL
MDC_IDC_SET_LEADCHNL_RV_PACING_CATHODE_ELECTRODE_1: NORMAL
MDC_IDC_SET_LEADCHNL_RV_PACING_CATHODE_LOCATION_1: NORMAL
MDC_IDC_SET_LEADCHNL_RV_PACING_POLARITY: NORMAL
MDC_IDC_SET_LEADCHNL_RV_PACING_PULSEWIDTH: 0.4 MS
MDC_IDC_SET_LEADCHNL_RV_SENSING_ANODE_ELECTRODE_1: NORMAL
MDC_IDC_SET_LEADCHNL_RV_SENSING_ANODE_LOCATION_1: NORMAL
MDC_IDC_SET_LEADCHNL_RV_SENSING_CATHODE_ELECTRODE_1: NORMAL
MDC_IDC_SET_LEADCHNL_RV_SENSING_CATHODE_LOCATION_1: NORMAL
MDC_IDC_SET_LEADCHNL_RV_SENSING_POLARITY: NORMAL
MDC_IDC_SET_LEADCHNL_RV_SENSING_SENSITIVITY: 0.9 MV
MDC_IDC_SET_ZONE_DETECTION_INTERVAL: 330 MS
MDC_IDC_SET_ZONE_DETECTION_INTERVAL: 400 MS
MDC_IDC_SET_ZONE_STATUS: NORMAL
MDC_IDC_SET_ZONE_STATUS: NORMAL
MDC_IDC_SET_ZONE_TYPE: NORMAL
MDC_IDC_SET_ZONE_VENDOR_TYPE: NORMAL
MDC_IDC_STAT_AT_BURDEN_PERCENT: 0 %
MDC_IDC_STAT_AT_DTM_END: NORMAL
MDC_IDC_STAT_AT_DTM_START: NORMAL
MDC_IDC_STAT_BRADY_AP_VP_PERCENT: 57 %
MDC_IDC_STAT_BRADY_AP_VS_PERCENT: 22.33 %
MDC_IDC_STAT_BRADY_AS_VP_PERCENT: 15.59 %
MDC_IDC_STAT_BRADY_AS_VS_PERCENT: 5.08 %
MDC_IDC_STAT_BRADY_DTM_END: NORMAL
MDC_IDC_STAT_BRADY_DTM_START: NORMAL
MDC_IDC_STAT_BRADY_RA_PERCENT_PACED: 78.37 %
MDC_IDC_STAT_BRADY_RV_PERCENT_PACED: 72.59 %
MDC_IDC_STAT_EPISODE_RECENT_COUNT: 0
MDC_IDC_STAT_EPISODE_RECENT_COUNT_DTM_END: NORMAL
MDC_IDC_STAT_EPISODE_RECENT_COUNT_DTM_START: NORMAL
MDC_IDC_STAT_EPISODE_TOTAL_COUNT: 0
MDC_IDC_STAT_EPISODE_TOTAL_COUNT: 1
MDC_IDC_STAT_EPISODE_TOTAL_COUNT_DTM_END: NORMAL
MDC_IDC_STAT_EPISODE_TOTAL_COUNT_DTM_START: NORMAL
MDC_IDC_STAT_EPISODE_TYPE: NORMAL
MDC_IDC_STAT_TACHYTHERAPY_RECENT_DTM_END: NORMAL
MDC_IDC_STAT_TACHYTHERAPY_RECENT_DTM_START: NORMAL
MDC_IDC_STAT_TACHYTHERAPY_TOTAL_DTM_END: NORMAL
MDC_IDC_STAT_TACHYTHERAPY_TOTAL_DTM_START: NORMAL

## 2024-09-16 PROCEDURE — 93296 REM INTERROG EVL PM/IDS: CPT | Performed by: INTERNAL MEDICINE

## 2024-09-16 PROCEDURE — 93294 REM INTERROG EVL PM/LDLS PM: CPT | Performed by: INTERNAL MEDICINE

## 2024-09-16 PROCEDURE — 99442 PR PHYSICIAN TELEPHONE EVALUATION 11-20 MIN: CPT | Mod: 93 | Performed by: FAMILY MEDICINE

## 2024-09-16 RX ORDER — LEVOTHYROXINE SODIUM 112 UG/1
112 TABLET ORAL DAILY
Qty: 90 TABLET | Refills: 3 | Status: SHIPPED | OUTPATIENT
Start: 2024-09-16

## 2024-09-16 RX ORDER — SERTRALINE HYDROCHLORIDE 100 MG/1
100 TABLET, FILM COATED ORAL DAILY
Qty: 90 TABLET | Refills: 3 | Status: SHIPPED | OUTPATIENT
Start: 2024-09-16

## 2024-09-16 NOTE — PROGRESS NOTES
Delbert is a 89 year old who is being evaluated via a billable telephone visit.    What phone number would you like to be contacted at? [phone]   How would you like to obtain your AVS? Deniz  Originating Location (pt. Location): Home    Distant Location (provider location):  On-site    Assessment & Plan     End of life care  Parkinson's disease with dyskinesia and fluctuating manifestations (H)  Functional decline, particularly physically over the past 6 months.  Wife and daughters interested in pursuing hospice care through [facility].  Referral placed.  - Hospice Referral; Future    Acquired hypothyroidism  Therapeutic on levothyroxine, refills provided.  - levothyroxine (SYNTHROID/LEVOTHROID) 112 MCG tablet; Take 1 tablet (112 mcg) by mouth daily.  - Hospice Referral; Future    Moderate episode of recurrent major depressive disorder (H)  Stable on sertraline, refills provided.  - sertraline (ZOLOFT) 100 MG tablet; Take 1 tablet (100 mg) by mouth daily.  - Hospice Referral; Future      Subjective   Delbert is a 89 year old, presenting for the following health issues:  Home Care/Hospice        9/16/2024     2:05 PM   Additional Questions   Roomed by Ana TEIXEIRA LPN   Accompanied by Daughter - Megan     HPI     Up-and-down during the night, fear for falls  Difficulty eating with a fork  Fell last week and and scanned legs  Daughters becoming concerned  Mobility much decreased from february- needs assistance with belt  Dragging his feet, legs may collapse under him  Going weekly to PT  Fluctuating sxs, particularly physically  [facility]  Family in [facility], planning for meeting on Friday.  Slouched in chair  Still has typical diet.  Sleeping more.      Objective           Vitals:  No vitals were obtained today due to virtual visit.    Physical Exam   General: Alert and no distress //Respiratory: No audible wheeze, cough, or shortness of breath // Psychiatric:   Appropriate affect, tone, and pace of words          Phone call duration: 12 minutes  Signed Electronically by: Tatyana Valdes DO

## 2024-10-29 ENCOUNTER — TRANSFERRED RECORDS (OUTPATIENT)
Dept: HEALTH INFORMATION MANAGEMENT | Facility: CLINIC | Age: 89
End: 2024-10-29
Payer: COMMERCIAL

## 2024-11-21 ENCOUNTER — OFFICE VISIT (OUTPATIENT)
Dept: FAMILY MEDICINE | Facility: CLINIC | Age: 89
End: 2024-11-21
Payer: COMMERCIAL

## 2024-11-21 VITALS
BODY MASS INDEX: 19.43 KG/M2 | WEIGHT: 128.2 LBS | HEART RATE: 71 BPM | DIASTOLIC BLOOD PRESSURE: 65 MMHG | SYSTOLIC BLOOD PRESSURE: 101 MMHG | RESPIRATION RATE: 16 BRPM | HEIGHT: 68 IN

## 2024-11-21 DIAGNOSIS — R29.6 RECURRENT FALLS: ICD-10-CM

## 2024-11-21 DIAGNOSIS — G20.B2 PARKINSON'S DISEASE WITH DYSKINESIA AND FLUCTUATING MANIFESTATIONS (H): Primary | ICD-10-CM

## 2024-11-21 NOTE — PROGRESS NOTES
Assessment & Plan     Parkinson's disease with dyskinesia and fluctuating manifestations (H)  Recurrent falls  Functional decline, particularly physically over the past 6 months with an increase in falls.  Previously did not qualify for hospice and weight remains stable so family is looking for alternative resources.  They would like to stay in their independent home, but his wife is finding it more challenging to care for him.  After discussion, interested in social work referral along with palliative consultation and potentially increasing services of home care PT/home health aide.  - Social Work Referral Specific Sites Only - See Locations in Order; Future  - Adult Palliative Care  Referral; Future  - Home Care Referral    The longitudinal plan of care for the diagnosis(es)/condition(s) as documented were addressed during this visit. Due to the added complexity in care, I will continue to support Delbert in the subsequent management and with ongoing continuity of care.    I spent a total of 33 minutes on the day of the visit.   Time spent by me today doing chart review, history and exam, documentation and further activities per the note      Subjective   Delbert is a 89 year old, presenting for the following health issues:  RECHECK (Follow up and just a check in)        11/21/2024    12:12 PM   Additional Questions   Roomed by Elisabeth Burnette CMA   Accompanied by Daughter & Spouse     History of Present Illness       Reason for visit:  A Physical and questions    He eats 2-3 servings of fruits and vegetables daily.He consumes 0 sweetened beverage(s) daily.He exercises with enough effort to increase his heart rate 9 or less minutes per day.  He exercises with enough effort to increase his heart rate 3 or less days per week. He is missing 7 dose(s) of medications per week.     Stopped physical therapy  Considered home PT instead  Recently saw neurology  Decline since February-   When walking using safety  "belt  More falls  No longer transferring unassisted   care weekly- 830-1030 to help shower and dress  Would like to stay in their home independently rather than going to a facility  Becoming more difficult for his wife to help with his cares  Will get up in the middle of the night to use the restroom.  They try to remind him he is wearing a brief, but does not always remember.  Concerned he may fall in middle the night  Unsure if he would use a commode  Wondering about care coordination.  Had consultation with hospice earlier this fall and he did not meet criteria.  Would consider palliative care or increasing services within the home      Objective    /65 (BP Location: Left arm, Patient Position: Sitting, Cuff Size: Adult Regular)   Pulse 71   Resp 16   Ht 1.727 m (5' 8\")   Wt 58.2 kg (128 lb 3.2 oz)   BMI 19.49 kg/m    Body mass index is 19.49 kg/m .  Physical Exam   GENERAL: alert and no distress  RESP: lungs clear to auscultation - no rales, rhonchi or wheezes  CV: regular rate and rhythm, normal S1 S2, no S3 or S4, no murmur  NEURO: Masklike facies, extremity weakness/atrophy  PSYCH: Flat affect, slowed mentation            Signed Electronically by: Tatyana Valdes DO    " no chest pain, no cough, and no shortness of breath.

## 2024-12-04 ENCOUNTER — TELEPHONE (OUTPATIENT)
Dept: FAMILY MEDICINE | Facility: CLINIC | Age: 89
End: 2024-12-04
Payer: COMMERCIAL

## 2024-12-04 NOTE — TELEPHONE ENCOUNTER
General Call      Reason for Call:  wife stated they are still not ready to commit to having home care come out. They will not leave requests open, closing PT referral as of now. If they are wanting a new referral later she can put one in again.  They are waiting on their daughter to get into town in a couple of weeks to discuss this further and make proper decisions.   Non admin.    Could we send this information to you in [facility] or would you prefer to receive a phone call?:   Patient would prefer a phone call   Okay to leave a detailed message?: Yes at Home number on file [phone] (home) or Other phone number:  doron [phone]   ext:[phone]

## 2024-12-12 NOTE — TELEPHONE ENCOUNTER
Patients daughter (Megan) is calling back stating that home health care people need an updated referral as they claim they can only hold onto the referral for 24hrs before it expires on there end? Wanting to know if PCP can create a new referral to send to home health care, Megan also has further questions regarding the  referral. Please call her at ([phone])

## 2024-12-16 ENCOUNTER — TELEPHONE (OUTPATIENT)
Dept: FAMILY MEDICINE | Facility: CLINIC | Age: 89
End: 2024-12-16
Payer: COMMERCIAL

## 2024-12-16 NOTE — TELEPHONE ENCOUNTER
Verbal ok for delay PT service for 12/20/24,   Home Health Care    Reason for call:  Verbal order.    Orders are needed for this patient.  Delay PT on 12/20 per patient's request.     Pt Provider: Dr. Valdes    Phone Number Homecare Nurse can be reached at: [phone]    Okay to leave a detailed message?: Yes, This line is confidential.

## 2024-12-16 NOTE — TELEPHONE ENCOUNTER
Attempted to return call, number provided is a general number for [facility] - selected option for home care and phone rang for 2 minutes without being answered or VM picking up. Please try to contact [facility] again later.     Tala Oliver RN

## 2024-12-17 ENCOUNTER — TELEPHONE (OUTPATIENT)
Dept: CARDIOLOGY | Facility: CLINIC | Age: 89
End: 2024-12-17

## 2024-12-17 NOTE — TELEPHONE ENCOUNTER
Request for Change in Device Follow-up    Gomez Villasenor requests to change their device follow-up. Standard of care for device follow-up is an annual in clinic device check and remote transmissions every three months. Gomez Villasenor requests to change their follow-up to remote follow-up only .     Device Data  Pacemaker  Device: Medtronic, PPM [place] (D)  Implant date: 6/30/2023  Diagnosis: Sick Sinus Syndrome    Chart and Device Review  Patient status information: Declining, unable to get into clinic  Device status information: WNL    Is the patient pacing dependent? No  Does the patient currently do routine remote monitoring? Yes  What is the estimated battery longevity? 9 years  2 months  Are automatic thresholds available and turned on in the device? Yes    Device RN recommendation  Remotes only device checks      Routed to physician: Dr. Shlomo Obrien

## 2024-12-20 ENCOUNTER — ANCILLARY PROCEDURE (OUTPATIENT)
Dept: CARDIOLOGY | Facility: CLINIC | Age: 89
End: 2024-12-20
Attending: INTERNAL MEDICINE
Payer: COMMERCIAL

## 2024-12-20 ENCOUNTER — TELEPHONE (OUTPATIENT)
Dept: CARDIOLOGY | Facility: CLINIC | Age: 89
End: 2024-12-20

## 2024-12-20 DIAGNOSIS — I44.2 CHB (COMPLETE HEART BLOCK) (H): ICD-10-CM

## 2024-12-20 DIAGNOSIS — Z95.0 CARDIAC PACEMAKER IN SITU: ICD-10-CM

## 2024-12-20 DIAGNOSIS — I49.5 SICK SINUS SYNDROME (H): ICD-10-CM

## 2024-12-20 NOTE — TELEPHONE ENCOUNTER
Encounter Type: routine remote pacemaker transmission.   Device: Medtronic [place].   Pacing % /Programmed: AP 74%,  58% at AAIR<=>DDDR 70/120 ppm.   Lead(s): stable.   Battery longevity: 9yrs, 2mo estimated.   Presenting: AP-VS  bpm.   Atrial high rates: since 9/16/24; total of 17 seconds of AT/AF noted.   Anticoagulant: None.   Ventricular High rates: since 9/16/24; One ventricular high rate episode on 10/30/24 9:57AM, appears to be NSVT 13bts 200-210 bpm.   Comments: Normal device function. Routed to device RN for review.   Plan: Next remote check scheduled for 4/3/25. BRITNI Rodrigues, Device Specialist  ADD: Transmission reviewed, see EPIC for details. Aliza Shipley RN    12/20/24: Reviewed transmission with Delbert's wife Tanya. She denies symptoms. Patient unable to come into clinic due to declining condition and parkinson's. Request made for remote only device checks. Palliative care consult scheduled for 1/24/25. Will route to Dr. Obrien. Aliza Shipley, RN

## 2024-12-23 LAB
MDC_IDC_EPISODE_DTM: NORMAL
MDC_IDC_EPISODE_DURATION: 3 S
MDC_IDC_EPISODE_ID: 2
MDC_IDC_EPISODE_TYPE: NORMAL
MDC_IDC_LEAD_CONNECTION_STATUS: NORMAL
MDC_IDC_LEAD_CONNECTION_STATUS: NORMAL
MDC_IDC_LEAD_IMPLANT_DT: NORMAL
MDC_IDC_LEAD_IMPLANT_DT: NORMAL
MDC_IDC_LEAD_LOCATION: NORMAL
MDC_IDC_LEAD_LOCATION: NORMAL
MDC_IDC_LEAD_LOCATION_DETAIL_1: NORMAL
MDC_IDC_LEAD_LOCATION_DETAIL_1: NORMAL
MDC_IDC_LEAD_MFG: NORMAL
MDC_IDC_LEAD_MFG: NORMAL
MDC_IDC_LEAD_MODEL: NORMAL
MDC_IDC_LEAD_MODEL: NORMAL
MDC_IDC_LEAD_POLARITY_TYPE: NORMAL
MDC_IDC_LEAD_POLARITY_TYPE: NORMAL
MDC_IDC_LEAD_SERIAL: NORMAL
MDC_IDC_LEAD_SERIAL: NORMAL
MDC_IDC_MSMT_BATTERY_DTM: NORMAL
MDC_IDC_MSMT_BATTERY_REMAINING_LONGEVITY: 110 MO
MDC_IDC_MSMT_BATTERY_RRT_TRIGGER: 2.62
MDC_IDC_MSMT_BATTERY_STATUS: NORMAL
MDC_IDC_MSMT_BATTERY_VOLTAGE: 3 V
MDC_IDC_MSMT_LEADCHNL_RA_IMPEDANCE_VALUE: 380 OHM
MDC_IDC_MSMT_LEADCHNL_RA_IMPEDANCE_VALUE: 532 OHM
MDC_IDC_MSMT_LEADCHNL_RA_PACING_THRESHOLD_AMPLITUDE: 1.38 V
MDC_IDC_MSMT_LEADCHNL_RA_PACING_THRESHOLD_PULSEWIDTH: 0.4 MS
MDC_IDC_MSMT_LEADCHNL_RA_SENSING_INTR_AMPL: 1.12 MV
MDC_IDC_MSMT_LEADCHNL_RA_SENSING_INTR_AMPL: 1.12 MV
MDC_IDC_MSMT_LEADCHNL_RV_IMPEDANCE_VALUE: 361 OHM
MDC_IDC_MSMT_LEADCHNL_RV_IMPEDANCE_VALUE: 475 OHM
MDC_IDC_MSMT_LEADCHNL_RV_PACING_THRESHOLD_AMPLITUDE: 0.75 V
MDC_IDC_MSMT_LEADCHNL_RV_PACING_THRESHOLD_PULSEWIDTH: 0.4 MS
MDC_IDC_MSMT_LEADCHNL_RV_SENSING_INTR_AMPL: 6.12 MV
MDC_IDC_MSMT_LEADCHNL_RV_SENSING_INTR_AMPL: 6.12 MV
MDC_IDC_PG_IMPLANT_DTM: NORMAL
MDC_IDC_PG_MFG: NORMAL
MDC_IDC_PG_MODEL: NORMAL
MDC_IDC_PG_SERIAL: NORMAL
MDC_IDC_PG_TYPE: NORMAL
MDC_IDC_SESS_CLINIC_NAME: NORMAL
MDC_IDC_SESS_DTM: NORMAL
MDC_IDC_SESS_TYPE: NORMAL
MDC_IDC_SET_BRADY_AT_MODE_SWITCH_RATE: 150 {BEATS}/MIN
MDC_IDC_SET_BRADY_HYSTRATE: NORMAL
MDC_IDC_SET_BRADY_LOWRATE: 70 {BEATS}/MIN
MDC_IDC_SET_BRADY_MAX_SENSOR_RATE: 120 {BEATS}/MIN
MDC_IDC_SET_BRADY_MAX_TRACKING_RATE: 120 {BEATS}/MIN
MDC_IDC_SET_BRADY_MODE: NORMAL
MDC_IDC_SET_BRADY_PAV_DELAY_LOW: 180 MS
MDC_IDC_SET_BRADY_SAV_DELAY_LOW: 150 MS
MDC_IDC_SET_LEADCHNL_RA_PACING_AMPLITUDE: 3 V
MDC_IDC_SET_LEADCHNL_RA_PACING_ANODE_ELECTRODE_1: NORMAL
MDC_IDC_SET_LEADCHNL_RA_PACING_ANODE_LOCATION_1: NORMAL
MDC_IDC_SET_LEADCHNL_RA_PACING_CAPTURE_MODE: NORMAL
MDC_IDC_SET_LEADCHNL_RA_PACING_CATHODE_ELECTRODE_1: NORMAL
MDC_IDC_SET_LEADCHNL_RA_PACING_CATHODE_LOCATION_1: NORMAL
MDC_IDC_SET_LEADCHNL_RA_PACING_POLARITY: NORMAL
MDC_IDC_SET_LEADCHNL_RA_PACING_PULSEWIDTH: 0.4 MS
MDC_IDC_SET_LEADCHNL_RA_SENSING_ANODE_ELECTRODE_1: NORMAL
MDC_IDC_SET_LEADCHNL_RA_SENSING_ANODE_LOCATION_1: NORMAL
MDC_IDC_SET_LEADCHNL_RA_SENSING_CATHODE_ELECTRODE_1: NORMAL
MDC_IDC_SET_LEADCHNL_RA_SENSING_CATHODE_LOCATION_1: NORMAL
MDC_IDC_SET_LEADCHNL_RA_SENSING_POLARITY: NORMAL
MDC_IDC_SET_LEADCHNL_RA_SENSING_SENSITIVITY: 0.3 MV
MDC_IDC_SET_LEADCHNL_RV_PACING_AMPLITUDE: 1.5 V
MDC_IDC_SET_LEADCHNL_RV_PACING_ANODE_ELECTRODE_1: NORMAL
MDC_IDC_SET_LEADCHNL_RV_PACING_ANODE_LOCATION_1: NORMAL
MDC_IDC_SET_LEADCHNL_RV_PACING_CAPTURE_MODE: NORMAL
MDC_IDC_SET_LEADCHNL_RV_PACING_CATHODE_ELECTRODE_1: NORMAL
MDC_IDC_SET_LEADCHNL_RV_PACING_CATHODE_LOCATION_1: NORMAL
MDC_IDC_SET_LEADCHNL_RV_PACING_POLARITY: NORMAL
MDC_IDC_SET_LEADCHNL_RV_PACING_PULSEWIDTH: 0.4 MS
MDC_IDC_SET_LEADCHNL_RV_SENSING_ANODE_ELECTRODE_1: NORMAL
MDC_IDC_SET_LEADCHNL_RV_SENSING_ANODE_LOCATION_1: NORMAL
MDC_IDC_SET_LEADCHNL_RV_SENSING_CATHODE_ELECTRODE_1: NORMAL
MDC_IDC_SET_LEADCHNL_RV_SENSING_CATHODE_LOCATION_1: NORMAL
MDC_IDC_SET_LEADCHNL_RV_SENSING_POLARITY: NORMAL
MDC_IDC_SET_LEADCHNL_RV_SENSING_SENSITIVITY: 0.9 MV
MDC_IDC_SET_ZONE_DETECTION_INTERVAL: 330 MS
MDC_IDC_SET_ZONE_DETECTION_INTERVAL: 400 MS
MDC_IDC_SET_ZONE_STATUS: NORMAL
MDC_IDC_SET_ZONE_STATUS: NORMAL
MDC_IDC_SET_ZONE_TYPE: NORMAL
MDC_IDC_SET_ZONE_VENDOR_TYPE: NORMAL
MDC_IDC_STAT_AT_BURDEN_PERCENT: 0 %
MDC_IDC_STAT_AT_DTM_END: NORMAL
MDC_IDC_STAT_AT_DTM_START: NORMAL
MDC_IDC_STAT_BRADY_AP_VP_PERCENT: 44.38 %
MDC_IDC_STAT_BRADY_AP_VS_PERCENT: 32.04 %
MDC_IDC_STAT_BRADY_AS_VP_PERCENT: 13.87 %
MDC_IDC_STAT_BRADY_AS_VS_PERCENT: 9.72 %
MDC_IDC_STAT_BRADY_DTM_END: NORMAL
MDC_IDC_STAT_BRADY_DTM_START: NORMAL
MDC_IDC_STAT_BRADY_RA_PERCENT_PACED: 74.92 %
MDC_IDC_STAT_BRADY_RV_PERCENT_PACED: 58.25 %
MDC_IDC_STAT_EPISODE_RECENT_COUNT: 0
MDC_IDC_STAT_EPISODE_RECENT_COUNT: 1
MDC_IDC_STAT_EPISODE_RECENT_COUNT_DTM_END: NORMAL
MDC_IDC_STAT_EPISODE_RECENT_COUNT_DTM_START: NORMAL
MDC_IDC_STAT_EPISODE_TOTAL_COUNT: 0
MDC_IDC_STAT_EPISODE_TOTAL_COUNT: 1
MDC_IDC_STAT_EPISODE_TOTAL_COUNT: 1
MDC_IDC_STAT_EPISODE_TOTAL_COUNT_DTM_END: NORMAL
MDC_IDC_STAT_EPISODE_TOTAL_COUNT_DTM_START: NORMAL
MDC_IDC_STAT_EPISODE_TYPE: NORMAL
MDC_IDC_STAT_TACHYTHERAPY_RECENT_DTM_END: NORMAL
MDC_IDC_STAT_TACHYTHERAPY_RECENT_DTM_START: NORMAL
MDC_IDC_STAT_TACHYTHERAPY_TOTAL_DTM_END: NORMAL
MDC_IDC_STAT_TACHYTHERAPY_TOTAL_DTM_START: NORMAL

## 2024-12-26 ENCOUNTER — APPOINTMENT (OUTPATIENT)
Dept: CT IMAGING | Facility: HOSPITAL | Age: 89
DRG: 177 | End: 2024-12-26
Attending: EMERGENCY MEDICINE
Payer: COMMERCIAL

## 2024-12-26 ENCOUNTER — HOSPITAL ENCOUNTER (INPATIENT)
Facility: HOSPITAL | Age: 89
End: 2024-12-26
Attending: EMERGENCY MEDICINE | Admitting: INTERNAL MEDICINE
Payer: COMMERCIAL

## 2024-12-26 ENCOUNTER — APPOINTMENT (OUTPATIENT)
Dept: RADIOLOGY | Facility: HOSPITAL | Age: 89
DRG: 177 | End: 2024-12-26
Attending: EMERGENCY MEDICINE
Payer: COMMERCIAL

## 2024-12-26 ENCOUNTER — ANCILLARY PROCEDURE (OUTPATIENT)
Dept: CARDIOLOGY | Facility: CLINIC | Age: 89
End: 2024-12-26
Attending: INTERNAL MEDICINE
Payer: COMMERCIAL

## 2024-12-26 VITALS
HEIGHT: 69 IN | WEIGHT: 130.07 LBS | SYSTOLIC BLOOD PRESSURE: 128 MMHG | RESPIRATION RATE: 18 BRPM | TEMPERATURE: 98 F | HEART RATE: 70 BPM | OXYGEN SATURATION: 92 % | DIASTOLIC BLOOD PRESSURE: 58 MMHG | BODY MASS INDEX: 19.27 KG/M2

## 2024-12-26 DIAGNOSIS — I49.5 SICK SINUS SYNDROME (H): ICD-10-CM

## 2024-12-26 DIAGNOSIS — Z95.0 CARDIAC PACEMAKER IN SITU: ICD-10-CM

## 2024-12-26 DIAGNOSIS — I10 PRIMARY HYPERTENSION: ICD-10-CM

## 2024-12-26 DIAGNOSIS — I44.2 CHB (COMPLETE HEART BLOCK) (H): ICD-10-CM

## 2024-12-26 DIAGNOSIS — U07.1 INFECTION DUE TO 2019 NOVEL CORONAVIRUS: Primary | ICD-10-CM

## 2024-12-26 DIAGNOSIS — J18.9 PNEUMONIA OF LEFT LOWER LOBE DUE TO INFECTIOUS ORGANISM: ICD-10-CM

## 2024-12-26 DIAGNOSIS — R53.1 GENERALIZED WEAKNESS: ICD-10-CM

## 2024-12-26 DIAGNOSIS — U07.1 COVID-19 VIRUS INFECTION: ICD-10-CM

## 2024-12-26 DIAGNOSIS — Z86.69 HISTORY OF PARKINSON DISEASE: ICD-10-CM

## 2024-12-26 PROBLEM — G20.A1 PARKINSON DISEASE (H): Status: ACTIVE | Noted: 2024-12-26

## 2024-12-26 PROBLEM — R41.0 DELIRIUM: Status: ACTIVE | Noted: 2024-12-26

## 2024-12-26 LAB
ANION GAP SERPL CALCULATED.3IONS-SCNC: 10 MMOL/L (ref 7–15)
BASOPHILS # BLD AUTO: 0 10E3/UL (ref 0–0.2)
BASOPHILS NFR BLD AUTO: 0 %
BUN SERPL-MCNC: 30.3 MG/DL (ref 8–23)
CALCIUM SERPL-MCNC: 9.4 MG/DL (ref 8.8–10.4)
CHLORIDE SERPL-SCNC: 99 MMOL/L (ref 98–107)
CREAT SERPL-MCNC: 0.9 MG/DL (ref 0.67–1.17)
EGFRCR SERPLBLD CKD-EPI 2021: 78 ML/MIN/1.73M2
EOSINOPHIL # BLD AUTO: 0 10E3/UL (ref 0–0.7)
EOSINOPHIL NFR BLD AUTO: 0 %
ERYTHROCYTE [DISTWIDTH] IN BLOOD BY AUTOMATED COUNT: 14.8 % (ref 10–15)
FLUAV RNA SPEC QL NAA+PROBE: NEGATIVE
FLUBV RNA RESP QL NAA+PROBE: NEGATIVE
GLUCOSE SERPL-MCNC: 133 MG/DL (ref 70–99)
HCO3 SERPL-SCNC: 27 MMOL/L (ref 22–29)
HCT VFR BLD AUTO: 36 % (ref 40–53)
HGB BLD-MCNC: 11.7 G/DL (ref 13.3–17.7)
IMM GRANULOCYTES # BLD: 0.1 10E3/UL
IMM GRANULOCYTES NFR BLD: 2 %
LACTATE SERPL-SCNC: 1.6 MMOL/L (ref 0.7–2)
LYMPHOCYTES # BLD AUTO: 0.3 10E3/UL (ref 0.8–5.3)
LYMPHOCYTES NFR BLD AUTO: 3 %
MAGNESIUM SERPL-MCNC: 2.2 MG/DL (ref 1.7–2.3)
MCH RBC QN AUTO: 31.9 PG (ref 26.5–33)
MCHC RBC AUTO-ENTMCNC: 32.5 G/DL (ref 31.5–36.5)
MCV RBC AUTO: 98 FL (ref 78–100)
MONOCYTES # BLD AUTO: 0.2 10E3/UL (ref 0–1.3)
MONOCYTES NFR BLD AUTO: 3 %
NEUTROPHILS # BLD AUTO: 7.3 10E3/UL (ref 1.6–8.3)
NEUTROPHILS NFR BLD AUTO: 92 %
NRBC # BLD AUTO: 0 10E3/UL
NRBC BLD AUTO-RTO: 0 /100
PLATELET # BLD AUTO: 128 10E3/UL (ref 150–450)
POTASSIUM SERPL-SCNC: 4.8 MMOL/L (ref 3.4–5.3)
RBC # BLD AUTO: 3.67 10E6/UL (ref 4.4–5.9)
RSV RNA SPEC NAA+PROBE: NEGATIVE
SARS-COV-2 RNA RESP QL NAA+PROBE: POSITIVE
SODIUM SERPL-SCNC: 136 MMOL/L (ref 135–145)
TROPONIN T SERPL HS-MCNC: 29 NG/L
TROPONIN T SERPL HS-MCNC: 31 NG/L
WBC # BLD AUTO: 7.9 10E3/UL (ref 4–11)

## 2024-12-26 PROCEDURE — 36415 COLL VENOUS BLD VENIPUNCTURE: CPT | Performed by: EMERGENCY MEDICINE

## 2024-12-26 PROCEDURE — 93005 ELECTROCARDIOGRAM TRACING: CPT | Performed by: EMERGENCY MEDICINE

## 2024-12-26 PROCEDURE — 120N000001 HC R&B MED SURG/OB

## 2024-12-26 PROCEDURE — 87637 SARSCOV2&INF A&B&RSV AMP PRB: CPT | Performed by: EMERGENCY MEDICINE

## 2024-12-26 PROCEDURE — 85025 COMPLETE CBC W/AUTO DIFF WBC: CPT | Performed by: EMERGENCY MEDICINE

## 2024-12-26 PROCEDURE — 250N000013 HC RX MED GY IP 250 OP 250 PS 637: Performed by: INTERNAL MEDICINE

## 2024-12-26 PROCEDURE — 99222 1ST HOSP IP/OBS MODERATE 55: CPT | Performed by: INTERNAL MEDICINE

## 2024-12-26 PROCEDURE — 83735 ASSAY OF MAGNESIUM: CPT | Performed by: EMERGENCY MEDICINE

## 2024-12-26 PROCEDURE — 258N000003 HC RX IP 258 OP 636: Performed by: INTERNAL MEDICINE

## 2024-12-26 PROCEDURE — 258N000003 HC RX IP 258 OP 636: Performed by: EMERGENCY MEDICINE

## 2024-12-26 PROCEDURE — 83605 ASSAY OF LACTIC ACID: CPT | Performed by: EMERGENCY MEDICINE

## 2024-12-26 PROCEDURE — 71046 X-RAY EXAM CHEST 2 VIEWS: CPT

## 2024-12-26 PROCEDURE — 99285 EMERGENCY DEPT VISIT HI MDM: CPT | Mod: 25

## 2024-12-26 PROCEDURE — 85018 HEMOGLOBIN: CPT | Performed by: EMERGENCY MEDICINE

## 2024-12-26 PROCEDURE — 84484 ASSAY OF TROPONIN QUANT: CPT | Performed by: EMERGENCY MEDICINE

## 2024-12-26 PROCEDURE — 70450 CT HEAD/BRAIN W/O DYE: CPT

## 2024-12-26 PROCEDURE — 87040 BLOOD CULTURE FOR BACTERIA: CPT | Performed by: EMERGENCY MEDICINE

## 2024-12-26 PROCEDURE — 80048 BASIC METABOLIC PNL TOTAL CA: CPT | Performed by: EMERGENCY MEDICINE

## 2024-12-26 PROCEDURE — 250N000011 HC RX IP 250 OP 636: Performed by: EMERGENCY MEDICINE

## 2024-12-26 RX ORDER — VITS A,C,E/LUTEIN/MINERALS 300MCG-200
2 TABLET ORAL DAILY
Status: ON HOLD | COMMUNITY

## 2024-12-26 RX ORDER — SODIUM CHLORIDE 9 MG/ML
INJECTION, SOLUTION INTRAVENOUS CONTINUOUS
Status: ACTIVE | OUTPATIENT
Start: 2024-12-26

## 2024-12-26 RX ORDER — CEFTRIAXONE 2 G/1
2 INJECTION, POWDER, FOR SOLUTION INTRAMUSCULAR; INTRAVENOUS ONCE
Status: COMPLETED | OUTPATIENT
Start: 2024-12-26 | End: 2024-12-26

## 2024-12-26 RX ORDER — VITS A,C,E/LUTEIN/MINERALS 300MCG-200
2 TABLET ORAL DAILY
Status: DISCONTINUED | OUTPATIENT
Start: 2024-12-26 | End: 2024-12-26

## 2024-12-26 RX ORDER — ACETAMINOPHEN 500 MG
500-1000 TABLET ORAL EVERY 8 HOURS PRN
Status: ACTIVE | OUTPATIENT
Start: 2024-12-26

## 2024-12-26 RX ORDER — VIT C/E/ZN/COPPR/LUTEIN/ZEAXAN 60 MG-6 MG
2 CAPSULE ORAL DAILY
Status: DISPENSED | OUTPATIENT
Start: 2024-12-26

## 2024-12-26 RX ORDER — SERTRALINE HYDROCHLORIDE 100 MG/1
100 TABLET, FILM COATED ORAL DAILY
Status: DISPENSED | OUTPATIENT
Start: 2024-12-26

## 2024-12-26 RX ORDER — VITAMIN B COMPLEX
50 TABLET ORAL DAILY
Status: DISPENSED | OUTPATIENT
Start: 2024-12-26

## 2024-12-26 RX ORDER — METOCLOPRAMIDE HYDROCHLORIDE 5 MG/ML
5 INJECTION INTRAMUSCULAR; INTRAVENOUS ONCE
Status: COMPLETED | OUTPATIENT
Start: 2024-12-26 | End: 2024-12-26

## 2024-12-26 RX ORDER — LEVOTHYROXINE SODIUM 112 UG/1
112 TABLET ORAL DAILY
Status: DISPENSED | OUTPATIENT
Start: 2024-12-26

## 2024-12-26 RX ORDER — CARBIDOPA AND LEVODOPA 25; 100 MG/1; MG/1
2 TABLET ORAL 4 TIMES DAILY
Status: DISPENSED | OUTPATIENT
Start: 2024-12-26

## 2024-12-26 RX ADMIN — LEVOTHYROXINE SODIUM 112 MCG: 0.11 TABLET ORAL at 17:19

## 2024-12-26 RX ADMIN — SERTRALINE HYDROCHLORIDE 100 MG: 100 TABLET ORAL at 17:19

## 2024-12-26 RX ADMIN — CARBIDOPA AND LEVODOPA 2 TABLET: 25; 100 TABLET ORAL at 20:03

## 2024-12-26 RX ADMIN — SODIUM CHLORIDE: 9 INJECTION, SOLUTION INTRAVENOUS at 17:19

## 2024-12-26 RX ADMIN — SODIUM CHLORIDE 500 ML: 9 INJECTION, SOLUTION INTRAVENOUS at 14:32

## 2024-12-26 RX ADMIN — AZITHROMYCIN MONOHYDRATE 500 MG: 500 INJECTION, POWDER, LYOPHILIZED, FOR SOLUTION INTRAVENOUS at 14:31

## 2024-12-26 RX ADMIN — CARBIDOPA AND LEVODOPA 2 TABLET: 25; 100 TABLET ORAL at 17:19

## 2024-12-26 RX ADMIN — CEFTRIAXONE SODIUM 2 G: 2 INJECTION, POWDER, FOR SOLUTION INTRAMUSCULAR; INTRAVENOUS at 13:58

## 2024-12-26 RX ADMIN — Medication 50 MCG: at 17:19

## 2024-12-26 ASSESSMENT — ACTIVITIES OF DAILY LIVING (ADL)
ADLS_ACUITY_SCORE: 41
ADLS_ACUITY_SCORE: 41
ADLS_ACUITY_SCORE: 43
ADLS_ACUITY_SCORE: 41
DRESSING/BATHING_DIFFICULTY: NO
DOING_ERRANDS_INDEPENDENTLY_DIFFICULTY: YES
ADLS_ACUITY_SCORE: 43
ADLS_ACUITY_SCORE: 46
ADLS_ACUITY_SCORE: 47
ADLS_ACUITY_SCORE: 41
ADLS_ACUITY_SCORE: 41
ADLS_ACUITY_SCORE: 57
ADLS_ACUITY_SCORE: 41
ADLS_ACUITY_SCORE: 41

## 2024-12-26 ASSESSMENT — COLUMBIA-SUICIDE SEVERITY RATING SCALE - C-SSRS
1. IN THE PAST MONTH, HAVE YOU WISHED YOU WERE DEAD OR WISHED YOU COULD GO TO SLEEP AND NOT WAKE UP?: NO
6. HAVE YOU EVER DONE ANYTHING, STARTED TO DO ANYTHING, OR PREPARED TO DO ANYTHING TO END YOUR LIFE?: NO
2. HAVE YOU ACTUALLY HAD ANY THOUGHTS OF KILLING YOURSELF IN THE PAST MONTH?: NO

## 2024-12-26 NOTE — ED PROVIDER NOTES
EMERGENCY DEPARTMENT ENCOUNTER      NAME: Gomez Villasenor  AGE: 89 year old male  YOB: 1928  MRN: [id number]  EVALUATION DATE & TIME: No admission date for patient encounter.    PCP: Tatyana Valdes    ED PROVIDER: Ivanna Myles M.D.      CHIEF COMPLAINT     Chief Complaint   Patient presents with    Generalized Weakness    Cough    Fall         FINAL IMPRESSION:     1. COVID-19 virus infection    2. Generalized weakness    3. History of Parkinson disease    4. Cardiac pacemaker in situ    5. Pneumonia of left lower lobe due to infectious organism          MEDICAL DECISION MAKING:     ED Course as of 12/26/24 1513   Thu Dec 26, 2024   1326 Mr. Villasenor is a 89-year-old male who presents here with his daughter and his wife.  He has been progressively weak since [place].  Did not attend the family functions had a gentle fall yesterday.  Did not lose consciousness.  History of Parkinson's but now is generalized weak.  Some vomiting no diarrhea.  Congestion.   1326 Exam elderly male   1326  looks like he does not feel well but he is nontoxic urinates abdomen is soft and elevated stream at the edema.   1327 Differential diagnosis for generalized weakness in a patient with Parkinson's pacemaker includes but not limited to infectious etiology with COVID influenza pneumonia UTI electrolyte abnormalities acute coronary syndrome also subdural hematoma from minimal fall.   1327 IV established patient placed in postop cardiac and pressure monitors.    Wife wanted to give him his regular dose of Parkinson medications I think this is reasonable.   1508 Remarkable for COVID.  Mildly elevated troponin at 29.   1508 Anemia hemoglobin 11.7 with slight thrombocytopenia.   1508 Both appear chronic.   1508 lactic acid within normal limits.   1508 At the time of this dictation urine still pending.   1509 X-ray dependently interpreted by me reveals hyperinflation left pacemaker concerned about consolidation left  lower lobe.   1509 Given cough shortness of breath generalized weakness will start him on acute pneumonia community-acquired pathway.   1509 Patient was independently quite weak.  Therefore we will admit him for antibiotics and further evaluation.   1510 Spoke with hospitalist who very can assess the patient for admission.  At the time of his dictation pacemaker interrogation is pending.       Medical Decision Making    History:  Supplemental history from: Wife and daughter   External Record(s) reviewed: [place] [facility] 12/17/2024 chronic.  History of sick sinus syndrome.    Work Up:  Chart documentation includes differential considered and any EKGs or imaging independently interpreted by provider, where specified.  In additional to work up documented, I considered the following work up: Analysis urine pending at the time of dictation.  Already given antibiotics.    External consultation:  Discussion of management with another provider: Hospitalist    Complicating factors:  Care impacted by chronic illness: Prostate cancer, disease of thyroid gland, Parkinson's disease, kidney stones, cardiac pacemaker, nephrolithiasis, hypothyroid, and depression.       Disposition considerations: Admit.    MIPS Adult Minor Head Trauma:Age 65 years or older        ED COURSE     10:57 AM Met patient and performed my initial exam.   12:41 PM rechecked and updated patient.   1:19 PM Admitted patient.   1:28 PM Talked to Dr. Rouse regarding patients admission.   1:31 PM Rechecked and updated patient about his pneumonia.   1:38 PM Patient will be taking his home medication for parkinson now.    At the conclusion of the encounter I discussed the results of all of the tests and the disposition. The questions were answered. The patient , wife, and duaghter acknowledged understanding and was agreeable with the care plan.         MEDICATIONS GIVEN IN THE EMERGENCY:     Medications   azithromycin (ZITHROMAX) 500 mg in sodium chloride  "0.9 % 250 mL intermittent infusion (500 mg Intravenous $New Bag 12/26/24 1431)   metoclopramide (REGLAN) injection 5 mg (5 mg Intravenous Not Given 12/26/24 1414)   sodium chloride 0.9% BOLUS 500 mL (500 mLs Intravenous $New Bag 12/26/24 1432)   cefTRIAXone (ROCEPHIN) 2 g vial to attach to  ml bag for ADULTS or NS 50 ml bag for PEDS (2 g Intravenous $New Bag 12/26/24 1358)       NEW PRESCRIPTIONS STARTED AT TODAY'S ER VISIT     New Prescriptions    No medications on file          =================================================================    HPI     Patient information was obtained from: Patient's wife and daughter     Use of : N/A       Gomez Villasenor is a 89 year old male who presents with wife and daughter by walk-in for evaluation of generalized weakness, cough and a fall.     Per daughter, the patient lives indefinitely with his wife and usually get around okay. Yesterday, patient did not feel good enough to go to their family's [place] and stayed home. Patient needed help with moving around, getting dressed, and care. He also vomited multiple times yesterday.     Patient endorses weakness with both upper and lower extremities. Wife states, \"he is more confused than normal\". This morning, wife found patient on the floor and he was struggling to get up. She then called her daughter and son-in-law to help patient up.     Per wife, the patient has been coughing up a lot of phlegm. He also has a pace maker. Patient \"so weak that he is not able to do anything\". Patient is not on any blood thinners. He is currently incontinence.     Denies any other pain or chest pressure. No other complaints at this time.       REVIEW OF SYSTEMS   Review of Systems   SEE HPI    PAST MEDICAL HISTORY:     Past Medical History:   Diagnosis Date    Cancer (H)     prostate    Cardiac pacemaker in situ 11/10/2023    Medtronic dual-chamber implanted 6/30/2023 (I)    Disease of thyroid gland     hypothyroid "    Hearing loss     Kidney stones 03/17/2015    this is third episode of stones    Macular degeneration     Nephrolithiasis 01/01/2015    right sided/ureteroscopic removal    Parkinson's disease (H)     Postural dizziness with presyncope     Prostate cancer (H)     Sinus bradycardia 08/03/2021    Status post cystoscopy 03/01/2015    with ureteroscopy and stone removal    Urinary incontinence        PAST SURGICAL HISTORY:     Past Surgical History:   Procedure Laterality Date    CYSTOURETHROSCOPY      Description: Cystoscopy (Diagnostic);  Recorded: 11/07/2008;    CYSTOURETHROSCOPY      Right stent exchange and stone extraction    EP PACEMAKER  06/30/2023    EYE SURGERY Right     cataract removed    INSERT / REPLACE / REMOVE PROSTHESIS URETHRAL SPHINCTER N/A 09/21/2016    Procedure:  REPLACEMENT OF ARTIFICIAL URINARY SPHINCTER;  Surgeon: Stevie Braxton MD;  Location: [facility];  Service:     OTHER SURGICAL HISTORY      artificial urethral sphincter    ZZC REMV PROSTATE,RETROPUB,RADICAL      Description: Prostatectomy Retropubic Radical With Nerve Sparing;  Recorded: 11/10/2009;  Comments: 2001 artificial sphinter placed         CURRENT MEDICATIONS:   acetaminophen (TYLENOL) 500 MG tablet  carbidopa-levodopa (SINEMET)  MG tablet  cholecalciferol (VITAMIN D-1000 MAX ST) 25 MCG (1000 UT) TABS  levothyroxine (SYNTHROID/LEVOTHROID) 112 MCG tablet  multivitamin (OCUVITE) TABS tablet  multivitamin w/minerals (MULTI-VITAMIN) tablet  sertraline (ZOLOFT) 100 MG tablet         ALLERGIES:   No Known Allergies    FAMILY HISTORY:     Family History   Problem Relation Age of Onset    Heart Disease Mother     Cancer Brother     No Known Problems Father     Leukemia Brother     Cancer Brother        SOCIAL HISTORY:     Social History     Socioeconomic History    Marital status:     Number of children: 3   Tobacco Use    Smoking status: Never    Smokeless tobacco: Never   Substance and Sexual Activity     "Alcohol use: Yes     Alcohol/week: 1.0 standard drink of alcohol     Comment: Alcoholic Drinks/day: one beer most days    Drug use: No    Sexual activity: Not Currently   Other Topics Concern    Parent/sibling w/ CABG, MI or angioplasty before 65F 55M? No     Social Drivers of Health     Financial Resource Strain: Unknown (2/8/2024)    Financial Resource Strain     Within the past 12 months, have you or your family members you live with been unable to get utilities (heat, electricity) when it was really needed?: Patient declined   Food Insecurity: Unknown (2/8/2024)    Food Insecurity     Within the past 12 months, did you worry that your food would run out before you got money to buy more?: Patient declined     Within the past 12 months, did the food you bought just not last and you didn t have money to get more?: Patient declined   Transportation Needs: Unknown (2/8/2024)    Transportation Needs     Within the past 12 months, has lack of transportation kept you from medical appointments, getting your medicines, non-medical meetings or appointments, work, or from getting things that you need?: Patient declined   Stress: No Stress Concern Present (2/8/2024)    [facility] [place] [facility] [employer] [facility] - Occupational Stress Questionnaire     Feeling of Stress : Only a little    Received from [facility], [facility]    Social Connections   Housing Stability: Unknown (2/8/2024)    Housing Stability     Do you have housing? : Patient declined     Are you worried about losing your housing?: Patient declined       VITALS:   /65   Pulse 71   Temp 97.7  F (36.5  C) (Tympanic)   Resp 17   Ht 1.727 m (5' 8\")   Wt 58.1 kg (128 lb)   SpO2 95%   BMI 19.46 kg/m      PHYSICAL EXAM     Physical Exam  Vitals and nursing note reviewed. Exam conducted with a chaperone present.   Constitutional:       General: He is not in acute distress.     Appearance: He " is not toxic-appearing.      Comments: Fragile nontoxic generalized weak.   Neurological:      Mental Status: He is alert.         Physical Exam   Constitutional: Non-toxic appearing.     Head: Atraumatic.     Nose: Nose normal.     Mouth/Throat: Oropharynx is clear and moist.     Eyes: EOM are normal. Pupils are equal, round, and reactive to light.     Ears: Bilateral pearly white tympanic membranes. Hearing aids intact.     Neck: Normal range of motion. Neck supple.     Cardiovascular: Normal rate, regular rhythm and normal heart sounds.  2+ femoral pulses/radial/DP pulses B. Left anterior pacemaker in.     Pulmonary/Chest: Normal effort  and breath sounds normal.     Abdominal: soft nontender.    Musculoskeletal: Normal range of motion.     Neurological: Moves upper and lower extremities equally.    Lymphatics: no edema, no calves pain, no palpable cords.    : NA    Skin: Skin is warm and dry.     Psychiatric: Normal mood and affect. Behavior is normal.       LAB:     All pertinent labs reviewed and interpreted.  Labs Ordered and Resulted from Time of ED Arrival to Time of ED Departure   BASIC METABOLIC PANEL - Abnormal       Result Value    Sodium 136      Potassium 4.8      Chloride 99      Carbon Dioxide (CO2) 27      Anion Gap 10      Urea Nitrogen 30.3 (*)     Creatinine 0.90      GFR Estimate 78      Calcium 9.4      Glucose 133 (*)    TROPONIN T, HIGH SENSITIVITY - Abnormal    Troponin T, High Sensitivity 31 (*)    CBC WITH PLATELETS AND DIFFERENTIAL - Abnormal    WBC Count 7.9      RBC Count 3.67 (*)     Hemoglobin 11.7 (*)     Hematocrit 36.0 (*)     MCV 98      MCH 31.9      MCHC 32.5      RDW 14.8      Platelet Count 128 (*)     % Neutrophils 92      % Lymphocytes 3      % Monocytes 3      % Eosinophils 0      % Basophils 0      % Immature Granulocytes 2      NRBCs per 100 WBC 0      Absolute Neutrophils 7.3      Absolute Lymphocytes 0.3 (*)     Absolute Monocytes 0.2      Absolute Eosinophils 0.0       Absolute Basophils 0.0      Absolute Immature Granulocytes 0.1      Absolute NRBCs 0.0     INFLUENZA A/B, RSV AND SARS-COV2 PCR - Abnormal    Influenza A PCR Negative      Influenza B PCR Negative      RSV PCR Negative      SARS CoV2 PCR Positive (*)    TROPONIN T, HIGH SENSITIVITY - Abnormal    Troponin T, High Sensitivity 29 (*)    MAGNESIUM - Normal    Magnesium 2.2     LACTIC ACID WHOLE BLOOD WITH 1X REPEAT IN 2 HR WHEN >2 - Normal    Lactic Acid, Initial 1.6     ROUTINE UA WITH MICROSCOPIC REFLEX TO CULTURE   BLOOD CULTURE   BLOOD CULTURE        RADIOLOGY:     Reviewed all pertinent imaging. Please see official radiology report.  Chest XR,  PA & LAT   Final Result   IMPRESSION: Stable dual-lead left-sided cardiac conduction device. Left lower lobar posterior basilar opacities suspicious for pneumonic infiltrates. The right lung is clear. No pleural effusion. Normal heart size.      CT Head w/o Contrast   Final Result   IMPRESSION:   1.  No acute intracranial process.   2.  Ventriculomegaly somewhat out of proportion to the degree of parenchymal volume loss, which can be seen in the setting of normal pressure hydrocephalus. Correlate clinically.   3.  Brain atrophy and sequela of chronic microvascular ischemia.                      Cardiac Device Check - Inpatient    (Results Pending)        EKG:     EKG #1  Sinus rhythm.  Poor anterior progression in V2.    Time:600505    Ventricular rate 71 bmp  Axis normal  WV interval 206 ms  QRS duration 86 ms  QT//454 ms    Compared to previous EKG on February 24, 2024 poor anterior progression in V2 is new.  I have independently reviewed and interpreted the EKG(s) documented above.      PROCEDURES:     Procedures      I, Ange Cassidy, am serving as a scribe to document services personally performed by Dr. Myles based on my observation and the provider's statements to me. I, Ivanna Myles MD attest that Ange Cassidy is acting in a scribe capacity, has observed my  performance of the services and has documented them in accordance with my direction.    Ivanna Myles M.D.  Emergency Medicine  [facility] EMERGENCY DEPARTMENT  [facility] [facility] [id number] [phone]  Dept: [phone]      Ivanna Myles MD  12/26/24 [id number]

## 2024-12-26 NOTE — ED TRIAGE NOTES
Patient arrives by private car for evaluation of generalized weakness, cough and vomiting since 12/24/2024.  Reports he rolled out of bed this am, family found him on the floor.  Denies LOC.

## 2024-12-26 NOTE — PHARMACY-ADMISSION MEDICATION HISTORY
Pharmacist Admission Medication History    Admission medication history is complete. The information provided in this note is only as accurate as the sources available at the time of the update.    Information Source(s): Family member and CareEverywhere/SureScripts via in-person    Pertinent Information: patient's wife states only took 1 dose of carbidopa-levodopa this morning, no other medications since yesterday.     Changes made to PTA medication list:  Added: ocuvite  Deleted: None  Changed: None    Allergies reviewed with patient and updates made in EHR: yes    Medication History Completed By: STUART MULLINS RPH 12/26/2024 12:15 PM    PTA Med List   Medication Sig Last Dose/Taking    acetaminophen (TYLENOL) 500 MG tablet Take 1-2 tablets (500-1,000 mg) by mouth every 8 hours as needed for fever or pain. 12/25/2024 Morning    carbidopa-levodopa (SINEMET)  MG tablet Take 2 tablets by mouth 4 times daily 12/26/2024 Morning    cholecalciferol (VITAMIN D-1000 MAX ST) 25 MCG (1000 UT) TABS Take 2,000 Units by mouth daily. 12/25/2024 Morning    levothyroxine (SYNTHROID/LEVOTHROID) 112 MCG tablet Take 1 tablet (112 mcg) by mouth daily. 12/25/2024 Morning    multivitamin (OCUVITE) TABS tablet Take 2 tablets by mouth daily. 12/25/2024 Morning    multivitamin w/minerals (MULTI-VITAMIN) tablet Take 1 tablet by mouth daily. 12/25/2024 Morning    sertraline (ZOLOFT) 100 MG tablet Take 1 tablet (100 mg) by mouth daily. 12/25/2024 Morning

## 2024-12-26 NOTE — H&P
"[facility]    History and Physical  Hospitalist       Date of Admission:  12/26/2024    Assessment & Plan   89 year old male with past medical hx of parkinson's, who presents with cough, weakness and falls.     Summary:    Principal Problem:    Infection due to 2019 novel coronavirus  Active Problems:    Cardiac pacemaker in situ    Generalized weakness    Pneumonia of left lower lobe due to infectious organism    History of Parkinson disease    Delirium    Parkinson disease (H)    Recurrent Falls    Plan:Will start Paxlovid, get PT eval, may need TCU on discharge.  Started on Antibiotics in ER -- will stop once symptoms improving.  Is full code, family will dicuss it with him.  Will do pacemaker check given recurrent falls -- but suspect it is related to weakness (has a Medtronic device)    DVT Prophylaxis: Lovenox 40 mg subcutaneous daily  Code Status: Full Code    Disposition: Expected discharge in 3 days, may need TCU at discharge.     Yonny Roca MD  Pager: [phone]  Cell Phone:  [phone]     Primary Care Physician   Tatyana Valdes    Chief Complaint   Cough, weakness    History is obtained from Patient and family    History of Present Illness    89 year old male who presents with wife and daughter by walk-in for evaluation of generalized weakness, cough and a fall.      Per daughter, the patient lives indefinitely with his wife and usually get around okay. Yesterday, patient did not feel good enough to go to their family's [facility] and stayed home. Patient needed help with moving around, getting dressed, and care. He also vomited multiple times yesterday.      Patient endorses weakness with both upper and lower extremities. Wife states, \"he is more confused than normal\". This morning, wife found patient on the floor and he was struggling to get up. She then called her daughter and son-in-law to help patient up.      Per wife, the patient has been coughing up a lot " "of phlegm. He also has a pace maker. Patient \"so weak that he is not able to do anything\". Patient is not on any blood thinners. He is currently incontinence.      Denies any other pain or chest pressure. No other complaints at this time.     PAST MEDICAL HISTORY    Past Medical History:   Diagnosis Date    Cancer (H)     prostate    Cardiac pacemaker in situ 11/10/2023    Medtronic dual-chamber implanted 6/30/2023 (I)    Covid 19 Infection -- Positive 12/26/24, symptomatic     Disease of thyroid gland     hypothyroid    Hearing loss     Kidney stones 03/17/2015    this is third episode of stones    Macular degeneration     Nephrolithiasis 01/01/2015    right sided/ureteroscopic removal    Parkinson's disease (H)     Postural dizziness with presyncope     Prostate cancer (H)     Sinus bradycardia 08/03/2021    Status post cystoscopy 03/01/2015    with ureteroscopy and stone removal    Urinary incontinence         PAST SURGICAL HISTORY    Past Surgical History:   Procedure Laterality Date    CYSTOURETHROSCOPY      Description: Cystoscopy (Diagnostic);  Recorded: 11/07/2008;    CYSTOURETHROSCOPY      Right stent exchange and stone extraction    EP PACEMAKER  06/30/2023    EYE SURGERY Right     cataract removed    INSERT / REPLACE / REMOVE PROSTHESIS URETHRAL SPHINCTER N/A 09/21/2016    Procedure:  REPLACEMENT OF ARTIFICIAL URINARY SPHINCTER;  Surgeon: Stevie Braxton MD;  Location: [facility];  Service:     OTHER SURGICAL HISTORY      artificial urethral sphincter    ZZC REMV PROSTATE,RETROPUB,RADICAL      Description: Prostatectomy Retropubic Radical With Nerve Sparing;  Recorded: 11/10/2009;  Comments: 2001 artificial sphinter placed        Prior to Admission Medications   Prior to Admission Medications   Prescriptions Last Dose Informant Patient Reported? Taking?   acetaminophen (TYLENOL) 500 MG tablet 12/25/2024 Morning  No Yes   Sig: Take 1-2 tablets (500-1,000 mg) by mouth every 8 hours as needed " for fever or pain.   carbidopa-levodopa (SINEMET)  MG tablet 12/26/2024 Morning  Yes Yes   Sig: Take 2 tablets by mouth 4 times daily   cholecalciferol (VITAMIN D-1000 MAX ST) 25 MCG (1000 UT) TABS 12/25/2024 Morning  Yes Yes   Sig: Take 2,000 Units by mouth daily.   levothyroxine (SYNTHROID/LEVOTHROID) 112 MCG tablet 12/25/2024 Morning  No Yes   Sig: Take 1 tablet (112 mcg) by mouth daily.   multivitamin (OCUVITE) TABS tablet 12/25/2024 Morning  Yes Yes   Sig: Take 2 tablets by mouth daily.   multivitamin w/minerals (MULTI-VITAMIN) tablet 12/25/2024 Morning  Yes Yes   Sig: Take 1 tablet by mouth daily.   sertraline (ZOLOFT) 100 MG tablet 12/25/2024 Morning  No Yes   Sig: Take 1 tablet (100 mg) by mouth daily.      Facility-Administered Medications: None     Allergies   No Known Allergies    SOCIAL HISTORY    Social History     Social History Narrative    Not on file      Social History     Tobacco Use    Smoking status: Never    Smokeless tobacco: Never   Substance Use Topics    Alcohol use: Yes     Alcohol/week: 1.0 standard drink of alcohol     Comment: Alcoholic Drinks/day: one beer most days    Drug use: No        FAMILY HISTORY    Family History   Problem Relation Age of Onset    Heart Disease Mother     Cancer Brother     No Known Problems Father     Leukemia Brother     Cancer Brother         Review of Systems   The 10 point Review of Systems is negative other than noted in the HPI or here.  Wife notes patient if mildly forgetful at baseline, worse today.       PHYSICAL EXAM     Temp: 97.7  F (36.5  C) Temp src: Tympanic BP: 134/70 Pulse: 70   Resp: 20 SpO2: 93 % O2 Device: None (Room air)    Vital Signs with Ranges  Temp:  [97.7  F (36.5  C)] 97.7  F (36.5  C)  Pulse:  [69-74] 70  Resp:  [16-30] 20  BP: (108-144)/(57-76) 134/70  SpO2:  [91 %-98 %] 93 %  128 lbs 0 oz    Constitutional: Awake, alert, cooperative, no apparent distress.  Eyes: Conjunctiva and pupils examined and normal.  HEENT: Moist  mucous membranes, normal dentition.  Respiratory: slight crackles at left base  Cardiovascular: Regular rate and rhythm, normal S1 and S2, and no murmur noted, no carotid bruits.  Trace bilateral ankle edema.   GI: Soft, non-distended, non-tender, normal bowel sounds.  Lymph/Hematologic: No anterior cervical, supraclavicular or axillary adenopathy.  Skin: No rashes, no cyanosis.   Musculoskeletal: No joint swelling, erythema or tenderness.  Neurologic: Alert, Ox2 (thought the date was Dec 2, even though he knew we just had [place]), Cranial nerves 2-12 intact, no focal weakness or numbness but generalized weakness  Psychiatric:  No obvious anxiety or depression.    Data   Data reviewed today:  I personally reviewed no images or EKG's today.  Recent Labs   Lab 12/26/24  1144   WBC 7.9   HGB 11.7*   MCV 98   *      POTASSIUM 4.8   CHLORIDE 99   CO2 27   BUN 30.3*   CR 0.90   ANIONGAP 10   TAMMY 9.4   *       Imaging:  Recent Results (from the past 24 hours)   CT Head w/o Contrast    Narrative    EXAM: CT HEAD W/O CONTRAST  LOCATION: [facility]  DATE: 12/26/2024    INDICATION: Fall.  COMPARISON: 2/22/2024.  TECHNIQUE: Routine CT Head without IV contrast. Multiplanar reformats. Dose reduction techniques were used.    FINDINGS:  INTRACRANIAL CONTENTS: No intracranial hemorrhage, extraaxial collection, or mass effect.  No CT evidence of acute infarct. Chronic bilateral cerebellar lacunar infarcts. Moderate presumed chronic small vessel ischemic changes. Ventriculomegaly   disproportionate to the degree of volume loss. Correlate for normal pressure hydrocephalus.     VISUALIZED ORBITS/SINUSES/MASTOIDS: No intraorbital abnormality. Mild mucosal thickening scattered about the paranasal sinuses. No middle ear or mastoid effusion.    BONES/SOFT TISSUES: No acute abnormality.      Impression    IMPRESSION:  1.  No acute intracranial process.  2.  Ventriculomegaly somewhat out of  proportion to the degree of parenchymal volume loss, which can be seen in the setting of normal pressure hydrocephalus. Correlate clinically.  3.  Brain atrophy and sequela of chronic microvascular ischemia.               Chest XR,  PA & LAT    Narrative    EXAM: XR CHEST 2 VIEWS  LOCATION: [facility]  DATE: 12/26/2024    INDICATION: cough  COMPARISON: Chest x-ray 7/1/2023      Impression    IMPRESSION: Stable dual-lead left-sided cardiac conduction device. Left lower lobar posterior basilar opacities suspicious for pneumonic infiltrates. The right lung is clear. No pleural effusion. Normal heart size.

## 2024-12-26 NOTE — ED NOTES
"[facility] ED Handoff Report    ED Chief Complaint: Weakness, Fall    ED Diagnosis:  (U07.1) COVID-19 virus infection  Comment: sx x 2 days  Plan: Assess and manage    (R53.1) Generalized weakness  Comment: due it Covid infection  Plan: PT, fluids, treat pneumonia and Covid    (Z86.69) History of Parkinson disease  Comment:   Plan:     (Z95.0) Cardiac pacemaker in situ  Comment:   Plan:     (J18.9) Pneumonia of left lower lobe due to infectious organism  Comment:   Plan: continued abx treatment       PMH:    Past Medical History:   Diagnosis Date    Cancer (H)     prostate    Cardiac pacemaker in situ 11/10/2023    Medtronic dual-chamber implanted 6/30/2023 (I)    Disease of thyroid gland     hypothyroid    Hearing loss     Kidney stones 03/17/2015    this is third episode of stones    Macular degeneration     Nephrolithiasis 01/01/2015    right sided/ureteroscopic removal    Parkinson's disease (H)     Postural dizziness with presyncope     Prostate cancer (H)     Sinus bradycardia 08/03/2021    Status post cystoscopy 03/01/2015    with ureteroscopy and stone removal    Urinary incontinence         Code Status:  No Order     Falls Risk: Yes Band: Applied    Current Living Situation/Residence: lives with a significant other     Elimination Status: Continent: Yes     Activity Level: 1-2 assist    Patients Preferred Language:  English     Needed: No    Vital Signs:  /70   Pulse 70   Temp 97.7  F (36.5  C) (Tympanic)   Resp 20   Ht 1.727 m (5' 8\")   Wt 58.1 kg (128 lb)   SpO2 93%   BMI 19.46 kg/m       Cardiac Rhythm: NSR    Pain Score: 0/10    Is the Patient Confused:  No    Last Food or Drink: 12/26/24 at 1600    Focused Assessment:  Patient with generalized weakness, infrequent cough. He is AO4. Covid+ with s/s starting 2 days. He is afebrile, denies SOB, denies CP or general pain. Endorses mild nausea. Has Medtronic ICD in place. Uses a walker/wheelchair to get around. Has need " 1-2 assist recently. PERRLA. +2 pulses. LLL diminished, lungs clear bilateral. NSR.    Tests Performed: Done: Labs and Imaging    Treatments Provided:  Rocephin, Zithromax, 0.9% NS fluid    Family Dynamics/Concerns: No    Family Updated On Visitor Policy: Yes    Plan of Care Communicated to Family: Yes    Who Was Updated about Plan of Care: Ramiro - TAWNYA, Megan - daughter    Belongings Checklist Done and Signed by Patient: Yes    Medications sent with patient:     Covid: symptomatic, positive    Additional Information:     RN: Antelmo Burr RN   12/26/2024 4:01 PM

## 2024-12-27 ENCOUNTER — APPOINTMENT (OUTPATIENT)
Dept: PHYSICAL THERAPY | Facility: HOSPITAL | Age: 89
DRG: 177 | End: 2024-12-27
Attending: INTERNAL MEDICINE
Payer: COMMERCIAL

## 2024-12-27 LAB
MDC_IDC_EPISODE_DTM: NORMAL
MDC_IDC_EPISODE_DURATION: 3 S
MDC_IDC_EPISODE_ID: 2
MDC_IDC_EPISODE_TYPE: NORMAL
MDC_IDC_LEAD_CONNECTION_STATUS: NORMAL
MDC_IDC_LEAD_IMPLANT_DT: NORMAL
MDC_IDC_LEAD_LOCATION: NORMAL
MDC_IDC_LEAD_LOCATION_DETAIL_1: NORMAL
MDC_IDC_LEAD_MFG: NORMAL
MDC_IDC_LEAD_MODEL: NORMAL
MDC_IDC_LEAD_POLARITY_TYPE: NORMAL
MDC_IDC_LEAD_SERIAL: NORMAL
MDC_IDC_MSMT_BATTERY_DTM: NORMAL
MDC_IDC_MSMT_BATTERY_DTM: NORMAL
MDC_IDC_MSMT_BATTERY_REMAINING_LONGEVITY: 110 MO
MDC_IDC_MSMT_BATTERY_REMAINING_LONGEVITY: 111 MO
MDC_IDC_MSMT_BATTERY_RRT_TRIGGER: 2.62
MDC_IDC_MSMT_BATTERY_RRT_TRIGGER: 2.62
MDC_IDC_MSMT_BATTERY_STATUS: NORMAL
MDC_IDC_MSMT_BATTERY_STATUS: NORMAL
MDC_IDC_MSMT_BATTERY_VOLTAGE: 3 V
MDC_IDC_MSMT_BATTERY_VOLTAGE: 3.01 V
MDC_IDC_MSMT_LEADCHNL_RA_IMPEDANCE_VALUE: 380 OHM
MDC_IDC_MSMT_LEADCHNL_RA_IMPEDANCE_VALUE: 380 OHM
MDC_IDC_MSMT_LEADCHNL_RA_IMPEDANCE_VALUE: 532 OHM
MDC_IDC_MSMT_LEADCHNL_RA_IMPEDANCE_VALUE: 551 OHM
MDC_IDC_MSMT_LEADCHNL_RA_PACING_THRESHOLD_AMPLITUDE: 1.38 V
MDC_IDC_MSMT_LEADCHNL_RA_PACING_THRESHOLD_AMPLITUDE: 1.5 V
MDC_IDC_MSMT_LEADCHNL_RA_PACING_THRESHOLD_PULSEWIDTH: 0.4 MS
MDC_IDC_MSMT_LEADCHNL_RA_PACING_THRESHOLD_PULSEWIDTH: 0.4 MS
MDC_IDC_MSMT_LEADCHNL_RA_SENSING_INTR_AMPL: 1.12 MV
MDC_IDC_MSMT_LEADCHNL_RA_SENSING_INTR_AMPL: 1.12 MV
MDC_IDC_MSMT_LEADCHNL_RA_SENSING_INTR_AMPL: 2 MV
MDC_IDC_MSMT_LEADCHNL_RA_SENSING_INTR_AMPL: 2 MV
MDC_IDC_MSMT_LEADCHNL_RV_IMPEDANCE_VALUE: 361 OHM
MDC_IDC_MSMT_LEADCHNL_RV_IMPEDANCE_VALUE: 380 OHM
MDC_IDC_MSMT_LEADCHNL_RV_IMPEDANCE_VALUE: 475 OHM
MDC_IDC_MSMT_LEADCHNL_RV_IMPEDANCE_VALUE: 494 OHM
MDC_IDC_MSMT_LEADCHNL_RV_PACING_THRESHOLD_AMPLITUDE: 0.75 V
MDC_IDC_MSMT_LEADCHNL_RV_PACING_THRESHOLD_AMPLITUDE: 0.75 V
MDC_IDC_MSMT_LEADCHNL_RV_PACING_THRESHOLD_PULSEWIDTH: 0.4 MS
MDC_IDC_MSMT_LEADCHNL_RV_PACING_THRESHOLD_PULSEWIDTH: 0.4 MS
MDC_IDC_MSMT_LEADCHNL_RV_SENSING_INTR_AMPL: 5.25 MV
MDC_IDC_MSMT_LEADCHNL_RV_SENSING_INTR_AMPL: 5.25 MV
MDC_IDC_MSMT_LEADCHNL_RV_SENSING_INTR_AMPL: 6.12 MV
MDC_IDC_MSMT_LEADCHNL_RV_SENSING_INTR_AMPL: 6.12 MV
MDC_IDC_PG_IMPLANT_DTM: NORMAL
MDC_IDC_PG_IMPLANT_DTM: NORMAL
MDC_IDC_PG_MFG: NORMAL
MDC_IDC_PG_MFG: NORMAL
MDC_IDC_PG_MODEL: NORMAL
MDC_IDC_PG_MODEL: NORMAL
MDC_IDC_PG_SERIAL: NORMAL
MDC_IDC_PG_SERIAL: NORMAL
MDC_IDC_PG_TYPE: NORMAL
MDC_IDC_PG_TYPE: NORMAL
MDC_IDC_SESS_CLINIC_NAME: NORMAL
MDC_IDC_SESS_CLINIC_NAME: NORMAL
MDC_IDC_SESS_DTM: NORMAL
MDC_IDC_SESS_DTM: NORMAL
MDC_IDC_SESS_TYPE: NORMAL
MDC_IDC_SESS_TYPE: NORMAL
MDC_IDC_SET_BRADY_AT_MODE_SWITCH_RATE: 150 {BEATS}/MIN
MDC_IDC_SET_BRADY_AT_MODE_SWITCH_RATE: 150 {BEATS}/MIN
MDC_IDC_SET_BRADY_HYSTRATE: NORMAL
MDC_IDC_SET_BRADY_HYSTRATE: NORMAL
MDC_IDC_SET_BRADY_LOWRATE: 70 {BEATS}/MIN
MDC_IDC_SET_BRADY_LOWRATE: 70 {BEATS}/MIN
MDC_IDC_SET_BRADY_MAX_SENSOR_RATE: 120 {BEATS}/MIN
MDC_IDC_SET_BRADY_MAX_SENSOR_RATE: 120 {BEATS}/MIN
MDC_IDC_SET_BRADY_MAX_TRACKING_RATE: 120 {BEATS}/MIN
MDC_IDC_SET_BRADY_MAX_TRACKING_RATE: 120 {BEATS}/MIN
MDC_IDC_SET_BRADY_MODE: NORMAL
MDC_IDC_SET_BRADY_MODE: NORMAL
MDC_IDC_SET_BRADY_PAV_DELAY_LOW: 180 MS
MDC_IDC_SET_BRADY_PAV_DELAY_LOW: 180 MS
MDC_IDC_SET_BRADY_SAV_DELAY_LOW: 150 MS
MDC_IDC_SET_BRADY_SAV_DELAY_LOW: 150 MS
MDC_IDC_SET_LEADCHNL_RA_PACING_AMPLITUDE: 3 V
MDC_IDC_SET_LEADCHNL_RA_PACING_AMPLITUDE: 3 V
MDC_IDC_SET_LEADCHNL_RA_PACING_ANODE_ELECTRODE_1: NORMAL
MDC_IDC_SET_LEADCHNL_RA_PACING_ANODE_ELECTRODE_1: NORMAL
MDC_IDC_SET_LEADCHNL_RA_PACING_ANODE_LOCATION_1: NORMAL
MDC_IDC_SET_LEADCHNL_RA_PACING_ANODE_LOCATION_1: NORMAL
MDC_IDC_SET_LEADCHNL_RA_PACING_CAPTURE_MODE: NORMAL
MDC_IDC_SET_LEADCHNL_RA_PACING_CAPTURE_MODE: NORMAL
MDC_IDC_SET_LEADCHNL_RA_PACING_CATHODE_ELECTRODE_1: NORMAL
MDC_IDC_SET_LEADCHNL_RA_PACING_CATHODE_ELECTRODE_1: NORMAL
MDC_IDC_SET_LEADCHNL_RA_PACING_CATHODE_LOCATION_1: NORMAL
MDC_IDC_SET_LEADCHNL_RA_PACING_CATHODE_LOCATION_1: NORMAL
MDC_IDC_SET_LEADCHNL_RA_PACING_POLARITY: NORMAL
MDC_IDC_SET_LEADCHNL_RA_PACING_POLARITY: NORMAL
MDC_IDC_SET_LEADCHNL_RA_PACING_PULSEWIDTH: 0.4 MS
MDC_IDC_SET_LEADCHNL_RA_PACING_PULSEWIDTH: 0.4 MS
MDC_IDC_SET_LEADCHNL_RA_SENSING_ANODE_ELECTRODE_1: NORMAL
MDC_IDC_SET_LEADCHNL_RA_SENSING_ANODE_ELECTRODE_1: NORMAL
MDC_IDC_SET_LEADCHNL_RA_SENSING_ANODE_LOCATION_1: NORMAL
MDC_IDC_SET_LEADCHNL_RA_SENSING_ANODE_LOCATION_1: NORMAL
MDC_IDC_SET_LEADCHNL_RA_SENSING_CATHODE_ELECTRODE_1: NORMAL
MDC_IDC_SET_LEADCHNL_RA_SENSING_CATHODE_ELECTRODE_1: NORMAL
MDC_IDC_SET_LEADCHNL_RA_SENSING_CATHODE_LOCATION_1: NORMAL
MDC_IDC_SET_LEADCHNL_RA_SENSING_CATHODE_LOCATION_1: NORMAL
MDC_IDC_SET_LEADCHNL_RA_SENSING_POLARITY: NORMAL
MDC_IDC_SET_LEADCHNL_RA_SENSING_POLARITY: NORMAL
MDC_IDC_SET_LEADCHNL_RA_SENSING_SENSITIVITY: 0.3 MV
MDC_IDC_SET_LEADCHNL_RA_SENSING_SENSITIVITY: 0.3 MV
MDC_IDC_SET_LEADCHNL_RV_PACING_AMPLITUDE: 1.5 V
MDC_IDC_SET_LEADCHNL_RV_PACING_AMPLITUDE: 1.5 V
MDC_IDC_SET_LEADCHNL_RV_PACING_ANODE_ELECTRODE_1: NORMAL
MDC_IDC_SET_LEADCHNL_RV_PACING_ANODE_ELECTRODE_1: NORMAL
MDC_IDC_SET_LEADCHNL_RV_PACING_ANODE_LOCATION_1: NORMAL
MDC_IDC_SET_LEADCHNL_RV_PACING_ANODE_LOCATION_1: NORMAL
MDC_IDC_SET_LEADCHNL_RV_PACING_CAPTURE_MODE: NORMAL
MDC_IDC_SET_LEADCHNL_RV_PACING_CAPTURE_MODE: NORMAL
MDC_IDC_SET_LEADCHNL_RV_PACING_CATHODE_ELECTRODE_1: NORMAL
MDC_IDC_SET_LEADCHNL_RV_PACING_CATHODE_ELECTRODE_1: NORMAL
MDC_IDC_SET_LEADCHNL_RV_PACING_CATHODE_LOCATION_1: NORMAL
MDC_IDC_SET_LEADCHNL_RV_PACING_CATHODE_LOCATION_1: NORMAL
MDC_IDC_SET_LEADCHNL_RV_PACING_POLARITY: NORMAL
MDC_IDC_SET_LEADCHNL_RV_PACING_POLARITY: NORMAL
MDC_IDC_SET_LEADCHNL_RV_PACING_PULSEWIDTH: 0.4 MS
MDC_IDC_SET_LEADCHNL_RV_PACING_PULSEWIDTH: 0.4 MS
MDC_IDC_SET_LEADCHNL_RV_SENSING_ANODE_ELECTRODE_1: NORMAL
MDC_IDC_SET_LEADCHNL_RV_SENSING_ANODE_ELECTRODE_1: NORMAL
MDC_IDC_SET_LEADCHNL_RV_SENSING_ANODE_LOCATION_1: NORMAL
MDC_IDC_SET_LEADCHNL_RV_SENSING_ANODE_LOCATION_1: NORMAL
MDC_IDC_SET_LEADCHNL_RV_SENSING_CATHODE_ELECTRODE_1: NORMAL
MDC_IDC_SET_LEADCHNL_RV_SENSING_CATHODE_ELECTRODE_1: NORMAL
MDC_IDC_SET_LEADCHNL_RV_SENSING_CATHODE_LOCATION_1: NORMAL
MDC_IDC_SET_LEADCHNL_RV_SENSING_CATHODE_LOCATION_1: NORMAL
MDC_IDC_SET_LEADCHNL_RV_SENSING_POLARITY: NORMAL
MDC_IDC_SET_LEADCHNL_RV_SENSING_POLARITY: NORMAL
MDC_IDC_SET_LEADCHNL_RV_SENSING_SENSITIVITY: 0.9 MV
MDC_IDC_SET_LEADCHNL_RV_SENSING_SENSITIVITY: 0.9 MV
MDC_IDC_SET_ZONE_DETECTION_INTERVAL: 330 MS
MDC_IDC_SET_ZONE_DETECTION_INTERVAL: 330 MS
MDC_IDC_SET_ZONE_DETECTION_INTERVAL: 400 MS
MDC_IDC_SET_ZONE_DETECTION_INTERVAL: 400 MS
MDC_IDC_SET_ZONE_STATUS: NORMAL
MDC_IDC_SET_ZONE_TYPE: NORMAL
MDC_IDC_SET_ZONE_VENDOR_TYPE: NORMAL
MDC_IDC_STAT_AT_BURDEN_PERCENT: 0 %
MDC_IDC_STAT_AT_BURDEN_PERCENT: 0 %
MDC_IDC_STAT_AT_DTM_END: NORMAL
MDC_IDC_STAT_AT_DTM_END: NORMAL
MDC_IDC_STAT_AT_DTM_START: NORMAL
MDC_IDC_STAT_AT_DTM_START: NORMAL
MDC_IDC_STAT_BRADY_AP_VP_PERCENT: 40.22 %
MDC_IDC_STAT_BRADY_AP_VP_PERCENT: 44.38 %
MDC_IDC_STAT_BRADY_AP_VS_PERCENT: 28.74 %
MDC_IDC_STAT_BRADY_AP_VS_PERCENT: 32.04 %
MDC_IDC_STAT_BRADY_AS_VP_PERCENT: 13.87 %
MDC_IDC_STAT_BRADY_AS_VP_PERCENT: 9.84 %
MDC_IDC_STAT_BRADY_AS_VS_PERCENT: 21.19 %
MDC_IDC_STAT_BRADY_AS_VS_PERCENT: 9.72 %
MDC_IDC_STAT_BRADY_DTM_END: NORMAL
MDC_IDC_STAT_BRADY_DTM_END: NORMAL
MDC_IDC_STAT_BRADY_DTM_START: NORMAL
MDC_IDC_STAT_BRADY_DTM_START: NORMAL
MDC_IDC_STAT_BRADY_RA_PERCENT_PACED: 68.17 %
MDC_IDC_STAT_BRADY_RA_PERCENT_PACED: 74.92 %
MDC_IDC_STAT_BRADY_RV_PERCENT_PACED: 50.06 %
MDC_IDC_STAT_BRADY_RV_PERCENT_PACED: 58.25 %
MDC_IDC_STAT_EPISODE_RECENT_COUNT: 0
MDC_IDC_STAT_EPISODE_RECENT_COUNT: 1
MDC_IDC_STAT_EPISODE_RECENT_COUNT_DTM_END: NORMAL
MDC_IDC_STAT_EPISODE_RECENT_COUNT_DTM_START: NORMAL
MDC_IDC_STAT_EPISODE_TOTAL_COUNT: 0
MDC_IDC_STAT_EPISODE_TOTAL_COUNT: 1
MDC_IDC_STAT_EPISODE_TOTAL_COUNT_DTM_END: NORMAL
MDC_IDC_STAT_EPISODE_TOTAL_COUNT_DTM_START: NORMAL
MDC_IDC_STAT_EPISODE_TYPE: NORMAL
MDC_IDC_STAT_TACHYTHERAPY_RECENT_DTM_END: NORMAL
MDC_IDC_STAT_TACHYTHERAPY_RECENT_DTM_END: NORMAL
MDC_IDC_STAT_TACHYTHERAPY_RECENT_DTM_START: NORMAL
MDC_IDC_STAT_TACHYTHERAPY_RECENT_DTM_START: NORMAL
MDC_IDC_STAT_TACHYTHERAPY_TOTAL_DTM_END: NORMAL
MDC_IDC_STAT_TACHYTHERAPY_TOTAL_DTM_END: NORMAL
MDC_IDC_STAT_TACHYTHERAPY_TOTAL_DTM_START: NORMAL
MDC_IDC_STAT_TACHYTHERAPY_TOTAL_DTM_START: NORMAL

## 2024-12-27 PROCEDURE — 97162 PT EVAL MOD COMPLEX 30 MIN: CPT | Mod: GP

## 2024-12-27 PROCEDURE — 93296 REM INTERROG EVL PM/IDS: CPT | Performed by: INTERNAL MEDICINE

## 2024-12-27 PROCEDURE — 250N000011 HC RX IP 250 OP 636: Performed by: INTERNAL MEDICINE

## 2024-12-27 PROCEDURE — 99232 SBSQ HOSP IP/OBS MODERATE 35: CPT | Performed by: INTERNAL MEDICINE

## 2024-12-27 PROCEDURE — 97530 THERAPEUTIC ACTIVITIES: CPT | Mod: GP

## 2024-12-27 PROCEDURE — 250N000013 HC RX MED GY IP 250 OP 250 PS 637: Performed by: INTERNAL MEDICINE

## 2024-12-27 PROCEDURE — 258N000003 HC RX IP 258 OP 636: Performed by: INTERNAL MEDICINE

## 2024-12-27 PROCEDURE — 120N000001 HC R&B MED SURG/OB

## 2024-12-27 PROCEDURE — 93294 REM INTERROG EVL PM/LDLS PM: CPT | Performed by: INTERNAL MEDICINE

## 2024-12-27 RX ORDER — AZITHROMYCIN 250 MG/1
250 TABLET, FILM COATED ORAL DAILY
Status: COMPLETED | OUTPATIENT
Start: 2024-12-27 | End: 2024-12-30

## 2024-12-27 RX ORDER — ENOXAPARIN SODIUM 100 MG/ML
40 INJECTION SUBCUTANEOUS EVERY 24 HOURS
Status: DISCONTINUED | OUTPATIENT
Start: 2024-12-27 | End: 2025-01-03

## 2024-12-27 RX ADMIN — SODIUM CHLORIDE: 9 INJECTION, SOLUTION INTRAVENOUS at 06:23

## 2024-12-27 RX ADMIN — NIRMATRELVIR AND RITONAVIR 3 TABLET: KIT at 08:30

## 2024-12-27 RX ADMIN — CARBIDOPA AND LEVODOPA 2 TABLET: 25; 100 TABLET ORAL at 16:14

## 2024-12-27 RX ADMIN — ACETAMINOPHEN 1000 MG: 500 TABLET ORAL at 08:31

## 2024-12-27 RX ADMIN — AZITHROMYCIN DIHYDRATE 250 MG: 250 TABLET, FILM COATED ORAL at 08:31

## 2024-12-27 RX ADMIN — Medication 2 CAPSULE: at 08:31

## 2024-12-27 RX ADMIN — LEVOTHYROXINE SODIUM 112 MCG: 0.11 TABLET ORAL at 08:31

## 2024-12-27 RX ADMIN — NIRMATRELVIR AND RITONAVIR 3 TABLET: KIT at 20:28

## 2024-12-27 RX ADMIN — CARBIDOPA AND LEVODOPA 2 TABLET: 25; 100 TABLET ORAL at 20:13

## 2024-12-27 RX ADMIN — ENOXAPARIN SODIUM 40 MG: 40 INJECTION SUBCUTANEOUS at 22:38

## 2024-12-27 RX ADMIN — CARBIDOPA AND LEVODOPA 2 TABLET: 25; 100 TABLET ORAL at 08:31

## 2024-12-27 RX ADMIN — CARBIDOPA AND LEVODOPA 2 TABLET: 25; 100 TABLET ORAL at 13:27

## 2024-12-27 RX ADMIN — SERTRALINE HYDROCHLORIDE 100 MG: 100 TABLET ORAL at 08:31

## 2024-12-27 RX ADMIN — Medication 50 MCG: at 08:31

## 2024-12-27 ASSESSMENT — ACTIVITIES OF DAILY LIVING (ADL)
ADLS_ACUITY_SCORE: 66
ADLS_ACUITY_SCORE: 66
ADLS_ACUITY_SCORE: 47
ADLS_ACUITY_SCORE: 66
ADLS_ACUITY_SCORE: 66
ADLS_ACUITY_SCORE: 47
ADLS_ACUITY_SCORE: 43
ADLS_ACUITY_SCORE: 59
ADLS_ACUITY_SCORE: 47
ADLS_ACUITY_SCORE: 66
ADLS_ACUITY_SCORE: 43
ADLS_ACUITY_SCORE: 62
ADLS_ACUITY_SCORE: 47
ADLS_ACUITY_SCORE: 47
ADLS_ACUITY_SCORE: 59
ADLS_ACUITY_SCORE: 66
ADLS_ACUITY_SCORE: 47
ADLS_ACUITY_SCORE: 59
ADLS_ACUITY_SCORE: 62
ADLS_ACUITY_SCORE: 66

## 2024-12-27 NOTE — PROGRESS NOTES
[facility]    Hospitalist Progress Note    Assessment & Plan   89 year old male who was admitted on 12/26/2024 with falls, weakness, SOB and found to be Covid positive.     Impression:   Principal Problem:    Infection due to 2019 novel coronavirus  Active Problems:    Cardiac pacemaker in situ    Generalized weakness    Pneumonia of left lower lobe due to infectious organism    History of Parkinson disease    Delirium -- improving    Parkinson disease (H)      Plan:  Continue Paxlovid.  Will stop IV fluids.  PT eval -- appears he will need TCU on discharge.  Updated family.     DVT Prophylaxis: Low Risk/Ambulatory with no VTE prophylaxis indicated, Lovenox 40 mg subcutaneous daily  Code Status: No Order    Disposition: Expected discharge to TCU probably Monday if place available.   Yonny Roca MD  Pager [phone]  Cell Phone [phone]  Text Page (7am to 6pm)    Interval History   Feels OK, breathing better, slept well.     Physical Exam   Temp: 98  F (36.7  C) Temp src: Oral BP: 137/76 Pulse: 78   Resp: 17 SpO2: 93 % O2 Device: None (Room air)    Vitals:    12/26/24 1021 12/26/24 1655   Weight: 58.1 kg (128 lb) 59 kg (130 lb 1.1 oz)     Vital Signs with Ranges  Temp:  [97.4  F (36.3  C)-98  F (36.7  C)] 98  F (36.7  C)  Pulse:  [70-79] 78  Resp:  [17-27] 17  BP: (117-168)/() 137/76  SpO2:  [86 %-95 %] 93 %  I/O last 3 completed shifts:  In: 1398 [P.O.:450; I.V.:948]  Out: -     # Pain Assessment:      12/27/2024     8:57 AM   Current Pain Score   Patient currently in pain? yes   Gomez lutz pain level was assessed and he currently denies pain.        Constitutional: Awake, alert, cooperative, no apparent distress  Respiratory: Clear to auscultation bilaterally, no crackles or wheezing  Cardiovascular: Regular rate and rhythm, normal S1 and S2, and no murmur noted  GI: Normal bowel sounds, soft, non-distended, non-tender  Extrem: No calf tenderness, no ankle  "edema  Neuro: Ox1 (date 12/24, and thought he was at the \"[place]\"), no focal motor or sensory deficits but generalized weakness    Medications   Current Facility-Administered Medications   Medication Dose Route Frequency Provider Last Rate Last Admin     Current Facility-Administered Medications   Medication Dose Route Frequency Provider Last Rate Last Admin    azithromycin (ZITHROMAX) tablet 250 mg  250 mg Oral Daily Yonny Rouse MD   250 mg at 12/27/24 0831    carbidopa-levodopa (SINEMET)  MG per tablet 2 tablet  2 tablet Oral 4x Daily Yonny Rouse MD   2 tablet at 12/27/24 1327    levothyroxine (SYNTHROID/LEVOTHROID) tablet 112 mcg  112 mcg Oral Daily Yonny Rouse MD   112 mcg at 12/27/24 0831    multivitamin  with lutein (OCUVITE WITH LUTEIN) per capsule 2 capsule  2 capsule Oral Daily Yonny Rouse MD   2 capsule at 12/27/24 0831    nirmatrelvir and ritonavir (PAXLOVID) 300 mg/100 mg therapy pack 3 tablet  3 tablet Oral BID Yonny Rouse MD   3 tablet at 12/27/24 0830    sertraline (ZOLOFT) tablet 100 mg  100 mg Oral Daily Yonny Rouse MD   100 mg at 12/27/24 0831    Vitamin D3 (CHOLECALCIFEROL) tablet 50 mcg  50 mcg Oral Daily Yonny Rouse MD   50 mcg at 12/27/24 0831       Data   Recent Labs   Lab 12/26/24  1144   WBC 7.9   HGB 11.7*   MCV 98   *      POTASSIUM 4.8   CHLORIDE 99   CO2 27   BUN 30.3*   CR 0.90   ANIONGAP 10   TAMMY 9.4   *       Imaging:   Recent Results (from the past 24 hours)   Cardiac Device Check - Remote (Standing ORD 5 count)   Result Value    Date Time Interrogation Session [id number]    Implantable Pulse Generator  Medtronic    Implantable Pulse Generator Model [id number] [place] XT  MRI    Implantable Pulse Generator Serial Number [id number]    Type Interrogation Session Remote Patient Initiated    [facility]inic Name [facility]    Implantable Pulse " Generator Type Pacemaker    Implantable Pulse Generator Implant Date 20230630    Implantable Lead  Medtronic    Implantable Lead Model 3830 SelectSecure MRI SureScan    Implantable Lead Serial Number [id number]    Implantable Lead Implant Date 20230630    Implantable Lead Polarity Type Bipolar Lead    Implantable Lead Location Detail 1 UNKNOWN    Implantable Lead Location Right Atrium    Implantable Lead Connection Status Connected    Implantable Lead  Medtronic    Implantable Lead Model 3830 SelectSecure MRI SureScan    Implantable Lead Serial Number [id number]    Implantable Lead Implant Date 20230630    Implantable Lead Polarity Type Bipolar Lead    Implantable Lead Location Detail 1 UNKNOWN    Implantable Lead Location Right Ventricle    Implantable Lead Connection Status Connected    José Antonio Setting Mode (NBG Code) [id number]    José Antonio Setting Lower Rate Limit 70    José Antonio Setting Maximum Tracking Rate 120    José Antonio Setting Maximum Sensor Rate 120    José Antonio Setting Hysterisis Rate DISABLED    José Antonio Setting MIRZA Delay Low 150    José Antonio Setting PAV Delay Low 180    José Antonio Setting AT Mode Switch Rate 150    Lead Channel Setting Sensing Polarity Bipolar    Lead Channel Setting Sensing Anode Location Right Atrium    Lead Channel Setting Sensing Anode Terminal Ring    Lead Channel Setting Sensing Cathode Location Right Atrium    Lead Channel Setting Sensing Cathode Terminal Tip    Lead Channel Setting Sensing Sensitivity 0.3    Lead Channel Setting Sensing Polarity Bipolar    Lead Channel Setting Sensing Anode Location Right Ventricle    Lead Channel Setting Sensing Anode Terminal Ring    Lead Channel Setting Sensing Cathode Location Right Ventricle    Lead Channel Setting Sensing Cathode Terminal Tip    Lead Channel Setting Sensing Sensitivity 0.9    Lead Channel Setting Pacing Polarity Bipolar    Lead Channel Setting Pacing Anode Location Right Atrium    Lead Channel Setting Pacing Anode Terminal  Ring    Lead Channel Setting Sensing Cathode Location Right Atrium    Lead Channel Setting Sensing Cathode Terminal Tip    Lead Channel Setting Pacing Pulse Width 0.4    Lead Channel Setting Pacing Amplitude 3    Lead Channel Setting Pacing Capture Mode Adaptive    Lead Channel Setting Pacing Polarity Bipolar    Lead Channel Setting Pacing Anode Location Right Ventricle    Lead Channel Setting Pacing Anode Terminal Ring    Lead Channel Setting Sensing Cathode Location Right Ventricle    Lead Channel Setting Sensing Cathode Terminal Tip    Lead Channel Setting Pacing Pulse Width 0.4    Lead Channel Setting Pacing Amplitude 1.5    Lead Channel Setting Pacing Capture Mode Adaptive    Zone Setting Type Category VF    Zone Setting Vendor Type Category V High Rate    Zone Setting Type Category VT    Zone Setting Vendor Type Category FastVT    Zone Setting Type Category VT    Zone Setting Vendor Type Category VT    Zone Setting Type Category VT    Zone Setting Vendor Type Category MonVT    Zone Setting Status Monitor    Zone Setting Detection Interval 330    Zone Setting Type Category ATRIAL_FIBRILLATION    Zone Setting Vendor Type Category FastATAF    Zone Setting Type Category AT/AF    Zone Setting Status Active    Zone Setting Detection Interval 400    Lead Channel Impedance Value 551    Lead Channel Impedance Value 380    Lead Channel Sensing Intrinsic Amplitude 2    Lead Channel Sensing Intrinsic Amplitude 2    Lead Channel Pacing Threshold Amplitude 1.5    Lead Channel Pacing Threshold Pulse Width 0.4    Lead Channel Impedance Value 494    Lead Channel Impedance Value 380    Lead Channel Sensing Intrinsic Amplitude 5.25    Lead Channel Sensing Intrinsic Amplitude 5.25    Lead Channel Pacing Threshold Amplitude 0.75    Lead Channel Pacing Threshold Pulse Width 0.4    Battery Date Time of Measurements 92121527813790    Battery Status OK    Battery RRT Trigger 2.625    Battery Remaining Longevity 111    Battery Voltage  3.01    José Antonio Statistic Date Time Start 35713199180814    José Antonio Statistic Date Time End 47112772752135    José Antonio Statistic RA Percent Paced 68.17    José Antonio Statistic RV Percent Paced 50.06    José Antonio Statistic AP  Percent 40.22    José Antonio Statistic AS  Percent 9.84    José Antonio Statistic AP VS Percent 28.74    José Antonio Statistic AS VS Percent 21.19    Atrial Tachy Statistic Date Time Start 16027141898839    Atrial Tachy Statistic Date Time End 22128584302645    Atrial Tachy Statistic AT/AF [place] Percent 0    Therapy Statistic Recent Date Time Start 20241219211613    Therapy Statistic Recent Date Time End 50935862514539    Therapy Statistic Total  Date Time Start 53935472096156    Therapy Statistic Total  Date Time End 51475680152046    Episode Statistic Recent Count 0    Episode Statistic Type Category AT/AF    Episode Statistic Recent Count 0    Episode Statistic Type Category Patient Activated    Episode Statistic Recent Count 0    Episode Statistic Type Category SVT    Episode Statistic Recent Count 0    Episode Statistic Type Category VT    Episode Statistic Recent Count 0    Episode Statistic Type Category VT    Episode Statistic Recent Date Time Start 11291362614145    Episode Statistic Recent Date Time End 64096314058019    Episode Statistic Recent Date Time Start 20241219211613    Episode Statistic Recent Date Time End 07616526833512    Episode Statistic Recent Date Time Start 88233332549516    Episode Statistic Recent Date Time End 53702073355849    Episode Statistic Recent Date Time Start 20241219211613    Episode Statistic Recent Date Time End 16498446711770    Episode Statistic Recent Date Time Start 78357827800226    Episode Statistic Recent Date Time End 72062358880374    Episode Statistic Total Count 1    Episode Statistic Type Category AT/AF    Episode Statistic Total Count 0    Episode Statistic Type Category Patient Activated    Episode Statistic Total Count 0    Episode Statistic Type Category SVT     Episode Statistic Total Count 1    Episode Statistic Type Category VT    Episode Statistic Total Count 0    Episode Statistic Type Category VT    Episode Statistic Total Date Time Start 26272944917864    Episode Statistic Total Date Time End 35751198684323    Episode Statistic Total Date Time Start 35151169646537    Episode Statistic Total Date Time End 12006131245102    Episode Statistic Total Date Time Start 35434996423411    Episode Statistic Total Date Time End 64851540651366    Episode Statistic Total Date Time Start 47658890309750    Episode Statistic Total Date Time End 85680428192859    Episode Statistic Total Date Time Start 89798912575718    Episode Statistic Total Date Time End 65237855919680    Narrative    Type: [employer] remote pacemaker transmission done at [facility].  Courtesy check.  Rep notes: N/A   Presenting rhythm: APVS 70 bpm   Battery longevity: 9 years, 3 months estimated   Lead status: Stable measurements and trends.   Atrial high rates: Since 12/19/2024 none detected.   Anticoagulant: None.  Ventricular high rates: Since 12/19/2024 none detected.   Comments: Normal device function.  Plan: Next routine remote on 4/3/2025. Jatinder, Device Specialist    Device follow up for the entirety of having the device, based on best   practices determined by [employer] and in Compliance with   Medicare guidelines. Continue remote device monitoring per patient plan.  I have reviewed and interpreted the device interrogation, settings,   programming, and encounter summary. The device is functioning within   normal device parameters. I agree with the current findings, assessment   and plan.

## 2024-12-27 NOTE — PROGRESS NOTES
[facility]    Patient is currently receiving PT HHA home care services from [facility].  and home health team have been notified of patients admission. [facility] liaison will continue to follow patient during hospital stay. If appropriate, provide home care orders to resume services at the time of discharge. If patients discharge date changes, please reach out to clinical liaison or the HUB to ensure SOC/SHUN date is still appropriate for a safe discharge plan.     Thank you,     FRANCISCO Angulo, LPN  Home Care Liaison   Cell: [phone]

## 2024-12-27 NOTE — CONSULTS
Care Management Initial Consult    General Information  Assessment completed with:  ,patient and his wifeTanya         Primary Care Provider verified and updated as needed:  yes   Readmission within the last 30 days:   no        Advance Care Planning:   no documents         Communication Assessment  Patient's communication style: spoken language (English or Bilingual)    Hearing Difficulty or Deaf: yes   Wear Glasses or Blind: yes    Cognitive  Cognitive/Neuro/Behavioral: .WDL except, orientation  Level of Consciousness: alert  Arousal Level: opens eyes spontaneously  Orientation: disoriented to, time, situation        Speech: clear, spontaneous, logical    Living Environment:   People in home:    wife   Current living Arrangements:        Able to return to prior arrangements:  will need TCU       Family/Social Support:  Care provided by: self and wife   Provides care for:       Support system:wife a 2 daughters            Description of Support System:  supportive         Current Resources:   Patient receiving home care services:  no        Community Resources:    Equipment currently used at home: wheelchair, manual, walker  Supplies currently used at home:      Employment/Financial:  Employment Status:   retired. He is a         Financial Concerns:  none identified           Does the patient's insurance plan have a 3 day qualifying hospital stay waiver?  No    Lifestyle & Psychosocial Needs:  Social Drivers of Health     Food Insecurity: Unknown (2/8/2024)    Food Insecurity     Within the past 12 months, did you worry that your food would run out before you got money to buy more?: Patient declined     Within the past 12 months, did the food you bought just not last and you didn t have money to get more?: Patient declined   Depression: At risk (5/7/2024)    PHQ-2     PHQ-2 Score: 4   Housing Stability: Unknown (2/8/2024)    Housing Stability     Do you have housing? : Patient declined     Are  you worried about losing your housing?: Patient declined   Tobacco Use: Low Risk  (11/21/2024)    Patient History     Smoking Tobacco Use: Never     Smokeless Tobacco Use: Never     Passive Exposure: Not on file   Financial Resource Strain: Unknown (2/8/2024)    Financial Resource Strain     Within the past 12 months, have you or your family members you live with been unable to get utilities (heat, electricity) when it was really needed?: Patient declined   Alcohol Use: Not on file   Transportation Needs: Unknown (2/8/2024)    Transportation Needs     Within the past 12 months, has lack of transportation kept you from medical appointments, getting your medicines, non-medical meetings or appointments, work, or from getting things that you need?: Patient declined   Physical Activity: Not on file   Interpersonal Safety: Low Risk  (12/26/2024)    Interpersonal Safety     Do you feel physically and emotionally safe where you currently live?: Yes     Within the past 12 months, have you been hit, slapped, kicked or otherwise physically hurt by someone?: No     Within the past 12 months, have you been humiliated or emotionally abused in other ways by your partner or ex-partner?: No   Stress: No Stress Concern Present (2/8/2024)    [employer] - Occupational Stress Questionnaire     Feeling of Stress : Only a little   Social Connections: Unknown (6/29/2023)    [facility]    Social Connections     Frequency of Communication with Friends and Family: Not on file   Health Literacy: Not on file       Functional Status:  Prior to admission patient needed assistance: meals, medicine, transportation. He was able to shower independently.             Mental Health Status: no concerns          Chemical Dependency Status: no concerns                Values/Beliefs:  Spiritual, Cultural Beliefs, [employer] Practices,  Values that affect care:    Unitarian             Discussed  Partnership in Safe Discharge Planning  document with patient/family: No    Additional Information:  Patient and wife live in a private [place] home. They have no home care services. Patient agrees with TCU at discharge and prefers [facility]. He is on lists. Transport TBD.    Next Steps: CM following    CINTHYA Landaverde

## 2024-12-27 NOTE — PLAN OF CARE
Problem: Adult Inpatient Plan of Care  Goal: Absence of Hospital-Acquired Illness or Injury  Intervention: Identify and Manage Fall Risk  Recent Flowsheet Documentation  Taken 12/26/2024 1800 by Sunni Santiago RN  Safety Promotion/Fall Prevention:   activity supervised   assistive device/personal items within reach   clutter free environment maintained   increased rounding and observation   increase visualization of patient   lighting adjusted   mobility aid in reach   nonskid shoes/slippers when out of bed   patient and family education   room organization consistent   safety round/check completed   toileting scheduled   treat underlying cause   Kept bed alarm on and call light within reach.  Problem: Adult Inpatient Plan of Care  Goal: Absence of Hospital-Acquired Illness or Injury  Intervention: Prevent Infection  Recent Flowsheet Documentation  Taken 12/26/2024 1800 by Sunni Santiago RN  Infection Prevention:   cohorting utilized   equipment surfaces disinfected   hand hygiene promoted   personal protective equipment utilized   rest/sleep promoted   single patient room provided   visitors restricted/screened   Kept on Special Precautions.  Scheduled PAXLOVID given.  Problem: Comorbidity Management  Goal: Maintenance of Heart Failure Symptom Control  Outcome: Progressing  Intervention: Maintain Heart Failure Management  Recent Flowsheet Documentation  Taken 12/26/2024 1800 by Sunni Santiago RN  Medication Review/Management: (mIVF)   medications reviewed   infusion initiated   Pacemaker intact. TL=427/82 ,HR=72  Problem: Pain Acute  Goal: Optimal Pain Control and Function  Outcome: Progressing  Intervention: Prevent or Manage Pain  Recent Flowsheet Documentation  Taken 12/26/2024 1800 by Sunni Santiago RN  Medication Review/Management: (mIVF)   medications reviewed   infusion initiated  Denies pain.

## 2024-12-27 NOTE — PLAN OF CARE
Problem: Fall Injury Risk  Goal: Absence of Fall and Fall-Related Injury  Outcome: Progressing     Problem: Infection  Goal: Absence of Infection Signs and Symptoms  Outcome: Progressing     Problem: Pain Acute  Goal: Optimal Pain Control and Function  Outcome: Progressing     Problem: Adult Inpatient Plan of Care  Goal: Optimal Comfort and Wellbeing  Outcome: Progressing   Goal Outcome Evaluation:      Plan of Care Reviewed With: patient    Overall Patient Progress: no changeOverall Patient Progress: no change     Patient disoriented to time and situation. Assist of 2 to bedside commode. Denies pain. Tele running NSR. VSS on RA.

## 2024-12-27 NOTE — PLAN OF CARE
Problem: Pain Acute  Goal: Optimal Pain Control and Function  Outcome: Progressing     Problem: Infection  Goal: Absence of Infection Signs and Symptoms  Outcome: Progressing     Problem: Comorbidity Management  Goal: Maintenance of Heart Failure Symptom Control  Outcome: Progressing   Goal Outcome Evaluation:      Plan of Care Reviewed With: patient    Overall Patient Progress: no change

## 2024-12-27 NOTE — PROGRESS NOTES
12/27/24 1500   Appointment Info   Signing Clinician's Name / Credentials (PT) Tala Nance, PT, DPT   Living Environment   People in Home spouse   Current Living Arrangements house   Home Accessibility no concerns   Transportation Anticipated family or friend will provide   Living Environment Comments Spouse reports no stairs within home.   Self-Care   Usual Activity Tolerance moderate   Current Activity Tolerance poor   Equipment Currently Used at Home walker, rolling;other (see comments)  (FWW inside, 4WW outside, and transport chair as needed)   Fall history within last six months yes   Number of times patient has fallen within last six months   (Multiple within last 6 months)   General Information   Onset of Illness/Injury or Date of Surgery 12/26/24   Referring Physician Yonny Rouse MD   Patient/Family Therapy Goals Statement (PT) Go to TCU   Pertinent History of Current Problem (include personal factors and/or comorbidities that impact the POC) Infection due to 2019 novel coronavirus; History of Parkinson disease   Existing Precautions/Restrictions fall   Cognition   Affect/Mental Status (Cognition) confused   Orientation Status (Cognition) oriented to;person;disoriented to;place;situation;time   Strength (Manual Muscle Testing)   Strength (Manual Muscle Testing) Deficits observed during functional mobility   Bed Mobility   Bed Mobility supine-sit-supine   Supine-Sit-Supine [place] (Bed Mobility) maximum assist (25% patient effort);1 person assist   Impairments Contributing to Impaired Bed Mobility decreased flexibility;decreased strength   Assistive Device (Bed Mobility) bed rails;draw sheet   Transfers   Transfers sit-stand transfer   Transfer Safety Concerns Noted losing balance backward   Impairments Contributing to Impaired Transfers decreased flexibility;decreased strength   Sit-Stand Transfer   Sit-Stand [place] (Transfers) moderate assist (50% patient effort);2 person assist    Assistive Device (Sit-Stand Transfers) other (see comments)  (Arm-in-arm)   Gait/Stairs (Locomotion)   [place] (Gait) unable to assess   Comment, (Gait/Stairs) Unable to assess due to rigidity and safety concerns.   Clinical Impression   Criteria for Skilled Therapeutic Intervention Yes, treatment indicated   PT Diagnosis (PT) Impaired functional mobility   Influenced by the following impairments Decreased flexibility, impaired balance/coordination, generalized weakness   Functional limitations due to impairments Bed mobility, transfers, gait   Clinical Presentation (PT Evaluation Complexity) evolving   Clinical Presentation Rationale Patient presents as medically diagnosed.   Clinical Decision Making (Complexity) moderate complexity   Planned Therapy Interventions (PT) balance training;bed mobility training;gait training;home exercise program;motor coordination training;neuromuscular re-education;patient/family education;postural re-education;ROM (range of motion);strengthening;stretching;transfer training;progressive activity/exercise;home program guidelines   Risk & Benefits of therapy have been explained evaluation/treatment results reviewed;care plan/treatment goals reviewed;patient;spouse/significant other   PT Total Evaluation Time   PT Eval, Moderate Complexity Minutes (65366) 15   Physical Therapy Goals   PT Frequency 5x/week   PT Predicted Duration/Target Date for Goal Attainment 01/03/25   PT Goals Bed Mobility;Transfers;Gait   PT: Bed Mobility Minimal assist;Supine to/from sit   PT: Transfers Minimal assist;Sit to/from stand;Assistive device  (FWW)   PT: Gait Minimal assist;Assistive device;25 feet  (FWW)   Interventions   Interventions Quick Adds Therapeutic Activity   Therapeutic Activity   Therapeutic Activities: dynamic activities to improve functional performance Minutes (71201) 15   Symptoms Noted During/After Treatment Fatigue   Treatment Detail/Skilled Intervention Patient returned  to supine due to extensor posture and rigidity on first attempt to complete bed mobility with assist of 1. With assist of 2, patient was able to transition to sitting EOB but required Max A of 1-2 and manual facilitation for hip/trunk flexion. Patient progressed to sitting EOB with SBA for balance. Facilitated sit<>stand and pivot transfer to recliner with Mod A of 2, arm-in-arm. RN updated.   PT Discharge Planning   PT Plan Bed mobility, transfers, LE ROM/strengthening   PT Discharge Recommendation (DC Rec) Transitional Care Facility   PT Rationale for DC Rec Patient required assist of 2 for bed mobility and transfers. Recommend TCU at discharge.   PT Brief overview of current status Max A of 2 supine<>sit. Mod A of 2 sit<>stand and bed<>chair.   PT Equipment Needed at Discharge walker, rolling;wheelchair;wheelchair cushion   Physical Therapy Time and Intention   Timed Code Treatment Minutes 15   Total Session Time (sum of timed and untimed services) 30
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
## 2024-12-28 LAB
ALBUMIN UR-MCNC: 20 MG/DL
APPEARANCE UR: CLEAR
BILIRUB UR QL STRIP: NEGATIVE
COLOR UR AUTO: ABNORMAL
GLUCOSE UR STRIP-MCNC: NEGATIVE MG/DL
HGB UR QL STRIP: NEGATIVE
KETONES UR STRIP-MCNC: ABNORMAL MG/DL
LEUKOCYTE ESTERASE UR QL STRIP: NEGATIVE
MUCOUS THREADS #/AREA URNS LPF: PRESENT /LPF
NITRATE UR QL: NEGATIVE
PH UR STRIP: 6.5 [PH] (ref 5–7)
RBC URINE: 1 /HPF
SP GR UR STRIP: 1.02 (ref 1–1.03)
SQUAMOUS EPITHELIAL: 1 /HPF
UROBILINOGEN UR STRIP-MCNC: <2 MG/DL
WBC URINE: 1 /HPF

## 2024-12-28 PROCEDURE — 250N000011 HC RX IP 250 OP 636: Performed by: INTERNAL MEDICINE

## 2024-12-28 PROCEDURE — 99232 SBSQ HOSP IP/OBS MODERATE 35: CPT | Performed by: INTERNAL MEDICINE

## 2024-12-28 PROCEDURE — 81001 URINALYSIS AUTO W/SCOPE: CPT | Performed by: INTERNAL MEDICINE

## 2024-12-28 PROCEDURE — 250N000013 HC RX MED GY IP 250 OP 250 PS 637: Performed by: INTERNAL MEDICINE

## 2024-12-28 PROCEDURE — 120N000001 HC R&B MED SURG/OB

## 2024-12-28 RX ORDER — HYDRALAZINE HYDROCHLORIDE 20 MG/ML
10 INJECTION INTRAMUSCULAR; INTRAVENOUS EVERY 4 HOURS PRN
Status: DISCONTINUED | OUTPATIENT
Start: 2024-12-28 | End: 2025-01-03 | Stop reason: HOSPADM

## 2024-12-28 RX ORDER — LISINOPRIL 5 MG/1
5 TABLET ORAL DAILY
Status: DISCONTINUED | OUTPATIENT
Start: 2024-12-28 | End: 2024-12-29

## 2024-12-28 RX ADMIN — CARBIDOPA AND LEVODOPA 2 TABLET: 25; 100 TABLET ORAL at 16:03

## 2024-12-28 RX ADMIN — NIRMATRELVIR AND RITONAVIR 3 TABLET: KIT at 21:04

## 2024-12-28 RX ADMIN — ACETAMINOPHEN 1000 MG: 500 TABLET ORAL at 08:08

## 2024-12-28 RX ADMIN — LISINOPRIL 5 MG: 5 TABLET ORAL at 09:47

## 2024-12-28 RX ADMIN — ENOXAPARIN SODIUM 40 MG: 40 INJECTION SUBCUTANEOUS at 21:03

## 2024-12-28 RX ADMIN — LEVOTHYROXINE SODIUM 112 MCG: 0.11 TABLET ORAL at 08:08

## 2024-12-28 RX ADMIN — NIRMATRELVIR AND RITONAVIR 3 TABLET: KIT at 08:05

## 2024-12-28 RX ADMIN — CARBIDOPA AND LEVODOPA 2 TABLET: 25; 100 TABLET ORAL at 08:08

## 2024-12-28 RX ADMIN — HYDRALAZINE HYDROCHLORIDE 10 MG: 20 INJECTION INTRAMUSCULAR; INTRAVENOUS at 08:28

## 2024-12-28 RX ADMIN — CARBIDOPA AND LEVODOPA 2 TABLET: 25; 100 TABLET ORAL at 11:51

## 2024-12-28 RX ADMIN — AZITHROMYCIN DIHYDRATE 250 MG: 250 TABLET, FILM COATED ORAL at 08:08

## 2024-12-28 RX ADMIN — SERTRALINE HYDROCHLORIDE 100 MG: 100 TABLET ORAL at 08:08

## 2024-12-28 RX ADMIN — Medication 50 MCG: at 08:08

## 2024-12-28 RX ADMIN — Medication 2 CAPSULE: at 08:05

## 2024-12-28 RX ADMIN — CARBIDOPA AND LEVODOPA 2 TABLET: 25; 100 TABLET ORAL at 21:04

## 2024-12-28 ASSESSMENT — ACTIVITIES OF DAILY LIVING (ADL)
ADLS_ACUITY_SCORE: 66
ADLS_ACUITY_SCORE: 67
ADLS_ACUITY_SCORE: 66
ADLS_ACUITY_SCORE: 67
ADLS_ACUITY_SCORE: 66
ADLS_ACUITY_SCORE: 63
ADLS_ACUITY_SCORE: 66
ADLS_ACUITY_SCORE: 66
ADLS_ACUITY_SCORE: 70
ADLS_ACUITY_SCORE: 66
ADLS_ACUITY_SCORE: 63
ADLS_ACUITY_SCORE: 63
ADLS_ACUITY_SCORE: 66
ADLS_ACUITY_SCORE: 67
ADLS_ACUITY_SCORE: 66
ADLS_ACUITY_SCORE: 66
ADLS_ACUITY_SCORE: 70
ADLS_ACUITY_SCORE: 66

## 2024-12-28 NOTE — CONSULTS
Consulted due to SDOH concern for housing. Patient lives in a house with spouse. No housing concerns. Care Management will continue to follow patient for discharge needs.    Latricia Philip RN on 12/28/2024 at 7:57 AM    Care Management Follow Up    Length of Stay (days): 2    Expected Discharge Date: 12/29/2024     Concerns to be Addressed:       Patient plan of care discussed at interdisciplinary rounds: Yes    Anticipated Discharge Disposition:  potentially TCU              Anticipated Discharge Services: TBD   Anticipated Discharge DME:  TBD    Patient/family educated on Medicare website which has current facility and service quality ratings:    Education Provided on the Discharge Plan:    Patient/Family in Agreement with the Plan:      Referrals Placed by CM/SW:    Private pay costs discussed: Not applicable    Discussed  Partnership in Safe Discharge Planning  document with patient/family: No     Handoff Completed: No, handoff not indicated or clinically appropriate    Additional Information:  Per Rounds with MD patient will be here at least until Monday. CM will continue to monitor progression of care, review team recommendations and provide discharge planning assist as needed.       Next Steps: medical readiness and accepting TCU    Latricia Philip RN

## 2024-12-28 NOTE — PLAN OF CARE
Problem: Pain Acute  Goal: Optimal Pain Control and Function  Outcome: Progressing     Problem: Comorbidity Management  Goal: Maintenance of Heart Failure Symptom Control  Outcome: Progressing     Problem: Fall Injury Risk  Goal: Absence of Fall and Fall-Related Injury  Outcome: Progressing   Goal Outcome Evaluation:      Plan of Care Reviewed With: patient    Overall Patient Progress: no change

## 2024-12-28 NOTE — PROGRESS NOTES
[facility]    Hospitalist Progress Note    Assessment & Plan   89 year old male who was admitted on 12/26/2024 with falls, weakness, SOB and found to be Covid positive.     Impression:   Principal Problem:    Infection due to 2019 novel coronavirus    Cardiac pacemaker in situ    Generalized weakness    Pneumonia of left lower lobe due to infectious organism, bacterial versus viral    History of Parkinson disease    Delirium -- improving    Parkinson disease (H)  Hypothyroidism  Essential hypertension  MDD      Plan:    -Continue Paxlovid.  -S/p IVF, good p.o. now.  - Continue PTA dose of Sinemet, levothyroxine, lisinopril, sertraline.  - PT/OT evaluation-recommended TCU.    DVT Prophylaxis: Low Risk/Ambulatory with no VTE prophylaxis indicated, Lovenox 40 mg subcutaneous daily  Code Status: No CPR- Do NOT Intubate    Disposition: Expected discharge to TCU probably Monday if place available.   Arlene Cavazos MD  Pager [phone]  Cell Phone [phone]  Text Page (7am to 6pm)  Interval History   Was seen and examined in attendance of his daughter, with patient's permission.  He is complaining of dry cough, weakness.  No focal weakness, fever, chills, chest pain, dyspnea.    Physical Exam   Temp: 98.5  F (36.9  C) Temp src: Oral BP: (!) 156/84 Pulse: 68   Resp: 20 SpO2: 93 % O2 Device: None (Room air)    Vitals:    12/26/24 1021 12/26/24 1655   Weight: 58.1 kg (128 lb) 59 kg (130 lb 1.1 oz)     Vital Signs with Ranges  Temp:  [97.7  F (36.5  C)-98.5  F (36.9  C)] 98.5  F (36.9  C)  Pulse:  [68-70] 68  Resp:  [14-20] 20  BP: (141-192)/(75-90) 156/84  SpO2:  [92 %-94 %] 93 %  I/O last 3 completed shifts:  In: 780 [P.O.:780]  Out: -     # Pain Assessment:      12/27/2024     3:55 PM   Current Pain Score   Patient currently in pain? denies   Gomez s pain level was assessed and he currently denies pain.      Constitutional: Awake, alert, cooperative, no apparent distress  Respiratory: Clear  "to auscultation bilaterally, no crackles or wheezing  Cardiovascular: Regular rate and rhythm, normal S1 and S2, and no murmur noted  GI: Normal bowel sounds, soft, non-distended, non-tender  Extrem: No calf tenderness, no ankle edema  Neuro: Ox1 (date 12/24, and thought he was at the \"[facility]\"), no focal motor or sensory deficits but generalized weakness    Medications   Current Facility-Administered Medications   Medication Dose Route Frequency Provider Last Rate Last Admin     Current Facility-Administered Medications   Medication Dose Route Frequency Provider Last Rate Last Admin    azithromycin (ZITHROMAX) tablet 250 mg  250 mg Oral Daily Yonny Rouse MD   250 mg at 12/28/24 0808    carbidopa-levodopa (SINEMET)  MG per tablet 2 tablet  2 tablet Oral 4x Daily Yonny Rouse MD   2 tablet at 12/28/24 1151    enoxaparin ANTICOAGULANT (LOVENOX) injection 40 mg  40 mg Subcutaneous Q24H Yonny Rouse MD   40 mg at 12/27/24 2238    levothyroxine (SYNTHROID/LEVOTHROID) tablet 112 mcg  112 mcg Oral Daily Yonny Rouse MD   112 mcg at 12/28/24 0808    lisinopril (ZESTRIL) tablet 5 mg  5 mg Oral Daily Arlene Cavazos MD   5 mg at 12/28/24 0947    multivitamin  with lutein (OCUVITE WITH LUTEIN) per capsule 2 capsule  2 capsule Oral Daily Yonny Rouse MD   2 capsule at 12/28/24 0805    nirmatrelvir and ritonavir (PAXLOVID) 300 mg/100 mg therapy pack 3 tablet  3 tablet Oral BID Yonny Rouse MD   3 tablet at 12/28/24 0805    sertraline (ZOLOFT) tablet 100 mg  100 mg Oral Daily Yonny Rouse MD   100 mg at 12/28/24 0808    Vitamin D3 (CHOLECALCIFEROL) tablet 50 mcg  50 mcg Oral Daily Yonny Rouse MD   50 mcg at 12/28/24 0808     Data   Recent Labs   Lab 12/26/24  1144   WBC 7.9   HGB 11.7*   MCV 98   *      POTASSIUM 4.8   CHLORIDE 99   CO2 27   BUN 30.3*   CR 0.90   ANIONGAP 10   TAMMY 9.4   *     Imaging: "   No results found for this or any previous visit (from the past 24 hours).   This document is created with the help of Dragon dictation system. All grammatical errors are unintentional.

## 2024-12-28 NOTE — PLAN OF CARE
Problem: Adult Inpatient Plan of Care  Goal: Absence of Hospital-Acquired Illness or Injury  Outcome: Progressing  Intervention: Identify and Manage Fall Risk  Recent Flowsheet Documentation  Taken 12/27/2024 1530 by Anca Vee RN  Safety Promotion/Fall Prevention:   activity supervised   increased rounding and observation   safety round/check completed   supervised activity   toileting scheduled   nonskid shoes/slippers when out of bed   mobility aid in reach   lighting adjusted  Intervention: Prevent Skin Injury  Recent Flowsheet Documentation  Taken 12/27/2024 2100 by Anca Vee RN  Body Position: position changed independently  Taken 12/27/2024 1530 by Anca Vee RN  Body Position: position changed independently  Intervention: Prevent Infection  Recent Flowsheet Documentation  Taken 12/27/2024 1530 by Anca Vee RN  Infection Prevention:   hand hygiene promoted   equipment surfaces disinfected   personal protective equipment utilized   rest/sleep promoted   single patient room provided     Problem: Pain Acute  Goal: Optimal Pain Control and Function  Outcome: Progressing  Intervention: Prevent or Manage Pain  Recent Flowsheet Documentation  Taken 12/27/2024 1530 by Anca Vee RN  Medication Review/Management: medications reviewed     Problem: Fall Injury Risk  Goal: Absence of Fall and Fall-Related Injury  Outcome: Progressing  Intervention: Identify and Manage Contributors  Recent Flowsheet Documentation  Taken 12/27/2024 1530 by Anca Vee RN  Medication Review/Management: medications reviewed  Intervention: Promote Injury-Free Environment  Recent Flowsheet Documentation  Taken 12/27/2024 1530 by Anca Vee RN  Safety Promotion/Fall Prevention:   activity supervised   increased rounding and observation   safety round/check completed   supervised activity   toileting scheduled   nonskid shoes/slippers when out of bed   mobility aid in reach   lighting adjusted   Goal  Outcome Evaluation:       Disoriented to place, time, situation. Mateo lift. Room air. Pt denies pain. Voiding yellow urine and small bowel movement.

## 2024-12-28 NOTE — PLAN OF CARE
Problem: Fall Injury Risk  Goal: Absence of Fall and Fall-Related Injury  Outcome: Progressing     Problem: Infection  Goal: Absence of Infection Signs and Symptoms  Outcome: Progressing     Problem: Pain Acute  Goal: Optimal Pain Control and Function  Outcome: Progressing     Problem: Adult Inpatient Plan of Care  Goal: Absence of Hospital-Acquired Illness or Injury  Intervention: Prevent Skin Injury  Recent Flowsheet Documentation  Taken 12/28/2024 0000 by Mary Dominguez RN  Body Position: supine   Goal Outcome Evaluation:      Plan of Care Reviewed With: patient    Overall Patient Progress: no changeOverall Patient Progress: no change     Patient only oriented to self. Assist of 2. Denies pain. VSS on RA. Incontinent of bowel and bladder throughout shift.

## 2024-12-29 PROBLEM — M62.81 GENERALIZED MUSCLE WEAKNESS: Status: ACTIVE | Noted: 2024-12-29

## 2024-12-29 PROCEDURE — 250N000013 HC RX MED GY IP 250 OP 250 PS 637: Performed by: INTERNAL MEDICINE

## 2024-12-29 PROCEDURE — 99232 SBSQ HOSP IP/OBS MODERATE 35: CPT | Performed by: INTERNAL MEDICINE

## 2024-12-29 PROCEDURE — 250N000011 HC RX IP 250 OP 636: Performed by: INTERNAL MEDICINE

## 2024-12-29 PROCEDURE — 120N000001 HC R&B MED SURG/OB

## 2024-12-29 RX ORDER — CLONIDINE HYDROCHLORIDE 0.1 MG/1
0.1 TABLET ORAL 2 TIMES DAILY PRN
Status: DISCONTINUED | OUTPATIENT
Start: 2024-12-29 | End: 2025-01-02

## 2024-12-29 RX ORDER — LISINOPRIL 5 MG/1
5 TABLET ORAL 2 TIMES DAILY
Status: DISCONTINUED | OUTPATIENT
Start: 2024-12-29 | End: 2025-01-02

## 2024-12-29 RX ADMIN — Medication 2 CAPSULE: at 09:52

## 2024-12-29 RX ADMIN — ANORECTAL OINTMENT 2 G: 15.7; .44; 24; 20.6 OINTMENT TOPICAL at 09:53

## 2024-12-29 RX ADMIN — HYDRALAZINE HYDROCHLORIDE 10 MG: 20 INJECTION INTRAMUSCULAR; INTRAVENOUS at 07:25

## 2024-12-29 RX ADMIN — AZITHROMYCIN DIHYDRATE 250 MG: 250 TABLET, FILM COATED ORAL at 09:52

## 2024-12-29 RX ADMIN — CARBIDOPA AND LEVODOPA 2 TABLET: 25; 100 TABLET ORAL at 20:48

## 2024-12-29 RX ADMIN — LEVOTHYROXINE SODIUM 112 MCG: 0.11 TABLET ORAL at 09:52

## 2024-12-29 RX ADMIN — ENOXAPARIN SODIUM 40 MG: 40 INJECTION SUBCUTANEOUS at 20:48

## 2024-12-29 RX ADMIN — LISINOPRIL 5 MG: 5 TABLET ORAL at 20:48

## 2024-12-29 RX ADMIN — NIRMATRELVIR AND RITONAVIR 3 TABLET: KIT at 09:53

## 2024-12-29 RX ADMIN — SERTRALINE HYDROCHLORIDE 100 MG: 100 TABLET ORAL at 09:52

## 2024-12-29 RX ADMIN — CARBIDOPA AND LEVODOPA 2 TABLET: 25; 100 TABLET ORAL at 15:30

## 2024-12-29 RX ADMIN — CARBIDOPA AND LEVODOPA 2 TABLET: 25; 100 TABLET ORAL at 09:52

## 2024-12-29 RX ADMIN — NIRMATRELVIR AND RITONAVIR 3 TABLET: KIT at 20:48

## 2024-12-29 RX ADMIN — LISINOPRIL 5 MG: 5 TABLET ORAL at 09:52

## 2024-12-29 RX ADMIN — Medication 50 MCG: at 09:52

## 2024-12-29 ASSESSMENT — ACTIVITIES OF DAILY LIVING (ADL)
ADLS_ACUITY_SCORE: 65
ADLS_ACUITY_SCORE: 66
ADLS_ACUITY_SCORE: 65
ADLS_ACUITY_SCORE: 66
ADLS_ACUITY_SCORE: 65

## 2024-12-29 NOTE — PLAN OF CARE
Goal Outcome Evaluation:         Problem: Adult Inpatient Plan of Care  Goal: Optimal Comfort and Wellbeing  Outcome: Progressing     Problem: Infection  Goal: Absence of Infection Signs and Symptoms  Outcome: Progressing     Problem: Adult Inpatient Plan of Care  Goal: Absence of Hospital-Acquired Illness or Injury  Intervention: Identify and Manage Fall Risk  Recent Flowsheet Documentation  Taken 12/29/2024 0055 by Pam Espinosa, RN  Safety Promotion/Fall Prevention:   activity supervised   increased rounding and observation   safety round/check completed   supervised activity   toileting scheduled   nonskid shoes/slippers when out of bed   mobility aid in reach   lighting adjusted  Taken 12/28/2024 2100 by Pam Espinosa, RN  Safety Promotion/Fall Prevention:   activity supervised   increased rounding and observation   safety round/check completed   supervised activity   toileting scheduled   nonskid shoes/slippers when out of bed   mobility aid in reach   lighting adjusted         Pt confused, denies pain on RA. Meds taken, slept through night, incontinent bowel/stool. No significant events.

## 2024-12-29 NOTE — PROGRESS NOTES
[facility]    Hospitalist Progress Note    Assessment & Plan   89 year old male who was admitted on 12/26/2024 with falls, weakness, SOB and found to be Covid positive.     Impression:   Principal Problem:   Infection due to 2019 novel coronavirus   Cardiac pacemaker in situ   Generalized weakness   Pneumonia of left lower lobe due to infectious organism, bacterial versus viral   History of Parkinson disease   Delirium -- improving   Parkinson disease (H)  Hypothyroidism  Essential hypertension  MDD    Plan:    -Continue Paxlovid till 12/30.  -S/p IVF, good p.o. now.  - Continue PTA dose of Sinemet, levothyroxine, lisinopril, sertraline.  - PT/OT evaluation-recommended TCU.  Awaiting bed availability.  - Patient's wife tested positive for COVID, she is visiting patient, she was constantly on isolating for 5 days.    DVT Prophylaxis: Low Risk/Ambulatory with no VTE prophylaxis indicated, Lovenox 40 mg subcutaneous daily  Code Status: No CPR- Do NOT Intubate    Disposition: Expected discharge to TCU probably Monday if place available.   Arlene Cavazos MD  Pager [phone]  Cell Phone [phone]  Text Page (7am to 6pm)  Interval History   Was seen and examined in attendance of his daughter, with patient's permission.  He is complaining of dry cough, weakness.  No focal weakness, fever, chills, chest pain, dyspnea.    Physical Exam   Temp: 98  F (36.7  C) Temp src: Oral BP: 119/64 Pulse: 77   Resp: 16 SpO2: 93 % O2 Device: None (Room air)    Vitals:    12/26/24 1021 12/26/24 1655   Weight: 58.1 kg (128 lb) 59 kg (130 lb 1.1 oz)     Vital Signs with Ranges  Temp:  [97.4  F (36.3  C)-98.5  F (36.9  C)] 98  F (36.7  C)  Pulse:  [70-77] 77  Resp:  [14-20] 16  BP: (119-196)/() 119/64  SpO2:  [93 %-97 %] 93 %  I/O last 3 completed shifts:  In: 400 [P.O.:400]  Out: -     # Pain Assessment:      12/29/2024    12:55 AM   Current Pain Score   Patient currently in pain? denies   Gomez s pain  "level was assessed and he currently denies pain.      Constitutional: Awake, alert, cooperative, no apparent distress  Respiratory: Clear to auscultation bilaterally, no crackles or wheezing  Cardiovascular: Regular rate and rhythm, normal S1 and S2, and no murmur noted  GI: Normal bowel sounds, soft, non-distended, non-tender  Extrem: No calf tenderness, no ankle edema  Neuro: Ox1 (date 12/24, and thought he was at the \"[employer]\"), no focal motor or sensory deficits but generalized weakness    Medications   Current Facility-Administered Medications   Medication Dose Route Frequency Provider Last Rate Last Admin     Current Facility-Administered Medications   Medication Dose Route Frequency Provider Last Rate Last Admin    azithromycin (ZITHROMAX) tablet 250 mg  250 mg Oral Daily Yonny Rouse MD   250 mg at 12/28/24 0808    carbidopa-levodopa (SINEMET)  MG per tablet 2 tablet  2 tablet Oral 4x Daily Yonny Rouse MD   2 tablet at 12/28/24 1151    enoxaparin ANTICOAGULANT (LOVENOX) injection 40 mg  40 mg Subcutaneous Q24H Yonny Rouse MD   40 mg at 12/27/24 2238    levothyroxine (SYNTHROID/LEVOTHROID) tablet 112 mcg  112 mcg Oral Daily Yonny Rouse MD   112 mcg at 12/28/24 0808    lisinopril (ZESTRIL) tablet 5 mg  5 mg Oral Daily Arlene Cavazos MD   5 mg at 12/28/24 0947    multivitamin  with lutein (OCUVITE WITH LUTEIN) per capsule 2 capsule  2 capsule Oral Daily Yonny Rouse MD   2 capsule at 12/28/24 0805    nirmatrelvir and ritonavir (PAXLOVID) 300 mg/100 mg therapy pack 3 tablet  3 tablet Oral BID Yonny Rouse MD   3 tablet at 12/28/24 0805    sertraline (ZOLOFT) tablet 100 mg  100 mg Oral Daily Yonny Rouse MD   100 mg at 12/28/24 0808    Vitamin D3 (CHOLECALCIFEROL) tablet 50 mcg  50 mcg Oral Daily Yonny Rouse MD   50 mcg at 12/28/24 0808     Data   Recent Labs   Lab 12/26/24  1144   WBC 7.9   HGB 11.7* "   MCV 98   *      POTASSIUM 4.8   CHLORIDE 99   CO2 27   BUN 30.3*   CR 0.90   ANIONGAP 10   TAMMY 9.4   *     Imaging:   No results found for this or any previous visit (from the past 24 hours).   This document is created with the help of Dragon dictation system. All grammatical errors are unintentional.

## 2024-12-29 NOTE — PLAN OF CARE
Problem: Pain Acute  Goal: Optimal Pain Control and Function  Outcome: Progressing     Problem: Infection  Goal: Absence of Infection Signs and Symptoms  Outcome: Progressing     Problem: Fall Injury Risk  Goal: Absence of Fall and Fall-Related Injury  Outcome: Progressing   Goal Outcome Evaluation:      Plan of Care Reviewed With: patient, spouse    Overall Patient Progress: improving

## 2024-12-30 DIAGNOSIS — Z53.9 DIAGNOSIS NOT YET DEFINED: Primary | ICD-10-CM

## 2024-12-30 LAB
ANION GAP SERPL CALCULATED.3IONS-SCNC: 11 MMOL/L (ref 7–15)
BUN SERPL-MCNC: 23.3 MG/DL (ref 8–23)
CALCIUM SERPL-MCNC: 8.6 MG/DL (ref 8.8–10.4)
CHLORIDE SERPL-SCNC: 102 MMOL/L (ref 98–107)
CREAT SERPL-MCNC: 0.77 MG/DL (ref 0.67–1.17)
CRP SERPL-MCNC: 82.9 MG/L
EGFRCR SERPLBLD CKD-EPI 2021: 82 ML/MIN/1.73M2
ERYTHROCYTE [DISTWIDTH] IN BLOOD BY AUTOMATED COUNT: 14.5 % (ref 10–15)
GLUCOSE SERPL-MCNC: 87 MG/DL (ref 70–99)
HCO3 SERPL-SCNC: 25 MMOL/L (ref 22–29)
HCT VFR BLD AUTO: 32.2 % (ref 40–53)
HGB BLD-MCNC: 10.7 G/DL (ref 13.3–17.7)
MCH RBC QN AUTO: 31.6 PG (ref 26.5–33)
MCHC RBC AUTO-ENTMCNC: 33.2 G/DL (ref 31.5–36.5)
MCV RBC AUTO: 95 FL (ref 78–100)
PLATELET # BLD AUTO: 161 10E3/UL (ref 150–450)
POTASSIUM SERPL-SCNC: 3.5 MMOL/L (ref 3.4–5.3)
RBC # BLD AUTO: 3.39 10E6/UL (ref 4.4–5.9)
SODIUM SERPL-SCNC: 138 MMOL/L (ref 135–145)
WBC # BLD AUTO: 2.6 10E3/UL (ref 4–11)

## 2024-12-30 PROCEDURE — 250N000013 HC RX MED GY IP 250 OP 250 PS 637: Performed by: INTERNAL MEDICINE

## 2024-12-30 PROCEDURE — 120N000001 HC R&B MED SURG/OB

## 2024-12-30 PROCEDURE — 80048 BASIC METABOLIC PNL TOTAL CA: CPT | Performed by: INTERNAL MEDICINE

## 2024-12-30 PROCEDURE — 86140 C-REACTIVE PROTEIN: CPT | Performed by: INTERNAL MEDICINE

## 2024-12-30 PROCEDURE — 36415 COLL VENOUS BLD VENIPUNCTURE: CPT | Performed by: INTERNAL MEDICINE

## 2024-12-30 PROCEDURE — 99232 SBSQ HOSP IP/OBS MODERATE 35: CPT | Performed by: INTERNAL MEDICINE

## 2024-12-30 PROCEDURE — 250N000011 HC RX IP 250 OP 636: Performed by: INTERNAL MEDICINE

## 2024-12-30 PROCEDURE — 85014 HEMATOCRIT: CPT | Performed by: INTERNAL MEDICINE

## 2024-12-30 PROCEDURE — 82435 ASSAY OF BLOOD CHLORIDE: CPT | Performed by: INTERNAL MEDICINE

## 2024-12-30 RX ORDER — POTASSIUM CHLORIDE 1.5 G/1.58G
40 POWDER, FOR SOLUTION ORAL ONCE
Status: COMPLETED | OUTPATIENT
Start: 2024-12-30 | End: 2024-12-30

## 2024-12-30 RX ORDER — AMLODIPINE BESYLATE 5 MG/1
5 TABLET ORAL DAILY
Status: DISCONTINUED | OUTPATIENT
Start: 2024-12-30 | End: 2025-01-02

## 2024-12-30 RX ADMIN — LEVOTHYROXINE SODIUM 112 MCG: 0.11 TABLET ORAL at 08:24

## 2024-12-30 RX ADMIN — POTASSIUM CHLORIDE 40 MEQ: 1.5 POWDER, FOR SOLUTION ORAL at 11:25

## 2024-12-30 RX ADMIN — ENOXAPARIN SODIUM 40 MG: 40 INJECTION SUBCUTANEOUS at 21:37

## 2024-12-30 RX ADMIN — CLONIDINE HYDROCHLORIDE 0.1 MG: 0.1 TABLET ORAL at 08:40

## 2024-12-30 RX ADMIN — AZITHROMYCIN DIHYDRATE 250 MG: 250 TABLET, FILM COATED ORAL at 08:24

## 2024-12-30 RX ADMIN — SERTRALINE HYDROCHLORIDE 100 MG: 100 TABLET ORAL at 08:24

## 2024-12-30 RX ADMIN — CARBIDOPA AND LEVODOPA 2 TABLET: 25; 100 TABLET ORAL at 08:24

## 2024-12-30 RX ADMIN — CARBIDOPA AND LEVODOPA 2 TABLET: 25; 100 TABLET ORAL at 11:25

## 2024-12-30 RX ADMIN — NIRMATRELVIR AND RITONAVIR 3 TABLET: KIT at 21:41

## 2024-12-30 RX ADMIN — LISINOPRIL 5 MG: 5 TABLET ORAL at 08:24

## 2024-12-30 RX ADMIN — CARBIDOPA AND LEVODOPA 2 TABLET: 25; 100 TABLET ORAL at 17:28

## 2024-12-30 RX ADMIN — CARBIDOPA AND LEVODOPA 2 TABLET: 25; 100 TABLET ORAL at 21:37

## 2024-12-30 RX ADMIN — AMLODIPINE BESYLATE 5 MG: 5 TABLET ORAL at 11:25

## 2024-12-30 RX ADMIN — NIRMATRELVIR AND RITONAVIR 3 TABLET: KIT at 08:24

## 2024-12-30 RX ADMIN — LISINOPRIL 5 MG: 5 TABLET ORAL at 21:37

## 2024-12-30 ASSESSMENT — ACTIVITIES OF DAILY LIVING (ADL)
ADLS_ACUITY_SCORE: 66
ADLS_ACUITY_SCORE: 66
ADLS_ACUITY_SCORE: 65
ADLS_ACUITY_SCORE: 66
ADLS_ACUITY_SCORE: 66
ADLS_ACUITY_SCORE: 65
ADLS_ACUITY_SCORE: 66
ADLS_ACUITY_SCORE: 65
ADLS_ACUITY_SCORE: 66
ADLS_ACUITY_SCORE: 65
ADLS_ACUITY_SCORE: 66
ADLS_ACUITY_SCORE: 65
ADLS_ACUITY_SCORE: 66

## 2024-12-30 NOTE — PROGRESS NOTES
[facility]    Hospitalist Progress Note    Assessment & Plan   89 year old male who was admitted on 12/26/2024 with falls, weakness, SOB and found to be Covid positive.     Impression:   Principal Problem:   Infection due to 2019 novel coronavirus   Cardiac pacemaker in situ   Generalized weakness   Pneumonia of left lower lobe due to infectious organism, bacterial versus viral   History of Parkinson disease   Delirium -- improving   Parkinson disease (H)  Hypothyroidism  Essential hypertension  MDD    Plan:    - Completed 5 days of Paxlovid on 12/30.  - S/p IVF, good p.o. now.  - Continue PTA dose of Sinemet, levothyroxine, lisinopril/dose increased 5 mg twice daily, sertraline.  Monitor BP and adjust antihypertensives accordingly.  - PT/OT evaluation-recommended TCU.  Awaiting bed availability.  - Patient's wife tested positive for COVID, she is visiting patient, she was constantly on isolating for 5 days.    DVT Prophylaxis: Low Risk/Ambulatory with no VTE prophylaxis indicated, Lovenox 40 mg subcutaneous daily  Code Status: No CPR- Do NOT Intubate    Disposition: Expected discharge to TCU.  Arlene Cavazos MD  Pager [phone]  Cell Phone [phone]  Text Page (7am to 6pm)  Interval History   Was seen and examined in attendance of his wife.  Patient sitting in chair.  He states feeling weak.  He has infrequent dry cough.  No chest pain, cardiac palpitations, fever, abdominal pain, nausea, dysuria.  Patient's daughter Rajendra was updated on POC via phone, per family request.    Physical Exam   Temp: 98  F (36.7  C) Temp src: Oral BP: 119/64 Pulse: 77   Resp: 16 SpO2: 93 % O2 Device: None (Room air)    Vitals:    12/26/24 1021 12/26/24 1655   Weight: 58.1 kg (128 lb) 59 kg (130 lb 1.1 oz)     Vital Signs with Ranges  Temp:  [97.4  F (36.3  C)-98.5  F (36.9  C)] 98  F (36.7  C)  Pulse:  [70-77] 77  Resp:  [14-20] 16  BP: (119-196)/() 119/64  SpO2:  [93 %-97 %] 93 %  I/O last 3  "completed shifts:  In: 400 [P.O.:400]  Out: -     # Pain Assessment:      12/30/2024     8:29 AM   Current Pain Score   Patient currently in pain? cindy Dyer s pain level was assessed and he currently denies pain.      Constitutional: Awake, alert, cooperative, no apparent distress  Respiratory: Clear to auscultation bilaterally, no crackles or wheezing  Cardiovascular: Regular rate and rhythm, normal S1 and S2, and no murmur noted  GI: Normal bowel sounds, soft, non-distended, non-tender  Extrem: No calf tenderness, no ankle edema  Neuro: Ox1 (date 12/24, and thought he was at the \"[employer]\"), no focal motor or sensory deficits but generalized weakness    Medications   Current Facility-Administered Medications   Medication Dose Route Frequency Provider Last Rate Last Admin     Current Facility-Administered Medications   Medication Dose Route Frequency Provider Last Rate Last Admin    azithromycin (ZITHROMAX) tablet 250 mg  250 mg Oral Daily Yonny Rouse MD   250 mg at 12/28/24 0808    carbidopa-levodopa (SINEMET)  MG per tablet 2 tablet  2 tablet Oral 4x Daily Yonny Rouse MD   2 tablet at 12/28/24 1151    enoxaparin ANTICOAGULANT (LOVENOX) injection 40 mg  40 mg Subcutaneous Q24H Yonny Rouse MD   40 mg at 12/27/24 2238    levothyroxine (SYNTHROID/LEVOTHROID) tablet 112 mcg  112 mcg Oral Daily Yonny Rouse MD   112 mcg at 12/28/24 0808    lisinopril (ZESTRIL) tablet 5 mg  5 mg Oral Daily Arlene Cavazos MD   5 mg at 12/28/24 0947    multivitamin  with lutein (OCUVITE WITH LUTEIN) per capsule 2 capsule  2 capsule Oral Daily Yonny Rouse MD   2 capsule at 12/28/24 0805    nirmatrelvir and ritonavir (PAXLOVID) 300 mg/100 mg therapy pack 3 tablet  3 tablet Oral BID Yonny Rouse MD   3 tablet at 12/28/24 0805    sertraline (ZOLOFT) tablet 100 mg  100 mg Oral Daily Yonny Rouse MD   100 mg at 12/28/24 0808    Vitamin D3 " (CHOLECALCIFEROL) tablet 50 mcg  50 mcg Oral Daily Yonny Rouse MD   50 mcg at 12/28/24 0808     Data   Recent Labs   Lab 12/26/24  1144   WBC 7.9   HGB 11.7*   MCV 98   *      POTASSIUM 4.8   CHLORIDE 99   CO2 27   BUN 30.3*   CR 0.90   ANIONGAP 10   TAMMY 9.4   *     Imaging:   No results found for this or any previous visit (from the past 24 hours).   This document is created with the help of Dragon dictation system. All grammatical errors are unintentional.

## 2024-12-30 NOTE — PROGRESS NOTES
Care Management Follow Up    Length of Stay (days): 4    Expected Discharge Date: 12/31/2024    Anticipated Discharge Plan:   TCU    Transportation: Anticipate Stretcher. Transportation costs discussed? Yes. Discussed with wife and daughters    PT Recommendations: Transitional Care Facility  OT Recommendations:        Barriers to Discharge: placement    Prior Living Situation:   with      Advanced Directive on File:       Patient/Spokesperson Updated: Yes. Who? Wife and daughters    Additional Information:  [facility]. Brenda Gomez is still reviewing. KAILEE met with Pt's wife Tanya and daughters Rajendra and Megan to discuss TCU. SW gave update regarding TCU referrals. SW gave TCU list to wife and daughters for them to review for additional choices. Family is agreeable to referral being sent to [facility]. Referral sent, pending. Family confirms Pt will likely need stretcher transport (due to COVID-19) at discharge; costs discussed, and family is agreeable.     Next steps:  Secure placement. Follow up with family regarding additional TCU choices.     NOVA RogerW

## 2024-12-30 NOTE — PLAN OF CARE
Goal Outcome Evaluation:                        Problem: Adult Inpatient Plan of Care  Goal: Readiness for Transition of Care  Outcome: Progressing     Problem: Pain Acute  Goal: Optimal Pain Control and Function  Outcome: Progressing  Intervention: Prevent or Manage Pain  Recent Flowsheet Documentation  Taken 12/30/2024 0810 by Simon Magaña RN  Medication Review/Management: medications reviewed     Problem: Infection  Goal: Absence of Infection Signs and Symptoms  Outcome: Progressing  Intervention: Prevent or Manage Infection  Recent Flowsheet Documentation  Taken 12/30/2024 0810 by Simon Magaña RN  Isolation Precautions: special precautions maintained     Problem: Comorbidity Management  Goal: Maintenance of Heart Failure Symptom Control  Outcome: Progressing  Intervention: Maintain Heart Failure Management  Recent Flowsheet Documentation  Taken 12/30/2024 0810 by Simon Magaña RN  Medication Review/Management: medications reviewed     Problem: Fall Injury Risk  Goal: Absence of Fall and Fall-Related Injury  Outcome: Progressing  Intervention: Identify and Manage Contributors  Recent Flowsheet Documentation  Taken 12/30/2024 0810 by Simon Magaña RN  Medication Review/Management: medications reviewed  Intervention: Promote Injury-Free Environment  Recent Flowsheet Documentation  Taken 12/30/2024 0810 by Simon Magaña RN  Safety Promotion/Fall Prevention:   activity supervised   safety round/check completed   increase visualization of patient   increased rounding and observation     Patient denies pain at this time, up to chair today with assist of 1, makes needs known appropriately  Staff assists with feeding, appetite is good on regular diet.    Simon Magaña RN

## 2024-12-30 NOTE — PLAN OF CARE
Problem: Fall Injury Risk  Goal: Absence of Fall and Fall-Related Injury  Outcome: Progressing  Intervention: Identify and Manage Contributors  Recent Flowsheet Documentation  Taken 12/29/2024 2000 by Kourtney Stover RN  Medication Review/Management: medications reviewed  Intervention: Promote Injury-Free Environment  Recent Flowsheet Documentation  Taken 12/29/2024 2000 by Kourtney Stover RN  Safety Promotion/Fall Prevention:   activity supervised   safety round/check completed   increase visualization of patient   increased rounding and observation     Problem: Infection  Goal: Absence of Infection Signs and Symptoms  Outcome: Progressing  Intervention: Prevent or Manage Infection  Recent Flowsheet Documentation  Taken 12/29/2024 2000 by Kourtney Stover RN  Isolation Precautions: special precautions maintained     Problem: Pain Acute  Goal: Optimal Pain Control and Function  Outcome: Progressing  Intervention: Prevent or Manage Pain  Recent Flowsheet Documentation  Taken 12/29/2024 2000 by Kourtney Stover RN  Medication Review/Management: medications reviewed   Goal Outcome Evaluation:    Patient is A/O x 2-3, intermittent forgetfulness and confusion.  VSS on RA. LS coarse, infrequent, productive cough; CARREON. Patient denies pain. Incontinent; not OOB, repositioned frequently. Multiple bruises from recent falls

## 2024-12-30 NOTE — PLAN OF CARE
Calm and cooperative. Disoriented to time and place and situation. Dry cough that is mild. Bed alarm on. Small BM incontinent of urine and stool. 2 incontinent episodes of urine. Bed alarm on.

## 2024-12-31 ENCOUNTER — APPOINTMENT (OUTPATIENT)
Dept: SPEECH THERAPY | Facility: HOSPITAL | Age: 89
DRG: 177 | End: 2024-12-31
Attending: INTERNAL MEDICINE
Payer: COMMERCIAL

## 2024-12-31 LAB
ATRIAL RATE - MUSE: 71 BPM
BACTERIA BLD CULT: NO GROWTH
BACTERIA BLD CULT: NO GROWTH
DIASTOLIC BLOOD PRESSURE - MUSE: 71 MMHG
INTERPRETATION ECG - MUSE: NORMAL
P AXIS - MUSE: 79 DEGREES
PR INTERVAL - MUSE: 206 MS
QRS DURATION - MUSE: 86 MS
QT - MUSE: 418 MS
QTC - MUSE: 454 MS
R AXIS - MUSE: 60 DEGREES
SYSTOLIC BLOOD PRESSURE - MUSE: 129 MMHG
T AXIS - MUSE: 70 DEGREES
VENTRICULAR RATE- MUSE: 71 BPM

## 2024-12-31 PROCEDURE — 120N000001 HC R&B MED SURG/OB

## 2024-12-31 PROCEDURE — 250N000013 HC RX MED GY IP 250 OP 250 PS 637: Performed by: INTERNAL MEDICINE

## 2024-12-31 PROCEDURE — 99233 SBSQ HOSP IP/OBS HIGH 50: CPT | Performed by: INTERNAL MEDICINE

## 2024-12-31 PROCEDURE — 92610 EVALUATE SWALLOWING FUNCTION: CPT | Mod: GN

## 2024-12-31 PROCEDURE — 250N000011 HC RX IP 250 OP 636: Performed by: INTERNAL MEDICINE

## 2024-12-31 RX ADMIN — CARBIDOPA AND LEVODOPA 2 TABLET: 25; 100 TABLET ORAL at 08:31

## 2024-12-31 RX ADMIN — ANORECTAL OINTMENT: 15.7; .44; 24; 20.6 OINTMENT TOPICAL at 20:42

## 2024-12-31 RX ADMIN — AMLODIPINE BESYLATE 5 MG: 5 TABLET ORAL at 08:31

## 2024-12-31 RX ADMIN — ACETAMINOPHEN 1000 MG: 500 TABLET ORAL at 20:41

## 2024-12-31 RX ADMIN — ENOXAPARIN SODIUM 40 MG: 40 INJECTION SUBCUTANEOUS at 20:41

## 2024-12-31 RX ADMIN — CARBIDOPA AND LEVODOPA 2 TABLET: 25; 100 TABLET ORAL at 16:21

## 2024-12-31 RX ADMIN — LEVOTHYROXINE SODIUM 112 MCG: 0.11 TABLET ORAL at 08:32

## 2024-12-31 RX ADMIN — LISINOPRIL 5 MG: 5 TABLET ORAL at 08:32

## 2024-12-31 RX ADMIN — NIRMATRELVIR AND RITONAVIR 3 TABLET: KIT at 08:32

## 2024-12-31 RX ADMIN — CARBIDOPA AND LEVODOPA 2 TABLET: 25; 100 TABLET ORAL at 20:41

## 2024-12-31 RX ADMIN — SERTRALINE HYDROCHLORIDE 100 MG: 100 TABLET ORAL at 08:31

## 2024-12-31 RX ADMIN — LISINOPRIL 5 MG: 5 TABLET ORAL at 20:41

## 2024-12-31 RX ADMIN — CARBIDOPA AND LEVODOPA 2 TABLET: 25; 100 TABLET ORAL at 12:02

## 2024-12-31 ASSESSMENT — ACTIVITIES OF DAILY LIVING (ADL)
ADLS_ACUITY_SCORE: 66
ADLS_ACUITY_SCORE: 65
ADLS_ACUITY_SCORE: 66
ADLS_ACUITY_SCORE: 65
ADLS_ACUITY_SCORE: 66
ADLS_ACUITY_SCORE: 65
ADLS_ACUITY_SCORE: 66
ADLS_ACUITY_SCORE: 65
ADLS_ACUITY_SCORE: 66
ADLS_ACUITY_SCORE: 65
ADLS_ACUITY_SCORE: 66

## 2024-12-31 NOTE — PLAN OF CARE
Pain: Generally denies pain other than slight headache. Declines intervention.  Neuro: Oriented to self. Intermittently oriented to place and situation. Disoriented to time.  Resp: Left lower lung expiratory wheezes. Infrequent non-productive cough. Aide reports cough correlated with thin liquids during feeding.  Cardio: Paced  GI/: Incontinent of bowel and bladder   Skin: Coccyx and posterior scrotum red blanchable  Activity: Chair fast. Mateo lift  Nutrition/IV: Good oral intake, feeder. Medications in applesauce.     Additionally, patient noted to cough following sip of thin water. Thickened by staff. No cough. Speech therapy requested.

## 2024-12-31 NOTE — PROGRESS NOTES
Care Management Follow Up    Length of Stay (days): 5    Expected Discharge Date: 01/01/2025    Anticipated Discharge Plan:   TCU    Transportation: Confirmed Stretcher. Transportation costs discussed? Yes. Discussed with wife and daughters    PT Recommendations: Transitional Care Facility  OT Recommendations:        Barriers to Discharge: placement    Prior Living Situation:   with      Advanced Directive on File:       Patient/Spokesperson Updated: Yes. Who? Pt's wife and daughters    Additional Information:  SW spoke with [facility] TCU admissions; they have not been able to review referral yet and will plan to respond in Epic this afternoon. KAILEE left voicemail for Priti from admissions at [facility]; no response as of this writing.    KAILEE met with Pt's wife Tanya and daughters Rajendra and Megan. SW gave update regarding TCU referrals. Family reports [facility] or [facility] are their top choices, but they are agreeable to referral being sent to [facility]. Referral sent, pending.     RAMYA Roger

## 2024-12-31 NOTE — PROGRESS NOTES
Speech-Language Pathology: Clinical Swallow Evaluation     12/31/24 1600   Appointment Info   Signing Clinician's Name / Credentials (SLP) Carmelita Valdes MA CCC-SLP   General Information   Onset of Illness/Injury or Date of Surgery 12/26/24   Referring Physician Dr. Elder   Pertinent History of Current Problem Per EMR: 89-year-old male with PMH of Parkinson's disease, recurrent falls, hypothyroidism, hypertension, SSS s/p PPM who was brought in for evaluation of generalized weakness, cough and vomiting, found on the floor.  Found to have COVID 19 infection and possible superimposed bacterial infection. S/p Paxlovid and azithromycin course.  Waiting TCU placement      1. Confirmed COVID 19 infection.  Completed 5-day treatment of Paxlovid  2. Possible superimposed bacterial pneumonia.  Chest x-ray with left lower lobe infiltrate.  S/p 5 days of azithromycin  3. Acute metabolic encephalopathy in the setting of infection.  Continue supportive cares  4. Dysphagia.  Likely in the setting of Parkinson's disease.  SLP evaluation to assess risk of aspiration  5. Generalized weakness.  PT/OT.  Waiting for TCU placement  6. Parkinson's disease.  Continue PTA Sinemet.  [facility]  7. Hypothyroidism.  Continue PTA levothyroxine  8. Sick sinus syndrome s/p PPM   General Observations Awake, interactive, difficult positioning, limited intake accepted   Type of Evaluation   Type of Evaluation Swallow Evaluation   Oral Motor   Oral Musculature generally intact   Oral Motor Deficits Observed   (No overt concerns, generally c/w advanced age)   Facial Symmetry (Oral Motor)   Comment, Facial Symmetry (Oral Motor) WFL-no overt asymmetry   Lip Function (Oral Motor)   Comment, Lip Function (Oral Motor) WFL   Tongue Function (Oral Motor)   Comment, Tongue Function (Oral Motor) WFL   General Swallowing Observations   Swallowing Evaluation Clinical swallow evaluation   Clinical Swallow Evaluation    Clinical Swallow Evaluation Textures Trialed thin liquids;mildly thick liquids;pureed;solid foods   Swallowing Recommendations   Diet Consistency Recommendations mildly thick liquids (level 2);easy to chew (level 7)   Supervision Level for Intake close supervision needed   Mode of Delivery Recommendations bolus size, small;slow rate of intake   Medication Administration Recommendations, Swallowing (SLP) per patient preference   Comment, Swallowing Recommendations No overt s/s aspiration with mildly thick liquids, puree and solid. Limited assessment. Downgrade to above diet and continue assessment with SLP tomorrow.   General Therapy Interventions   Planned Therapy Interventions Dysphagia Treatment   Clinical Impression   Criteria for Skilled Therapeutic Interventions Met (SLP Eval) Yes, treatment indicated   SLP Diagnosis Dysphagia   Risks & Benefits of therapy have been explained evaluation/treatment results reviewed;care plan/treatment goals reviewed;participants included;patient   Clinical Impression Comments Highly limited CSE due to limited intake. RN reported coughing with thin liquids consistently today and resolved when she trialed mildly thick. No significant concerns with pocketing, possibly reduced mastication. Trialed 2 sips water and 4 sips of mildly thick liquids without overt s/s aspiration with either but did have appearance of improved tolerance with mildly thick. Munching mastication with cracker and refused further. Recommend downgrade to ETC and Mildly thick liquids and further assess tomorrow.   SLP Total Evaluation Time   Eval: oral/pharyngeal swallow function, clinical swallow Minutes (20204) 15   SLP Goals   Therapy Frequency (SLP Eval) 5 times/week   SLP Predicted Duration/Target Date for Goal Attainment 01/08/25   SLP Goals Swallow   SLP: Safely tolerate diet without signs/symptoms of aspiration Easy to chew diet;Mildly thick liquids;Thin liquids  (pending further assessment)   SLP  Discharge Planning   SLP Plan Wed 1/5: further assess at bedside with oral intake, possible VFSS   SLP Rationale for DC Rec to be determined   SLP Brief overview of current status  ETC and Mildly thick as tolerated. SLP to follow for further assessmnet.   SLP Time and Intention   Total Session Time (sum of timed and untimed services) 15

## 2024-12-31 NOTE — PLAN OF CARE
Problem: Adult Inpatient Plan of Care  Goal: Plan of Care Review  Description: The Plan of Care Review/Shift note should be completed every shift.  The Outcome Evaluation is a brief statement about your assessment that the patient is improving, declining, or no change.  This information will be displayed automatically on your shift  note.  Outcome: Progressing     Problem: Pain Acute  Goal: Optimal Pain Control and Function  Outcome: Progressing  Intervention: Prevent or Manage Pain  Recent Flowsheet Documentation  Taken 12/31/2024 0000 by Helen Squires RN  Medication Review/Management: medications reviewed     Problem: Fall Injury Risk  Goal: Absence of Fall and Fall-Related Injury  Outcome: Progressing  Intervention: Identify and Manage Contributors  Recent Flowsheet Documentation  Taken 12/31/2024 0000 by Helen Squires RN  Medication Review/Management: medications reviewed  Intervention: Promote Injury-Free Environment  Recent Flowsheet Documentation  Taken 12/31/2024 0000 by Helen Squires RN  Safety Promotion/Fall Prevention: lighting adjusted   Goal Outcome Evaluation:       Intermittent confusion. Denies pain. PIV, Sl locked. Maintained special precaution. Incontinent of bowel and bladder, no BM thi shift.

## 2024-12-31 NOTE — PROGRESS NOTES
[facility]    Medicine Progress Note - Hospitalist Service    Date of Admission:  12/26/2024    Assessment & Plan   89-year-old male with PMH of Parkinson's disease, recurrent falls, hypothyroidism, hypertension, SSS s/p PPM who was brought in for evaluation of generalized weakness, cough and vomiting, found on the floor.  Found to have COVID 19 infection and possible superimposed bacterial infection. S/p Paxlovid and azithromycin course.  Waiting TCU placement     Confirmed COVID 19 infection.  Completed 5-day treatment of Paxlovid  Possible superimposed bacterial pneumonia.  Chest x-ray with left lower lobe infiltrate.  S/p 5 days of azithromycin  Acute metabolic encephalopathy in the setting of infection.  Continue supportive cares  Dysphagia.  Likely in the setting of Parkinson's disease.  SLP evaluation to assess risk of aspiration  Generalized weakness.  PT/OT.  Waiting for TCU placement  Parkinson's disease.  Continue PTA Sinemet.  Follows with Dr. Martínez at [facility]  Hypothyroidism.  Continue PTA levothyroxine  Sick sinus syndrome s/p PPM              Diet: 2 Gram Sodium Diet    DVT Prophylaxis: Pneumatic Compression Devices  Garcia Catheter: Not present  Lines: None     Cardiac Monitoring: None  Code Status: No CPR- Do NOT Intubate      Clinically Significant Risk Factors                                   # Pacemaker present       Social Drivers of Health    Depression: At risk (5/7/2024)    PHQ-2     PHQ-2 Score: 4   Housing Stability: Low Risk  (12/28/2024)    Housing Stability     Do you have housing? : Yes     Are you worried about losing your housing?: No   Recent Concern: Housing Stability - High Risk (12/28/2024)    Housing Stability     Do you have housing? : No     Are you worried about losing your housing?: No    [facility]    Social Connections          Disposition Plan      Medically Ready for Discharge: Ready Now             Maru Elder MD  Hospitalist Service  [facility]  Securely message with [employer] (more info)  Text page via [employer] Paging/Directory   ______________________________________________________________________    Interval History   Per nursing staff report, patient was noted to cough following a sip of water.  Remains confused, no agitation      Physical Exam   Vital Signs: Temp: 98  F (36.7  C) Temp src: Oral BP: (!) 156/82 Pulse: 72   Resp: 14 SpO2: 96 % O2 Device: None (Room air)    Weight: 130 lbs 1.14 oz    General Appearance: No acute distress, soft spoken  Respiratory: Respirations unlabored, lungs are clear bilaterally  Cardiovascular: Regular rate and rhythm, S1-S2  GI: Abdomen soft and nontender  Other: Alert, oriented to person only    Medical Decision Making       50 MINUTES SPENT BY ME on the date of service doing chart review, history, exam, documentation & further activities per the note.  MANAGEMENT DISCUSSED with the following over the past 24 hours: Patient, family, nursing staff, RN CM       Data         Imaging results reviewed over the past 24 hrs:   No results found for this or any previous visit (from the past 24 hours).

## 2025-01-01 ENCOUNTER — APPOINTMENT (OUTPATIENT)
Dept: SPEECH THERAPY | Facility: HOSPITAL | Age: OVER 89
DRG: 177 | End: 2025-01-01
Payer: COMMERCIAL

## 2025-01-01 PROCEDURE — 99232 SBSQ HOSP IP/OBS MODERATE 35: CPT | Performed by: INTERNAL MEDICINE

## 2025-01-01 PROCEDURE — 250N000013 HC RX MED GY IP 250 OP 250 PS 637: Performed by: INTERNAL MEDICINE

## 2025-01-01 PROCEDURE — 250N000011 HC RX IP 250 OP 636: Performed by: INTERNAL MEDICINE

## 2025-01-01 PROCEDURE — 92526 ORAL FUNCTION THERAPY: CPT | Mod: GN | Performed by: SPEECH-LANGUAGE PATHOLOGIST

## 2025-01-01 PROCEDURE — 120N000001 HC R&B MED SURG/OB

## 2025-01-01 RX ADMIN — CARBIDOPA AND LEVODOPA 2 TABLET: 25; 100 TABLET ORAL at 11:59

## 2025-01-01 RX ADMIN — LEVOTHYROXINE SODIUM 112 MCG: 0.11 TABLET ORAL at 07:58

## 2025-01-01 RX ADMIN — ENOXAPARIN SODIUM 40 MG: 40 INJECTION SUBCUTANEOUS at 20:35

## 2025-01-01 RX ADMIN — AMLODIPINE BESYLATE 5 MG: 5 TABLET ORAL at 07:58

## 2025-01-01 RX ADMIN — LISINOPRIL 5 MG: 5 TABLET ORAL at 20:35

## 2025-01-01 RX ADMIN — CARBIDOPA AND LEVODOPA 2 TABLET: 25; 100 TABLET ORAL at 16:15

## 2025-01-01 RX ADMIN — CARBIDOPA AND LEVODOPA 2 TABLET: 25; 100 TABLET ORAL at 20:35

## 2025-01-01 RX ADMIN — LISINOPRIL 5 MG: 5 TABLET ORAL at 07:58

## 2025-01-01 RX ADMIN — CARBIDOPA AND LEVODOPA 2 TABLET: 25; 100 TABLET ORAL at 07:58

## 2025-01-01 RX ADMIN — SERTRALINE HYDROCHLORIDE 100 MG: 100 TABLET ORAL at 07:58

## 2025-01-01 ASSESSMENT — ACTIVITIES OF DAILY LIVING (ADL)
ADLS_ACUITY_SCORE: 69
ADLS_ACUITY_SCORE: 65
ADLS_ACUITY_SCORE: 66
ADLS_ACUITY_SCORE: 63
ADLS_ACUITY_SCORE: 69
ADLS_ACUITY_SCORE: 65
ADLS_ACUITY_SCORE: 66
ADLS_ACUITY_SCORE: 65
ADLS_ACUITY_SCORE: 66
ADLS_ACUITY_SCORE: 65
ADLS_ACUITY_SCORE: 69
ADLS_ACUITY_SCORE: 65
ADLS_ACUITY_SCORE: 67
ADLS_ACUITY_SCORE: 69
ADLS_ACUITY_SCORE: 69
ADLS_ACUITY_SCORE: 65
ADLS_ACUITY_SCORE: 69
ADLS_ACUITY_SCORE: 66
ADLS_ACUITY_SCORE: 63
ADLS_ACUITY_SCORE: 66
ADLS_ACUITY_SCORE: 69

## 2025-01-01 NOTE — PROGRESS NOTES
"[facility]    Medicine Progress Note - Hospitalist Service    Date of Admission:  12/26/2024    Assessment & Plan   89-year-old male with PMH of Parkinson's disease, recurrent falls, hypothyroidism, hypertension, SSS s/p PPM who was brought in for evaluation of generalized weakness, cough and vomiting, found on the floor.  Found to have COVID 19 infection and possible superimposed bacterial infection. S/p Paxlovid and azithromycin course.  Waiting TCU placement     Confirmed COVID 19 infection.  Completed 5-day treatment of Paxlovid  Possible superimposed bacterial pneumonia.  Chest x-ray with left lower lobe infiltrate.  S/p 5 days of azithromycin  Acute metabolic encephalopathy in the setting of infection.  Continue supportive cares  Dysphagia.  Likely in the setting of Parkinson's disease.  Continue puréed diet and mildly thick liquids.  SLP evaluation to assess risk of aspiration  Generalized weakness.  PT/OT.  Waiting for TCU placement  Parkinson's disease.  Continue PTA Sinemet.  Follows with Dr. Mart[facility]nez at [facility]  Hypothyroidism.  Continue PTA levothyroxine  Sick sinus syndrome s/p PPM          Diet: Combination Diet Easy to Chew (level 7); Mildly Thick (level 2); 2 gm NA Diet    DVT Prophylaxis: Pneumatic Compression Devices  Garcai Catheter: Not present  Lines: None     Cardiac Monitoring: None  Code Status: No CPR- Do NOT Intubate      Clinically Significant Risk Factors                              # Cachexia: Estimated body mass index is 17.52 kg/m  as calculated from the following:    Height as of this encounter: 1.753 m (5' 9\").    Weight as of this encounter: 53.8 kg (118 lb 9.7 oz).       # Pacemaker present       Social Drivers of Health    Depression: At risk (5/7/2024)    PHQ-2     PHQ-2 Score: 4   Housing Stability: Low Risk  (12/28/2024)    Housing Stability     Do you have housing? : Yes     Are you worried about losing your housing?: No   Recent Concern: Housing " Stability - High Risk (12/28/2024)    Housing Stability     Do you have housing? : No     Are you worried about losing your housing?: No    [facility] [employer] [facility]    Social Connections          Disposition Plan     Medically Ready for Discharge: Ready Now             Maru Elder MD  Hospitalist Service  [facility]  Securely message with [employer] (more info)  Text page via [employer] Paging/Directory   ______________________________________________________________________    Interval History   Patient seems more alert today  Sitting in a recliner at the time of my exam.  Denies having any pain.  Continues to have some nonproductive cough  Per RN report incontinent of bowel and bladder    Physical Exam   Vital Signs: Temp: 98  F (36.7  C) Temp src: Oral BP: 118/63 Pulse: 78   Resp: 16 SpO2: 93 % O2 Device: None (Room air)    Weight: 118 lbs 9.72 oz    General Appearance: No acute distress  Respiratory: Lungs are clear bilaterally  Cardiovascular: Regular.  Normal S1-S2  GI: Abdomen soft, nontender  Other: Alert, oriented to person only    Medical Decision Making       45 MINUTES SPENT BY ME on the date of service doing chart review, history, exam, documentation & further activities per the note.  MANAGEMENT DISCUSSED with the following over the past 24 hours: Patient and nursing staff       Data         Imaging results reviewed over the past 24 hrs:   No results found for this or any previous visit (from the past 24 hours).

## 2025-01-01 NOTE — PLAN OF CARE
"Goal Outcome Evaluation:      Plan of Care Reviewed With: patient    Overall Patient Progress: improvingOverall Patient Progress: improving    Outcome Evaluation: Pt oriented to person only overnight. Restless. Denied pain. Intermittent non-productive cough. Offered sips of water with every interaction. Needs to be monitored closely. Bed alarm on.    BP (!) 140/78 (BP Location: Right arm)   Pulse 70   Temp 97.8  F (36.6  C) (Oral)   Resp 16   Ht 1.753 m (5' 9\")   Wt 59 kg (130 lb 1.1 oz)   SpO2 97%   BMI 19.21 kg/m      Problem: Adult Inpatient Plan of Care  Goal: Optimal Comfort and Wellbeing  Outcome: Progressing  Intervention: Monitor Pain and Promote Comfort  Recent Flowsheet Documentation  Taken 12/31/2024 2040 by Kaia Johnson RN  Pain Management Interventions:   repositioned   pillow support provided   medication (see MAR)     Problem: Infection  Goal: Absence of Infection Signs and Symptoms  Outcome: Progressing  Intervention: Prevent or Manage Infection  Recent Flowsheet Documentation  Taken 1/1/2025 0030 by Kaia Johnson RN  Isolation Precautions: special precautions maintained  Taken 12/31/2024 2040 by Kaia Johnson RN  Isolation Precautions: special precautions maintained     Problem: Comorbidity Management  Goal: Maintenance of Heart Failure Symptom Control  Outcome: Progressing  Intervention: Maintain Heart Failure Management  Recent Flowsheet Documentation  Taken 1/1/2025 0030 by Kaia Johnson RN  Medication Review/Management: medications reviewed  Taken 12/31/2024 2040 by Kaia Johnson RN  Medication Review/Management: medications reviewed     Problem: Fall Injury Risk  Goal: Absence of Fall and Fall-Related Injury  Outcome: Progressing  Intervention: Identify and Manage Contributors  Recent Flowsheet Documentation  Taken 1/1/2025 0030 by Kaia Johnson RN  Medication Review/Management: medications reviewed  Taken 12/31/2024 2040 by Kaia Johnson" RN  Medication Review/Management: medications reviewed  Intervention: Promote Injury-Free Environment  Recent Flowsheet Documentation  Taken 1/1/2025 0030 by Kaia Johnson RN  Safety Promotion/Fall Prevention:   activity supervised   clutter free environment maintained   nonskid shoes/slippers when out of bed  Taken 12/31/2024 2040 by Kaia Johnson RN  Safety Promotion/Fall Prevention:   activity supervised   clutter free environment maintained   nonskid shoes/slippers when out of bed

## 2025-01-01 NOTE — PLAN OF CARE
Pain: Denies pain  Neuro: Oriented to self. Intermittently oriented to time, place, and situation  Resp: Lung sounds clear. Infrequent non-productive cough  Cardio: Paced  GI/: Incontinent of bowel and bladder  Skin: Scrotum red blanchable  Activity: Up in chair with aide. Per report, good transfer to chair, but difficult transfer back to bed.  Nutrition/IV:  Good intake for breakfast, declined lunch.

## 2025-01-02 ENCOUNTER — APPOINTMENT (OUTPATIENT)
Dept: SPEECH THERAPY | Facility: HOSPITAL | Age: OVER 89
DRG: 177 | End: 2025-01-02
Payer: COMMERCIAL

## 2025-01-02 ENCOUNTER — APPOINTMENT (OUTPATIENT)
Dept: PHYSICAL THERAPY | Facility: HOSPITAL | Age: OVER 89
DRG: 177 | End: 2025-01-02
Payer: COMMERCIAL

## 2025-01-02 PROCEDURE — 250N000013 HC RX MED GY IP 250 OP 250 PS 637: Performed by: INTERNAL MEDICINE

## 2025-01-02 PROCEDURE — 258N000003 HC RX IP 258 OP 636: Performed by: INTERNAL MEDICINE

## 2025-01-02 PROCEDURE — 250N000011 HC RX IP 250 OP 636: Performed by: INTERNAL MEDICINE

## 2025-01-02 PROCEDURE — 120N000001 HC R&B MED SURG/OB

## 2025-01-02 PROCEDURE — 97530 THERAPEUTIC ACTIVITIES: CPT | Mod: GP

## 2025-01-02 PROCEDURE — 97112 NEUROMUSCULAR REEDUCATION: CPT | Mod: GP

## 2025-01-02 PROCEDURE — 99233 SBSQ HOSP IP/OBS HIGH 50: CPT | Performed by: INTERNAL MEDICINE

## 2025-01-02 PROCEDURE — 92526 ORAL FUNCTION THERAPY: CPT | Mod: GN

## 2025-01-02 RX ORDER — LISINOPRIL 5 MG/1
5 TABLET ORAL 2 TIMES DAILY
DISCHARGE
Start: 2025-01-02 | End: 2025-01-02

## 2025-01-02 RX ORDER — AMLODIPINE BESYLATE 5 MG/1
5 TABLET ORAL DAILY
DISCHARGE
Start: 2025-01-03 | End: 2025-01-03

## 2025-01-02 RX ORDER — AMLODIPINE BESYLATE 5 MG/1
5 TABLET ORAL DAILY
DISCHARGE
Start: 2025-01-03 | End: 2025-01-02

## 2025-01-02 RX ADMIN — CARBIDOPA AND LEVODOPA 2 TABLET: 25; 100 TABLET ORAL at 16:18

## 2025-01-02 RX ADMIN — LEVOTHYROXINE SODIUM 112 MCG: 0.11 TABLET ORAL at 08:43

## 2025-01-02 RX ADMIN — ENOXAPARIN SODIUM 40 MG: 40 INJECTION SUBCUTANEOUS at 20:16

## 2025-01-02 RX ADMIN — SERTRALINE HYDROCHLORIDE 100 MG: 100 TABLET ORAL at 08:43

## 2025-01-02 RX ADMIN — CARBIDOPA AND LEVODOPA 2 TABLET: 25; 100 TABLET ORAL at 12:41

## 2025-01-02 RX ADMIN — LISINOPRIL 5 MG: 5 TABLET ORAL at 08:43

## 2025-01-02 RX ADMIN — CARBIDOPA AND LEVODOPA 2 TABLET: 25; 100 TABLET ORAL at 20:16

## 2025-01-02 RX ADMIN — SODIUM CHLORIDE 500 ML: 9 INJECTION, SOLUTION INTRAVENOUS at 11:23

## 2025-01-02 RX ADMIN — CARBIDOPA AND LEVODOPA 2 TABLET: 25; 100 TABLET ORAL at 08:42

## 2025-01-02 ASSESSMENT — ACTIVITIES OF DAILY LIVING (ADL)
ADLS_ACUITY_SCORE: 66
ADLS_ACUITY_SCORE: 65
ADLS_ACUITY_SCORE: 66
ADLS_ACUITY_SCORE: 66
ADLS_ACUITY_SCORE: 70
ADLS_ACUITY_SCORE: 65
ADLS_ACUITY_SCORE: 65
ADLS_ACUITY_SCORE: 66
ADLS_ACUITY_SCORE: 70
ADLS_ACUITY_SCORE: 70
ADLS_ACUITY_SCORE: 65
ADLS_ACUITY_SCORE: 71
ADLS_ACUITY_SCORE: 70
ADLS_ACUITY_SCORE: 65
ADLS_ACUITY_SCORE: 67
ADLS_ACUITY_SCORE: 65
ADLS_ACUITY_SCORE: 71
ADLS_ACUITY_SCORE: 65

## 2025-01-02 NOTE — PLAN OF CARE
Problem: Adult Inpatient Plan of Care  Goal: Absence of Hospital-Acquired Illness or Injury  Intervention: Identify and Manage Fall Risk  Recent Flowsheet Documentation  Taken 1/2/2025 0845 by Mariam Casas RN  Safety Promotion/Fall Prevention:   activity supervised   assistive device/personal items within reach   clutter free environment maintained   nonskid shoes/slippers when out of bed   safety round/check completed     Problem: Mobility Impairment  Goal: Optimal Mobility  Intervention: Optimize Mobility  Recent Flowsheet Documentation  Taken 1/2/2025 0845 by Mariam Casas RN  Activity Management: activity adjusted per tolerance  Positioning/Transfer Devices:   pillows   in use     Problem: Pain Acute  Goal: Optimal Pain Control and Function  Intervention: Prevent or Manage Pain  Recent Flowsheet Documentation  Taken 1/2/2025 0845 by Mariam Casas RN  Medication Review/Management: medications reviewed   Goal Outcome Evaluation:         [id number]  Pt admitted for generalized weakness, cough, vomiting, found on floor. Found to have COVID 19 infection.   A/Ox2, disoriented to place and situation.  VSS ex hypotensive at 1050. 500 ml bolus given per MD, recheck /57  Denies pain  Assist x 1/2, PT following  Up in chair this morning  Easy to chew diet with mildly thicken liquids  R PIV SL  Incontinent of B/B  Family at bedside  Discharge pending TCU placement  Special precautions maintained  Nursing to continue to monitor.

## 2025-01-02 NOTE — PLAN OF CARE
Problem: Adult Inpatient Plan of Care  Goal: Plan of Care Review  Description: The Plan of Care Review/Shift note should be completed every shift.  The Outcome Evaluation is a brief statement about your assessment that the patient is improving, declining, or no change.  This information will be displayed automatically on your shift  note.  Outcome: Progressing  Goal: Optimal Comfort and Wellbeing  Outcome: Progressing     Problem: Infection  Goal: Absence of Infection Signs and Symptoms  Outcome: Progressing     Problem: Fall Injury Risk  Goal: Absence of Fall and Fall-Related Injury  Outcome: Progressing  Intervention: Identify and Manage Contributors  Recent Flowsheet Documentation  Taken 1/2/2025 0050 by Nasir Perez RN  Medication Review/Management: medications reviewed  Taken 1/1/2025 2045 by Nasir Perez RN  Medication Review/Management: medications reviewed  Intervention: Promote Injury-Free Environment  Recent Flowsheet Documentation  Taken 1/2/2025 0050 by Nasir Perez RN  Safety Promotion/Fall Prevention:   activity supervised   assistive device/personal items within reach   clutter free environment maintained   lighting adjusted   mobility aid in reach   nonskid shoes/slippers when out of bed   safety round/check completed  Taken 1/1/2025 2045 by Nasir Perez RN  Safety Promotion/Fall Prevention:   activity supervised   assistive device/personal items within reach   clutter free environment maintained   lighting adjusted   mobility aid in reach   nonskid shoes/slippers when out of bed   safety round/check completed   Goal Outcome Evaluation:         Alert and oriented to self only  VSS on RA  Denied pain/nausea  Incontinent of bowel and bladder  Remains on special precautions for COVID  Repositioned as allowed by pt  Slept overnight  Able to make needs known    Nasir Perez RN

## 2025-01-02 NOTE — PROGRESS NOTES
Care Management Follow Up    Length of Stay (days): 7    Expected Discharge Date: 01/02/2025     Concerns to be Addressed:       Patient plan of care discussed at interdisciplinary rounds: Yes    Anticipated Discharge Disposition:  TCU              Anticipated Discharge Services:  TCU  Anticipated Discharge DME:  na    Patient/family educated on Medicare website which has current facility and service quality ratings:    Education Provided on the Discharge Plan:    Patient/Family in Agreement with the Plan:      Referrals Placed by CM/SW:    Private pay costs discussed: per previous CM note- stretcher costs discussed with family    Discussed  Partnership in Safe Discharge Planning  document with patient/family: No     Handoff Completed: No, handoff not indicated or clinically appropriate    Additional Information:  Per notes patient is medically ready for discharge. Message sent to pending referrals [facility] and [facility] inquiring bed openings  8:51 AM  Received a message from Brenda Gomez that they are unable to meet the patients needs.   9:47 AM   Per MD patient is ready for discharge. Message from Tika that [facility] has a bed after 1pm today. [facility] stretcher transport set up for [id number] window/.  Alert patient, family,Bedside RN charge RN,FREDI.   10:36 AM  PCS and PAS completed  Next Steps:discharge    Latricia Philip RN    Care Management Discharge Note    Discharge Date: 01/02/2025       Discharge Disposition: Transitional Care    Discharge Services:      Discharge DME: None    Discharge Transportation: family or friend will provide    Private pay costs discussed: transportation costs    Does the patient's insurance plan have a 3 day qualifying hospital stay waiver?  Yes     Which insurance plan 3 day waiver is available? Alternative insurance waiver    Will the waiver be used for post-acute placement? No    PAS Confirmation Code: [id number]  Patient/family  educated on Medicare website which has current facility and service quality ratings:      Education Provided on the Discharge Plan:  yes  Persons Notified of Discharge Plans: family, charge RN, bedside RN, transport HUC  Patient/Family in Agreement with the Plan: yes    Handoff Referral Completed: No, handoff not indicated or clinically appropriate    Additional Information:  Patient will discharge to [facility] via [facility] stretcher transport with service window of [phone]. Family and patient aware and agree with plan    Latricia Philip RN    Patient will not discharge today per MD as he needs IV fluids. Informed Charge RN,HUC, transport and facility  Canceled transport and rescheduled for tomorrow [facility] window [phone]

## 2025-01-02 NOTE — PROGRESS NOTES
"[facility]    Medicine Progress Note - Hospitalist Service    Date of Admission:  12/26/2024    Assessment & Plan   89-year-old male with PMH of Parkinson's disease, recurrent falls, hypothyroidism, hypertension, SSS s/p PPM who was brought in for evaluation of generalized weakness, cough and vomiting, found on the floor.  Found to have COVID 19 infection and possible superimposed bacterial infection. S/p Paxlovid and azithromycin course.  TCU discharge held today due to hypotension     Hypotension.  Likely due to aggressive BP control (started on lisinopril and amlodipine this admission).  Will give  mL bolus and discontinue lisinopril and amlodipine.    Confirmed COVID 19 infection.  Completed 5-day treatment of Paxlovid  Possible superimposed bacterial pneumonia.  Chest x-ray with left lower lobe infiltrate.  S/p 5 days of azithromycin  Acute metabolic encephalopathy in the setting of infection.  Continue supportive cares  Dysphagia.  Likely in the setting of Parkinson's disease.  Continue puréed diet and mildly thick liquids.  SLP evaluation to assess risk of aspiration  Generalized weakness.  PT/OT.  Waiting for TCU placement  Parkinson's disease.  Continue PTA Sinemet.  Follows with Dr. Martínez at [facility]  Hypothyroidism.  Continue PTA levothyroxine  Sick sinus syndrome s/p PPM          Diet: Combination Diet Easy to Chew (level 7); Mildly Thick (level 2); 2 gm NA Diet  Diet    DVT Prophylaxis: Enoxaparin (Lovenox) SQ  Garcia Catheter: Not present  Lines: None     Cardiac Monitoring: None  Code Status: No CPR- Do NOT Intubate      Clinically Significant Risk Factors                              # Cachexia: Estimated body mass index is 17.52 kg/m  as calculated from the following:    Height as of this encounter: 1.753 m (5' 9\").    Weight as of this encounter: 53.8 kg (118 lb 9.7 oz).       # Pacemaker present       Social Drivers of Health    Depression: At risk (5/7/2024) "    PHQ-2     PHQ-2 Score: 4   Housing Stability: Low Risk  (12/28/2024)    Housing Stability     Do you have housing? : Yes     Are you worried about losing your housing?: No   Recent Concern: Housing Stability - High Risk (12/28/2024)    Housing Stability     Do you have housing? : No     Are you worried about losing your housing?: No    [facility]    Social Connections          Disposition Plan     Medically Ready for Discharge: Anticipated Tomorrow             Maru Elder MD  Hospitalist Service  [facility]  Securely message with [employer] (more info)  Text page via [employer] Paging/Directory   ______________________________________________________________________    Interval History   Was alert  this morning but then became increasingly weak, soft-spoken and more confused.  Found to be hypotensive SBP in the 80s which responded to IV fluid bolus.    Physical Exam   Vital Signs: Temp: 97.6  F (36.4  C) Temp src: Oral BP: (!) 84/50 (map 62, MD notified) Pulse: 70   Resp: 20 SpO2: 95 % O2 Device: None (Room air)    Weight: 118 lbs 9.72 oz    General Appearance: No acute distress, looks fatigued, whispering  HEENT: Dry oral mucosa  Respiratory: Respirations unlabored, lungs are clear  Cardiovascular: Regular rate and rhythm.  Normal S1 and S2  GI: Abdomen soft and nontender    Medical Decision Making       50 MINUTES SPENT BY ME on the date of service doing chart review, history, exam, documentation & further activities per the note.  MANAGEMENT DISCUSSED with the following over the past 24 hours: Patient, family over the phone and in the hospital, RN, RN CM       Data         Imaging results reviewed over the past 24 hrs:   No results found for this or any previous visit (from the past 24 hours).

## 2025-01-03 VITALS
DIASTOLIC BLOOD PRESSURE: 73 MMHG | TEMPERATURE: 97.5 F | RESPIRATION RATE: 18 BRPM | OXYGEN SATURATION: 99 % | WEIGHT: 118.61 LBS | BODY MASS INDEX: 17.57 KG/M2 | HEART RATE: 74 BPM | HEIGHT: 69 IN | SYSTOLIC BLOOD PRESSURE: 125 MMHG

## 2025-01-03 VITALS
HEART RATE: 70 BPM | HEIGHT: 69 IN | OXYGEN SATURATION: 96 % | BODY MASS INDEX: 17.57 KG/M2 | DIASTOLIC BLOOD PRESSURE: 66 MMHG | RESPIRATION RATE: 18 BRPM | WEIGHT: 118.61 LBS | SYSTOLIC BLOOD PRESSURE: 133 MMHG | TEMPERATURE: 97.7 F

## 2025-01-03 PROCEDURE — 99239 HOSP IP/OBS DSCHRG MGMT >30: CPT | Performed by: INTERNAL MEDICINE

## 2025-01-03 PROCEDURE — 250N000013 HC RX MED GY IP 250 OP 250 PS 637: Performed by: INTERNAL MEDICINE

## 2025-01-03 RX ORDER — ENOXAPARIN SODIUM 100 MG/ML
30 INJECTION SUBCUTANEOUS EVERY 24 HOURS
Status: DISCONTINUED | OUTPATIENT
Start: 2025-01-03 | End: 2025-01-03 | Stop reason: HOSPADM

## 2025-01-03 RX ADMIN — CARBIDOPA AND LEVODOPA 2 TABLET: 25; 100 TABLET ORAL at 12:44

## 2025-01-03 RX ADMIN — SERTRALINE HYDROCHLORIDE 100 MG: 100 TABLET ORAL at 09:14

## 2025-01-03 RX ADMIN — CARBIDOPA AND LEVODOPA 2 TABLET: 25; 100 TABLET ORAL at 09:14

## 2025-01-03 RX ADMIN — LEVOTHYROXINE SODIUM 112 MCG: 0.11 TABLET ORAL at 09:14

## 2025-01-03 ASSESSMENT — ACTIVITIES OF DAILY LIVING (ADL)
ADLS_ACUITY_SCORE: 65
ADLS_ACUITY_SCORE: 67
ADLS_ACUITY_SCORE: 66
ADLS_ACUITY_SCORE: 65
ADLS_ACUITY_SCORE: 66
ADLS_ACUITY_SCORE: 67
ADLS_ACUITY_SCORE: 66
ADLS_ACUITY_SCORE: 65
ADLS_ACUITY_SCORE: 67
ADLS_ACUITY_SCORE: 67
ADLS_ACUITY_SCORE: 65
ADLS_ACUITY_SCORE: 67

## 2025-01-03 NOTE — DISCHARGE SUMMARY
"[facility]  Hospitalist Discharge Summary      Date of Admission:  12/26/2024  Date of Discharge:  1/3/2025  3:15 PM  Discharging Provider: Maru Elder MD  Discharge Service: Hospitalist Service    Discharge Diagnoses   COVID-19 infection  Left lower lobe bacterial pneumonia  Acute metabolic encephalopathy due to infection  Hypotension due to medications  Dysphagia  Parkinson's disease  Generalized weakness        Clinically Significant Risk Factors     # Cachexia: Estimated body mass index is 17.52 kg/m  as calculated from the following:    Height as of this encounter: 1.753 m (5' 9\").    Weight as of this encounter: 53.8 kg (118 lb 9.7 oz).       Follow-ups Needed After Discharge   Follow-up Appointments       Follow Up and recommended labs and tests      Follow up with detention physician.  The following labs/tests are recommended: BMP.                Unresulted Labs Ordered in the Past 30 Days of this Admission       No orders found from 11/26/2024 to 12/27/2024.        These results will be followed up by     Discharge Disposition   Discharged to short-term care facility  Condition at discharge: Stable    Hospital Course   Gomez Villasenor is a 89-year-old male with PMH of Parkinson's disease, recurrent falls, hypothyroidism, hypertension, SSS s/p PPM who was brought to [facility] for evaluation of generalized weakness, cough and vomiting after being found found on the floor.  Patient was found to have COVID 19 infection and possible superimposed bacterial pneumonia.  He completed Paxlovid and azithromycin course.   Dysphagia was noted therefore mildly second liquids were recommended to minimize risk of aspiration.  Patient remains disoriented to place and situation, has been weaker than baseline and will discharge to SNF to continue rehabilitation.  Anticipate he will need LTC placement soon.      Consultations This Hospital Stay   PHYSICAL THERAPY ADULT IP " CONSULT  CARE MANAGEMENT / SOCIAL WORK IP CONSULT  CARE MANAGEMENT / SOCIAL WORK IP CONSULT  SPEECH LANGUAGE PATH ADULT IP CONSULT  PHYSICAL THERAPY ADULT IP CONSULT  OCCUPATIONAL THERAPY ADULT IP CONSULT    Code Status   No CPR- Do NOT Intubate    Time Spent on this Encounter   I, Maru Elder MD, personally saw the patient today and spent greater than 30 minutes discharging this patient.       Maru Elder MD  [address] [address]  Phone: [phone]  Fax: [phone]  ______________________________________________________________________    Physical Exam   Vital Signs: Temp: 97.5  F (36.4  C) Temp src: Oral BP: 125/73 Pulse: 74   Resp: 18 SpO2: 99 % O2 Device: None (Room air)    Weight: 118 lbs 9.72 oz  General Appearance: No acute distress  Respiratory: Respirations unlabored  Other: Alert , Soft-spoken       Primary Care Physician   Tatyana Valdes    Discharge Orders      Primary Care - Care Coordination Referral      General info for SNF    Length of Stay Estimate: Short Term Care: Estimated # of Days <30  Condition at Discharge: Improving  Level of care:skilled   Rehabilitation Potential: Good  Admission H&P remains valid and up-to-date: Yes  Recent Chemotherapy: N/A  Use Nursing Home Standing Orders: Yes     Mantoux instructions    Give two-step Mantoux (PPD) Per Facility Policy Yes     Follow Up and recommended labs and tests    Follow up with long term physician.  The following labs/tests are recommended: BMP.     Reason for your hospital stay    COVID 19 infection, pneumonia, acute metabolic encephalopathy     Activity - Up with assistive device     Activity - Up with nursing assistance     No CPR- Do NOT Intubate     Physical Therapy Adult Consult    Evaluate and treat as clinically indicated.    Reason:  weakness     Occupational Therapy Adult Consult    Evaluate and treat as clinically indicated.    Reason:  weakness      Fall precautions     Diet    Follow this diet upon discharge: Current Diet:Orders Placed This Encounter      Combination Diet Easy to Chew (level 7); Mildly Thick (level 2); 2 gm NA Diet       Significant Results and Procedures   Most Recent 3 CBC's:  Recent Labs   Lab Test 12/30/24  0720 12/26/24  1144 02/22/24  1531   WBC 2.6* 7.9 2.3*   HGB 10.7* 11.7* 12.4*   MCV 95 98 101*    128* 136*     Most Recent 3 BMP's:  Recent Labs   Lab Test 12/30/24  0720 12/26/24  1144 02/22/24  1531    136 138   POTASSIUM 3.5 4.8 4.2   CHLORIDE 102 99 103   CO2 25 27 31*   BUN 23.3* 30.3* 25.8*   CR 0.77 0.90 0.88   ANIONGAP 11 10 4*   TAMMY 8.6* 9.4 8.5   GLC 87 133* 92   ,   Results for orders placed or performed during the hospital encounter of 12/26/24   CT Head w/o Contrast    Narrative    EXAM: CT HEAD W/O CONTRAST  LOCATION: [facility]  DATE: 12/26/2024    INDICATION: Fall.  COMPARISON: 2/22/2024.  TECHNIQUE: Routine CT Head without IV contrast. Multiplanar reformats. Dose reduction techniques were used.    FINDINGS:  INTRACRANIAL CONTENTS: No intracranial hemorrhage, extraaxial collection, or mass effect.  No CT evidence of acute infarct. Chronic bilateral cerebellar lacunar infarcts. Moderate presumed chronic small vessel ischemic changes. Ventriculomegaly   disproportionate to the degree of volume loss. Correlate for normal pressure hydrocephalus.     VISUALIZED ORBITS/SINUSES/MASTOIDS: No intraorbital abnormality. Mild mucosal thickening scattered about the paranasal sinuses. No middle ear or mastoid effusion.    BONES/SOFT TISSUES: No acute abnormality.      Impression    IMPRESSION:  1.  No acute intracranial process.  2.  Ventriculomegaly somewhat out of proportion to the degree of parenchymal volume loss, which can be seen in the setting of normal pressure hydrocephalus. Correlate clinically.  3.  Brain atrophy and sequela of chronic microvascular ischemia.               Chest  XR,  PA & LAT    Narrative    EXAM: XR CHEST 2 VIEWS  LOCATION: [facility]  DATE: 12/26/2024    INDICATION: cough  COMPARISON: Chest x-ray 7/1/2023      Impression    IMPRESSION: Stable dual-lead left-sided cardiac conduction device. Left lower lobar posterior basilar opacities suspicious for pneumonic infiltrates. The right lung is clear. No pleural effusion. Normal heart size.       Discharge Medications   Current Discharge Medication List        CONTINUE these medications which have NOT CHANGED    Details   acetaminophen (TYLENOL) 500 MG tablet Take 1-2 tablets (500-1,000 mg) by mouth every 8 hours as needed for fever or pain.    Associated Diagnoses: Parkinson's disease with dyskinesia and fluctuating manifestations (H)      carbidopa-levodopa (SINEMET)  MG tablet Take 2 tablets by mouth 4 times daily      cholecalciferol (VITAMIN D-1000 MAX ST) 25 MCG (1000 UT) TABS Take 2,000 Units by mouth daily.      levothyroxine (SYNTHROID/LEVOTHROID) 112 MCG tablet Take 1 tablet (112 mcg) by mouth daily.  Qty: 90 tablet, Refills: 3    Associated Diagnoses: Acquired hypothyroidism      !! multivitamin (OCUVITE) TABS tablet Take 2 tablets by mouth daily.      !! multivitamin w/minerals (MULTI-VITAMIN) tablet Take 1 tablet by mouth daily.      sertraline (ZOLOFT) 100 MG tablet Take 1 tablet (100 mg) by mouth daily.  Qty: 90 tablet, Refills: 3    Associated Diagnoses: Moderate episode of recurrent major depressive disorder (H)       !! - Potential duplicate medications found. Please discuss with provider.        Allergies   No Known Allergies

## 2025-01-03 NOTE — PLAN OF CARE
Physical Therapy Discharge Summary    Reason for therapy discharge:    Discharged to transitional care facility.    Progress towards therapy goal(s). See goals on Care Plan in [facility] electronic health record for goal details.  Goals partially met.  Barriers to achieving goals:   discharge from facility.    Therapy recommendation(s):    Continued therapy is recommended.  Rationale/Recommendations:  PT at TCU to improve functional mobility.

## 2025-01-03 NOTE — PLAN OF CARE
Problem: Adult Inpatient Plan of Care  Goal: Optimal Comfort and Wellbeing  Outcome: Met     Problem: Adult Inpatient Plan of Care  Goal: Readiness for Transition of Care  Outcome: Met   Goal Outcome Evaluation:       Patient is oriented to self only. Requires help to feed and is incontinent B/B. Pleasant and cooperative. Denies pain. Occasional non-productive cough. Good appetite.  Discharging via stretcher to TCU, between 1-2 pm.

## 2025-01-03 NOTE — PROGRESS NOTES
Nutrition-Brief Note  Screened for LOS, day 8  Eating 50-75% of meals per flowsheets  Noted orders to discharge today, RD will not follow at this time

## 2025-01-03 NOTE — PLAN OF CARE
Problem: Adult Inpatient Plan of Care  Goal: Absence of Hospital-Acquired Illness or Injury  Intervention: Identify and Manage Fall Risk  Recent Flowsheet Documentation  Taken 1/2/2025 1630 by Virginia Martínez RN  Safety Promotion/Fall Prevention:   activity supervised   assistive device/personal items within reach   clutter free environment maintained   lighting adjusted   nonskid shoes/slippers when out of bed   patient and family education   room near nurse's station   safety round/check completed  Intervention: Prevent Skin Injury  Recent Flowsheet Documentation  Taken 1/2/2025 2020 by Virginia Martínez RN  Body Position:   log-rolled   supine, head elevated  Taken 1/2/2025 1630 by Virginia Martínez RN  Body Position:   log-rolled   supine, head elevated  Intervention: Prevent Infection  Recent Flowsheet Documentation  Taken 1/2/2025 1630 by Virginia Martínez RN  Infection Prevention:   personal protective equipment utilized   rest/sleep promoted   single patient room provided   equipment surfaces disinfected   hand hygiene promoted     Problem: Pain Acute  Goal: Optimal Pain Control and Function  Intervention: Prevent or Manage Pain  Recent Flowsheet Documentation  Taken 1/2/2025 1630 by Virginia Martínez RN  Medication Review/Management: medications reviewed     Problem: Infection  Goal: Absence of Infection Signs and Symptoms  Intervention: Prevent or Manage Infection  Recent Flowsheet Documentation  Taken 1/2/2025 1630 by Virginia Martínez RN  Isolation Precautions: special precautions maintained     Problem: Comorbidity Management  Goal: Maintenance of Heart Failure Symptom Control  Intervention: Maintain Heart Failure Management  Recent Flowsheet Documentation  Taken 1/2/2025 1630 by Virginia Martínez RN  Medication Review/Management: medications reviewed     Problem: Fall Injury Risk  Goal: Absence of Fall and Fall-Related Injury  Intervention: Identify and Manage Contributors  Recent Flowsheet  Documentation  Taken 1/2/2025 1630 by Virginia Martínez RN  Medication Review/Management: medications reviewed  Intervention: Promote Injury-Free Environment  Recent Flowsheet Documentation  Taken 1/2/2025 1630 by Virginia Martínez RN  Safety Promotion/Fall Prevention:   activity supervised   assistive device/personal items within reach   clutter free environment maintained   lighting adjusted   nonskid shoes/slippers when out of bed   patient and family education   room near nurse's station   safety round/check completed     Problem: Gas Exchange Impaired  Goal: Optimal Gas Exchange  Intervention: Optimize Oxygenation and Ventilation  Recent Flowsheet Documentation  Taken 1/2/2025 2020 by Virginia Martínez RN  Head of Bed (HOB) Positioning: HOB at 20-30 degrees  Taken 1/2/2025 1630 by Virginia Martínez RN  Head of Bed (HOB) Positioning: HOB at 20-30 degrees     Problem: Mobility Impairment  Goal: Optimal Mobility  Intervention: Optimize Mobility  Recent Flowsheet Documentation  Taken 1/2/2025 2020 by Virginai Martínez RN  Positioning/Transfer Devices:   pillows   in use  Taken 1/2/2025 1630 by Virginia Martínez RN  Activity Management: activity adjusted per tolerance  Positioning/Transfer Devices:   pillows   in use   NURSING PROGRESS NOTE  Shift Summary      Date: January 2, 2025     Neuro/Musculoskeletal:  A&O to self and time   Respiratory:  Sating in the 90s on RA.  GI/:  Adequate urine output.  LBM: 1/2/25  Diet/Appetite:  Tolerating Easy to Chew/2g sodium diet.  Activity:  Assist of 1/2   Pain:  Denies.   Skin:  No new deficits noted.     Pertinent Shift Updates:  Pt denies any pain or nausea. Sat up at the edge of the bed assist of 1 for dinner. Multiple episodes of B&B incontinence. VSS on RA, continue to monitor.      Plan:  Discharge to TCU       Virginia Martínez RN  ....................................................

## 2025-01-03 NOTE — PLAN OF CARE
Speech Language Therapy Discharge Summary    Reason for therapy discharge:    Discharged to transitional care facility.    Progress towards therapy goal(s). [facility] for goal details.  Goals partially met.  Barriers to achieving goals:   discharge from facility.    Therapy recommendation(s):    Continued therapy is recommended.  Rationale/Recommendations:  Easy to chew diet w/mildly thick liquids due to immediate overt s/s aspiration when given any thin liquids and precarious respiratory status. Continue speech services at TCU to reassess as condition improves, consider OP video swallow if appropriate.

## 2025-01-03 NOTE — PLAN OF CARE
"Goal Outcome Evaluation:      Plan of Care Reviewed With: patient    Overall Patient Progress: improving    Pt is alert to self. Assisted x 1   Denies pain,SOB, chest pain  BM last night used commode   Discharge was held due to hypotension   Pt slept intermittent   Respirations even and unlabored   No distress noted on assessments   Call light within reach   Plan of care ongoing   /73 (BP Location: Right arm)   Pulse 74   Temp 97.5  F (36.4  C) (Oral)   Resp 18   Ht 1.753 m (5' 9\")   Wt 53.8 kg (118 lb 9.7 oz)   SpO2 99%   BMI 17.52 kg/m      Dominique Negrete RN on 1/3/2025 at 6:02 AM        Problem: Adult Inpatient Plan of Care  Goal: Absence of Hospital-Acquired Illness or Injury  Intervention: Prevent Skin Injury  Recent Flowsheet Documentation  Taken 1/3/2025 0453 by Dominique Negrete RN  Body Position: turned  Taken 1/3/2025 0230 by Dominique Negrete RN  Body Position:   log-rolled   supine, head elevated     Problem: Adult Inpatient Plan of Care  Goal: Absence of Hospital-Acquired Illness or Injury  Intervention: Prevent Infection  Recent Flowsheet Documentation  Taken 1/3/2025 0230 by Dominique Negrete RN  Infection Prevention:   personal protective equipment utilized   rest/sleep promoted   single patient room provided   equipment surfaces disinfected   hand hygiene promoted     Problem: Pain Acute  Goal: Optimal Pain Control and Function  Intervention: Develop Pain Management Plan  Recent Flowsheet Documentation  Taken 1/3/2025 0100 by Dominique Negrete RN  Pain Management Interventions:   repositioned   pillow support provided   medication (see MAR)  Intervention: Prevent or Manage Pain  Recent Flowsheet Documentation  Taken 1/3/2025 0230 by Dominique Negrete RN  Medication Review/Management: medications reviewed     Problem: Pain Acute  Goal: Optimal Pain Control and Function  Intervention: Prevent or Manage Pain  Recent Flowsheet Documentation  Taken 1/3/2025 0230 by Dominique Negrete RN  Medication " Review/Management: medications reviewed     Problem: Comorbidity Management  Goal: Maintenance of Heart Failure Symptom Control  Outcome: Progressing  Intervention: Maintain Heart Failure Management  Recent Flowsheet Documentation  Taken 1/3/2025 0230 by Dominique Negrete RN  Medication Review/Management: medications reviewed     Problem: Fall Injury Risk  Goal: Absence of Fall and Fall-Related Injury  Outcome: Progressing  Intervention: Identify and Manage Contributors  Recent Flowsheet Documentation  Taken 1/3/2025 0230 by Dominique Negrete RN  Medication Review/Management: medications reviewed  Intervention: Promote Injury-Free Environment  Recent Flowsheet Documentation  Taken 1/3/2025 0230 by Dominique Negrete RN  Safety Promotion/Fall Prevention:   activity supervised   assistive device/personal items within reach   clutter free environment maintained   lighting adjusted   nonskid shoes/slippers when out of bed   patient and family education   room near nurse's station   safety round/check completed

## 2025-01-03 NOTE — PROGRESS NOTES
Care Management Follow Up    Length of Stay (days): 8    Expected Discharge Date: 01/03/2025     Concerns to be Addressed:       Patient plan of care discussed at interdisciplinary rounds: Yes    Anticipated Discharge Disposition: Transitional Care              Anticipated Discharge Services:    Anticipated Discharge DME: None    Patient/family educated on Medicare website which has current facility and service quality ratings:    Education Provided on the Discharge Plan:    Patient/Family in Agreement with the Plan: yes    Referrals Placed by CM/SW:    Private pay costs discussed: transportation costs    Discussed  Partnership in Safe Discharge Planning  document with patient/family: no    Handoff Completed: No, handoff not indicated or clinically appropriate    Additional Information:  [employer] [facility] transport set up today for [id number] to [facility]. Message sent to MD to verify patient is ready for discharge today.   9:30 AM   Per MD patient is ready for discharge today. Facility notified. PCS and PAS completed    Next Steps: fax orders    Latricia Philip RN    Care Management Discharge Note    Discharge Date: 01/03/2025       Discharge Disposition: Transitional Care    Discharge Services:      Discharge DME: None    Discharge Transportation: family or friend will provide    Private pay costs discussed: transportation costs    Does the patient's insurance plan have a 3 day qualifying hospital stay waiver?  Yes     Which insurance plan 3 day waiver is available? Alternative insurance waiver    Will the waiver be used for post-acute placement? No    PAS Confirmation Code: [id number]  Patient/family educated on Medicare website which has current facility and service quality ratings:      Education Provided on the Discharge Plan:    Persons Notified of Discharge Plans: Charge RN, HUC,family, patient  Patient/Family in Agreement with the Plan: yes    Handoff Referral Completed: No, handoff  not indicated or clinically appropriate    Additional Information:  Patient will discharge to [facility] transport. Patient and family agree with the plan    Latricia Philip RN

## 2025-01-06 ENCOUNTER — PATIENT OUTREACH (OUTPATIENT)
Dept: CARE COORDINATION | Facility: CLINIC | Age: OVER 89
End: 2025-01-06
Payer: COMMERCIAL

## 2025-01-06 ENCOUNTER — TELEPHONE (OUTPATIENT)
Dept: FAMILY MEDICINE | Facility: CLINIC | Age: OVER 89
End: 2025-01-06
Payer: COMMERCIAL

## 2025-01-06 NOTE — TELEPHONE ENCOUNTER
FYI - Status Update    Who is Calling: family member, Megan     Update: Generalized weakness, recovering from covid, pneumonia, and in transitional care. Family would like to get Delbert back home and would like help with in home support. He is immobile and family would need support with care in transitioning.     Does caller want a call/response back: Yes     Could we send this information to you in [employer] or would you prefer to receive a phone call?:   Patient would prefer a phone call   Okay to leave a detailed message?: Yes at Cell number on file:    Telephone Information:   Mobile [phone]

## 2025-01-06 NOTE — PROGRESS NOTES
Clinic Care Coordination Contact  Care Coordination Transition Communication    Clinical Data: Patient was hospitalized at [facility] from 12/26/24 to 1/3/25 with diagnosis of COVID-19 infection  Left lower lobe bacterial pneumonia  Acute metabolic encephalopathy due to infection  Hypotension due to medications  Dysphagia  Parkinson's disease  Generalized weakness.     Assessment: Patient has transitioned to TCU/ARU for short term rehabilitation:    Facility Name: [facility]  Transition Communication:  Notified facility of Primary Care- Care Coordination support via Epic fax.    Plan: Care Coordinator will await notification from facility staff informing of patient's discharge plans/needs. Care Coordinator will review chart and outreach to facility staff every 4 weeks and as needed.

## 2025-01-07 NOTE — TELEPHONE ENCOUNTER
Would need to be discharged from transitional care before scheduling follow-up appointment.  Okay to use held, but please leave virtual open.    Tatyana Valdes, DO

## 2025-01-08 NOTE — TELEPHONE ENCOUNTER
First Attempt: CHANELL Guzman to call back to schedule an appt with Dr Valdes for when he is out of transitional care. Ok for a reserved slot.

## 2025-01-15 NOTE — TELEPHONE ENCOUNTER
Patient family member, Megan has multiple questions that I can't answer that are more clinical so can a RN call her back & then also schedule the appt for the hospital follow up please?

## 2025-01-16 NOTE — TELEPHONE ENCOUNTER
Called and spoke with patient's daughter Megan. She had several questions regarding home care once patient is discharged. Advised daughter to discuss orders for home with TCU providers. Patient is still in the TCU. Daughter will call us to schedule office visit with PCP once patient is discharged.

## 2025-01-19 NOTE — TELEPHONE ENCOUNTER
Remote device interrogation reviewed.  Single episode of nonsustained VT (asymptomatic).  Given the decline in the patient's overall clinical status and palliative care consult recommend suspending any device patient's going forward.  Shlomo Obrien MD

## 2025-01-27 ENCOUNTER — TELEPHONE (OUTPATIENT)
Dept: FAMILY MEDICINE | Facility: CLINIC | Age: OVER 89
End: 2025-01-27
Payer: COMMERCIAL

## 2025-01-27 ENCOUNTER — MEDICAL CORRESPONDENCE (OUTPATIENT)
Dept: HEALTH INFORMATION MANAGEMENT | Facility: CLINIC | Age: OVER 89
End: 2025-01-27
Payer: COMMERCIAL

## 2025-01-27 ENCOUNTER — DOCUMENTATION ONLY (OUTPATIENT)
Dept: OTHER | Facility: CLINIC | Age: OVER 89
End: 2025-01-27
Payer: COMMERCIAL

## 2025-01-27 NOTE — TELEPHONE ENCOUNTER
Home Care is calling regarding an established patient with [facility].       Requesting orders from: Tatyana Valdes  Provider is following patient: No, need to confirm       Orders Requested    Skilled Nursing  Request for initial certification (first set of orders)   Frequency:  1x/wk for 3 wks  1 x/every other week x 6 weeks      Physical Therapy  Request for initial evaluation and treatment (one time)      Occupational Therapy  Request for initial evaluation and treatment (one time)    Speech Therapy  Request for initial evaluation and treatment (one time)     Social Work  Request for initial evaluation and treatment (one time)       Information was gathered and will be sent to provider for review.  RN will contact Home Care with information after provider review.  Information was gathered and will be sent to provider to confirm provider will be following patient.  RN will contact Home Care with information after provider review.  Confirmed ok to leave a detailed message with call back.  Contact information confirmed and updated as needed.    Tala Oliver RN

## 2025-01-29 ENCOUNTER — OFFICE VISIT (OUTPATIENT)
Dept: FAMILY MEDICINE | Facility: CLINIC | Age: OVER 89
End: 2025-01-29
Payer: COMMERCIAL

## 2025-01-29 VITALS
DIASTOLIC BLOOD PRESSURE: 55 MMHG | SYSTOLIC BLOOD PRESSURE: 84 MMHG | HEART RATE: 72 BPM | WEIGHT: 114.8 LBS | RESPIRATION RATE: 16 BRPM | BODY MASS INDEX: 17.46 KG/M2

## 2025-01-29 DIAGNOSIS — F33.1 MODERATE EPISODE OF RECURRENT MAJOR DEPRESSIVE DISORDER (H): ICD-10-CM

## 2025-01-29 DIAGNOSIS — Z02.89 ENCOUNTER FOR COMPLETION OF FORM WITH PATIENT: ICD-10-CM

## 2025-01-29 DIAGNOSIS — E03.9 HYPOTHYROIDISM, UNSPECIFIED TYPE: ICD-10-CM

## 2025-01-29 DIAGNOSIS — D64.9 ANEMIA, UNSPECIFIED TYPE: ICD-10-CM

## 2025-01-29 DIAGNOSIS — Z09 HOSPITAL DISCHARGE FOLLOW-UP: Primary | ICD-10-CM

## 2025-01-29 DIAGNOSIS — G20.C PARKINSONISM, UNSPECIFIED PARKINSONISM TYPE (H): ICD-10-CM

## 2025-01-29 LAB — HGB BLD-MCNC: 10.9 G/DL (ref 13.3–17.7)

## 2025-01-29 PROCEDURE — 85018 HEMOGLOBIN: CPT | Performed by: FAMILY MEDICINE

## 2025-01-29 PROCEDURE — G2211 COMPLEX E/M VISIT ADD ON: HCPCS | Performed by: FAMILY MEDICINE

## 2025-01-29 PROCEDURE — 96127 BRIEF EMOTIONAL/BEHAV ASSMT: CPT | Performed by: FAMILY MEDICINE

## 2025-01-29 PROCEDURE — 36415 COLL VENOUS BLD VENIPUNCTURE: CPT | Performed by: FAMILY MEDICINE

## 2025-01-29 PROCEDURE — 80048 BASIC METABOLIC PNL TOTAL CA: CPT | Performed by: FAMILY MEDICINE

## 2025-01-29 PROCEDURE — 99215 OFFICE O/P EST HI 40 MIN: CPT | Performed by: FAMILY MEDICINE

## 2025-01-29 PROCEDURE — 84443 ASSAY THYROID STIM HORMONE: CPT | Performed by: FAMILY MEDICINE

## 2025-01-29 RX ORDER — SERTRALINE HYDROCHLORIDE 100 MG/1
150 TABLET, FILM COATED ORAL DAILY
COMMUNITY
Start: 2025-01-29

## 2025-01-29 ASSESSMENT — PATIENT HEALTH QUESTIONNAIRE - PHQ9
SUM OF ALL RESPONSES TO PHQ QUESTIONS 1-9: 4
SUM OF ALL RESPONSES TO PHQ QUESTIONS 1-9: 4
10. IF YOU CHECKED OFF ANY PROBLEMS, HOW DIFFICULT HAVE THESE PROBLEMS MADE IT FOR YOU TO DO YOUR WORK, TAKE CARE OF THINGS AT HOME, OR GET ALONG WITH OTHER PEOPLE: NOT DIFFICULT AT ALL

## 2025-01-30 LAB
ANION GAP SERPL CALCULATED.3IONS-SCNC: 7 MMOL/L (ref 7–15)
BUN SERPL-MCNC: 24.7 MG/DL (ref 8–23)
CALCIUM SERPL-MCNC: 9.5 MG/DL (ref 8.8–10.4)
CHLORIDE SERPL-SCNC: 104 MMOL/L (ref 98–107)
CREAT SERPL-MCNC: 0.82 MG/DL (ref 0.67–1.17)
EGFRCR SERPLBLD CKD-EPI 2021: 80 ML/MIN/1.73M2
GLUCOSE SERPL-MCNC: 86 MG/DL (ref 70–99)
HCO3 SERPL-SCNC: 30 MMOL/L (ref 22–29)
POTASSIUM SERPL-SCNC: 4.5 MMOL/L (ref 3.4–5.3)
SODIUM SERPL-SCNC: 141 MMOL/L (ref 135–145)
TSH SERPL DL<=0.005 MIU/L-ACNC: 1.31 UIU/ML (ref 0.3–4.2)

## 2025-02-01 DIAGNOSIS — G20.A1 PARKINSON'S DISEASE, UNSPECIFIED WHETHER DYSKINESIA PRESENT, UNSPECIFIED WHETHER MANIFESTATIONS FLUCTUATE (H): Primary | ICD-10-CM

## 2025-02-01 NOTE — PROGRESS NOTES
[address] [facility] [address]  Mobile Phone  [phone]  Email  [email or web address]    Tanya Villasenor  Emergency Contact, Spouse      Rajendra Narayan  Emergency Contact, Other    Chart review only    Change in insurance coverage       Assessment:  Parkinson     Review of diagnosis    parkinson    Avoidance of dopamine blockers   Not taking    Motor complication review       Review of Impulse control disorders       Review of surgical or medication options       Gait/Balance/Falls       Exercise/Therapy performed/offered       Cognitive/Driving   Cognitive impairment  Delirium  Problems tolerating an acetylcholinesterase inhibitor    Mood   Mood disorder  depression    Hallucinations/delusions     Sleep       Bladder/Renal/Prostate/Gyn/Other   Prostate cancer   Urinary incontinence   Renal stones     GI/Constipation/GERD       ENDO/Lipid/DM/Bone density/Thyroid  Hypothyroidism     Cardio/heart/Hyper or Hypotensive   Cardiac pacemaker  Postural dizziness  presyncope    Vision/Dry Eyes/Cataracts/Glaucoma/Macular   Macular degeneration    Heme/Anticoagulation/Antiplatelet/Anemia/Other      ENT/Resp  Pneumonia of left lower lob  History of covid     Skin/Cancer/Seborrhea/other      Musculoskeletal/Pain/Headache  Generalized muscle weakness  fall    Other:      Medications            Acetaminophen tylenol 500mg         Carbidopa/levodopa Sinemet 25/100 2 2 2 2    Cholecalciferol Vit D 1000 25         Levothyroxine synthroid 112        MVI Ocuvite         MVI        Sertraline zoloft 100mg 1.5                                                                                                                   Shala Martínez MD - 05/31/2024 2:00 PM CDT     Gomez Villasenor presents for a follow-up visit. He is accompanied by his wife and daughter.    They still live in their own house. His wife is his main caregiver. He does need assistance at night sometimes. His wife feels that she does not need additional help at  this time.     Walking is harder. He had a couple of falls earlier this month. He is seeing physical therapy. He has a wheelchair.    Eating is hard. Not because of swallowing issues, but because he tends to lean to the side and he has problems staying upright. He is still feeding himself.    He takes Sinemet 2 tablets 4 times a day. No side effects.    Using a walker at home    /66 (BP Location: Left Arm, BP Cuff Size: Regular)  Pulse 73     EXAMINATION:     Alert, answering simple questions appropriately.    There was no resting tremor. No postural tremor with extended arms.No tremor present on finger to nose test.Mild right sided bradykinesia with finger tapping, hand opening and closing, hand pronation/supination, mild to moderate on the left.. Tone is increased in left arm and leg      ASSESSMENT and PLAN:    Gomez Villasenor is a 89 y.o. old male with a history of prostate cancer and bradycardia, s/p pacemaker placement, with a history of parkinsonism, mainly manifested by gait instability, memory concerns, c/w dementia (MOCA 17/30, could not tolerate donepezil), worked with OT in the past.    -Gait instability: due to parkinsonism, not orthostatic. Still working with PT    -caregiver needs: I offered a referral to social work to discuss caregiver resource. 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   His brain MRI from 2015 was suggestive of possible NPH. Since has been discussed with the family in the past and we discussed it again today. Given his advanced age with with relatively stable symptoms and dementi and he is unlikely a good surgical candidate, we decided to hold off on further evaluation for NPH.      Otherwise, plan as below:    Patient Instructions   Reason for today's visit: follow  up    Treatment plan:     Referral to occupational therapy is available (order placed)    Social work referral is available    No change in Sinemet for now    Follow up:   In 6-8 months    7/12/2021  [facility]  Gomez Villasenor is a 89 y.o. male history of prostate cancer, who presents to [facility] care for a diagnosis of Parkinson's disease.    He has had symptoms for a least a couple of years, started with balance concerns. He never had a tremor. He has been under the care of Dr. Chaudhari at [place], who started him on Carbidopa/Levodopa in 1/2020. He was taking a dose of 3 tabs 4 times daily, no side effects, but was not sure if it was helping him. He reduced it to 2 tabs 3 times daily and does not fee any different (no better or worse). He is not sure when to take the doses.    One if his main concerns is fatigue, this did not change with dose reduction.     He had 2 falls in the last few weeks. He does have some dizziness, was diagnosed with bradycardia and is seeing cardiology to consider placement of a pacemaker. His wife reports that his heart rate has always been low. He never passed out.    Memory is not great. He has some confusion with doctor appointments.    His wife is in charge of finances. This is not new. They live in their own home.    He just finished a PT session last week. He also has a . He has walking sticks.     He sleeps well at night, No vivid dreams  He takes 1 nap during the day.    He is depressed because he cant do much. Recently saw a psychiatrist for this. He takes zoloft , has been on Zoloft for 2 years    Bladder: he has an arificial sphincter since 1992, this is working for him. No concerns about bowel movements.     MRI brain 2015:  CONCLUSION:   1.  No recent infarct, intracranial mass or evidence of intracranial hemorrhage.   2.  Underlying mild to moderate cerebral volume loss and presumed chronic small vessel ischemic  "changes.   3.  Disproportionate enlargement of the lateral and third ventricles and ancillary findings which, in the correct clinical setting, would raise the possibility of a normal pressure/communicating hydrocephalus.     His exam (on levodopa) is notable for mild right sided parkinsonism and more significant postural abnormalities.  He is not sure how much Carbidopa/Levodopa is helping him, but he has no side effects, and I would recommend to continue to take it as below.    His brain MRI from 2015 was suggestive of possible NPH. We discussed that the MRI could be repeated, but since he is 89, with relatively stable symptoms and he is unlikely a good surgical candidate, we decided to hold off on that.    Memory testing was offered, but he is not interested at this time.    Otherwise, plan as below:    Patient Instructions   Reason for today's visit: Parkinson's    Your diagnosis:   Parkinson's  MRI findings in 2015 suggestive of possible \"normal pressure hydrocephalus\"    Treatment plan:     Take Carbidopa/Levodopa as follows:  2 tabs three times daily at 6:30 AM, 12 PM, 6 PM. If needed, Can add another dose at bedtime       Karen Noble   MV    12/27/24  2:03 PM  Note  Action 12/27/24 MV 2.01pm   Action Taken Imaging request faxed to [facility]      Action 1/13/25 MV   Action Taken Images resolvevd              RECORDS RECEIVED FROM: internal   REASON FOR VISIT: Parkinson's disease    PROVIDER: Dr. Grande   DATE OF APPT: 4/11/25   NOTES (FOR ALL VISITS) STATUS DETAILS   OFFICE NOTE from referring provider Internal Dr Tatyana Valdes @ [facility] Med:  12/20/24 telephone encounter  11/21/24   OFFICE NOTE from other specialist Care Everywhere Dr Shala Martínez @  Neuro:  5/31/24  3/7/24  10/26/23  (Additional encounters)   DISCHARGE REPORT from the ER Internal [facility]:  2/22/24   MEDICATION LIST Internal     IMAGING  (FOR ALL VISITS)       CT (HEAD, NECK, SPINE) PACS [facility]:  CT Head 12/26/24  CT Head " 2/22/24     [facility]:  CT Cervical Spine 6/29/23  CT Head 6/29/23

## 2025-02-02 ENCOUNTER — MYC MEDICAL ADVICE (OUTPATIENT)
Dept: FAMILY MEDICINE | Facility: CLINIC | Age: OVER 89
End: 2025-02-02
Payer: COMMERCIAL

## 2025-02-03 NOTE — TELEPHONE ENCOUNTER
Did you want to send a separate 50 mg tablet of sertraline to pharmacy? Order pending for PCP review.

## 2025-02-05 ENCOUNTER — TELEPHONE (OUTPATIENT)
Dept: FAMILY MEDICINE | Facility: CLINIC | Age: OVER 89
End: 2025-02-05
Payer: COMMERCIAL

## 2025-02-05 NOTE — TELEPHONE ENCOUNTER
Home Care is calling regarding an established patient with [facility].       Requesting orders from: Tatyana Valdes  Provider is following patient: Yes  Is this a 60-day recertification request?  No    Orders Requested    Speech Therapy  Request for initial certification (first set of orders)   Frequency:  every other week for 2 visits total       Information was gathered and will be sent to provider for review.  RN will contact Home Care with information after provider review.  Confirmed ok to leave a detailed message with call back.  Contact information confirmed and updated as needed.    Елена Ngo RN

## 2025-02-06 ENCOUNTER — TELEPHONE (OUTPATIENT)
Dept: FAMILY MEDICINE | Facility: CLINIC | Age: OVER 89
End: 2025-02-06
Payer: COMMERCIAL

## 2025-02-06 NOTE — TELEPHONE ENCOUNTER
Spoke with pt's daughter, she will call or send [employer] message tomorrow after discussing what they want to do

## 2025-02-06 NOTE — TELEPHONE ENCOUNTER
Tried calling phone number for Antonio with [facility] and unable to leave voicemail. Response states as unable to complete call. Will try back      Елена Ngo RN

## 2025-02-06 NOTE — TELEPHONE ENCOUNTER
Home Care is calling regarding an established patient with [facility].       Requesting orders from: Tatyana Valdes  Provider is following patient: Yes  Is this a 60-day recertification request?  No    Orders Requested    Occupational Therapy  Request for initial certification (first set of orders)   Frequency:  1x/wk for 3 wks  then 1 every otherx/wk for 2 wks      Confirmed ok to leave a detailed message with call back.  Contact information confirmed and updated as needed.    Tianna Reno RN

## 2025-02-06 NOTE — TELEPHONE ENCOUNTER
Called Antonio, OT with [facility] and left a message to call back.  If Antonio returns call please relay verbal orders.

## 2025-02-13 DIAGNOSIS — F33.1 MODERATE EPISODE OF RECURRENT MAJOR DEPRESSIVE DISORDER (H): ICD-10-CM

## 2025-02-13 RX ORDER — SERTRALINE HYDROCHLORIDE 100 MG/1
150 TABLET, FILM COATED ORAL DAILY
Qty: 135 TABLET | Refills: 3 | Status: SHIPPED | OUTPATIENT
Start: 2025-02-13